# Patient Record
Sex: MALE | Race: WHITE | NOT HISPANIC OR LATINO | Employment: UNEMPLOYED | URBAN - METROPOLITAN AREA
[De-identification: names, ages, dates, MRNs, and addresses within clinical notes are randomized per-mention and may not be internally consistent; named-entity substitution may affect disease eponyms.]

---

## 2022-06-17 ENCOUNTER — HOSPITAL ENCOUNTER (INPATIENT)
Facility: HOSPITAL | Age: 57
LOS: 6 days | Discharge: LEFT BEFORE DISCHARGE | DRG: 301 | End: 2022-06-23
Attending: EMERGENCY MEDICINE | Admitting: EMERGENCY MEDICINE
Payer: MEDICAID

## 2022-06-17 DIAGNOSIS — I96 DRY GANGRENE: ICD-10-CM

## 2022-06-17 DIAGNOSIS — M79.672 LEFT FOOT PAIN: ICD-10-CM

## 2022-06-17 DIAGNOSIS — R07.9 CHEST PAIN: ICD-10-CM

## 2022-06-17 PROCEDURE — 12000002 HC ACUTE/MED SURGE SEMI-PRIVATE ROOM

## 2022-06-17 PROCEDURE — 99285 EMERGENCY DEPT VISIT HI MDM: CPT | Mod: 25

## 2022-06-18 PROBLEM — I16.0 HYPERTENSIVE URGENCY: Status: ACTIVE | Noted: 2022-06-18

## 2022-06-18 PROBLEM — I96 DRY GANGRENE: Status: ACTIVE | Noted: 2022-06-18

## 2022-06-18 PROBLEM — I73.9 PAD (PERIPHERAL ARTERY DISEASE): Status: ACTIVE | Noted: 2022-06-18

## 2022-06-18 LAB
ALBUMIN SERPL BCP-MCNC: 3.9 G/DL (ref 3.5–5.2)
ALP SERPL-CCNC: 102 U/L (ref 55–135)
ALT SERPL W/O P-5'-P-CCNC: 21 U/L (ref 10–44)
ANION GAP SERPL CALC-SCNC: 11 MMOL/L (ref 8–16)
AST SERPL-CCNC: 27 U/L (ref 10–40)
BASOPHILS # BLD AUTO: 0.07 K/UL (ref 0–0.2)
BASOPHILS NFR BLD: 0.7 % (ref 0–1.9)
BILIRUB SERPL-MCNC: 0.5 MG/DL (ref 0.1–1)
BILIRUB UR QL STRIP: NEGATIVE
BUN SERPL-MCNC: 20 MG/DL (ref 6–20)
CALCIUM SERPL-MCNC: 9.6 MG/DL (ref 8.7–10.5)
CHLORIDE SERPL-SCNC: 100 MMOL/L (ref 95–110)
CLARITY UR: CLEAR
CO2 SERPL-SCNC: 24 MMOL/L (ref 23–29)
COLOR UR: YELLOW
CREAT SERPL-MCNC: 0.9 MG/DL (ref 0.5–1.4)
CRP SERPL-MCNC: 11.5 MG/L (ref 0–8.2)
CTP QC/QA: YES
DIFFERENTIAL METHOD: ABNORMAL
EOSINOPHIL # BLD AUTO: 0.4 K/UL (ref 0–0.5)
EOSINOPHIL NFR BLD: 4 % (ref 0–8)
ERYTHROCYTE [DISTWIDTH] IN BLOOD BY AUTOMATED COUNT: 12.1 % (ref 11.5–14.5)
ERYTHROCYTE [SEDIMENTATION RATE] IN BLOOD BY WESTERGREN METHOD: 9 MM/HR (ref 0–10)
EST. GFR  (AFRICAN AMERICAN): >60 ML/MIN/1.73 M^2
EST. GFR  (NON AFRICAN AMERICAN): >60 ML/MIN/1.73 M^2
GLUCOSE SERPL-MCNC: 132 MG/DL (ref 70–110)
GLUCOSE UR QL STRIP: ABNORMAL
HCT VFR BLD AUTO: 46.2 % (ref 40–54)
HGB BLD-MCNC: 16.4 G/DL (ref 14–18)
HGB UR QL STRIP: NEGATIVE
IMM GRANULOCYTES # BLD AUTO: 0.02 K/UL (ref 0–0.04)
IMM GRANULOCYTES NFR BLD AUTO: 0.2 % (ref 0–0.5)
KETONES UR QL STRIP: NEGATIVE
LEUKOCYTE ESTERASE UR QL STRIP: NEGATIVE
LYMPHOCYTES # BLD AUTO: 2.8 K/UL (ref 1–4.8)
LYMPHOCYTES NFR BLD: 27 % (ref 18–48)
MCH RBC QN AUTO: 31.7 PG (ref 27–31)
MCHC RBC AUTO-ENTMCNC: 35.5 G/DL (ref 32–36)
MCV RBC AUTO: 89 FL (ref 82–98)
MONOCYTES # BLD AUTO: 1 K/UL (ref 0.3–1)
MONOCYTES NFR BLD: 9.4 % (ref 4–15)
NEUTROPHILS # BLD AUTO: 6.1 K/UL (ref 1.8–7.7)
NEUTROPHILS NFR BLD: 58.7 % (ref 38–73)
NITRITE UR QL STRIP: NEGATIVE
NRBC BLD-RTO: 0 /100 WBC
PH UR STRIP: 6 [PH] (ref 5–8)
PLATELET # BLD AUTO: 262 K/UL (ref 150–450)
PMV BLD AUTO: 9.8 FL (ref 9.2–12.9)
POTASSIUM SERPL-SCNC: 3.5 MMOL/L (ref 3.5–5.1)
PROT SERPL-MCNC: 8 G/DL (ref 6–8.4)
PROT UR QL STRIP: ABNORMAL
RBC # BLD AUTO: 5.17 M/UL (ref 4.6–6.2)
SARS-COV-2 RDRP RESP QL NAA+PROBE: NEGATIVE
SODIUM SERPL-SCNC: 135 MMOL/L (ref 136–145)
SP GR UR STRIP: 1.02 (ref 1–1.03)
URN SPEC COLLECT METH UR: ABNORMAL
UROBILINOGEN UR STRIP-ACNC: NEGATIVE EU/DL
WBC # BLD AUTO: 10.36 K/UL (ref 3.9–12.7)

## 2022-06-18 PROCEDURE — 99223 1ST HOSP IP/OBS HIGH 75: CPT | Mod: ,,, | Performed by: PODIATRIST

## 2022-06-18 PROCEDURE — 25000003 PHARM REV CODE 250: Performed by: EMERGENCY MEDICINE

## 2022-06-18 PROCEDURE — 85025 COMPLETE CBC W/AUTO DIFF WBC: CPT | Performed by: PHYSICIAN ASSISTANT

## 2022-06-18 PROCEDURE — 86140 C-REACTIVE PROTEIN: CPT | Performed by: PHYSICIAN ASSISTANT

## 2022-06-18 PROCEDURE — U0002 COVID-19 LAB TEST NON-CDC: HCPCS | Performed by: EMERGENCY MEDICINE

## 2022-06-18 PROCEDURE — 85652 RBC SED RATE AUTOMATED: CPT | Performed by: PHYSICIAN ASSISTANT

## 2022-06-18 PROCEDURE — 21400001 HC TELEMETRY ROOM

## 2022-06-18 PROCEDURE — 63600175 PHARM REV CODE 636 W HCPCS: Performed by: EMERGENCY MEDICINE

## 2022-06-18 PROCEDURE — 99223 PR INITIAL HOSPITAL CARE,LEVL III: ICD-10-PCS | Mod: ,,, | Performed by: PODIATRIST

## 2022-06-18 PROCEDURE — 80053 COMPREHEN METABOLIC PANEL: CPT | Performed by: PHYSICIAN ASSISTANT

## 2022-06-18 PROCEDURE — 81003 URINALYSIS AUTO W/O SCOPE: CPT | Performed by: PHYSICIAN ASSISTANT

## 2022-06-18 RX ORDER — ACETAMINOPHEN 325 MG/1
650 TABLET ORAL EVERY 4 HOURS PRN
Status: DISCONTINUED | OUTPATIENT
Start: 2022-06-18 | End: 2022-06-23 | Stop reason: HOSPADM

## 2022-06-18 RX ORDER — IBUPROFEN 200 MG
16 TABLET ORAL
Status: DISCONTINUED | OUTPATIENT
Start: 2022-06-18 | End: 2022-06-23 | Stop reason: HOSPADM

## 2022-06-18 RX ORDER — SODIUM CHLORIDE 0.9 % (FLUSH) 0.9 %
10 SYRINGE (ML) INJECTION EVERY 12 HOURS PRN
Status: DISCONTINUED | OUTPATIENT
Start: 2022-06-18 | End: 2022-06-23 | Stop reason: HOSPADM

## 2022-06-18 RX ORDER — METOPROLOL TARTRATE 50 MG/1
50 TABLET ORAL 2 TIMES DAILY
Status: DISCONTINUED | OUTPATIENT
Start: 2022-06-18 | End: 2022-06-18

## 2022-06-18 RX ORDER — LABETALOL HYDROCHLORIDE 5 MG/ML
10 INJECTION, SOLUTION INTRAVENOUS
Status: COMPLETED | OUTPATIENT
Start: 2022-06-18 | End: 2022-06-18

## 2022-06-18 RX ORDER — GLUCAGON 1 MG
1 KIT INJECTION
Status: DISCONTINUED | OUTPATIENT
Start: 2022-06-18 | End: 2022-06-23 | Stop reason: HOSPADM

## 2022-06-18 RX ORDER — ASPIRIN 325 MG
325 TABLET ORAL DAILY
Status: DISCONTINUED | OUTPATIENT
Start: 2022-06-18 | End: 2022-06-23 | Stop reason: HOSPADM

## 2022-06-18 RX ORDER — MORPHINE SULFATE 4 MG/ML
4 INJECTION, SOLUTION INTRAMUSCULAR; INTRAVENOUS EVERY 4 HOURS PRN
Status: DISCONTINUED | OUTPATIENT
Start: 2022-06-18 | End: 2022-06-23 | Stop reason: HOSPADM

## 2022-06-18 RX ORDER — ONDANSETRON 2 MG/ML
4 INJECTION INTRAMUSCULAR; INTRAVENOUS EVERY 8 HOURS PRN
Status: DISCONTINUED | OUTPATIENT
Start: 2022-06-18 | End: 2022-06-23 | Stop reason: HOSPADM

## 2022-06-18 RX ORDER — ATORVASTATIN CALCIUM 40 MG/1
80 TABLET, FILM COATED ORAL DAILY
Status: DISCONTINUED | OUTPATIENT
Start: 2022-06-18 | End: 2022-06-23 | Stop reason: HOSPADM

## 2022-06-18 RX ORDER — HYDRALAZINE HYDROCHLORIDE 20 MG/ML
10 INJECTION INTRAMUSCULAR; INTRAVENOUS EVERY 6 HOURS PRN
Status: DISCONTINUED | OUTPATIENT
Start: 2022-06-18 | End: 2022-06-19

## 2022-06-18 RX ORDER — IBUPROFEN 200 MG
24 TABLET ORAL
Status: DISCONTINUED | OUTPATIENT
Start: 2022-06-18 | End: 2022-06-23 | Stop reason: HOSPADM

## 2022-06-18 RX ORDER — ENOXAPARIN SODIUM 100 MG/ML
1 INJECTION SUBCUTANEOUS ONCE
Status: COMPLETED | OUTPATIENT
Start: 2022-06-18 | End: 2022-06-18

## 2022-06-18 RX ORDER — HYDROCODONE BITARTRATE AND ACETAMINOPHEN 5; 325 MG/1; MG/1
1 TABLET ORAL EVERY 6 HOURS PRN
Status: DISCONTINUED | OUTPATIENT
Start: 2022-06-18 | End: 2022-06-23 | Stop reason: HOSPADM

## 2022-06-18 RX ORDER — METOPROLOL TARTRATE 25 MG/1
25 TABLET, FILM COATED ORAL 2 TIMES DAILY
Status: DISCONTINUED | OUTPATIENT
Start: 2022-06-18 | End: 2022-06-23 | Stop reason: HOSPADM

## 2022-06-18 RX ORDER — GABAPENTIN 100 MG/1
100 CAPSULE ORAL 3 TIMES DAILY
Status: DISCONTINUED | OUTPATIENT
Start: 2022-06-18 | End: 2022-06-23 | Stop reason: HOSPADM

## 2022-06-18 RX ORDER — NALOXONE HCL 0.4 MG/ML
0.02 VIAL (ML) INJECTION
Status: DISCONTINUED | OUTPATIENT
Start: 2022-06-18 | End: 2022-06-23 | Stop reason: HOSPADM

## 2022-06-18 RX ORDER — ENOXAPARIN SODIUM 100 MG/ML
80 INJECTION SUBCUTANEOUS
Status: DISCONTINUED | OUTPATIENT
Start: 2022-06-18 | End: 2022-06-23 | Stop reason: HOSPADM

## 2022-06-18 RX ORDER — TALC
6 POWDER (GRAM) TOPICAL NIGHTLY PRN
Status: DISCONTINUED | OUTPATIENT
Start: 2022-06-18 | End: 2022-06-23 | Stop reason: HOSPADM

## 2022-06-18 RX ADMIN — GABAPENTIN 100 MG: 100 CAPSULE ORAL at 08:06

## 2022-06-18 RX ADMIN — METOPROLOL TARTRATE 25 MG: 25 TABLET, FILM COATED ORAL at 09:06

## 2022-06-18 RX ADMIN — METOPROLOL TARTRATE 25 MG: 25 TABLET, FILM COATED ORAL at 08:06

## 2022-06-18 RX ADMIN — GABAPENTIN 100 MG: 100 CAPSULE ORAL at 09:06

## 2022-06-18 RX ADMIN — ATORVASTATIN CALCIUM 80 MG: 40 TABLET, FILM COATED ORAL at 09:06

## 2022-06-18 RX ADMIN — MELATONIN TAB 3 MG 6 MG: 3 TAB at 08:06

## 2022-06-18 RX ADMIN — LABETALOL HYDROCHLORIDE 10 MG: 5 INJECTION, SOLUTION INTRAVENOUS at 03:06

## 2022-06-18 RX ADMIN — ASPIRIN 325 MG ORAL TABLET 325 MG: 325 PILL ORAL at 09:06

## 2022-06-18 RX ADMIN — GABAPENTIN 100 MG: 100 CAPSULE ORAL at 03:06

## 2022-06-18 RX ADMIN — ENOXAPARIN SODIUM 80 MG: 100 INJECTION SUBCUTANEOUS at 03:06

## 2022-06-18 RX ADMIN — ENOXAPARIN SODIUM 80 MG: 80 INJECTION SUBCUTANEOUS at 03:06

## 2022-06-18 NOTE — H&P
[facility]  [facility]  History & Physical    Patient Name: Yo Pink  MRN: [id number]  Patient Class: IP- Inpatient  Admission Date: 6/17/2022  Attending Physician: Juan Alberto Alas MD  Primary Care Provider: No primary care provider on file.         Patient information was obtained from patient, caregiver / friend, past medical records and ER records.     Subjective:     Principal Problem:Dry gangrene    Chief Complaint:     Chief Complaint   Patient presents with    Wound Check     Pt here with reports of wound to middle toe on left foot. Pt stated he was seen yesterday and needed to be admitted but had to leave AMA to take care of things at home.       HPI: 57 y.o. male PMHx CVA, ?PAD, tobacco abuse presents with toe pain greater on the right than the left x 1 week. He reports that the area is discolored and started after he stubbed his toe 2 weeks ago. Patient states the ulcer is worsening in color and increasing in size. He was seen yesterday for the same but left prior to final assessment to feed his cats.  Patient has not seen a doctor for this problem before. Symptoms associated with claudication.    ED course: Labs unremarkable. Foot xray shows No radiographic evidence of acute displaced fracture or aggressive cortical destruction    Incidentally, patient quit smoking 3 weeks ago. Case discussed with vascular surgery. Welcome podiatry input.  If patient needs to be transferred for further work, he is amenable.    Admitted to Hospital Medicine for further evaluation    History reviewed. No pertinent past medical history.  History reviewed. No pertinent surgical history.  Social History     Tobacco Use    Smoking status: Former Smoker    Smokeless tobacco: Never Used   Substance Use Topics    Alcohol use: Never    Drug use: Not Currently     History reviewed. No pertinent family history.  Review of patient's allergies indicates:  No Known Allergies    MEDICATION (includes  "but may not be exclusive to:)  Tumeric     Review of Systems as per HPI  ROS  Constitutional: Negative for activity change, appetite change, fatigue, diaphoresis, chills and fever.   Respiratory: Negative for apnea, choking, chest tightness and wheezing.    Gastrointestinal: Negative for abdominal distention, constipation, diarrhea and nausea.   Genitourinary: Negative for dysuria, flank pain, frequency and hematuria.   Skin: Negative for pallor. Positive color change,  rash and wound.   Neurological: Negative for light-headedness, numbness, dizziness and headaches.   Psychiatric/Behavioral: Negative for agitation, behavioral problems, confusion, decreased concentration and dysphoric mood.     All other systems reviewed and are negative.    Physical Exam     ED Triage Vitals [06/17/22 2206]   Enc Vitals Group      BP (!) 188/117      Pulse 87      Resp 18      Temp 97.6 °F (36.4 °C)      Temp src Oral      SpO2 97 %      Weight 178 lb      Height 5' 10"      Head Circumference       Peak Flow       Pain Score       Pain Loc       Pain Edu?       Excl. in GC?      Vitals:    06/17/22 2206 06/18/22 0307 06/18/22 0332   BP: (!) 188/117 (!) (P) 175/114 (!) 159/97   BP Location: Right arm  Right arm   Patient Position: Sitting  Lying   Pulse: 87     Resp: 18     Temp: 97.6 °F (36.4 °C)     TempSrc: Oral     SpO2: 97%     Weight: 80.7 kg (178 lb)     Height: 5' 10" (1.778 m)           Vital signs and nursing assessment noted: elevated bp    GEN:   NAD, A & Ox3, atraumatic, well appearing, nontoxic appearing  HEENT:  PERRLA, EOMI, moist membranes, nl conjunctiva, no scleral icterus, no nystagmus, no nodes/nodules, soft, supple, FROM, no trachial deviation, nexus negative  CV:   RRR no m/r/g, 1+ DP pulses, <3sec cap refill, no obvious JVD  RESP:  CTA B, no w/r/r, equal and bilateral chest rise, no respiratory distress  ABD:   soft, Nontender, Nondistended, +BS, no guarding/rebound  :   Deferred  BACK:  FROM, no midline " tenderness, no paraspinal tenderness  EXT:   FROM, MANNING x 4, no swelling, no edema, no calf tenderness, no bony tenderness, no warmth or redness, no crepitus, no obvious deformity  Physical Exam  Cardiovascular:      Pulses:           Dorsalis pedis pulses are 1+ on the right side and 1+ on the left side.   Musculoskeletal:      Right foot: Normal range of motion.      Left foot: Normal range of motion.   Feet:      Right foot:      Skin integrity: Ulcer, blister, skin breakdown, dry skin and fissure present.      Toenail Condition: Right toenails are abnormally thick.      Left foot:      Toenail Condition: Left toenails are abnormally thick.   Skin:     General: Skin is cool.      Capillary Refill: Capillary refill takes 2 to 3 seconds.      Coloration: Skin is mottled. Skin is not ashen, cyanotic, jaundiced, pale or sallow.      Findings: Wound present. No acne, burn, laceration, lesion or petechiae.     see media 6/16/22    LYMPH:  no gross adenopathy  NEURO:  GCS 15, CN II-XII grossly intact, no obvious motor/sensory deficit, no tremor, nl coordination  PSYCH:   no SI/HI, no anxiety, nl mood/affect, nl judgement/thought process         Tests   Significant Labs and/or Imaging: I have reviewed all pertinent results/findings within the past 24 hours.  Labs Reviewed   CBC W/ AUTO DIFFERENTIAL - Abnormal; Notable for the following components:       Result Value    MCH 31.7 (*)     All other components within normal limits   COMPREHENSIVE METABOLIC PANEL - Abnormal; Notable for the following components:    Sodium 135 (*)     Glucose 132 (*)     All other components within normal limits   URINALYSIS, REFLEX TO URINE CULTURE - Abnormal; Notable for the following components:    Protein, UA Trace (*)     Glucose, UA Trace (*)     All other components within normal limits    Narrative:     Specimen Source->Urine   C-REACTIVE PROTEIN - Abnormal; Notable for the following components:    CRP 11.5 (*)     All other components  within normal limits   SEDIMENTATION RATE   SARS-COV-2 RDRP GENE     X-Ray Foot Complete Left   Final Result      No radiographic evidence of acute displaced fracture or aggressive cortical destruction.  Further evaluation and follow-up as warranted.         Electronically signed by: Giovanny Dela Cruz MD   Date:    06/18/2022   Time:    00:22      US Lower Extrem Arteries Bilat with CAREY (xpd)    (Results Pending)     No results found for this or any previous visit.      Assessment/Plan:   * Dry gangrene  Consult podiatry.  Consult vascular to manage PAD      PAD (peripheral artery disease)  Cardiac monitoring, neuro checks, neurology consulted    Lovenox  Mechanical thrombectomy is NOT indicated for patient  Antithrombotic therapy with aspirin initiated within 48 hours of stroke onset  Lipid lowering with high intensity statin therapy. Check fasting lipid panel   Blood pressure reduction.  Behavioral and lifestyle changes including smoking cessation, exercise, weight reduction for patients with obesity, and a Mediterranean style diet      Hypertensive urgency  Patient provided a quiet room in which to rest. This may produce a fall in blood pressure =20/10 mmHg in approximately one-third of adults. If this is not effective, antihypertensive drugs may be given.    Goal is to lower the blood pressure to <160/<100 mmHg or to a level that is no more than 25 to 30 percent lower than the baseline blood pressure.  Thus, the short-term blood pressure target may need to be above 160/100 mmHg in patients who present with very high pressures, because cerebral or myocardial ischemia or infarction, or acute kidney injury, can be induced by rapid and aggressive antihypertensive therapy if the blood pressure falls below the range at which tissue perfusion can be maintained by autoregulation.  In the long-term as an outpatient, the blood pressure can then be reduced further.       '    VTE Risk Mitigation (From admission, onward)          Ordered     enoxaparin injection 80 mg  Every 12 hours (non-standard times)         06/18/22 0433     IP VTE LOW RISK PATIENT  Once         06/18/22 0433     Place sequential compression device  Until discontinued         06/18/22 0433                   Shalonda Larsen MD  Department of Hospital Medicine   [facility] - Emergency Dept

## 2022-06-18 NOTE — CARE UPDATE
Reviewed H and P by my colleague and agree with A and P. Patient presenting with R foot gangrene. Podiatry consulted. Will follow recs      English

## 2022-06-18 NOTE — ED TRIAGE NOTES
Pt reports wearing compression socks five days ago and reports having psoriasis/ dry skin and scratching L toe with metal spoon and awakened to discoloration to the third digit. Pt reports putting alcohol and neosporin on the site.

## 2022-06-18 NOTE — ED PROVIDER NOTES
Encounter Date: 6/17/2022    SCRIBE #1 NOTE: I, Keeley Osborne, am scribing for, and in the presence of,  Jim Hall MD. I have scribed the following portions of the note - Other sections scribed: HPI, ROS.       History     Chief Complaint   Patient presents with    Wound Check     Pt here with reports of wound to middle toe on left foot. Pt stated he was seen yesterday and needed to be admitted but had to leave AMA to take care of things at home.     57 year old male, with no pertinent medical history, presents to the ED to evaluate the wound to his third toe on his left foot that appeared 5 days ago. Patient states the ulcer is worsening in color and increasing in size. He came to the ED last night and was informed about having to stay the night, take antibiotics, and talk to podiatry. He left AMA last night to feed his cats. Patient returned today and reports that his legs feel tired whenever he walks and they turn blue bilaterally. He states he can still feel his toes. Patient denies diabetes. Patient denies fever, chills, or other associated symptoms.    The history is provided by the patient. No  was used.     Review of patient's allergies indicates:  No Known Allergies  History reviewed. No pertinent past medical history.  History reviewed. No pertinent surgical history.  History reviewed. No pertinent family history.  Social History     Tobacco Use    Smoking status: Former Smoker    Smokeless tobacco: Never Used   Substance Use Topics    Alcohol use: Never    Drug use: Not Currently     Review of Systems   Constitutional: Negative.    HENT: Negative.    Eyes: Negative.    Respiratory: Negative.    Cardiovascular: Negative.    Gastrointestinal: Negative.    Genitourinary: Negative.    Musculoskeletal: Negative.    Skin: Positive for color change (legs turn blue when walking, worsening wound color) and wound (third toe on left foot).   Neurological: Positive for weakness (legs  feel tired when walking).       Physical Exam     Initial Vitals [06/17/22 2206]   BP Pulse Resp Temp SpO2   (!) 188/117 87 18 97.6 °F (36.4 °C) 97 %      MAP       --         Physical Exam    Nursing note and vitals reviewed.  Constitutional: He appears well-developed and well-nourished. He is not diaphoretic. No distress.   HENT:   Head: Normocephalic and atraumatic.   Eyes: Pupils are equal, round, and reactive to light. Right eye exhibits no discharge. Left eye exhibits no discharge.   Neck: No tracheal deviation present.   Normal range of motion.  Cardiovascular: Normal rate and regular rhythm.   Pulmonary/Chest: No stridor. No respiratory distress.   Musculoskeletal:         General: Tenderness (Noted tenderness and discoloration to his left 3rd toe with significant discoloration of adjacent toes, 1+ DP pulses noted in the foot as compared to his right foot 2+ DP bounding pulses there.  Ulceration did on the plantar surface at the base of the 3rd ) present. No edema.      Cervical back: Normal range of motion.     Neurological: He is alert and oriented to person, place, and time.   Skin: Skin is warm and dry.         ED Course   Procedures  Labs Reviewed   CBC W/ AUTO DIFFERENTIAL - Abnormal; Notable for the following components:       Result Value    MCH 31.7 (*)     All other components within normal limits   COMPREHENSIVE METABOLIC PANEL - Abnormal; Notable for the following components:    Sodium 135 (*)     Glucose 132 (*)     All other components within normal limits   URINALYSIS, REFLEX TO URINE CULTURE - Abnormal; Notable for the following components:    Protein, UA Trace (*)     Glucose, UA Trace (*)     All other components within normal limits    Narrative:     Specimen Source->Urine   C-REACTIVE PROTEIN - Abnormal; Notable for the following components:    CRP 11.5 (*)     All other components within normal limits   SEDIMENTATION RATE   SARS-COV-2 RDRP GENE          Imaging Results          US Lower  Extremity Arteries Bilateral (Final result)  Result time 06/18/22 06:15:11   Procedure changed from US Lower Extrem Arteries Bilat with CAREY (xpd)     Final result by Kenton Thrasher MD (06/18/22 06:15:11)                 Impression:      No focal hemodynamically significant stenosis.    Multifocal abnormal waveform morphology of interrogated bilateral lower extremity arteries, which may relate to proximal stenosis and/or peripheral arterial disease.  Additional details of peak systolic velocities and waveform morphologies, as provided in the body of report.      Electronically signed by: Kenton Thrasher  Date:    06/18/2022  Time:    06:15             Narrative:    EXAMINATION:  US LOWER EXTREMITY ARTERIES BILATERAL    CLINICAL HISTORY:  Left foot pain;    TECHNIQUE:  Bilateral spectral, color and grayscale images of the large arteries of both lower extremities were performed.    COMPARISON:  None    FINDINGS:  Peak systolic velocities were obtained as follows (in cm/sec):    Right common femoral:  79.2, triphasic waveform    Right deep femoral:  100.9, triphasic waveform    Right superficial femoral proximal: 73.5, biphasic waveform    Right superficial femoral mid: 65.8, biphasic waveform    Right superficial femoral distal: 35.8, biphasic waveform    Right proximal popliteal:  28.2, biphasic waveform    Right distal popliteal:  25.3, biphasic waveform    Right anterior tibial:  26.2, biphasic waveform    Right peroneal:  29.5, biphasic waveform    Right posterior tibial:  66.8, biphasic waveform    Right dorsalis pedis: 19 biphasic waveform    Left common femoral:  66.3, triphasic waveform    Left deep femoral:  64.5, triphasic waveform    Left superficial femoral proximal: 47.9, triphasic waveform    Left superficial femoral mid: 29, monophasic waveform    Left superficial femoral distal: 21, monophasic waveform    Left proximal popliteal: 10.8, monophasic waveform    Left distal popliteal: 18, biphasic  waveform    Left anterior tibial:  7.9, low resistance waveform    Left peroneal:   17.8, low resistance waveform    Left posterior tibial: 10, monophasic waveform    Left dorsalis pedis: 6.1, monophasic waveform                               X-Ray Foot Complete Left (Final result)  Result time 06/18/22 00:22:40    Final result by Giovanny Dela Cruz MD (06/18/22 00:22:40)                 Impression:      No radiographic evidence of acute displaced fracture or aggressive cortical destruction.  Further evaluation and follow-up as warranted.      Electronically signed by: Giovanny Dela Cruz MD  Date:    06/18/2022  Time:    00:22             Narrative:    EXAMINATION:  XR FOOT COMPLETE 3 VIEW LEFT    CLINICAL HISTORY:  .  Pain in left foot    TECHNIQUE:  AP, lateral and oblique views of the left foot were performed.    COMPARISON:  Left foot radiograph series 06/16/2022    FINDINGS:  There is no radiographic evidence of acute displaced fracture.  Alignment appears within normal limits without evidence of dislocation.  The Lisfranc interval does not appear abnormally widened.  No aggressive cortical destruction or periosteal reaction appreciated.  Previously demonstrated punctate metallic density associated with the 4th digit PIP joint is not well appreciated on today's examination.                                 Medications   sodium chloride 0.9% flush 10 mL (has no administration in time range)   naloxone 0.4 mg/mL injection 0.02 mg (has no administration in time range)   glucose chewable tablet 16 g (has no administration in time range)   glucose chewable tablet 24 g (has no administration in time range)   glucagon (human recombinant) injection 1 mg (has no administration in time range)   sodium chloride 0.9% flush 10 mL (has no administration in time range)   dextrose 10% bolus 125 mL (has no administration in time range)   dextrose 10% bolus 250 mL (has no administration in time range)   enoxaparin injection 80 mg  (has no administration in time range)   ondansetron injection 4 mg (has no administration in time range)   morphine injection 4 mg (has no administration in time range)   HYDROcodone-acetaminophen 5-325 mg per tablet 1 tablet (has no administration in time range)   acetaminophen tablet 650 mg (has no administration in time range)   melatonin tablet 6 mg (has no administration in time range)   gabapentin capsule 100 mg (100 mg Oral Given 6/18/22 0915)   aspirin tablet 325 mg (325 mg Oral Given 6/18/22 0915)   atorvastatin tablet 80 mg (80 mg Oral Given 6/18/22 0915)   hydrALAZINE injection 10 mg (has no administration in time range)   metoprolol tartrate (LOPRESSOR) tablet 25 mg (25 mg Oral Given 6/18/22 0915)   labetaloL injection 10 mg (10 mg Intravenous Given 6/18/22 0327)   enoxaparin injection 80 mg (80 mg Subcutaneous Given 6/18/22 0346)     Medical Decision Making:   History:   Old Medical Records: I decided to obtain old medical records.  Clinical Tests:   Lab Tests: Ordered and Reviewed  Radiological Study: Ordered and Reviewed    MDM:    57-year-old male with past medical history as noted above presenting with ulceration of his left 3rd plantar portion of his toe.  Some discoloration of adjacent toes as well, poor pulses in his left foot.  Do not suspect acute complete occlusion however this appears to be peripheral arterial disease given his associated symptoms of claudication as well.  Arterial ultrasounds ordered and pending.  Patient will need podiatry and vascular surgery evaluation.  Will continue neuro checks, Lovenox x1 given, admitted in stable and improving condition.  Discussed diagnosis and further treatment with patient and family at bedside. All questions answered, patient transferred to floor improved and stable.          Scribe Attestation:   Scribe #1: I performed the above scribed service and the documentation accurately describes the services I performed. I attest to the accuracy of the  note.                 Clinical Impression:   Final diagnoses:  [M79.672] Left foot pain  [I96] Dry gangrene  [R07.9] Chest pain          ED Disposition Condition    Admit           I, Jim Hall M.D., personally performed the services described in this documentation. All medical record entries made by the scribe were at my direction and in my presence. I have reviewed the chart and agree that the record reflects my personal performance and is accurate and complete.     Jim Hall MD  06/18/22 [id number]

## 2022-06-18 NOTE — ASSESSMENT & PLAN NOTE
Cardiac monitoring, neuro checks, neurology consulted    Lovenox  Mechanical thrombectomy is NOT indicated for patient  Antithrombotic therapy with aspirin initiated within 48 hours of stroke onset  Lipid lowering with high intensity statin therapy. Check fasting lipid panel   Blood pressure reduction after the acute phase of ischemic stroke has passed  Behavioral and lifestyle changes including smoking cessation, exercise, weight reduction for patients with obesity, and a Mediterranean style diet

## 2022-06-18 NOTE — ASSESSMENT & PLAN NOTE
Patient provided a quiet room in which to rest. This may produce a fall in blood pressure =20/10 mmHg in approximately one-third of adults. If this is not effective, antihypertensive drugs may be given.    Goal is to lower the blood pressure to <160/<100 mmHg or to a level that is no more than 25 to 30 percent lower than the baseline blood pressure.  Thus, the short-term blood pressure target may need to be above 160/100 mmHg in patients who present with very high pressures, because cerebral or myocardial ischemia or infarction, or acute kidney injury, can be induced by rapid and aggressive antihypertensive therapy if the blood pressure falls below the range at which tissue perfusion can be maintained by autoregulation.  In the long-term as an outpatient, the blood pressure can then be reduced further.       '

## 2022-06-18 NOTE — CONSULTS
[place] - Telemetry  Podiatry  Consult Note    Patient Name: Yo Pink  MRN: [id number]  Admission Date: 6/17/2022  Hospital Length of Stay: 0 days  Attending Physician: Olvin Mortensen MD  Primary Care Provider: No primary care provider on file.     Consults  Subjective:     History of Present Illness: 58 y/o male PMH tobacco abuse admitted for left third toe ulceration. Patient reports wearing compression socks to sleep in several days ago. He then subsequently noticed his toes turning purple. Reports then soaking his feet in epsom salt.       Scheduled Meds:   aspirin  325 mg Oral Daily    atorvastatin  80 mg Oral Daily    enoxparin  80 mg Subcutaneous Q12H    gabapentin  100 mg Oral TID    metoprolol tartrate  25 mg Oral BID     Continuous Infusions:  PRN Meds:acetaminophen, dextrose 10%, dextrose 10%, glucagon (human recombinant), glucose, glucose, hydrALAZINE, HYDROcodone-acetaminophen, melatonin, morphine, naloxone, ondansetron, sodium chloride 0.9%, sodium chloride 0.9%    Review of patient's allergies indicates:  No Known Allergies     History reviewed. No pertinent past medical history.  History reviewed. No pertinent surgical history.    Family History    None       Tobacco Use    Smoking status: Former Smoker    Smokeless tobacco: Never Used   Substance and Sexual Activity    Alcohol use: Never    Drug use: Not Currently    Sexual activity: Not on file     Review of Systems   Constitutional: Positive for activity change.   Respiratory: Negative for shortness of breath.    Cardiovascular: Negative for chest pain and leg swelling.   Gastrointestinal: Negative for nausea and vomiting.   Musculoskeletal: Positive for arthralgias.   Neurological: Positive for numbness. Negative for weakness.     Objective:     Vital Signs (Most Recent):  Temp: 98.5 °F (36.9 °C) (06/18/22 1608)  Pulse: 63 (06/18/22 1608)  Resp: 18 (06/18/22 1608)  BP: (!) 157/86 (06/18/22 1608)  SpO2: 98 % (06/18/22 1608)  Vital Signs (24h Range):  Temp:  [97.6 °F (36.4 °C)-98.6 °F (37 °C)] 98.5 °F (36.9 °C)  Pulse:  [63-87] 63  Resp:  [18-20] 18  SpO2:  [96 %-98 %] 98 %  BP: (149-188)/() 157/86     Weight: 80.7 kg (178 lb)  Body mass index is 25.54 kg/m².    Foot Exam    General  Orientation: alert and oriented to person, place, and time       Right Foot/Ankle     Neurovascular  Dorsalis pedis: 1+  Posterior tibial: 1+      Left Foot/Ankle      Inspection and Palpation  Skin Exam: ulcer;     Neurovascular  Dorsalis pedis: 1+  Posterior tibial: 1+          6/18/22:  Left third toe dry stable gangrene           Right foot toes 1-5 ischemic appearance.     Right foot         Laboratory:  CBC:   Recent Labs   Lab 06/18/22  0103   WBC 10.36   RBC 5.17   HGB 16.4   HCT 46.2      MCV 89   MCH 31.7*   MCHC 35.5     CMP:   Recent Labs   Lab 06/18/22  0103   *   CALCIUM 9.6   ALBUMIN 3.9   PROT 8.0   *   K 3.5   CO2 24      BUN 20   CREATININE 0.9   ALKPHOS 102   ALT 21   AST 27   BILITOT 0.5       Diagnostic Results:  Xray:   X-Ray Foot Complete Left  Order: [id number]   Status: Final result     Visible to patient: No (inaccessible in Patient Portal)     Next appt: None     Dx: Left foot pain     0 Result Notes    Details    Reading Physician Reading Date Result Priority   Giovanny Dela Cruz MD  [phone] 6/18/2022 STAT     Narrative & Impression  EXAMINATION:  XR FOOT COMPLETE 3 VIEW LEFT     CLINICAL HISTORY:  .  Pain in left foot     TECHNIQUE:  AP, lateral and oblique views of the left foot were performed.     COMPARISON:  Left foot radiograph series 06/16/2022     FINDINGS:  There is no radiographic evidence of acute displaced fracture.  Alignment appears within normal limits without evidence of dislocation.  The Lisfranc interval does not appear abnormally widened.  No aggressive cortical destruction or periosteal reaction appreciated.  Previously demonstrated punctate metallic density  associated with the 4th digit PIP joint is not well appreciated on today's examination.     Impression:     No radiographic evidence of acute displaced fracture or aggressive cortical destruction.  Further evaluation and follow-up as warranted.        Electronically signed by: Giovanny Dela Cruz MD  Date:                                            06/18/2022  Time:                                           00:22             Exam Ended: 06/18/22 00:14 Last Resulted: 06/18/22 00:22        Order Details      View Encounter      Lab and Collection Details      Routing      Result History             Result Care Coordination        Patient Communication     Add Comments   Not seen Back to Top                   X-Ray Foot Complete Left: Patient Communication     Add Comments   Not seen       External Result Report    External Result Report       Narrative & Impression    EXAMINATION:  XR FOOT COMPLETE 3 VIEW LEFT     CLINICAL HISTORY:  .  Pain in left foot     TECHNIQUE:  AP, lateral and oblique views of the left foot were performed.     COMPARISON:  Left foot radiograph series 06/16/2022     FINDINGS:  There is no radiographic evidence of acute displaced fracture.  Alignment appears within normal limits without evidence of dislocation.  The Lisfranc interval does not appear abnormally widened.  No aggressive cortical destruction or periosteal reaction appreciated.  Previously demonstrated punctate metallic density associated with the 4th digit PIP joint is not well appreciated on today's examination.     Impression:     No radiographic evidence of acute displaced fracture or aggressive cortical destruction.  Further evaluation and follow-up as warranted.             [facility]in[facility] Lower Extremity Arteries Bilateral  Order: [id number]   Status: Final result     Visible to patient: No (inaccessible in Patient Portal)     Next appt: None     0 Result Notes    Details    Reading Physician Reading Date Result Priority   Kenton KUHN  MD Price  [phone] 6/18/2022 STAT     Narrative & Impression  EXAMINATION:  US LOWER EXTREMITY ARTERIES BILATERAL     CLINICAL HISTORY:  Left foot pain;     TECHNIQUE:  Bilateral spectral, color and grayscale images of the large arteries of both lower extremities were performed.     COMPARISON:  None     FINDINGS:  Peak systolic velocities were obtained as follows (in cm/sec):     Right common femoral:  79.2, triphasic waveform     Right deep femoral:  100.9, triphasic waveform     Right superficial femoral proximal: 73.5, biphasic waveform     Right superficial femoral mid: 65.8, biphasic waveform     Right superficial femoral distal: 35.8, biphasic waveform     Right proximal popliteal:  28.2, biphasic waveform     Right distal popliteal:  25.3, biphasic waveform     Right anterior tibial:  26.2, biphasic waveform     Right peroneal:  29.5, biphasic waveform     Right posterior tibial:  66.8, biphasic waveform     Right dorsalis pedis: 19 biphasic waveform     Left common femoral:  66.3, triphasic waveform     Left deep femoral:  64.5, triphasic waveform     Left superficial femoral proximal: 47.9, triphasic waveform     Left superficial femoral mid: 29, monophasic waveform     Left superficial femoral distal: 21, monophasic waveform     Left proximal popliteal: 10.8, monophasic waveform     Left distal popliteal: 18, biphasic waveform     Left anterior tibial:  7.9, low resistance waveform     Left peroneal:   17.8, low resistance waveform     Left posterior tibial: 10, monophasic waveform     Left dorsalis pedis: 6.1, monophasic waveform     Impression:     No focal hemodynamically significant stenosis.     Multifocal abnormal waveform morphology of interrogated bilateral lower extremity arteries, which may relate to proximal stenosis and/or peripheral arterial disease.  Additional details of peak systolic velocities and waveform morphologies, as provided in the body of  report.        Electronically signed by: Kenton Thrasher  Date:                                            06/18/2022  Time:                                                l Findings:    Assessment/Plan:     Active Diagnoses:    Diagnosis Date Noted POA    PRINCIPAL PROBLEM:  Dry gangrene [I96] 06/18/2022 Yes    Hypertensive urgency [I16.0] 06/18/2022 Yes    PAD (peripheral artery disease) [I73.9] 06/18/2022 Yes      Problems Resolved During this Admission:       Left third toe dry stable gangrene. -will follow demarcation   Right foot toes 1-5 ischemic changes  Vascular surgery consulted.   Nursing orders placed for dressing changes.   Podiatry will follow.     Thank you for your consult. I will follow-up with patient. Please contact us if you have any additional questions.    Mely Alonzo DPM  Podiatry  [facility] - [place] - Telemetry

## 2022-06-18 NOTE — ED NOTES
Pt lower digits purple and mottled in color. Sensation intact to bilateral lower extremities. Pt reports having PVD.

## 2022-06-18 NOTE — FIRST PROVIDER EVALUATION
"Medical screening exam completed.  I have conducted a focused provider triage encounter, findings are as follows:    Brief history of present illness:  57 y.o. male presents to the ED for wound to the left middle toe.    Vitals:    06/17/22 2206   BP: (!) 188/117   BP Location: Right arm   Patient Position: Sitting   Pulse: 87   Resp: 18   Temp: 97.6 °F (36.4 °C)   TempSrc: Oral   SpO2: 97%   Weight: 80.7 kg (178 lb)   Height: 5' 10" (1.778 m)       Pertinent physical exam:  Patient is nontoxic appearing and in no acute distress.      Brief workup plan:  Labs ordered    Preliminary workup initiated; this workup will be continued and followed by the physician or advanced practice provider that is assigned to the patient when roomed.  "

## 2022-06-19 PROCEDURE — 21400001 HC TELEMETRY ROOM

## 2022-06-19 PROCEDURE — 25000003 PHARM REV CODE 250: Performed by: EMERGENCY MEDICINE

## 2022-06-19 PROCEDURE — 63600175 PHARM REV CODE 636 W HCPCS: Performed by: EMERGENCY MEDICINE

## 2022-06-19 PROCEDURE — 63600175 PHARM REV CODE 636 W HCPCS: Performed by: INTERNAL MEDICINE

## 2022-06-19 RX ORDER — HYDRALAZINE HYDROCHLORIDE 20 MG/ML
10 INJECTION INTRAMUSCULAR; INTRAVENOUS EVERY 6 HOURS PRN
Status: DISCONTINUED | OUTPATIENT
Start: 2022-06-19 | End: 2022-06-23 | Stop reason: HOSPADM

## 2022-06-19 RX ORDER — HYDRALAZINE HYDROCHLORIDE 20 MG/ML
10 INJECTION INTRAMUSCULAR; INTRAVENOUS EVERY 6 HOURS PRN
Status: DISCONTINUED | OUTPATIENT
Start: 2022-06-19 | End: 2022-06-19

## 2022-06-19 RX ADMIN — ENOXAPARIN SODIUM 80 MG: 80 INJECTION SUBCUTANEOUS at 04:06

## 2022-06-19 RX ADMIN — GABAPENTIN 100 MG: 100 CAPSULE ORAL at 08:06

## 2022-06-19 RX ADMIN — METOPROLOL TARTRATE 25 MG: 25 TABLET, FILM COATED ORAL at 08:06

## 2022-06-19 RX ADMIN — ASPIRIN 325 MG ORAL TABLET 325 MG: 325 PILL ORAL at 08:06

## 2022-06-19 RX ADMIN — MELATONIN TAB 3 MG 6 MG: 3 TAB at 08:06

## 2022-06-19 RX ADMIN — HYDRALAZINE HYDROCHLORIDE 10 MG: 20 INJECTION INTRAMUSCULAR; INTRAVENOUS at 05:06

## 2022-06-19 RX ADMIN — ENOXAPARIN SODIUM 80 MG: 80 INJECTION SUBCUTANEOUS at 05:06

## 2022-06-19 RX ADMIN — GABAPENTIN 100 MG: 100 CAPSULE ORAL at 02:06

## 2022-06-19 RX ADMIN — HYDROCODONE BITARTRATE AND ACETAMINOPHEN 1 TABLET: 5; 325 TABLET ORAL at 09:06

## 2022-06-19 RX ADMIN — ATORVASTATIN CALCIUM 80 MG: 40 TABLET, FILM COATED ORAL at 08:06

## 2022-06-19 NOTE — SUBJECTIVE & OBJECTIVE
Interval History: No new issues       Review of Systems   Constitutional:  Negative for activity change and appetite change.   HENT:  Negative for congestion and dental problem.    Eyes:  Negative for discharge and itching.   Respiratory:  Negative for apnea and chest tightness.    Cardiovascular:  Negative for chest pain.   Gastrointestinal:  Negative for abdominal distention.   Endocrine: Negative for cold intolerance.   Genitourinary:  Negative for difficulty urinating.   Musculoskeletal:  Negative for arthralgias.   Allergic/Immunologic: Negative for environmental allergies.   Neurological:  Negative for dizziness.   Psychiatric/Behavioral:  Negative for agitation and behavioral problems.    Objective:     Vital Signs (Most Recent):  Temp: 98.4 °F (36.9 °C) (06/19/22 0703)  Pulse: 70 (06/19/22 0703)  Resp: 20 (06/19/22 0703)  BP: (!) 164/97 (06/19/22 0703)  SpO2: 95 % (06/19/22 0703)   Vital Signs (24h Range):  Temp:  [97.6 °F (36.4 °C)-98.9 °F (37.2 °C)] 98.4 °F (36.9 °C)  Pulse:  [63-71] 70  Resp:  [18-20] 20  SpO2:  [95 %-98 %] 95 %  BP: (149-180)/(74-97) 164/97     Weight: 80.7 kg (178 lb)  Body mass index is 25.54 kg/m².  No intake or output data in the 24 hours ending 06/19/22 0941   Physical Exam  Vitals and nursing note reviewed.   Constitutional:       General: He is not in acute distress.     Appearance: Normal appearance. He is normal weight. He is not ill-appearing, toxic-appearing or diaphoretic.   HENT:      Head: Normocephalic and atraumatic.   Eyes:      Conjunctiva/sclera: Conjunctivae normal.   Cardiovascular:      Rate and Rhythm: Normal rate and regular rhythm.   Pulmonary:      Effort: Pulmonary effort is normal. No respiratory distress.   Skin:     General: Skin is warm and dry.   Neurological:      Mental Status: He is alert and oriented to person, place, and time.   Psychiatric:         Mood and Affect: Mood normal.         Behavior: Behavior normal.         Thought Content: Thought  content normal.       Significant Labs: All pertinent labs within the past 24 hours have been reviewed.  BMP:   Recent Labs   Lab 06/18/22  0103   *   *   K 3.5      CO2 24   BUN 20   CREATININE 0.9   CALCIUM 9.6     CBC:   Recent Labs   Lab 06/18/22 0103   WBC 10.36   HGB 16.4   HCT 46.2          Significant Imaging:

## 2022-06-19 NOTE — PROGRESS NOTES
[facility] Medicine  Progress Note    Patient Name: Yo Pink  MRN: [id number]  Patient Class: IP- Inpatient   Admission Date: 6/17/2022  Length of Stay: 1 days  Attending Physician: Olvin Mortensen MD  Primary Care Provider: No primary care provider on file.        Subjective:     Principal Problem:Dry gangrene        HPI:  No notes on file    Overview/Hospital Course:  Patient admitted with gangrene of L #3 toe. Vascular and podiatry were consulted       Interval History: No new issues       Review of Systems   Constitutional:  Negative for activity change and appetite change.   HENT:  Negative for congestion and dental problem.    Eyes:  Negative for discharge and itching.   Respiratory:  Negative for apnea and chest tightness.    Cardiovascular:  Negative for chest pain.   Gastrointestinal:  Negative for abdominal distention.   Endocrine: Negative for cold intolerance.   Genitourinary:  Negative for difficulty urinating.   Musculoskeletal:  Negative for arthralgias.   Allergic/Immunologic: Negative for environmental allergies.   Neurological:  Negative for dizziness.   Psychiatric/Behavioral:  Negative for agitation and behavioral problems.    Objective:     Vital Signs (Most Recent):  Temp: 98.4 °F (36.9 °C) (06/19/22 0703)  Pulse: 70 (06/19/22 0703)  Resp: 20 (06/19/22 0703)  BP: (!) 164/97 (06/19/22 0703)  SpO2: 95 % (06/19/22 0703)   Vital Signs (24h Range):  Temp:  [97.6 °F (36.4 °C)-98.9 °F (37.2 °C)] 98.4 °F (36.9 °C)  Pulse:  [63-71] 70  Resp:  [18-20] 20  SpO2:  [95 %-98 %] 95 %  BP: (149-180)/(74-97) 164/97     Weight: 80.7 kg (178 lb)  Body mass index is 25.54 kg/m².  No intake or output data in the 24 hours ending 06/19/22 0941   Physical Exam  Vitals and nursing note reviewed.   Constitutional:       General: He is not in acute distress.     Appearance: Normal appearance. He is normal weight. He is not ill-appearing, toxic-appearing or diaphoretic.   HENT:      Head:  Normocephalic and atraumatic.   Eyes:      Conjunctiva/sclera: Conjunctivae normal.   Cardiovascular:      Rate and Rhythm: Normal rate and regular rhythm.   Pulmonary:      Effort: Pulmonary effort is normal. No respiratory distress.   Skin:     General: Skin is warm and dry.   Neurological:      Mental Status: He is alert and oriented to person, place, and time.   Psychiatric:         Mood and Affect: Mood normal.         Behavior: Behavior normal.         Thought Content: Thought content normal.       Significant Labs: All pertinent labs within the past 24 hours have been reviewed.  BMP:   Recent Labs   Lab 06/18/22  0103   *   *   K 3.5      CO2 24   BUN 20   CREATININE 0.9   CALCIUM 9.6     CBC:   Recent Labs   Lab 06/18/22 0103   WBC 10.36   HGB 16.4   HCT 46.2          Significant Imaging:       Assessment/Plan:      * Dry gangrene  Consult podiatry.  Consult vascular to manage PAD  Continue Abx for now.      PAD (peripheral artery disease)  Cardiac monitoring, neuro checks, neurology consulted    Lovenox  Mechanical thrombectomy is NOT indicated for patient  Antithrombotic therapy with aspirin initiated within 48 hours of stroke onset  Lipid lowering with high intensity statin therapy. Check fasting lipid panel   Blood pressure reduction after the acute phase of ischemic stroke has passed  Behavioral and lifestyle changes including smoking cessation, exercise, weight reduction for patients with obesity, and a Mediterranean style diet      Hypertensive urgency  Patient provided a quiet room in which to rest. This may produce a fall in blood pressure =20/10 mmHg in approximately one-third of adults. If this is not effective, antihypertensive drugs may be given.    Goal is to lower the blood pressure to <160/<100 mmHg or to a level that is no more than 25 to 30 percent lower than the baseline blood pressure.  Thus, the short-term blood pressure target may need to be above 160/100 mmHg  in patients who present with very high pressures, because cerebral or myocardial ischemia or infarction, or acute kidney injury, can be induced by rapid and aggressive antihypertensive therapy if the blood pressure falls below the range at which tissue perfusion can be maintained by autoregulation.  In the long-term as an outpatient, the blood pressure can then be reduced further.     Resolved     '        VTE Risk Mitigation (From admission, onward)         Ordered     enoxaparin injection 80 mg  Every 12 hours (non-standard times)         06/18/22 0433     IP VTE LOW RISK PATIENT  Once         06/18/22 0433     Place sequential compression device  Until discontinued         06/18/22 0433                Discharge Planning   RAPHAEL:      Code Status: Full Code   Is the patient medically ready for discharge?:     Reason for patient still in hospital (select all that apply): Patient unstable                     Olvin Girard MD  Department of Hospital Medicine   [facility]

## 2022-06-19 NOTE — NURSING
PER handoff received from MAMIE Swartz     Pt resting in bed quietly. NAD noted. No c/o pain.  Fall and safety precautions maintained. Bed alarm activated and audible.. Bed locked in lowest position, with side rails up x2. Call bell and personal items within reach

## 2022-06-19 NOTE — ASSESSMENT & PLAN NOTE
Patient provided a quiet room in which to rest. This may produce a fall in blood pressure =20/10 mmHg in approximately one-third of adults. If this is not effective, antihypertensive drugs may be given.    Goal is to lower the blood pressure to <160/<100 mmHg or to a level that is no more than 25 to 30 percent lower than the baseline blood pressure.  Thus, the short-term blood pressure target may need to be above 160/100 mmHg in patients who present with very high pressures, because cerebral or myocardial ischemia or infarction, or acute kidney injury, can be induced by rapid and aggressive antihypertensive therapy if the blood pressure falls below the range at which tissue perfusion can be maintained by autoregulation.  In the long-term as an outpatient, the blood pressure can then be reduced further.     Resolved     '

## 2022-06-19 NOTE — CONSULTS
Thank you for your consult to [facility]. We have reviewed the patient chart. This patient does meet criteria for [facility] service at this time. Will assume care on 06/20/22 at 6AM     Cassie Mancuso MD  [facility]

## 2022-06-19 NOTE — PLAN OF CARE
[facility] - [place] - Telemetry  Initial Discharge Assessment       Primary Care Provider: [facility]    Admission Diagnosis: Left foot pain [M79.672]  Dry gangrene [I96]  Chest pain [R07.9]    Admission Date: 6/17/2022  Expected Discharge Date:     Discharge Barriers Identified: None    Payor: MEDICAID / Plan: [employer] [facility] ([employer]) / Product Type: Managed Medicaid /     Extended Emergency Contact Information  Primary Emergency Contact: Ana Bangura  Work Phone: [phone]  Relation: Friend  Secondary Emergency Contact: KHLOE VINSON  Mobile Phone: [phone]  Relation: Friend   needed? No    Discharge Plan A: Home  Discharge Plan B:  (tbd)      [id number] - [place] - [address] AT [facility] [address]  [address]  [place] [address]  Phone: [phone] Fax: [phone]      Initial Assessment (most recent)     Adult Discharge Assessment - 06/19/22 1519        Discharge Assessment    Assessment Type Discharge Planning Assessment     Confirmed/corrected address, phone number and insurance Yes     Confirmed Demographics Correct on Facesheet     Source of Information patient     Communicated RAPHAEL with patient/caregiver Date not available/Unable to determine     Reason For Admission Dry gangrene     Lives With alone     Do you have help at home or someone to help you manage your care at home? Yes     Who are your caregiver(s) and their phone number(s)? Khloe Vinson (friend) [phone]     Prior to hospitilization cognitive status: Alert/Oriented     Current cognitive status: Alert/Oriented     Walking or Climbing Stairs Difficulty none     Dressing/Bathing Difficulty none     Equipment Currently Used at Home none     Readmission within 30 days? No     Do you currently have service(s) that help you manage your care at home? No     Do you take prescription medications? Yes     Do you have prescription coverage? Yes     Coverage FRANC      Do you have any problems affording any of your prescribed medications? No     Who is going to help you get home at discharge? frined and neighbors     How do you get to doctors appointments? car, drives self     Discharge Plan A Home     Discharge Plan B --   tbd    DME Needed Upon Discharge  --   tbd    Discharge Plan discussed with: Patient     Discharge Barriers Identified None                 from home alone; independent in care;     Denies needs at this time

## 2022-06-19 NOTE — HOSPITAL COURSE
Patient admitted with gangrene of L #3 toe. Vascular and podiatry were consulted     Patient signed out AMA

## 2022-06-20 LAB
ANION GAP SERPL CALC-SCNC: 10 MMOL/L (ref 8–16)
BUN SERPL-MCNC: 15 MG/DL (ref 6–20)
CALCIUM SERPL-MCNC: 9.1 MG/DL (ref 8.7–10.5)
CHLORIDE SERPL-SCNC: 100 MMOL/L (ref 95–110)
CO2 SERPL-SCNC: 25 MMOL/L (ref 23–29)
CREAT SERPL-MCNC: 0.8 MG/DL (ref 0.5–1.4)
EST. GFR  (AFRICAN AMERICAN): >60 ML/MIN/1.73 M^2
EST. GFR  (NON AFRICAN AMERICAN): >60 ML/MIN/1.73 M^2
GLUCOSE SERPL-MCNC: 108 MG/DL (ref 70–110)
POTASSIUM SERPL-SCNC: 3.4 MMOL/L (ref 3.5–5.1)
SODIUM SERPL-SCNC: 135 MMOL/L (ref 136–145)

## 2022-06-20 PROCEDURE — 63600175 PHARM REV CODE 636 W HCPCS: Performed by: INTERNAL MEDICINE

## 2022-06-20 PROCEDURE — 25000003 PHARM REV CODE 250: Performed by: STUDENT IN AN ORGANIZED HEALTH CARE EDUCATION/TRAINING PROGRAM

## 2022-06-20 PROCEDURE — 99223 1ST HOSP IP/OBS HIGH 75: CPT | Mod: ,,, | Performed by: SURGERY

## 2022-06-20 PROCEDURE — 99223 1ST HOSP IP/OBS HIGH 75: CPT | Mod: ,,, | Performed by: INTERNAL MEDICINE

## 2022-06-20 PROCEDURE — 63600175 PHARM REV CODE 636 W HCPCS: Performed by: EMERGENCY MEDICINE

## 2022-06-20 PROCEDURE — 36415 COLL VENOUS BLD VENIPUNCTURE: CPT | Performed by: INTERNAL MEDICINE

## 2022-06-20 PROCEDURE — 99223 PR INITIAL HOSPITAL CARE,LEVL III: ICD-10-PCS | Mod: ,,, | Performed by: SURGERY

## 2022-06-20 PROCEDURE — 80048 BASIC METABOLIC PNL TOTAL CA: CPT | Performed by: INTERNAL MEDICINE

## 2022-06-20 PROCEDURE — 25000003 PHARM REV CODE 250: Performed by: SURGERY

## 2022-06-20 PROCEDURE — 25000003 PHARM REV CODE 250: Performed by: EMERGENCY MEDICINE

## 2022-06-20 PROCEDURE — 21400001 HC TELEMETRY ROOM

## 2022-06-20 PROCEDURE — 99223 PR INITIAL HOSPITAL CARE,LEVL III: ICD-10-PCS | Mod: ,,, | Performed by: INTERNAL MEDICINE

## 2022-06-20 RX ORDER — CLOPIDOGREL BISULFATE 75 MG/1
75 TABLET ORAL DAILY
Status: DISCONTINUED | OUTPATIENT
Start: 2022-06-20 | End: 2022-06-23 | Stop reason: HOSPADM

## 2022-06-20 RX ORDER — LISINOPRIL 5 MG/1
10 TABLET ORAL DAILY
Status: DISCONTINUED | OUTPATIENT
Start: 2022-06-20 | End: 2022-06-23 | Stop reason: HOSPADM

## 2022-06-20 RX ADMIN — ASPIRIN 325 MG ORAL TABLET 325 MG: 325 PILL ORAL at 08:06

## 2022-06-20 RX ADMIN — ATORVASTATIN CALCIUM 80 MG: 40 TABLET, FILM COATED ORAL at 08:06

## 2022-06-20 RX ADMIN — MELATONIN TAB 3 MG 6 MG: 3 TAB at 09:06

## 2022-06-20 RX ADMIN — GABAPENTIN 100 MG: 100 CAPSULE ORAL at 08:06

## 2022-06-20 RX ADMIN — METOPROLOL TARTRATE 25 MG: 25 TABLET, FILM COATED ORAL at 09:06

## 2022-06-20 RX ADMIN — CLOPIDOGREL 75 MG: 75 TABLET, FILM COATED ORAL at 11:06

## 2022-06-20 RX ADMIN — HYDRALAZINE HYDROCHLORIDE 10 MG: 20 INJECTION INTRAMUSCULAR; INTRAVENOUS at 02:06

## 2022-06-20 RX ADMIN — LISINOPRIL 10 MG: 5 TABLET ORAL at 02:06

## 2022-06-20 RX ADMIN — METOPROLOL TARTRATE 25 MG: 25 TABLET, FILM COATED ORAL at 08:06

## 2022-06-20 RX ADMIN — ENOXAPARIN SODIUM 80 MG: 80 INJECTION SUBCUTANEOUS at 04:06

## 2022-06-20 RX ADMIN — GABAPENTIN 100 MG: 100 CAPSULE ORAL at 09:06

## 2022-06-20 RX ADMIN — GABAPENTIN 100 MG: 100 CAPSULE ORAL at 02:06

## 2022-06-20 NOTE — HPI
"57M with h/o CVA, ?PAD, tobacco abuse (quit about 10 days ago) admitted 6/`7 with R toe pain x 1 week. Reports toe started to have some bluish/black discoloration that has progressed since he stubbed it about 2 weeks ago. Also noticed some blue-sebastián coloring to right toes. Says he bumps his toes all the time. Has been soaking in epsom salt. Denies f/c. Denies recent abx use.     Was seen by podiatry. Concerned for L third dry gangrene and ischemic changes to R toes. Recommended vascular consult.       [place]-Multifocal abnormal waveform morphology of interrogated bilateral lower extremity arteries, which may relate to proximal stenosis and/or peripheral arterial disease.     No systemic signs of infection    Antibiotics have been held    ID consulted for "gangrene of toe, podiatry/vascular consulted  "

## 2022-06-20 NOTE — CONSULTS
[facility]  Vascular Surgery  Consult Note    Inpatient consult to Vascular Surgery  Consult performed by: Mehul Rich MD  Consult ordered by: Shalonda Larsen MD        Subjective:     Chief Complaint/Reason for Admission: L toe wound    History of Present Illness:             Mehul Rich MD [facility]                         06/20/2022    HPI:  Yo Pink is a 57 y.o. male with   Patient Active Problem List   Diagnosis    Dry gangrene    Hypertensive urgency    PAD (peripheral artery disease)    being managed by PCP and specialists who is here today for evaluation of BLE discoloration and L toe wound.  Patient states location is L toe occurring for weeks.  Associated signs and symptoms include discoloration.  Quality is dull and severity is 8/10.  Symptoms began weeks ago.  Alleviating factors include wound care.  Worsening factors include pressure.    no MI  no Stroke  Tobacco use: recently quit    History reviewed. No pertinent past medical history.  History reviewed. No pertinent surgical history.  History reviewed. No pertinent family history.  Social History     Socioeconomic History    Marital status: Single   Tobacco Use    Smoking status: Former Smoker    Smokeless tobacco: Never Used   Substance and Sexual Activity    Alcohol use: Never    Drug use: Not Currently       Current Facility-Administered Medications:     acetaminophen tablet 650 mg, 650 mg, Oral, Q4H PRN, Shalonda Larsen MD    aspirin tablet 325 mg, 325 mg, Oral, Daily, Shalonda Larsen MD, 325 mg at 06/20/22 0849    atorvastatin tablet 80 mg, 80 mg, Oral, Daily, Shalonda Larsen MD, 80 mg at 06/20/22 0849    clopidogreL tablet 75 mg, 75 mg, Oral, Daily, Mehul Rich MD, 75 mg at 06/20/22 1102    dextrose 10% bolus 125 mL, 12.5 g, Intravenous, PRN, Shalonda Larsen MD    dextrose 10% bolus 250 mL, 25 g, Intravenous, PRN, Shalonda  MD Chava    enoxaparin injection 80 mg, 80 mg, Subcutaneous, Q12H, Shalonda Larsen MD, 80 mg at 06/20/22 1610    gabapentin capsule 100 mg, 100 mg, Oral, TID, Shalonda Larsen MD, 100 mg at 06/20/22 1428    glucagon (human recombinant) injection 1 mg, 1 mg, Intramuscular, PRN, Shalonda Larsen MD    glucose chewable tablet 16 g, 16 g, Oral, PRN, Shalonda Larsen MD    glucose chewable tablet 24 g, 24 g, Oral, PRN, Shalonda Larsen MD    hydrALAZINE injection 10 mg, 10 mg, Intravenous, Q6H PRN, Olvin Mortensen MD, 10 mg at 06/20/22 1428    HYDROcodone-acetaminophen 5-325 mg per tablet 1 tablet, 1 tablet, Oral, Q6H PRN, Shalonda Larsen MD, 1 tablet at 06/19/22 2153    lisinopriL tablet 10 mg, 10 mg, Oral, Daily, Cassie Mancuso MD, 10 mg at 06/20/22 1428    melatonin tablet 6 mg, 6 mg, Oral, Nightly PRN, Shalonda Larsen MD, 6 mg at 06/19/22 2038    metoprolol tartrate (LOPRESSOR) tablet 25 mg, 25 mg, Oral, BID, Shalonda Larsen MD, 25 mg at 06/20/22 0849    morphine injection 4 mg, 4 mg, Intravenous, Q4H PRN, Shalonda Larsen MD    naloxone 0.4 mg/mL injection 0.02 mg, 0.02 mg, Intravenous, PRN, Shalonda Larsen MD    ondansetron injection 4 mg, 4 mg, Intravenous, Q8H PRN, Shalonda Larsen MD    sodium chloride 0.9% flush 10 mL, 10 mL, Intravenous, Q12H PRN, Shalonda Larsen MD    sodium chloride 0.9% flush 10 mL, 10 mL, Intravenous, Q12H PRN, Shalonda Larsen MD        No medications prior to admission.       Review of patient's allergies indicates:  No Known Allergies    History reviewed. No pertinent past medical history.  History reviewed. No pertinent surgical history.  Family History    None       Tobacco Use    Smoking status: Former Smoker    Smokeless tobacco: Never Used   Substance and Sexual Activity    Alcohol use: Never    Drug use: Not Currently    Sexual activity: Not on file     Review of Systems   Constitutional:  Negative for  chills.   HENT:  Negative for congestion.    Eyes:  Negative for visual disturbance.   Respiratory:  Negative for shortness of breath.    Cardiovascular:  Negative for chest pain.   Gastrointestinal:  Negative for abdominal distention.   Endocrine: Negative for cold intolerance.   Genitourinary:  Negative for flank pain.   Musculoskeletal:  Negative for back pain.   Skin:  Positive for color change and wound. Negative for pallor and rash.   Allergic/Immunologic: Negative for immunocompromised state.   Neurological:  Negative for dizziness.   Hematological:  Does not bruise/bleed easily.   Psychiatric/Behavioral:  Negative for agitation.    Objective:     Vital Signs (Most Recent):  Temp: 97.7 °F (36.5 °C) (06/20/22 1505)  Pulse: 83 (06/20/22 1505)  Resp: 18 (06/20/22 1505)  BP: (!) 172/104 (reported to RN) (06/20/22 1505)  SpO2: 95 % (06/20/22 1505)   Vital Signs (24h Range):  Temp:  [97.7 °F (36.5 °C)-98.9 °F (37.2 °C)] 97.7 °F (36.5 °C)  Pulse:  [69-83] 83  Resp:  [16-20] 18  SpO2:  [94 %-97 %] 95 %  BP: (163-189)/() 172/104     Weight: 80.7 kg (178 lb)  Body mass index is 25.54 kg/m².    Physical Exam  Vitals reviewed.   Constitutional:       General: He is not in acute distress.     Appearance: He is well-developed. He is not diaphoretic.   HENT:      Head: Normocephalic and atraumatic.   Eyes:      Conjunctiva/sclera: Conjunctivae normal.   Cardiovascular:      Rate and Rhythm: Normal rate.      Pulses:           Femoral pulses are 2+ on the right side and 2+ on the left side.       Dorsalis pedis pulses are detected w/ Doppler on the right side and detected w/ Doppler on the left side.        Posterior tibial pulses are detected w/ Doppler on the right side and detected w/ Doppler on the left side.   Pulmonary:      Effort: Pulmonary effort is normal.   Abdominal:      General: There is no distension.      Palpations: Abdomen is soft. There is no mass.      Tenderness: There is no abdominal tenderness.  There is no guarding or rebound.      Hernia: No hernia is present.   Musculoskeletal:         General: No deformity. Normal range of motion.      Cervical back: Neck supple.   Feet:      Left foot:      Skin integrity: Ulcer and skin breakdown present. No erythema.   Skin:     Findings: No rash.   Neurological:      Mental Status: He is alert and oriented to person, place, and time.       Significant Labs:  All pertinent labs from the last 24 hours have been reviewed.    Significant Diagnostics:  I have reviewed all pertinent imaging results/findings within the past 24 hours.    Assessment/Plan:     * Dry gangrene  -rec urgent LLE angiogram, possible intervention as soon as possible.    -ASA, Plavix  -cont smoking cessation  -wound care, offloading and glycemic control          Thank you for your consult. I will follow-up with patient. Please contact us if you have any additional questions.    Mehul Rich MD  Vascular Surgery  [facility] - Telemetry

## 2022-06-20 NOTE — ASSESSMENT & PLAN NOTE
"57M with h/o CVA, ?PAD, tobacco abuse admitted 6/`7 with R toe pain and b/l toe discoloration. No fever or systemic signs of infection. Was seen by podiatry. Concerned for L third dry gangrene and ischemic changes to R toes. Recommended vascular consul, pending. [place]-Multifocal abnormal waveform morphology of interrogated bilateral lower extremity arteries, which may relate to proximal stenosis and/or peripheral arterial disease. Antibiotics have been held. ID consulted for "gangrene of toe, podiatry/vascular consulted    Agree pt likely has dry gangrene. Antibiotics unlikely to be beneficial    Recommendations:   - agree with holding antibiotics. low threshold for starting if any increase in drainage or systemic signs of infection  - needs adequate perfusion to heal wounds  - if amputation done, please send prox margin for path/culture and let infectious disease know  - counseled on need for tobacco cessation    "

## 2022-06-20 NOTE — SUBJECTIVE & OBJECTIVE
History reviewed. No pertinent past medical history.    History reviewed. No pertinent surgical history.  Social History     Socioeconomic History    Marital status: Single   Tobacco Use    Smoking status: Former Smoker    Smokeless tobacco: Never Used   Substance and Sexual Activity    Alcohol use: Never    Drug use: Not Currently     The patient has a family history of  Noncontributory    No current facility-administered medications on file prior to encounter.     No current outpatient medications on file prior to encounter.         Review of Systems   Constitutional:  Negative for activity change and appetite change.   HENT:  Negative for congestion and dental problem.    Eyes:  Negative for discharge and itching.   Respiratory:  Negative for apnea and chest tightness.    Cardiovascular:  Negative for chest pain.   Gastrointestinal:  Negative for abdominal distention.   Endocrine: Negative for cold intolerance.   Genitourinary:  Negative for difficulty urinating.   Musculoskeletal:  Negative for arthralgias.   Allergic/Immunologic: Negative for environmental allergies.   Neurological:  Negative for dizziness.   Psychiatric/Behavioral:  Negative for agitation and behavioral problems.    Objective:     Vital Signs (Most Recent):  Temp: 98.5 °F (36.9 °C) (06/20/22 1217)  Pulse: 72 (06/20/22 1219)  Resp: 16 (06/20/22 1219)  BP: (!) 182/109 (reported to RN) (06/20/22 1219)  SpO2: 97 % (06/20/22 1219)   Vital Signs (24h Range):  Temp:  [97.7 °F (36.5 °C)-98.9 °F (37.2 °C)] 98.5 °F (36.9 °C)  Pulse:  [69-83] 72  Resp:  [16-20] 16  SpO2:  [94 %-97 %] 97 %  BP: (163-189)/() 182/109     Weight: 80.7 kg (178 lb)  Body mass index is 25.54 kg/m².    Intake/Output Summary (Last 24 hours) at 6/20/2022 1415  Last data filed at 6/20/2022 0910  Gross per 24 hour   Intake 240 ml   Output --   Net 240 ml        Physical Exam  Vitals and nursing note reviewed.   Constitutional:       General: He is not in acute distress.      Appearance: Normal appearance. He is normal weight. He is not ill-appearing, toxic-appearing or diaphoretic.   HENT:      Head: Normocephalic and atraumatic.   Eyes:      Conjunctiva/sclera: Conjunctivae normal.   Cardiovascular:      Rate and Rhythm: Normal rate and regular rhythm.   Pulmonary:      Effort: Pulmonary effort is normal. No respiratory distress.      Breath sounds: Normal breath sounds.   Abdominal:      General: There is no distension.      Palpations: Abdomen is soft.   Skin:     General: Skin is warm and dry.      Comments: Ischemic L toe. No drainage. Blue-sebastián coloration to right toes. Palpable pulses in b/l feet   Neurological:      Mental Status: He is alert and oriented to person, place, and time.   Psychiatric:         Mood and Affect: Mood normal.         Behavior: Behavior normal.         Thought Content: Thought content normal.                     Significant Labs: All pertinent labs within the past 24 hours have been reviewed.  BMP:   Recent Labs   Lab 06/20/22  0347      *   K 3.4*      CO2 25   BUN 15   CREATININE 0.8   CALCIUM 9.1       CBC:   No results for input(s): WBC, HGB, HCT, PLT in the last 48 hours.      Significant Imaging:

## 2022-06-20 NOTE — SUBJECTIVE & OBJECTIVE
No medications prior to admission.       Review of patient's allergies indicates:  No Known Allergies    History reviewed. No pertinent past medical history.  History reviewed. No pertinent surgical history.  Family History    None       Tobacco Use    Smoking status: Former Smoker    Smokeless tobacco: Never Used   Substance and Sexual Activity    Alcohol use: Never    Drug use: Not Currently    Sexual activity: Not on file     Review of Systems   Constitutional:  Negative for chills.   HENT:  Negative for congestion.    Eyes:  Negative for visual disturbance.   Respiratory:  Negative for shortness of breath.    Cardiovascular:  Negative for chest pain.   Gastrointestinal:  Negative for abdominal distention.   Endocrine: Negative for cold intolerance.   Genitourinary:  Negative for flank pain.   Musculoskeletal:  Negative for back pain.   Skin:  Positive for color change and wound. Negative for pallor and rash.   Allergic/Immunologic: Negative for immunocompromised state.   Neurological:  Negative for dizziness.   Hematological:  Does not bruise/bleed easily.   Psychiatric/Behavioral:  Negative for agitation.    Objective:     Vital Signs (Most Recent):  Temp: 97.7 °F (36.5 °C) (06/20/22 1505)  Pulse: 83 (06/20/22 1505)  Resp: 18 (06/20/22 1505)  BP: (!) 172/104 (reported to RN) (06/20/22 1505)  SpO2: 95 % (06/20/22 1505)   Vital Signs (24h Range):  Temp:  [97.7 °F (36.5 °C)-98.9 °F (37.2 °C)] 97.7 °F (36.5 °C)  Pulse:  [69-83] 83  Resp:  [16-20] 18  SpO2:  [94 %-97 %] 95 %  BP: (163-189)/() 172/104     Weight: 80.7 kg (178 lb)  Body mass index is 25.54 kg/m².    Physical Exam  Vitals reviewed.   Constitutional:       General: He is not in acute distress.     Appearance: He is well-developed. He is not diaphoretic.   HENT:      Head: Normocephalic and atraumatic.   Eyes:      Conjunctiva/sclera: Conjunctivae normal.   Cardiovascular:      Rate and Rhythm: Normal rate.      Pulses:           Femoral pulses are  2+ on the right side and 2+ on the left side.       Dorsalis pedis pulses are detected w/ Doppler on the right side and detected w/ Doppler on the left side.        Posterior tibial pulses are detected w/ Doppler on the right side and detected w/ Doppler on the left side.   Pulmonary:      Effort: Pulmonary effort is normal.   Abdominal:      General: There is no distension.      Palpations: Abdomen is soft. There is no mass.      Tenderness: There is no abdominal tenderness. There is no guarding or rebound.      Hernia: No hernia is present.   Musculoskeletal:         General: No deformity. Normal range of motion.      Cervical back: Neck supple.   Feet:      Left foot:      Skin integrity: Ulcer and skin breakdown present. No erythema.   Skin:     Findings: No rash.   Neurological:      Mental Status: He is alert and oriented to person, place, and time.       Significant Labs:  All pertinent labs from the last 24 hours have been reviewed.    Significant Diagnostics:  I have reviewed all pertinent imaging results/findings within the past 24 hours.

## 2022-06-20 NOTE — NURSING
PER handoff given to MAMIE Sweeney     Pt resting in bed quietly. NAD noted. Fall and safety precautions maintained. Bed alarm activated and audible.. Bed locked in lowest position, with side rails up x2. Call bell and personal items within reach

## 2022-06-20 NOTE — ASSESSMENT & PLAN NOTE
-rec urgent LLE angiogram, possible intervention as soon as possible.    -ASA, Plavix  -cont smoking cessation  -wound care, offloading and glycemic control

## 2022-06-20 NOTE — CONSULTS
"[facility]  Infectious Disease  Consult Note    Patient Name: Yo Pink  MRN: [id number]  Admission Date: 6/17/2022  Hospital Length of Stay: 2 days  Attending Physician: Cassie Mancuso MD  Primary Care Provider: [facility]     Isolation Status: No active isolations    Patient information was obtained from patient and ER records.      Inpatient consult to Infectious Diseases  Consult performed by: Ranjana Dumont MD  Consult ordered by: Cassie Mancuso MD        Assessment/Plan:     * Dry gangrene  57M with h/o CVA, ?PAD, tobacco abuse admitted 6/`7 with R toe pain and b/l toe discoloration. No fever or systemic signs of infection. Was seen by podiatry. Concerned for L third dry gangrene and ischemic changes to R toes. Recommended vascular consul, pending. [place]-Multifocal abnormal waveform morphology of interrogated bilateral lower extremity arteries, which may relate to proximal stenosis and/or peripheral arterial disease. Antibiotics have been held. ID consulted for "gangrene of toe, podiatry/vascular consulted    Agree pt likely has dry gangrene. Antibiotics unlikely to be beneficial    Recommendations:   - agree with holding antibiotics. low threshold for starting if any increase in drainage or systemic signs of infection  - needs adequate perfusion to heal wounds  - if amputation done, please send prox margin for path/culture and let infectious disease know  - counseled on need for tobacco cessation          Thank you for your consult. I will sign off. Please contact us if you have any additional questions.    Ranjana Dumont MD  Infectious Disease  [facility] [place] [facility] [place]    Subjective:     Principal Problem: Dry gangrene    HPI: 57M with h/o CVA, ?PAD, tobacco abuse (quit about 10 days ago) admitted 6/`7 with R toe pain x 1 week. Reports toe started to have some bluish/black discoloration that has progressed since he stubbed it about 2 weeks ago. Also noticed some blue-sebastián " "coloring to right toes. Says he bumps his toes all the time. Has been soaking in epsom salt. Denies f/c. Denies recent abx use.     Was seen by podiatry. Concerned for L third dry gangrene and ischemic changes to R toes. Recommended vascular consult.       [place]-Multifocal abnormal waveform morphology of interrogated bilateral lower extremity arteries, which may relate to proximal stenosis and/or peripheral arterial disease.     No systemic signs of infection    Antibiotics have been held    ID consulted for "gangrene of toe, podiatry/vascular consulted      History reviewed. No pertinent past medical history.    History reviewed. No pertinent surgical history.  Social History     Socioeconomic History    Marital status: Single   Tobacco Use    Smoking status: Former Smoker    Smokeless tobacco: Never Used   Substance and Sexual Activity    Alcohol use: Never    Drug use: Not Currently     The patient has a family history of  Noncontributory    No current facility-administered medications on file prior to encounter.     No current outpatient medications on file prior to encounter.         Review of Systems   Constitutional:  Negative for activity change and appetite change.   HENT:  Negative for congestion and dental problem.    Eyes:  Negative for discharge and itching.   Respiratory:  Negative for apnea and chest tightness.    Cardiovascular:  Negative for chest pain.   Gastrointestinal:  Negative for abdominal distention.   Endocrine: Negative for cold intolerance.   Genitourinary:  Negative for difficulty urinating.   Musculoskeletal:  Negative for arthralgias.   Allergic/Immunologic: Negative for environmental allergies.   Neurological:  Negative for dizziness.   Psychiatric/Behavioral:  Negative for agitation and behavioral problems.    Objective:     Vital Signs (Most Recent):  Temp: 98.5 °F (36.9 °C) (06/20/22 1217)  Pulse: 72 (06/20/22 1219)  Resp: 16 (06/20/22 1219)  BP: (!) 182/109 (reported to RN) " (06/20/22 1219)  SpO2: 97 % (06/20/22 1219)   Vital Signs (24h Range):  Temp:  [97.7 °F (36.5 °C)-98.9 °F (37.2 °C)] 98.5 °F (36.9 °C)  Pulse:  [69-83] 72  Resp:  [16-20] 16  SpO2:  [94 %-97 %] 97 %  BP: (163-189)/() 182/109     Weight: 80.7 kg (178 lb)  Body mass index is 25.54 kg/m².    Intake/Output Summary (Last 24 hours) at 6/20/2022 1415  Last data filed at 6/20/2022 0910  Gross per 24 hour   Intake 240 ml   Output --   Net 240 ml        Physical Exam  Vitals and nursing note reviewed.   Constitutional:       General: He is not in acute distress.     Appearance: Normal appearance. He is normal weight. He is not ill-appearing, toxic-appearing or diaphoretic.   HENT:      Head: Normocephalic and atraumatic.   Eyes:      Conjunctiva/sclera: Conjunctivae normal.   Cardiovascular:      Rate and Rhythm: Normal rate and regular rhythm.   Pulmonary:      Effort: Pulmonary effort is normal. No respiratory distress.      Breath sounds: Normal breath sounds.   Abdominal:      General: There is no distension.      Palpations: Abdomen is soft.   Skin:     General: Skin is warm and dry.      Comments: Ischemic L toe. No drainage. Blue-sebastián coloration to right toes. Palpable pulses in b/l feet   Neurological:      Mental Status: He is alert and oriented to person, place, and time.   Psychiatric:         Mood and Affect: Mood normal.         Behavior: Behavior normal.         Thought Content: Thought content normal.                     Significant Labs: All pertinent labs within the past 24 hours have been reviewed.  BMP:   Recent Labs   Lab 06/20/22  0347      *   K 3.4*      CO2 25   BUN 15   CREATININE 0.8   CALCIUM 9.1       CBC:   No results for input(s): WBC, HGB, HCT, PLT in the last 48 hours.      Significant Imaging:

## 2022-06-20 NOTE — HPI
Mehul Rich MD [facility]                         06/20/2022    HPI:  Yo Pink is a 57 y.o. male with   Patient Active Problem List   Diagnosis    Dry gangrene    Hypertensive urgency    PAD (peripheral artery disease)    being managed by PCP and specialists who is here today for evaluation of BLE discoloration and L toe wound.  Patient states location is L toe occurring for weeks.  Associated signs and symptoms include discoloration.  Quality is dull and severity is 8/10.  Symptoms began weeks ago.  Alleviating factors include wound care.  Worsening factors include pressure.    no MI  no Stroke  Tobacco use: recently quit    History reviewed. No pertinent past medical history.  History reviewed. No pertinent surgical history.  History reviewed. No pertinent family history.  Social History     Socioeconomic History    Marital status: Single   Tobacco Use    Smoking status: Former Smoker    Smokeless tobacco: Never Used   Substance and Sexual Activity    Alcohol use: Never    Drug use: Not Currently       Current Facility-Administered Medications:     acetaminophen tablet 650 mg, 650 mg, Oral, Q4H PRN, Shalonda Larsen MD    aspirin tablet 325 mg, 325 mg, Oral, Daily, Shalonda Larsen MD, 325 mg at 06/20/22 0849    atorvastatin tablet 80 mg, 80 mg, Oral, Daily, Shalonda Larsen MD, 80 mg at 06/20/22 0849    clopidogreL tablet 75 mg, 75 mg, Oral, Daily, Mehul Rich MD, 75 mg at 06/20/22 1102    dextrose 10% bolus 125 mL, 12.5 g, Intravenous, PRN, Shalonda Larsen MD    dextrose 10% bolus 250 mL, 25 g, Intravenous, PRN, Shalonda Larsen MD    enoxaparin injection 80 mg, 80 mg, Subcutaneous, Q12H, Shalonda Larsen MD, 80 mg at 06/20/22 1610    gabapentin capsule 100 mg, 100 mg, Oral, TID, Shalonda Larsen MD, 100 mg at 06/20/22 1428    glucagon (human recombinant) injection 1 mg, 1 mg, Intramuscular, PRN, Shalonda Larsen,  MD    glucose chewable tablet 16 g, 16 g, Oral, PRN, Shalonda Larsen MD    glucose chewable tablet 24 g, 24 g, Oral, PRN, Shalonda Larsen MD    hydrALAZINE injection 10 mg, 10 mg, Intravenous, Q6H PRN, Olvin Mortensen MD, 10 mg at 06/20/22 1428    HYDROcodone-acetaminophen 5-325 mg per tablet 1 tablet, 1 tablet, Oral, Q6H PRN, Shalonda Larsen MD, 1 tablet at 06/19/22 2153    lisinopriL tablet 10 mg, 10 mg, Oral, Daily, Cassie Manucso MD, 10 mg at 06/20/22 1428    melatonin tablet 6 mg, 6 mg, Oral, Nightly PRN, Shalonda Larsen MD, 6 mg at 06/19/22 2038    metoprolol tartrate (LOPRESSOR) tablet 25 mg, 25 mg, Oral, BID, Shalonda Larsen MD, 25 mg at 06/20/22 0849    morphine injection 4 mg, 4 mg, Intravenous, Q4H PRN, Shalonda Larsen MD    naloxone 0.4 mg/mL injection 0.02 mg, 0.02 mg, Intravenous, PRN, Shalonda Larsen MD    ondansetron injection 4 mg, 4 mg, Intravenous, Q8H PRN, Shalonda Larsen MD    sodium chloride 0.9% flush 10 mL, 10 mL, Intravenous, Q12H PRN, Shalonda Larsen MD    sodium chloride 0.9% flush 10 mL, 10 mL, Intravenous, Q12H PRN, Shalonda Larsen MD

## 2022-06-21 LAB
LEFT ABI: 0.73
LEFT ARM BP: 169 MMHG
LEFT DORSALIS PEDIS: 124 MMHG
LEFT POSTERIOR TIBIAL: 111 MMHG
LEFT TBI: 0.53
LEFT TOE PRESSURE: 91 MMHG
RIGHT ABI: 1.2
RIGHT ARM BP: 171 MMHG
RIGHT DORSALIS PEDIS: 195 MMHG
RIGHT POSTERIOR TIBIAL: 206 MMHG
RIGHT TBI: 0.7
RIGHT TOE PRESSURE: 119 MMHG

## 2022-06-21 PROCEDURE — 99233 SBSQ HOSP IP/OBS HIGH 50: CPT | Mod: ,,, | Performed by: SURGERY

## 2022-06-21 PROCEDURE — 25000003 PHARM REV CODE 250: Performed by: STUDENT IN AN ORGANIZED HEALTH CARE EDUCATION/TRAINING PROGRAM

## 2022-06-21 PROCEDURE — 25000003 PHARM REV CODE 250: Performed by: EMERGENCY MEDICINE

## 2022-06-21 PROCEDURE — 99233 PR SUBSEQUENT HOSPITAL CARE,LEVL III: ICD-10-PCS | Mod: ,,, | Performed by: SURGERY

## 2022-06-21 PROCEDURE — 63600175 PHARM REV CODE 636 W HCPCS: Performed by: EMERGENCY MEDICINE

## 2022-06-21 PROCEDURE — 99232 PR SUBSEQUENT HOSPITAL CARE,LEVL II: ICD-10-PCS | Mod: ,,, | Performed by: PODIATRIST

## 2022-06-21 PROCEDURE — 99232 SBSQ HOSP IP/OBS MODERATE 35: CPT | Mod: ,,, | Performed by: PODIATRIST

## 2022-06-21 PROCEDURE — 25000003 PHARM REV CODE 250: Performed by: SURGERY

## 2022-06-21 PROCEDURE — 21400001 HC TELEMETRY ROOM

## 2022-06-21 RX ADMIN — ATORVASTATIN CALCIUM 80 MG: 40 TABLET, FILM COATED ORAL at 09:06

## 2022-06-21 RX ADMIN — HYDROCODONE BITARTRATE AND ACETAMINOPHEN 1 TABLET: 5; 325 TABLET ORAL at 01:06

## 2022-06-21 RX ADMIN — ENOXAPARIN SODIUM 80 MG: 80 INJECTION SUBCUTANEOUS at 03:06

## 2022-06-21 RX ADMIN — CLOPIDOGREL 75 MG: 75 TABLET, FILM COATED ORAL at 09:06

## 2022-06-21 RX ADMIN — ASPIRIN 325 MG ORAL TABLET 325 MG: 325 PILL ORAL at 09:06

## 2022-06-21 RX ADMIN — GABAPENTIN 100 MG: 100 CAPSULE ORAL at 03:06

## 2022-06-21 RX ADMIN — ENOXAPARIN SODIUM 80 MG: 80 INJECTION SUBCUTANEOUS at 05:06

## 2022-06-21 RX ADMIN — LISINOPRIL 10 MG: 5 TABLET ORAL at 09:06

## 2022-06-21 RX ADMIN — GABAPENTIN 100 MG: 100 CAPSULE ORAL at 09:06

## 2022-06-21 RX ADMIN — GABAPENTIN 100 MG: 100 CAPSULE ORAL at 08:06

## 2022-06-21 NOTE — SUBJECTIVE & OBJECTIVE
Interval History: ID and vascular surgery consulted, vascular recommending revascularization of LLE      Review of Systems   Constitutional:  Negative for activity change and appetite change.   HENT:  Negative for congestion and dental problem.    Eyes:  Negative for discharge and itching.   Respiratory:  Negative for apnea and chest tightness.    Cardiovascular:  Negative for chest pain.   Gastrointestinal:  Negative for abdominal distention.   Endocrine: Negative for cold intolerance.   Genitourinary:  Negative for difficulty urinating.   Musculoskeletal:  Negative for arthralgias.   Allergic/Immunologic: Negative for environmental allergies.   Neurological:  Negative for dizziness.   Psychiatric/Behavioral:  Negative for agitation and behavioral problems.    Objective:     Vital Signs (Most Recent):  Temp: 97.9 °F (36.6 °C) (06/20/22 2115)  Pulse: 80 (06/20/22 2115)  Resp: 18 (06/20/22 2115)  BP: (!) 141/74 (06/20/22 2115)  SpO2: 95 % (06/20/22 2115)   Vital Signs (24h Range):  Temp:  [97.7 °F (36.5 °C)-98.9 °F (37.2 °C)] 97.9 °F (36.6 °C)  Pulse:  [72-83] 80  Resp:  [16-20] 18  SpO2:  [94 %-97 %] 95 %  BP: (141-189)/() 141/74     Weight: 80.7 kg (178 lb)  Body mass index is 25.54 kg/m².    Intake/Output Summary (Last 24 hours) at 6/20/2022 2131  Last data filed at 6/20/2022 1730  Gross per 24 hour   Intake 480 ml   Output --   Net 480 ml        Physical Exam  Vitals and nursing note reviewed.   Constitutional:       General: He is not in acute distress.     Appearance: Normal appearance. He is normal weight. He is not ill-appearing, toxic-appearing or diaphoretic.   HENT:      Head: Normocephalic and atraumatic.   Eyes:      General: No scleral icterus.     Conjunctiva/sclera: Conjunctivae normal.   Cardiovascular:      Rate and Rhythm: Normal rate and regular rhythm.   Pulmonary:      Effort: Pulmonary effort is normal. No respiratory distress.   Skin:     Comments: See pictures of gangrene of left toe    Neurological:      General: No focal deficit present.      Mental Status: He is alert and oriented to person, place, and time.   Psychiatric:         Mood and Affect: Mood normal.         Behavior: Behavior normal.         Thought Content: Thought content normal.       Significant Labs: All pertinent labs within the past 24 hours have been reviewed.  BMP:   Recent Labs   Lab 06/20/22  0347      *   K 3.4*      CO2 25   BUN 15   CREATININE 0.8   CALCIUM 9.1       CBC:   No results for input(s): WBC, HGB, HCT, PLT in the last 48 hours.      Significant Imaging:

## 2022-06-21 NOTE — NURSING
Bedside report given to day nurse. Pt has no sign of distress. Safety precautions are maintained. Chart check completed.

## 2022-06-21 NOTE — PLAN OF CARE
Problem: Adult Inpatient Plan of Care  Goal: Plan of Care Review  Outcome: Ongoing, Progressing  Flowsheets (Taken 6/21/2022 0457)  Plan of Care Reviewed With: patient  Goal: Patient-Specific Goal (Individualized)  Outcome: Ongoing, Progressing  Goal: Absence of Hospital-Acquired Illness or Injury  Outcome: Ongoing, Progressing  Intervention: Identify and Manage Fall Risk  Flowsheets (Taken 6/21/2022 0457)  Safety Promotion/Fall Prevention:   assistive device/personal item within reach   side rails raised x 2  Intervention: Prevent Skin Injury  Flowsheets (Taken 6/21/2022 0457)  Body Position: position changed independently  Skin Protection: adhesive use limited  Intervention: Prevent and Manage VTE (Venous Thromboembolism) Risk  Flowsheets (Taken 6/21/2022 0457)  Activity Management: Ambulated to bathroom - L4  VTE Prevention/Management:   bleeding precations maintained   bleeding risk assessed  Intervention: Prevent Infection  Flowsheets (Taken 6/21/2022 0457)  Infection Prevention: single patient room provided  Goal: Optimal Comfort and Wellbeing  Outcome: Ongoing, Progressing  Intervention: Provide Person-Centered Care  Flowsheets (Taken 6/21/2022 0457)  Trust Relationship/Rapport:   care explained   choices provided   emotional support provided   empathic listening provided  Goal: Readiness for Transition of Care  Outcome: Ongoing, Progressing

## 2022-06-21 NOTE — PROGRESS NOTES
[facility]  [facility]  Progress Note    Patient Name: Yo Pink  MRN: [id number]  Admission Date: 6/17/2022  Primary Care Provider: [facility]    Subjective:     Interval History: No new problems today    Post-Op Info:  * No surgery found *         No medications prior to admission.       Review of patient's allergies indicates:  No Known Allergies    History reviewed. No pertinent past medical history.  History reviewed. No pertinent surgical history.  Family History    None       Tobacco Use    Smoking status: Former Smoker    Smokeless tobacco: Never Used   Substance and Sexual Activity    Alcohol use: Never    Drug use: Not Currently    Sexual activity: Not on file     Review of Systems   Constitutional:  Negative for chills.   HENT:  Negative for congestion.    Eyes:  Negative for visual disturbance.   Respiratory:  Negative for shortness of breath.    Cardiovascular:  Negative for chest pain.   Gastrointestinal:  Negative for abdominal distention.   Endocrine: Negative for cold intolerance.   Genitourinary:  Negative for flank pain.   Musculoskeletal:  Negative for back pain.   Skin:  Positive for color change and wound. Negative for pallor and rash.   Allergic/Immunologic: Negative for immunocompromised state.   Neurological:  Negative for dizziness.   Hematological:  Does not bruise/bleed easily.   Psychiatric/Behavioral:  Negative for agitation.    Objective:     Vital Signs (Most Recent):  Temp: 97.6 °F (36.4 °C) (06/21/22 1121)  Pulse: 79 (06/21/22 1121)  Resp: 17 (06/21/22 1121)  BP: 129/81 (06/21/22 1121)  SpO2: 99 % (06/21/22 1121)   Vital Signs (24h Range):  Temp:  [97.6 °F (36.4 °C)-97.9 °F (36.6 °C)] 97.6 °F (36.4 °C)  Pulse:  [59-83] 79  Resp:  [17-18] 17  SpO2:  [94 %-99 %] 99 %  BP: (107-172)/() 129/81     Weight: 80.7 kg (178 lb)  Body mass index is 25.54 kg/m².    Physical Exam  Vitals reviewed.   Constitutional:       General: He is not in acute  distress.     Appearance: He is well-developed. He is not diaphoretic.   HENT:      Head: Normocephalic and atraumatic.   Eyes:      Conjunctiva/sclera: Conjunctivae normal.   Cardiovascular:      Rate and Rhythm: Normal rate.      Pulses:           Femoral pulses are 2+ on the right side and 2+ on the left side.       Dorsalis pedis pulses are detected w/ Doppler on the right side and detected w/ Doppler on the left side.        Posterior tibial pulses are detected w/ Doppler on the right side and detected w/ Doppler on the left side.   Pulmonary:      Effort: Pulmonary effort is normal.   Abdominal:      General: There is no distension.      Palpations: Abdomen is soft. There is no mass.      Tenderness: There is no abdominal tenderness. There is no guarding or rebound.      Hernia: No hernia is present.   Musculoskeletal:         General: No deformity. Normal range of motion.      Cervical back: Neck supple.   Feet:      Left foot:      Skin integrity: Ulcer and skin breakdown present. No erythema.   Skin:     Findings: No rash.   Neurological:      Mental Status: He is alert and oriented to person, place, and time.       Significant Labs:  All pertinent labs from the last 24 hours have been reviewed.    Significant Diagnostics:  I have reviewed all pertinent imaging results/findings within the past 24 hours.    Assessment/Plan:     * Dry gangrene  -rec urgent LLE angiogram, possible intervention as soon as possible, pending availability.    -ASA, Plavix  -cont smoking cessation  -wound care, offloading and glycemic control          Mehul Rich MD  Vascular Surgery  [facility] - Telemetry

## 2022-06-21 NOTE — SUBJECTIVE & OBJECTIVE
No medications prior to admission.       Review of patient's allergies indicates:  No Known Allergies    History reviewed. No pertinent past medical history.  History reviewed. No pertinent surgical history.  Family History    None       Tobacco Use    Smoking status: Former Smoker    Smokeless tobacco: Never Used   Substance and Sexual Activity    Alcohol use: Never    Drug use: Not Currently    Sexual activity: Not on file     Review of Systems   Constitutional:  Negative for chills.   HENT:  Negative for congestion.    Eyes:  Negative for visual disturbance.   Respiratory:  Negative for shortness of breath.    Cardiovascular:  Negative for chest pain.   Gastrointestinal:  Negative for abdominal distention.   Endocrine: Negative for cold intolerance.   Genitourinary:  Negative for flank pain.   Musculoskeletal:  Negative for back pain.   Skin:  Positive for color change and wound. Negative for pallor and rash.   Allergic/Immunologic: Negative for immunocompromised state.   Neurological:  Negative for dizziness.   Hematological:  Does not bruise/bleed easily.   Psychiatric/Behavioral:  Negative for agitation.    Objective:     Vital Signs (Most Recent):  Temp: 97.6 °F (36.4 °C) (06/21/22 1121)  Pulse: 79 (06/21/22 1121)  Resp: 17 (06/21/22 1121)  BP: 129/81 (06/21/22 1121)  SpO2: 99 % (06/21/22 1121)   Vital Signs (24h Range):  Temp:  [97.6 °F (36.4 °C)-97.9 °F (36.6 °C)] 97.6 °F (36.4 °C)  Pulse:  [59-83] 79  Resp:  [17-18] 17  SpO2:  [94 %-99 %] 99 %  BP: (107-172)/() 129/81     Weight: 80.7 kg (178 lb)  Body mass index is 25.54 kg/m².    Physical Exam  Vitals reviewed.   Constitutional:       General: He is not in acute distress.     Appearance: He is well-developed. He is not diaphoretic.   HENT:      Head: Normocephalic and atraumatic.   Eyes:      Conjunctiva/sclera: Conjunctivae normal.   Cardiovascular:      Rate and Rhythm: Normal rate.      Pulses:           Femoral pulses are 2+ on the right  side and 2+ on the left side.       Dorsalis pedis pulses are detected w/ Doppler on the right side and detected w/ Doppler on the left side.        Posterior tibial pulses are detected w/ Doppler on the right side and detected w/ Doppler on the left side.   Pulmonary:      Effort: Pulmonary effort is normal.   Abdominal:      General: There is no distension.      Palpations: Abdomen is soft. There is no mass.      Tenderness: There is no abdominal tenderness. There is no guarding or rebound.      Hernia: No hernia is present.   Musculoskeletal:         General: No deformity. Normal range of motion.      Cervical back: Neck supple.   Feet:      Left foot:      Skin integrity: Ulcer and skin breakdown present. No erythema.   Skin:     Findings: No rash.   Neurological:      Mental Status: He is alert and oriented to person, place, and time.       Significant Labs:  All pertinent labs from the last 24 hours have been reviewed.    Significant Diagnostics:  I have reviewed all pertinent imaging results/findings within the past 24 hours.

## 2022-06-21 NOTE — PLAN OF CARE
SW spoke with patient about discharge planning. Patient stated that he lives alone but has friends that will be able to help him when he discharges home. Patient stated that he is not sure what they plan to do about his foot. SW explained that they are waiting on recommendations from vascular and podiatry.        06/21/22 1042   Discharge Reassessment   Assessment Type Discharge Planning Reassessment   Did the patient's condition or plan change since previous assessment? Yes   Discharge Plan discussed with: Patient   Communicated RAPHAEL with patient/caregiver Date not available/Unable to determine   Discharge Plan A Home   Discharge Plan B Home   DME Needed Upon Discharge    (TBD)   Discharge Barriers Identified None   Why the patient remains in the hospital Requires continued medical care   Post-Acute Status   Post-Acute Authorization Other   Coverage [facility]   Other Status No Post-Acute Service Needs   Discharge Delays None known at this time

## 2022-06-21 NOTE — NURSING
Report received from night nurse MAMIE Avila. Visualized patient and assessed patient's overall condition and appearance. No acute distress noted. Will continue to monitor

## 2022-06-21 NOTE — ASSESSMENT & PLAN NOTE
Patient provided a quiet room in which to rest. This may produce a fall in blood pressure =20/10 mmHg in approximately one-third of adults. If this is not effective, antihypertensive drugs may be given.    Goal is to lower the blood pressure to <160/<100 mmHg or to a level that is no more than 25 to 30 percent lower than the baseline blood pressure.  Thus, the short-term blood pressure target may need to be above 160/100 mmHg in patients who present with very high pressures, because cerebral or myocardial ischemia or infarction, or acute kidney injury, can be induced by rapid and aggressive antihypertensive therapy if the blood pressure falls below the range at which tissue perfusion can be maintained by autoregulation.  In the long-term as an outpatient, the blood pressure can then be reduced further.     Started on lisinopril    '

## 2022-06-21 NOTE — ASSESSMENT & PLAN NOTE
Consult podiatry.  Consult vascular to manage PAD - ID and vascular surgery consulted, vascular recommending revascularization of LLE

## 2022-06-21 NOTE — PROGRESS NOTES
[facility] - Telemetry  Podiatry  Consult Note    Patient Name: Yo Pink  MRN: [id number]  Admission Date: 6/17/2022  Hospital Length of Stay: 3 days  Attending Physician: Cassie Mancuso MD  Primary Care Provider: [facility]  Jeff     Consults  Subjective:     History of Present Illness: 58 y/o male PMH tobacco abuse admitted for left third toe ulceration. Patient reports wearing compression socks to sleep in several days ago. He then subsequently noticed his toes turning purple. Reports then soaking his feet in epsom salt.     6/21/22: Patient seen bedside. Pending angio per vascular.   Scheduled Meds:   aspirin  325 mg Oral Daily    atorvastatin  80 mg Oral Daily    clopidogreL  75 mg Oral Daily    enoxparin  80 mg Subcutaneous Q12H    gabapentin  100 mg Oral TID    lisinopriL  10 mg Oral Daily    metoprolol tartrate  25 mg Oral BID     Continuous Infusions:  PRN Meds:acetaminophen, dextrose 10%, dextrose 10%, glucagon (human recombinant), glucose, glucose, hydrALAZINE, HYDROcodone-acetaminophen, melatonin, morphine, naloxone, ondansetron, sodium chloride 0.9%, sodium chloride 0.9%    Review of patient's allergies indicates:  No Known Allergies     History reviewed. No pertinent past medical history.  History reviewed. No pertinent surgical history.    Family History    None       Tobacco Use    Smoking status: Former Smoker    Smokeless tobacco: Never Used   Substance and Sexual Activity    Alcohol use: Never    Drug use: Not Currently    Sexual activity: Not on file     Review of Systems   Constitutional: Positive for activity change.   Respiratory: Negative for shortness of breath.    Cardiovascular: Negative for chest pain and leg swelling.   Gastrointestinal: Negative for nausea and vomiting.   Musculoskeletal: Positive for arthralgias.   Neurological: Positive for numbness. Negative for weakness.     Objective:     Vital Signs (Most Recent):  Temp: 97.6 °F (36.4 °C) (06/21/22  1121)  Pulse: 79 (06/21/22 1121)  Resp: 17 (06/21/22 1121)  BP: 129/81 (06/21/22 1121)  SpO2: 99 % (06/21/22 1121) Vital Signs (24h Range):  Temp:  [97.6 °F (36.4 °C)-97.9 °F (36.6 °C)] 97.6 °F (36.4 °C)  Pulse:  [59-83] 79  Resp:  [17-18] 17  SpO2:  [94 %-99 %] 99 %  BP: (107-172)/() 129/81     Weight: 80.7 kg (178 lb)  Body mass index is 25.54 kg/m².    Foot Exam    General  Orientation: alert and oriented to person, place, and time       Right Foot/Ankle     Neurovascular  Dorsalis pedis: 1+  Posterior tibial: 1+      Left Foot/Ankle      Inspection and Palpation  Skin Exam: ulcer;     Neurovascular  Dorsalis pedis: 1+  Posterior tibial: 1+          6/21/22:  Dry stable gangrene left third toe.       Right foot             6/18/22:  Left third toe dry stable gangrene           Right foot toes 1-5 ischemic appearance.     Right foot         Laboratory:  CBC:   Recent Labs   Lab 06/18/22  0103   WBC 10.36   RBC 5.17   HGB 16.4   HCT 46.2      MCV 89   MCH 31.7*   MCHC 35.5     CMP:   Recent Labs   Lab 06/18/22  0103 06/20/22  0347   * 108   CALCIUM 9.6 9.1   ALBUMIN 3.9  --    PROT 8.0  --    * 135*   K 3.5 3.4*   CO2 24 25    100   BUN 20 15   CREATININE 0.9 0.8   ALKPHOS 102  --    ALT 21  --    AST 27  --    BILITOT 0.5  --        Diagnostic Results:  Xray:   X-Ray Foot Complete Left  Order: [id number]   Status: Final result     Visible to patient: No (inaccessible in Patient Portal)     Next appt: None     Dx: Left foot pain     0 Result Notes    Details    Reading Physician Reading Date Result Priority   Giovanny Dela Cruz MD  [phone] 6/18/2022 STAT     Narrative & Impression  EXAMINATION:  XR FOOT COMPLETE 3 VIEW LEFT     CLINICAL HISTORY:  .  Pain in left foot     TECHNIQUE:  AP, lateral and oblique views of the left foot were performed.     COMPARISON:  Left foot radiograph series 06/16/2022     FINDINGS:  There is no radiographic evidence of acute displaced  fracture.  Alignment appears within normal limits without evidence of dislocation.  The Lisfranc interval does not appear abnormally widened.  No aggressive cortical destruction or periosteal reaction appreciated.  Previously demonstrated punctate metallic density associated with the 4th digit PIP joint is not well appreciated on today's examination.     Impression:     No radiographic evidence of acute displaced fracture or aggressive cortical destruction.  Further evaluation and follow-up as warranted.        Electronically signed by: Giovanny Dela Cruz MD  Date:                                            06/18/2022  Time:                                           00:22             Exam Ended: 06/18/22 00:14 Last Resulted: 06/18/22 00:22        Order Details      View Encounter      Lab and Collection Details      Routing      Result History             Result Care Coordination        Patient Communication     Add Comments   Not seen Back to Top                   X-Ray Foot Complete Left: Patient Communication     Add Comments   Not seen       External Result Report    External Result Report       Narrative & Impression    EXAMINATION:  XR FOOT COMPLETE 3 VIEW LEFT     CLINICAL HISTORY:  .  Pain in left foot     TECHNIQUE:  AP, lateral and oblique views of the left foot were performed.     COMPARISON:  Left foot radiograph series 06/16/2022     FINDINGS:  There is no radiographic evidence of acute displaced fracture.  Alignment appears within normal limits without evidence of dislocation.  The Lisfranc interval does not appear abnormally widened.  No aggressive cortical destruction or periosteal reaction appreciated.  Previously demonstrated punctate metallic density associated with the 4th digit PIP joint is not well appreciated on today's examination.     Impression:     No radiographic evidence of acute displaced fracture or aggressive cortical destruction.  Further evaluation and follow-up as warranted.              [facility] Lower Extremity Arteries Bilateral  Order: [id number]   Status: Final result     Visible to patient: No (inaccessible in Patient Portal)     Next appt: None     0 Result Notes    Details    Reading Physician Reading Date Result Priority   Kenton Thrasher MD  [phone] 6/18/2022 STAT     Narrative & Impression  EXAMINATION:  US LOWER EXTREMITY ARTERIES BILATERAL     CLINICAL HISTORY:  Left foot pain;     TECHNIQUE:  Bilateral spectral, color and grayscale images of the large arteries of both lower extremities were performed.     COMPARISON:  None     FINDINGS:  Peak systolic velocities were obtained as follows (in cm/sec):     Right common femoral:  79.2, triphasic waveform     Right deep femoral:  100.9, triphasic waveform     Right superficial femoral proximal: 73.5, biphasic waveform     Right superficial femoral mid: 65.8, biphasic waveform     Right superficial femoral distal: 35.8, biphasic waveform     Right proximal popliteal:  28.2, biphasic waveform     Right distal popliteal:  25.3, biphasic waveform     Right anterior tibial:  26.2, biphasic waveform     Right peroneal:  29.5, biphasic waveform     Right posterior tibial:  66.8, biphasic waveform     Right dorsalis pedis: 19 biphasic waveform     Left common femoral:  66.3, triphasic waveform     Left deep femoral:  64.5, triphasic waveform     Left superficial femoral proximal: 47.9, triphasic waveform     Left superficial femoral mid: 29, monophasic waveform     Left superficial femoral distal: 21, monophasic waveform     Left proximal popliteal: 10.8, monophasic waveform     Left distal popliteal: 18, biphasic waveform     Left anterior tibial:  7.9, low resistance waveform     Left peroneal:   17.8, low resistance waveform     Left posterior tibial: 10, monophasic waveform     Left dorsalis pedis: 6.1, monophasic waveform     Impression:     No focal hemodynamically significant stenosis.     Multifocal abnormal  waveform morphology of interrogated bilateral lower extremity arteries, which may relate to proximal stenosis and/or peripheral arterial disease.  Additional details of peak systolic velocities and waveform morphologies, as provided in the body of report.        Electronically signed by: Kenton Thrasher  Date:                                            06/18/2022  Time:                                                l Findings:    Assessment/Plan:     Active Diagnoses:    Diagnosis Date Noted POA    PRINCIPAL PROBLEM:  Dry gangrene [I96] 06/18/2022 Yes    Hypertensive urgency [I16.0] 06/18/2022 Yes    PAD (peripheral artery disease) [I73.9] 06/18/2022 Yes      Problems Resolved During this Admission:       Left third toe dry stable gangrene. -will follow demarcation   Right foot toes 1-5 ischemic changes  Vascular surgery consulted- pending angio   Nursing orders placed for dressing changes.   Podiatry will follow.     Thank you for your consult. I will follow-up with patient. Please contact us if you have any additional questions.    Mely Alonzo DPM  Podiatry  [facility] - Telemetry

## 2022-06-21 NOTE — ASSESSMENT & PLAN NOTE
-rec urgent LLE angiogram, possible intervention as soon as possible, pending availability.    -ASA, Plavix  -cont smoking cessation  -wound care, offloading and glycemic control

## 2022-06-21 NOTE — PROGRESS NOTES
[facility] Medicine  Telemedicine Progress Note    Patient Name: Yo Pink  MRN: [id number]  Patient Class: IP- Inpatient   Admission Date: 6/17/2022  Length of Stay: 2 days  Attending Physician: Cassie Mancuso MD  Primary Care Provider: [facility]          Subjective:     Principal Problem:Dry gangrene        HPI:  No notes on file    Overview/Hospital Course:  Patient admitted with gangrene of L #3 toe. Vascular and podiatry were consulted       Interval History: ID and vascular surgery consulted, vascular recommending revascularization of LLE      Review of Systems   Constitutional:  Negative for activity change and appetite change.   HENT:  Negative for congestion and dental problem.    Eyes:  Negative for discharge and itching.   Respiratory:  Negative for apnea and chest tightness.    Cardiovascular:  Negative for chest pain.   Gastrointestinal:  Negative for abdominal distention.   Endocrine: Negative for cold intolerance.   Genitourinary:  Negative for difficulty urinating.   Musculoskeletal:  Negative for arthralgias.   Allergic/Immunologic: Negative for environmental allergies.   Neurological:  Negative for dizziness.   Psychiatric/Behavioral:  Negative for agitation and behavioral problems.    Objective:     Vital Signs (Most Recent):  Temp: 97.9 °F (36.6 °C) (06/20/22 2115)  Pulse: 80 (06/20/22 2115)  Resp: 18 (06/20/22 2115)  BP: (!) 141/74 (06/20/22 2115)  SpO2: 95 % (06/20/22 2115)   Vital Signs (24h Range):  Temp:  [97.7 °F (36.5 °C)-98.9 °F (37.2 °C)] 97.9 °F (36.6 °C)  Pulse:  [72-83] 80  Resp:  [16-20] 18  SpO2:  [94 %-97 %] 95 %  BP: (141-189)/() 141/74     Weight: 80.7 kg (178 lb)  Body mass index is 25.54 kg/m².    Intake/Output Summary (Last 24 hours) at 6/20/2022 2131  Last data filed at 6/20/2022 1730  Gross per 24 hour   Intake 480 ml   Output --   Net 480 ml        Physical Exam  Vitals and nursing note reviewed.   Constitutional:        General: He is not in acute distress.     Appearance: Normal appearance. He is normal weight. He is not ill-appearing, toxic-appearing or diaphoretic.   HENT:      Head: Normocephalic and atraumatic.   Eyes:      General: No scleral icterus.     Conjunctiva/sclera: Conjunctivae normal.   Cardiovascular:      Rate and Rhythm: Normal rate and regular rhythm.   Pulmonary:      Effort: Pulmonary effort is normal. No respiratory distress.   Skin:     Comments: See pictures of gangrene of left toe   Neurological:      General: No focal deficit present.      Mental Status: He is alert and oriented to person, place, and time.   Psychiatric:         Mood and Affect: Mood normal.         Behavior: Behavior normal.         Thought Content: Thought content normal.       Significant Labs: All pertinent labs within the past 24 hours have been reviewed.  BMP:   Recent Labs   Lab 06/20/22  0347      *   K 3.4*      CO2 25   BUN 15   CREATININE 0.8   CALCIUM 9.1       CBC:   No results for input(s): WBC, HGB, HCT, PLT in the last 48 hours.      Significant Imaging:       Assessment/Plan:      * Dry gangrene  Consult podiatry.  Consult vascular to manage PAD - ID and vascular surgery consulted, vascular recommending revascularization of LLE    PAD (peripheral artery disease)  Cardiac monitoring, neuro checks, neurology consulted    Lovenox  Mechanical thrombectomy is NOT indicated for patient  Antithrombotic therapy with aspirin initiated within 48 hours of stroke onset  Lipid lowering with high intensity statin therapy. Check fasting lipid panel   Blood pressure reduction after the acute phase of ischemic stroke has passed  Behavioral and lifestyle changes including smoking cessation, exercise, weight reduction for patients with obesity, and a Mediterranean style diet      Hypertensive urgency  Patient provided a quiet room in which to rest. This may produce a fall in blood pressure =20/10 mmHg in approximately  one-third of adults. If this is not effective, antihypertensive drugs may be given.    Goal is to lower the blood pressure to <160/<100 mmHg or to a level that is no more than 25 to 30 percent lower than the baseline blood pressure.  Thus, the short-term blood pressure target may need to be above 160/100 mmHg in patients who present with very high pressures, because cerebral or myocardial ischemia or infarction, or acute kidney injury, can be induced by rapid and aggressive antihypertensive therapy if the blood pressure falls below the range at which tissue perfusion can be maintained by autoregulation.  In the long-term as an outpatient, the blood pressure can then be reduced further.     Started on lisinopril    '        VTE Risk Mitigation (From admission, onward)         Ordered     enoxaparin injection 80 mg  Every 12 hours (non-standard times)         06/18/22 0433     IP VTE LOW RISK PATIENT  Once         06/18/22 0433     Place sequential compression device  Until discontinued         06/18/22 0433                      I have assessed these finding virtually using telemed platform and with assistance of bedside nurse                 The attending portion of this evaluation, treatment, and documentation was performed per Cassie Mancuso MD via Telemedicine AudioVisual using the secure [email or web address] software platform with 2 way audio/video. The provider was located off-site and the patient is located in the hospital. The aforementioned video software was utilized to document the relevant history and physical exam    Cassie Mancuso MD  Department of Hospital Medicine   [facility]

## 2022-06-22 PROCEDURE — 99233 SBSQ HOSP IP/OBS HIGH 50: CPT | Mod: ,,, | Performed by: SURGERY

## 2022-06-22 PROCEDURE — 99233 PR SUBSEQUENT HOSPITAL CARE,LEVL III: ICD-10-PCS | Mod: ,,, | Performed by: SURGERY

## 2022-06-22 PROCEDURE — 25000003 PHARM REV CODE 250: Performed by: SURGERY

## 2022-06-22 PROCEDURE — 25000003 PHARM REV CODE 250: Performed by: STUDENT IN AN ORGANIZED HEALTH CARE EDUCATION/TRAINING PROGRAM

## 2022-06-22 PROCEDURE — 25000003 PHARM REV CODE 250: Performed by: EMERGENCY MEDICINE

## 2022-06-22 PROCEDURE — 21400001 HC TELEMETRY ROOM

## 2022-06-22 RX ADMIN — GABAPENTIN 100 MG: 100 CAPSULE ORAL at 09:06

## 2022-06-22 RX ADMIN — HYDROCODONE BITARTRATE AND ACETAMINOPHEN 1 TABLET: 5; 325 TABLET ORAL at 12:06

## 2022-06-22 RX ADMIN — ATORVASTATIN CALCIUM 80 MG: 40 TABLET, FILM COATED ORAL at 09:06

## 2022-06-22 RX ADMIN — METOPROLOL TARTRATE 25 MG: 25 TABLET, FILM COATED ORAL at 09:06

## 2022-06-22 RX ADMIN — CLOPIDOGREL 75 MG: 75 TABLET, FILM COATED ORAL at 09:06

## 2022-06-22 RX ADMIN — GABAPENTIN 100 MG: 100 CAPSULE ORAL at 03:06

## 2022-06-22 RX ADMIN — ASPIRIN 325 MG ORAL TABLET 325 MG: 325 PILL ORAL at 09:06

## 2022-06-22 RX ADMIN — GABAPENTIN 100 MG: 100 CAPSULE ORAL at 08:06

## 2022-06-22 RX ADMIN — METOPROLOL TARTRATE 25 MG: 25 TABLET, FILM COATED ORAL at 08:06

## 2022-06-22 RX ADMIN — LISINOPRIL 10 MG: 5 TABLET ORAL at 09:06

## 2022-06-22 NOTE — PLAN OF CARE
Problem: Adult Inpatient Plan of Care  Goal: Plan of Care Review  Outcome: Ongoing, Progressing  Goal: Patient-Specific Goal (Individualized)  Outcome: Ongoing, Progressing  Goal: Absence of Hospital-Acquired Illness or Injury  Outcome: Ongoing, Progressing  Intervention: Identify and Manage Fall Risk  Flowsheets (Taken 6/22/2022 0545)  Safety Promotion/Fall Prevention:   assistive device/personal item within reach   side rails raised x 2  Intervention: Prevent Skin Injury  Flowsheets (Taken 6/22/2022 0545)  Body Position: supine  Skin Protection: adhesive use limited  Intervention: Prevent and Manage VTE (Venous Thromboembolism) Risk  Flowsheets (Taken 6/22/2022 0545)  Activity Management: Ambulated in room - L4  VTE Prevention/Management:   bleeding precations maintained   bleeding risk assessed  Intervention: Prevent Infection  Flowsheets (Taken 6/22/2022 0545)  Infection Prevention: single patient room provided  Goal: Optimal Comfort and Wellbeing  Outcome: Ongoing, Progressing  Intervention: Monitor Pain and Promote Comfort  Flowsheets (Taken 6/22/2022 0545)  Pain Management Interventions:   relaxation techniques promoted   quiet environment facilitated  Intervention: Provide Person-Centered Care  Flowsheets (Taken 6/22/2022 0545)  Trust Relationship/Rapport:   care explained   choices provided   emotional support provided   empathic listening provided  Goal: Readiness for Transition of Care  Outcome: Ongoing, Progressing

## 2022-06-22 NOTE — SUBJECTIVE & OBJECTIVE
Interval History: ID and vascular surgery consulted, vascular recommending revascularization of LLE, unable to perform today. Will keep NPO at midnight as a precaution for tomorrow.       Review of Systems   Constitutional:  Negative for activity change and appetite change.   HENT:  Negative for congestion and dental problem.    Eyes:  Negative for discharge and itching.   Respiratory:  Negative for apnea and chest tightness.    Cardiovascular:  Negative for chest pain.   Gastrointestinal:  Negative for abdominal distention.   Endocrine: Negative for cold intolerance.   Genitourinary:  Negative for difficulty urinating.   Musculoskeletal:  Negative for arthralgias.   Skin:  Positive for wound.   Allergic/Immunologic: Negative for environmental allergies.   Neurological:  Negative for dizziness.   Psychiatric/Behavioral:  Negative for agitation and behavioral problems.    Objective:     Vital Signs (Most Recent):  Temp: 97.8 °F (36.6 °C) (06/21/22 1941)  Pulse: 81 (06/21/22 1941)  Resp: 18 (06/21/22 1941)  BP: 111/70 (06/21/22 1941)  SpO2: 98 % (06/21/22 1941)   Vital Signs (24h Range):  Temp:  [97.6 °F (36.4 °C)-98.7 °F (37.1 °C)] 97.8 °F (36.6 °C)  Pulse:  [59-81] 81  Resp:  [17-18] 18  SpO2:  [94 %-99 %] 98 %  BP: (107-141)/(63-81) 111/70     Weight: 80.7 kg (178 lb)  Body mass index is 25.54 kg/m².    Intake/Output Summary (Last 24 hours) at 6/21/2022 2001  Last data filed at 6/21/2022 1750  Gross per 24 hour   Intake 240 ml   Output 500 ml   Net -260 ml        Physical Exam  Vitals and nursing note reviewed.   Constitutional:       General: He is not in acute distress.     Appearance: Normal appearance. He is normal weight. He is not ill-appearing, toxic-appearing or diaphoretic.   HENT:      Head: Normocephalic and atraumatic.   Eyes:      General: No scleral icterus.     Conjunctiva/sclera: Conjunctivae normal.   Cardiovascular:      Rate and Rhythm: Normal rate and regular rhythm.   Pulmonary:      Effort:  Pulmonary effort is normal. No respiratory distress.   Skin:     Comments: See pictures of gangrene of left toe   Neurological:      General: No focal deficit present.      Mental Status: He is alert and oriented to person, place, and time.   Psychiatric:         Mood and Affect: Mood normal.         Behavior: Behavior normal.         Thought Content: Thought content normal.       Significant Labs: All pertinent labs within the past 24 hours have been reviewed.  BMP:   Recent Labs   Lab 06/20/22  0347      *   K 3.4*      CO2 25   BUN 15   CREATININE 0.8   CALCIUM 9.1       CBC:   No results for input(s): WBC, HGB, HCT, PLT in the last 48 hours.      Significant Imaging:

## 2022-06-22 NOTE — ASSESSMENT & PLAN NOTE
Consult podiatry.  Consult vascular to manage PAD - ID and vascular surgery consulted, vascular recommending revascularization of LLE however difficulty fitting patient into schedule, NPO at midnight again tonight

## 2022-06-22 NOTE — NURSING
Bedside Report given to night nurse ROBIN Avila.  Walking rounds completed. Visualized and assessed patient NAD noted. Safety precautions maintained and call light within reach.     Chart check completed.

## 2022-06-22 NOTE — ASSESSMENT & PLAN NOTE
-rec urgent LLE angiogram, possible intervention as soon as possible, scheduling contacted, pending availability.    -ASA, Plavix  -cont smoking cessation  -wound care, offloading and glycemic control

## 2022-06-22 NOTE — PROGRESS NOTES
[facility]  Vascular Surgery  Progress Note    Patient Name: Yo Pink  MRN: [id number]  Admission Date: 6/17/2022  Primary Care Provider: [facility]    Subjective:     Interval History: foot and toe discoloration occurs intermittently    Post-Op Info:  * No surgery found *         No medications prior to admission.       Review of patient's allergies indicates:  No Known Allergies    History reviewed. No pertinent past medical history.  History reviewed. No pertinent surgical history.  Family History    None       Tobacco Use    Smoking status: Former Smoker    Smokeless tobacco: Never Used   Substance and Sexual Activity    Alcohol use: Never    Drug use: Not Currently    Sexual activity: Not on file     Review of Systems   Constitutional:  Negative for chills.   HENT:  Negative for congestion.    Eyes:  Negative for visual disturbance.   Respiratory:  Negative for shortness of breath.    Cardiovascular:  Negative for chest pain.   Gastrointestinal:  Negative for abdominal distention.   Endocrine: Negative for cold intolerance.   Genitourinary:  Negative for flank pain.   Musculoskeletal:  Negative for back pain.   Skin:  Positive for color change and wound. Negative for pallor and rash.   Allergic/Immunologic: Negative for immunocompromised state.   Neurological:  Negative for dizziness.   Hematological:  Does not bruise/bleed easily.   Psychiatric/Behavioral:  Negative for agitation.    Objective:     Vital Signs (Most Recent):  Temp: 98.4 °F (36.9 °C) (06/22/22 1419)  Pulse: 72 (06/22/22 1419)  Resp: 18 (06/22/22 1419)  BP: (!) 131/90 (06/22/22 1419)  SpO2: 97 % (06/22/22 1419)   Vital Signs (24h Range):  Temp:  [97.6 °F (36.4 °C)-98.4 °F (36.9 °C)] 98.4 °F (36.9 °C)  Pulse:  [64-81] 72  Resp:  [18] 18  SpO2:  [96 %-100 %] 97 %  BP: (111-154)/(70-92) 131/90     Weight: 80.7 kg (178 lb)  Body mass index is 25.54 kg/m².    Physical Exam  Vitals reviewed.   Constitutional:        General: He is not in acute distress.     Appearance: He is well-developed. He is not diaphoretic.   HENT:      Head: Normocephalic and atraumatic.   Eyes:      Conjunctiva/sclera: Conjunctivae normal.   Cardiovascular:      Rate and Rhythm: Normal rate.      Pulses:           Femoral pulses are 2+ on the right side and 2+ on the left side.       Dorsalis pedis pulses are detected w/ Doppler on the right side and detected w/ Doppler on the left side.        Posterior tibial pulses are detected w/ Doppler on the right side and detected w/ Doppler on the left side.   Pulmonary:      Effort: Pulmonary effort is normal.   Abdominal:      General: There is no distension.      Palpations: Abdomen is soft. There is no mass.      Tenderness: There is no abdominal tenderness. There is no guarding or rebound.      Hernia: No hernia is present.   Musculoskeletal:         General: No deformity. Normal range of motion.      Cervical back: Neck supple.   Feet:      Left foot:      Skin integrity: Ulcer and skin breakdown present. No erythema.   Skin:     Findings: No rash.   Neurological:      Mental Status: He is alert and oriented to person, place, and time.       Significant Labs:  All pertinent labs from the last 24 hours have been reviewed.    Significant Diagnostics:  I have reviewed all pertinent imaging results/findings within the past 24 hours.    Assessment/Plan:     * Dry gangrene  -rec urgent LLE angiogram, possible intervention as soon as possible, scheduling contacted, pending availability.    -ASA, Plavix  -cont smoking cessation  -wound care, offloading and glycemic control          Mehul Rich MD  Vascular Surgery  [facility] [place] - Telemetry

## 2022-06-22 NOTE — ASSESSMENT & PLAN NOTE
Cardiac monitoring, vascular consulted    Lovenox  Mechanical thrombectomy is NOT indicated for patient  Antithrombotic therapy with aspirin initiated within 48 hours of stroke onset  Lipid lowering with high intensity statin therapy. Check fasting lipid panel   Blood pressure reduction after the acute phase of ischemic stroke has passed  Behavioral and lifestyle changes including smoking cessation, exercise, weight reduction for patients with obesity, and a Mediterranean style diet

## 2022-06-22 NOTE — PROGRESS NOTES
[facility] Medicine  Telemedicine Progress Note    Patient Name: Yo Pink  MRN: [id number]  Patient Class: IP- Inpatient   Admission Date: 6/17/2022  Length of Stay: 4 days  Attending Physician: Cassie Mancuso MD  Primary Care Provider: [facility]          Subjective:     Principal Problem:Dry gangrene        HPI:  No notes on file    Overview/Hospital Course:  Patient admitted with gangrene of L #3 toe. Vascular and podiatry were consulted       Interval History: ID and vascular surgery consulted, vascular recommending revascularization of LLE, unable to perform again today due to scheduling. Will keep NPO at midnight again as a precaution for tomorrow.       Review of Systems   Constitutional:  Negative for activity change and appetite change.   HENT:  Negative for congestion and dental problem.    Eyes:  Negative for discharge and itching.   Respiratory:  Negative for apnea and chest tightness.    Cardiovascular:  Negative for chest pain.   Gastrointestinal:  Negative for abdominal distention.   Endocrine: Negative for cold intolerance.   Genitourinary:  Negative for difficulty urinating.   Musculoskeletal:  Negative for arthralgias.   Skin:  Positive for wound.   Allergic/Immunologic: Negative for environmental allergies.   Neurological:  Negative for dizziness.   Psychiatric/Behavioral:  Negative for agitation and behavioral problems.    Objective:     Vital Signs (Most Recent):  Temp: 98.4 °F (36.9 °C) (06/22/22 1419)  Pulse: 72 (06/22/22 1419)  Resp: 18 (06/22/22 1419)  BP: (!) 131/90 (06/22/22 1419)  SpO2: 97 % (06/22/22 1419)   Vital Signs (24h Range):  Temp:  [97.6 °F (36.4 °C)-98.4 °F (36.9 °C)] 98.4 °F (36.9 °C)  Pulse:  [64-81] 72  Resp:  [18] 18  SpO2:  [96 %-100 %] 97 %  BP: (111-154)/(70-92) 131/90     Weight: 80.7 kg (178 lb)  Body mass index is 25.54 kg/m².  No intake or output data in the 24 hours ending 06/22/22 1848     Physical Exam  Vitals and  nursing note reviewed.   Constitutional:       General: He is not in acute distress.     Appearance: Normal appearance. He is normal weight. He is not ill-appearing, toxic-appearing or diaphoretic.   HENT:      Head: Normocephalic and atraumatic.   Eyes:      General: No scleral icterus.     Conjunctiva/sclera: Conjunctivae normal.   Cardiovascular:      Rate and Rhythm: Normal rate and regular rhythm.   Pulmonary:      Effort: Pulmonary effort is normal. No respiratory distress.   Skin:     Comments: See pictures of gangrene of left toe   Neurological:      General: No focal deficit present.      Mental Status: He is alert and oriented to person, place, and time.   Psychiatric:         Mood and Affect: Mood normal.         Behavior: Behavior normal.         Thought Content: Thought content normal.       Significant Labs: All pertinent labs within the past 24 hours have been reviewed.  BMP:   No results for input(s): GLU, NA, K, CL, CO2, BUN, CREATININE, CALCIUM, MG in the last 48 hours.    CBC:   No results for input(s): WBC, HGB, HCT, PLT in the last 48 hours.      Significant Imaging:       Assessment/Plan:      * Dry gangrene  Consult podiatry.  Consult vascular to manage PAD - ID and vascular surgery consulted, vascular recommending revascularization of LLE however difficulty fitting patient into schedule, NPO at midnight again tonight     PAD (peripheral artery disease)  Cardiac monitoring, vascular consulted    Lovenox  Mechanical thrombectomy is NOT indicated for patient  Antithrombotic therapy with aspirin initiated within 48 hours of stroke onset  Lipid lowering with high intensity statin therapy. Check fasting lipid panel   Blood pressure reduction after the acute phase of ischemic stroke has passed  Behavioral and lifestyle changes including smoking cessation, exercise, weight reduction for patients with obesity, and a Mediterranean style diet      Hypertensive urgency  Patient provided a quiet room in  which to rest. This may produce a fall in blood pressure =20/10 mmHg in approximately one-third of adults. If this is not effective, antihypertensive drugs may be given.    Goal is to lower the blood pressure to <160/<100 mmHg or to a level that is no more than 25 to 30 percent lower than the baseline blood pressure.  Thus, the short-term blood pressure target may need to be above 160/100 mmHg in patients who present with very high pressures, because cerebral or myocardial ischemia or infarction, or acute kidney injury, can be induced by rapid and aggressive antihypertensive therapy if the blood pressure falls below the range at which tissue perfusion can be maintained by autoregulation.  In the long-term as an outpatient, the blood pressure can then be reduced further.     Started on lisinopril    '        VTE Risk Mitigation (From admission, onward)         Ordered     enoxaparin injection 80 mg  Every 12 hours (non-standard times)         06/18/22 0433     IP VTE LOW RISK PATIENT  Once         06/18/22 0433     Place sequential compression device  Until discontinued         06/18/22 0433                      I have assessed these finding virtually using telemed platform and with assistance of bedside nurse                 The attending portion of this evaluation, treatment, and documentation was performed per Cassie Mancuso MD via Telemedicine AudioVisual using the secure [employer] software platform with 2 way audio/video. The provider was located off-site and the patient is located in the hospital. The aforementioned video software was utilized to document the relevant history and physical exam    Cassie Mancuso MD  Department of Hospital Medicine   [facility] - Telemetry

## 2022-06-22 NOTE — NURSING
Report received from night nurse ROBIN Avila. Visualized patient and assessed patient's overall condition and appearance. No acute distress noted. Will continue to monitor

## 2022-06-22 NOTE — SUBJECTIVE & OBJECTIVE
No medications prior to admission.       Review of patient's allergies indicates:  No Known Allergies    History reviewed. No pertinent past medical history.  History reviewed. No pertinent surgical history.  Family History    None       Tobacco Use    Smoking status: Former Smoker    Smokeless tobacco: Never Used   Substance and Sexual Activity    Alcohol use: Never    Drug use: Not Currently    Sexual activity: Not on file     Review of Systems   Constitutional:  Negative for chills.   HENT:  Negative for congestion.    Eyes:  Negative for visual disturbance.   Respiratory:  Negative for shortness of breath.    Cardiovascular:  Negative for chest pain.   Gastrointestinal:  Negative for abdominal distention.   Endocrine: Negative for cold intolerance.   Genitourinary:  Negative for flank pain.   Musculoskeletal:  Negative for back pain.   Skin:  Positive for color change and wound. Negative for pallor and rash.   Allergic/Immunologic: Negative for immunocompromised state.   Neurological:  Negative for dizziness.   Hematological:  Does not bruise/bleed easily.   Psychiatric/Behavioral:  Negative for agitation.    Objective:     Vital Signs (Most Recent):  Temp: 98.4 °F (36.9 °C) (06/22/22 1419)  Pulse: 72 (06/22/22 1419)  Resp: 18 (06/22/22 1419)  BP: (!) 131/90 (06/22/22 1419)  SpO2: 97 % (06/22/22 1419)   Vital Signs (24h Range):  Temp:  [97.6 °F (36.4 °C)-98.4 °F (36.9 °C)] 98.4 °F (36.9 °C)  Pulse:  [64-81] 72  Resp:  [18] 18  SpO2:  [96 %-100 %] 97 %  BP: (111-154)/(70-92) 131/90     Weight: 80.7 kg (178 lb)  Body mass index is 25.54 kg/m².    Physical Exam  Vitals reviewed.   Constitutional:       General: He is not in acute distress.     Appearance: He is well-developed. He is not diaphoretic.   HENT:      Head: Normocephalic and atraumatic.   Eyes:      Conjunctiva/sclera: Conjunctivae normal.   Cardiovascular:      Rate and Rhythm: Normal rate.      Pulses:           Femoral pulses are 2+ on the right  side and 2+ on the left side.       Dorsalis pedis pulses are detected w/ Doppler on the right side and detected w/ Doppler on the left side.        Posterior tibial pulses are detected w/ Doppler on the right side and detected w/ Doppler on the left side.   Pulmonary:      Effort: Pulmonary effort is normal.   Abdominal:      General: There is no distension.      Palpations: Abdomen is soft. There is no mass.      Tenderness: There is no abdominal tenderness. There is no guarding or rebound.      Hernia: No hernia is present.   Musculoskeletal:         General: No deformity. Normal range of motion.      Cervical back: Neck supple.   Feet:      Left foot:      Skin integrity: Ulcer and skin breakdown present. No erythema.   Skin:     Findings: No rash.   Neurological:      Mental Status: He is alert and oriented to person, place, and time.       Significant Labs:  All pertinent labs from the last 24 hours have been reviewed.    Significant Diagnostics:  I have reviewed all pertinent imaging results/findings within the past 24 hours.

## 2022-06-22 NOTE — SUBJECTIVE & OBJECTIVE
Interval History: ID and vascular surgery consulted, vascular recommending revascularization of LLE, unable to perform again today due to scheduling. Will keep NPO at midnight again as a precaution for tomorrow.       Review of Systems   Constitutional:  Negative for activity change and appetite change.   HENT:  Negative for congestion and dental problem.    Eyes:  Negative for discharge and itching.   Respiratory:  Negative for apnea and chest tightness.    Cardiovascular:  Negative for chest pain.   Gastrointestinal:  Negative for abdominal distention.   Endocrine: Negative for cold intolerance.   Genitourinary:  Negative for difficulty urinating.   Musculoskeletal:  Negative for arthralgias.   Skin:  Positive for wound.   Allergic/Immunologic: Negative for environmental allergies.   Neurological:  Negative for dizziness.   Psychiatric/Behavioral:  Negative for agitation and behavioral problems.    Objective:     Vital Signs (Most Recent):  Temp: 98.4 °F (36.9 °C) (06/22/22 1419)  Pulse: 72 (06/22/22 1419)  Resp: 18 (06/22/22 1419)  BP: (!) 131/90 (06/22/22 1419)  SpO2: 97 % (06/22/22 1419)   Vital Signs (24h Range):  Temp:  [97.6 °F (36.4 °C)-98.4 °F (36.9 °C)] 98.4 °F (36.9 °C)  Pulse:  [64-81] 72  Resp:  [18] 18  SpO2:  [96 %-100 %] 97 %  BP: (111-154)/(70-92) 131/90     Weight: 80.7 kg (178 lb)  Body mass index is 25.54 kg/m².  No intake or output data in the 24 hours ending 06/22/22 1848     Physical Exam  Vitals and nursing note reviewed.   Constitutional:       General: He is not in acute distress.     Appearance: Normal appearance. He is normal weight. He is not ill-appearing, toxic-appearing or diaphoretic.   HENT:      Head: Normocephalic and atraumatic.   Eyes:      General: No scleral icterus.     Conjunctiva/sclera: Conjunctivae normal.   Cardiovascular:      Rate and Rhythm: Normal rate and regular rhythm.   Pulmonary:      Effort: Pulmonary effort is normal. No respiratory distress.   Skin:      Comments: See pictures of gangrene of left toe   Neurological:      General: No focal deficit present.      Mental Status: He is alert and oriented to person, place, and time.   Psychiatric:         Mood and Affect: Mood normal.         Behavior: Behavior normal.         Thought Content: Thought content normal.       Significant Labs: All pertinent labs within the past 24 hours have been reviewed.  BMP:   No results for input(s): GLU, NA, K, CL, CO2, BUN, CREATININE, CALCIUM, MG in the last 48 hours.    CBC:   No results for input(s): WBC, HGB, HCT, PLT in the last 48 hours.      Significant Imaging:

## 2022-06-22 NOTE — PROGRESS NOTES
[facility] Medicine  Telemedicine Progress Note    Patient Name: Yo Pink  MRN: [id number]  Patient Class: IP- Inpatient   Admission Date: 6/17/2022  Length of Stay: 3 days  Attending Physician: Cassie Mancuso MD  Primary Care Provider: [facility]          Subjective:     Principal Problem:Dry gangrene        HPI:  No notes on file    Overview/Hospital Course:  Patient admitted with gangrene of L #3 toe. Vascular and podiatry were consulted       Interval History: ID and vascular surgery consulted, vascular recommending revascularization of LLE, unable to perform today. Will keep NPO at midnight as a precaution for tomorrow.       Review of Systems   Constitutional:  Negative for activity change and appetite change.   HENT:  Negative for congestion and dental problem.    Eyes:  Negative for discharge and itching.   Respiratory:  Negative for apnea and chest tightness.    Cardiovascular:  Negative for chest pain.   Gastrointestinal:  Negative for abdominal distention.   Endocrine: Negative for cold intolerance.   Genitourinary:  Negative for difficulty urinating.   Musculoskeletal:  Negative for arthralgias.   Skin:  Positive for wound.   Allergic/Immunologic: Negative for environmental allergies.   Neurological:  Negative for dizziness.   Psychiatric/Behavioral:  Negative for agitation and behavioral problems.    Objective:     Vital Signs (Most Recent):  Temp: 97.8 °F (36.6 °C) (06/21/22 1941)  Pulse: 81 (06/21/22 1941)  Resp: 18 (06/21/22 1941)  BP: 111/70 (06/21/22 1941)  SpO2: 98 % (06/21/22 1941)   Vital Signs (24h Range):  Temp:  [97.6 °F (36.4 °C)-98.7 °F (37.1 °C)] 97.8 °F (36.6 °C)  Pulse:  [59-81] 81  Resp:  [17-18] 18  SpO2:  [94 %-99 %] 98 %  BP: (107-141)/(63-81) 111/70     Weight: 80.7 kg (178 lb)  Body mass index is 25.54 kg/m².    Intake/Output Summary (Last 24 hours) at 6/21/2022 2001  Last data filed at 6/21/2022 1750  Gross per 24 hour   Intake  240 ml   Output 500 ml   Net -260 ml        Physical Exam  Vitals and nursing note reviewed.   Constitutional:       General: He is not in acute distress.     Appearance: Normal appearance. He is normal weight. He is not ill-appearing, toxic-appearing or diaphoretic.   HENT:      Head: Normocephalic and atraumatic.   Eyes:      General: No scleral icterus.     Conjunctiva/sclera: Conjunctivae normal.   Cardiovascular:      Rate and Rhythm: Normal rate and regular rhythm.   Pulmonary:      Effort: Pulmonary effort is normal. No respiratory distress.   Skin:     Comments: See pictures of gangrene of left toe   Neurological:      General: No focal deficit present.      Mental Status: He is alert and oriented to person, place, and time.   Psychiatric:         Mood and Affect: Mood normal.         Behavior: Behavior normal.         Thought Content: Thought content normal.       Significant Labs: All pertinent labs within the past 24 hours have been reviewed.  BMP:   Recent Labs   Lab 06/20/22  0347      *   K 3.4*      CO2 25   BUN 15   CREATININE 0.8   CALCIUM 9.1       CBC:   No results for input(s): WBC, HGB, HCT, PLT in the last 48 hours.      Significant Imaging:       Assessment/Plan:      * Dry gangrene  Consult podiatry.  Consult vascular to manage PAD - ID and vascular surgery consulted, vascular recommending revascularization of LLE    PAD (peripheral artery disease)  Cardiac monitoring, vascular consulted    Lovenox  Mechanical thrombectomy is NOT indicated for patient  Antithrombotic therapy with aspirin initiated within 48 hours of stroke onset  Lipid lowering with high intensity statin therapy. Check fasting lipid panel   Blood pressure reduction after the acute phase of ischemic stroke has passed  Behavioral and lifestyle changes including smoking cessation, exercise, weight reduction for patients with obesity, and a Mediterranean style diet      Hypertensive urgency  Patient provided a  quiet room in which to rest. This may produce a fall in blood pressure =20/10 mmHg in approximately one-third of adults. If this is not effective, antihypertensive drugs may be given.    Goal is to lower the blood pressure to <160/<100 mmHg or to a level that is no more than 25 to 30 percent lower than the baseline blood pressure.  Thus, the short-term blood pressure target may need to be above 160/100 mmHg in patients who present with very high pressures, because cerebral or myocardial ischemia or infarction, or acute kidney injury, can be induced by rapid and aggressive antihypertensive therapy if the blood pressure falls below the range at which tissue perfusion can be maintained by autoregulation.  In the long-term as an outpatient, the blood pressure can then be reduced further.     Started on lisinopril    '        VTE Risk Mitigation (From admission, onward)         Ordered     enoxaparin injection 80 mg  Every 12 hours (non-standard times)         06/18/22 0433     IP VTE LOW RISK PATIENT  Once         06/18/22 0433     Place sequential compression device  Until discontinued         06/18/22 0433                      I have assessed these finding virtually using telemed platform and with assistance of bedside nurse                 The attending portion of this evaluation, treatment, and documentation was performed per Cassie Mancuso MD via Telemedicine AudioVisual using the secure seedtag software platform with 2 way audio/video. The provider was located off-site and the patient is located in the hospital. The aforementioned video software was utilized to document the relevant history and physical exam    Cassie Mancuso MD  Department of Hospital Medicine   [facility] [place] [facility]

## 2022-06-22 NOTE — H&P (VIEW-ONLY)
[facility]  [facility]  [facility] Note    Patient Name: Yo Pink  MRN: [id number]  Admission Date: 6/17/2022  Primary [facility] Provider: [facility]    Subjective:     Interval History: foot and toe discoloration occurs intermittently    Post-Op Info:  * No surgery found *         No medications prior to admission.       Review of patient's allergies indicates:  No Known Allergies    History reviewed. No pertinent past medical history.  History reviewed. No pertinent surgical history.  Family History    None       Tobacco Use    Smoking status: Former Smoker    Smokeless tobacco: Never Used   Substance and Sexual Activity    Alcohol use: Never    Drug use: Not Currently    Sexual activity: Not on file     Review of Systems   Constitutional:  Negative for chills.   HENT:  Negative for congestion.    Eyes:  Negative for visual disturbance.   Respiratory:  Negative for shortness of breath.    Cardiovascular:  Negative for chest pain.   Gastrointestinal:  Negative for abdominal distention.   Endocrine: Negative for cold intolerance.   Genitourinary:  Negative for flank pain.   Musculoskeletal:  Negative for back pain.   Skin:  Positive for color change and wound. Negative for pallor and rash.   Allergic/Immunologic: Negative for immunocompromised state.   Neurological:  Negative for dizziness.   Hematological:  Does not bruise/bleed easily.   Psychiatric/Behavioral:  Negative for agitation.    Objective:     Vital Signs (Most Recent):  Temp: 98.4 °F (36.9 °C) (06/22/22 1419)  Pulse: 72 (06/22/22 1419)  Resp: 18 (06/22/22 1419)  BP: (!) 131/90 (06/22/22 1419)  SpO2: 97 % (06/22/22 1419)   Vital Signs (24h Range):  Temp:  [97.6 °F (36.4 °C)-98.4 °F (36.9 °C)] 98.4 °F (36.9 °C)  Pulse:  [64-81] 72  Resp:  [18] 18  SpO2:  [96 %-100 %] 97 %  BP: (111-154)/(70-92) 131/90     Weight: 80.7 kg (178 lb)  Body mass index is 25.54 kg/m².    Physical Exam  Vitals reviewed.   Constitutional:        General: He is not in acute distress.     Appearance: He is well-developed. He is not diaphoretic.   HENT:      Head: Normocephalic and atraumatic.   Eyes:      Conjunctiva/sclera: Conjunctivae normal.   Cardiovascular:      Rate and Rhythm: Normal rate.      Pulses:           Femoral pulses are 2+ on the right side and 2+ on the left side.       Dorsalis pedis pulses are detected w/ Doppler on the right side and detected w/ Doppler on the left side.        Posterior tibial pulses are detected w/ Doppler on the right side and detected w/ Doppler on the left side.   Pulmonary:      Effort: Pulmonary effort is normal.   Abdominal:      General: There is no distension.      Palpations: Abdomen is soft. There is no mass.      Tenderness: There is no abdominal tenderness. There is no guarding or rebound.      Hernia: No hernia is present.   Musculoskeletal:         General: No deformity. Normal range of motion.      Cervical back: Neck supple.   Feet:      Left foot:      Skin integrity: Ulcer and skin breakdown present. No erythema.   Skin:     Findings: No rash.   Neurological:      Mental Status: He is alert and oriented to person, place, and time.       Significant Labs:  All pertinent labs from the last 24 hours have been reviewed.    Significant Diagnostics:  I have reviewed all pertinent imaging results/findings within the past 24 hours.    Assessment/Plan:     * Dry gangrene  -rec urgent LLE angiogram, possible intervention as soon as possible, scheduling contacted, pending availability.    -ASA, Plavix  -cont smoking cessation  -wound care, offloading and glycemic control          Mehul Rich MD  Vascular Surgery  [facility] - Telemetry

## 2022-06-22 NOTE — NURSING
Bedside Report given to night nurse MAMIE Bates. Walking rounds completed. Visualized and assessed patient NAD noted. Safety precautions maintained and call light within reach.     Chart check completed.

## 2022-06-23 VITALS
HEART RATE: 64 BPM | TEMPERATURE: 98 F | HEIGHT: 70 IN | OXYGEN SATURATION: 97 % | SYSTOLIC BLOOD PRESSURE: 149 MMHG | DIASTOLIC BLOOD PRESSURE: 76 MMHG | RESPIRATION RATE: 20 BRPM | BODY MASS INDEX: 25.48 KG/M2 | WEIGHT: 178 LBS

## 2022-06-23 PROCEDURE — 25000003 PHARM REV CODE 250: Performed by: STUDENT IN AN ORGANIZED HEALTH CARE EDUCATION/TRAINING PROGRAM

## 2022-06-23 PROCEDURE — 25000003 PHARM REV CODE 250: Performed by: EMERGENCY MEDICINE

## 2022-06-23 PROCEDURE — 25000003 PHARM REV CODE 250: Performed by: SURGERY

## 2022-06-23 RX ADMIN — LISINOPRIL 10 MG: 5 TABLET ORAL at 09:06

## 2022-06-23 RX ADMIN — ASPIRIN 325 MG ORAL TABLET 325 MG: 325 PILL ORAL at 09:06

## 2022-06-23 RX ADMIN — HYDROCODONE BITARTRATE AND ACETAMINOPHEN 1 TABLET: 5; 325 TABLET ORAL at 12:06

## 2022-06-23 RX ADMIN — CLOPIDOGREL 75 MG: 75 TABLET, FILM COATED ORAL at 09:06

## 2022-06-23 RX ADMIN — METOPROLOL TARTRATE 25 MG: 25 TABLET, FILM COATED ORAL at 09:06

## 2022-06-23 RX ADMIN — GABAPENTIN 100 MG: 100 CAPSULE ORAL at 09:06

## 2022-06-23 RX ADMIN — ATORVASTATIN CALCIUM 80 MG: 40 TABLET, FILM COATED ORAL at 09:06

## 2022-06-23 NOTE — PLAN OF CARE
Pt signed out AMA. Pt ambulatory, no acute distress noted.. IV removed, pt to be picked up by friend. AMA form in chart.

## 2022-06-23 NOTE — ASSESSMENT & PLAN NOTE
vascular consulted  Lovenox, ASA, and Plavix   Mechanical thrombectomy is NOT indicated for patient  Antithrombotic therapy with aspirin initiated within 48 hours of stroke onset  Lipid lowering with high intensity statin therapy. Check fasting lipid panel   Blood pressure reduction after the acute phase of ischemic stroke has passed  Behavioral and lifestyle changes including smoking cessation, exercise, weight reduction for patients with obesity, and a Mediterranean style diet

## 2022-06-23 NOTE — SUBJECTIVE & OBJECTIVE
Interval History: ID and vascular surgery consulted, vascular recommending revascularization of LLE, unable to perform. [place] [facility] [place] [facility] [place]. Patient would like to sign out AMA. Long discussion with patient about risks of signing out. He continues to want to sign out.        Review of Systems   Constitutional:  Negative for activity change and appetite change.   HENT:  Negative for congestion and dental problem.    Eyes:  Negative for discharge and itching.   Respiratory:  Negative for apnea and chest tightness.    Cardiovascular:  Negative for chest pain.   Gastrointestinal:  Negative for abdominal distention.   Endocrine: Negative for cold intolerance.   Genitourinary:  Negative for difficulty urinating.   Musculoskeletal:  Negative for arthralgias.   Skin:  Positive for wound.   Allergic/Immunologic: Negative for environmental allergies.   Neurological:  Negative for dizziness.   Psychiatric/Behavioral:  Negative for agitation and behavioral problems.    Objective:     Vital Signs (Most Recent):  Temp: 97.9 °F (36.6 °C) (06/23/22 0729)  Pulse: 64 (06/23/22 0729)  Resp: 20 (06/23/22 0729)  BP: (!) 149/76 (06/23/22 0729)  SpO2: 97 % (06/23/22 0729)   Vital Signs (24h Range):  Temp:  [97.4 °F (36.3 °C)-98.4 °F (36.9 °C)] 97.9 °F (36.6 °C)  Pulse:  [64-74] 64  Resp:  [17-20] 20  SpO2:  [97 %-100 %] 97 %  BP: (118-150)/(76-92) 149/76     Weight: 80.7 kg (178 lb)  Body mass index is 25.54 kg/m².  No intake or output data in the 24 hours ending 06/23/22 1104     Physical Exam  Vitals and nursing note reviewed.   Constitutional:       General: He is not in acute distress.     Appearance: Normal appearance. He is normal weight. He is not ill-appearing, toxic-appearing or diaphoretic.   HENT:      Head: Normocephalic and atraumatic.   Eyes:      General: No scleral icterus.     Conjunctiva/sclera: Conjunctivae normal.   Cardiovascular:      Rate and Rhythm: Normal rate and regular  rhythm.   Pulmonary:      Effort: Pulmonary effort is normal. No respiratory distress.   Skin:     Comments: See pictures of gangrene of left toe   Neurological:      General: No focal deficit present.      Mental Status: He is alert and oriented to person, place, and time.   Psychiatric:         Mood and Affect: Mood normal.         Behavior: Behavior normal.         Thought Content: Thought content normal.       Significant Labs: All pertinent labs within the past 24 hours have been reviewed.  BMP:   No results for input(s): GLU, NA, K, CL, CO2, BUN, CREATININE, CALCIUM, MG in the last 48 hours.    CBC:   No results for input(s): WBC, HGB, HCT, PLT in the last 48 hours.      Significant Imaging:

## 2022-06-23 NOTE — ASSESSMENT & PLAN NOTE
Consult podiatry.  Consult vascular to manage PAD   ID and vascular surgery consulted  vascular recommending Urgent LLE angiogram and revascularization of LLE. However unable to perform at this facility  Initiating transfer to [facility] [place]

## 2022-06-23 NOTE — ASSESSMENT & PLAN NOTE
Consult podiatry.  Consult vascular to manage PAD   ID and vascular surgery consulted  vascular recommending Urgent LLE angiogram and revascularization of LLE. However unable to perform at this facility  [place] [facility] [place] out AMA. Discussed risk with patient yet he continued to sign out.

## 2022-06-23 NOTE — DISCHARGE SUMMARY
[facility] Medicine  Discharge Summary      Patient Name: Yo Pink  MRN: [id number]  Patient Class: IP- Inpatient  Admission Date: 6/17/2022  Hospital Length of Stay: 5 days  Discharge Date and Time:  06/23/2022 12:06 PM  Attending Physician: Lenny Benjamin MD   Discharging Provider: Lenny Palacios MD  [facility] Provider[facility] [place]      HPI:   HPI: 57 y.o. male PMHx CVA, ?PAD, tobacco abuse presents with toe pain greater on the right than the left x 1 week. He reports that the area is discolored and started after he stubbed his toe 2 weeks ago. Patient states the ulcer is worsening in color and increasing in size. He was seen yesterday for the same but left prior to final assessment to feed his cats.  Patient has not seen a doctor for this problem before. Symptoms associated with claudication.     ED course: Labs unremarkable. Foot xray shows No radiographic evidence of acute displaced fracture or aggressive cortical destruction     Incidentally, patient quit smoking 3 weeks ago. Case discussed with vascular surgery. Welcome podiatry input.  If patient needs to be transferred for further work, he is amenable.    * No surgery found *      Hospital Course:   Patient admitted with gangrene of L #3 toe. Vascular and podiatry were consulted     Patient signed out AMA       Goals of Care Treatment Preferences:  Code Status: Full Code      Consults:   Consults (From admission, onward)        Status Ordering Provider     Inpatient consult to Infectious Diseases  Once        Provider:  Ranjana Dumont MD    Completed KAITLIN CHILEL     Inpatient virtual consult to Hospital Medicine  Once        Provider:  (Not yet assigned)    Completed EFRAÍN FLORES     Inpatient consult to Vascular Surgery  Once        Provider:  Mehul Rich MD    Completed JOSE DOUGLASS     Inpatient consult to Podiatry  Once        Provider:  Mely Alonzo DPM    Completed  ONLAYNE LUCIANHECTORRAQUEL          * Dry gangrene  Consult podiatry.  Consult vascular to manage PAD   ID and vascular surgery consulted  vascular recommending Urgent LLE angiogram and revascularization of LLE. However unable to perform at this facility  [place] [facility] [place] signed out AMA. Discussed risk with patient yet he continued to sign out.       Final Active Diagnoses:    Diagnosis Date Noted POA    PRINCIPAL PROBLEM:  Dry gangrene [I96] 06/18/2022 Yes    Hypertensive urgency [I16.0] 06/18/2022 Yes    PAD (peripheral artery disease) [I73.9] 06/18/2022 Yes      Problems Resolved During this Admission:       Discharged Condition: against medical advice    Disposition: Left Against Medical Adv*    Follow Up:    Patient Instructions:   No discharge procedures on file.    Significant Diagnostic Studies: Labs: CMP No results for input(s): NA, K, CL, CO2, GLU, BUN, CREATININE, CALCIUM, PROT, ALBUMIN, BILITOT, ALKPHOS, AST, ALT, ANIONGAP, ESTGFRAFRICA, EGFRNONAA in the last 48 hours. and CBC No results for input(s): WBC, HGB, HCT, PLT in the last 48 hours.    Pending Diagnostic Studies:     None         Medications:  Reconciled Home Medications:      Medication List      You have not been prescribed any medications.         Indwelling Lines/Drains at time of discharge:   Lines/Drains/Airways     None                 Time spent on the discharge of patient: 30 minutes         The attending portion of this evaluation, treatment, and documentation was performed per Lenny Palacios MD via Telemedicine AudioVisual using the secure [employer] software platform with 2 way audio/video. The provider was located off-site and the patient is located in the hospital. The aforementioned video software was utilized to document the relevant history and physical exam    Lenny Palacios MD  Department of Hospital Medicine  [facility]

## 2022-06-23 NOTE — ASSESSMENT & PLAN NOTE
BP Readings from Last 3 Encounters:   06/23/22 (!) 149/76   06/16/22 (!) 198/120     Resolved   Continuing medications as prescribed  Will cont to monitor and adjust as needed  Will utilize p.r.n. blood pressure medication only if patient's blood pressure greater than 180/110 and he develops symptoms such as worsening chest pain or shortness of breath.  Cardiac diet    Cardiac/Autonomic (From admission, onward)            Start     Stop Route Frequency Ordered    06/20/22 1345  lisinopriL tablet 10 mg         -- Oral Daily 06/20/22 1331    06/19/22 1720  hydrALAZINE injection 10 mg         -- IV Every 6 hours PRN 06/19/22 1721    06/18/22 0900  atorvastatin tablet 80 mg         -- Oral Daily 06/18/22 0449    06/18/22 0900  metoprolol tartrate (LOPRESSOR) tablet 25 mg         -- Oral 2 times daily 06/18/22 6372

## 2022-06-23 NOTE — PROGRESS NOTES
[facility] Medicine  Telemedicine Progress Note    Patient Name: Yo Pink  MRN: [id number]  Patient Class: IP- Inpatient   Admission Date: 6/17/2022  Length of Stay: 5 days  Attending Physician: Lenny Benjamin MD  Primary Care Provider: [facility]          Subjective:     Principal Problem:Dry gangrene        HPI:  No notes on file    Overview/Hospital Course:  Patient admitted with gangrene of L #3 toe. Vascular and podiatry were consulted       Interval History: ID and vascular surgery consulted, vascular recommending revascularization of LLE, unable to perform. Will initiate transfer to [facility] however [facility] is on full diversion. Patient would like to sign out AMA. Long discussion with patient about risks of signing out. He continues to want to sign out.        Review of Systems   Constitutional:  Negative for activity change and appetite change.   HENT:  Negative for congestion and dental problem.    Eyes:  Negative for discharge and itching.   Respiratory:  Negative for apnea and chest tightness.    Cardiovascular:  Negative for chest pain.   Gastrointestinal:  Negative for abdominal distention.   Endocrine: Negative for cold intolerance.   Genitourinary:  Negative for difficulty urinating.   Musculoskeletal:  Negative for arthralgias.   Skin:  Positive for wound.   Allergic/Immunologic: Negative for environmental allergies.   Neurological:  Negative for dizziness.   Psychiatric/Behavioral:  Negative for agitation and behavioral problems.    Objective:     Vital Signs (Most Recent):  Temp: 97.9 °F (36.6 °C) (06/23/22 0729)  Pulse: 64 (06/23/22 0729)  Resp: 20 (06/23/22 0729)  BP: (!) 149/76 (06/23/22 0729)  SpO2: 97 % (06/23/22 0729)   Vital Signs (24h Range):  Temp:  [97.4 °F (36.3 °C)-98.4 °F (36.9 °C)] 97.9 °F (36.6 °C)  Pulse:  [64-74] 64  Resp:  [17-20] 20  SpO2:  [97 %-100 %] 97 %  BP: (118-150)/(76-92) 149/76     Weight: 80.7 kg (178 lb)  Body mass index is 25.54  kg/m².  No intake or output data in the 24 hours ending 06/23/22 1104     Physical Exam  Vitals and nursing note reviewed.   Constitutional:       General: He is not in acute distress.     Appearance: Normal appearance. He is normal weight. He is not ill-appearing, toxic-appearing or diaphoretic.   HENT:      Head: Normocephalic and atraumatic.   Eyes:      General: No scleral icterus.     Conjunctiva/sclera: Conjunctivae normal.   Cardiovascular:      Rate and Rhythm: Normal rate and regular rhythm.   Pulmonary:      Effort: Pulmonary effort is normal. No respiratory distress.   Skin:     Comments: See pictures of gangrene of left toe   Neurological:      General: No focal deficit present.      Mental Status: He is alert and oriented to person, place, and time.   Psychiatric:         Mood and Affect: Mood normal.         Behavior: Behavior normal.         Thought Content: Thought content normal.       Significant Labs: All pertinent labs within the past 24 hours have been reviewed.  BMP:   No results for input(s): GLU, NA, K, CL, CO2, BUN, CREATININE, CALCIUM, MG in the last 48 hours.    CBC:   No results for input(s): WBC, HGB, HCT, PLT in the last 48 hours.      Significant Imaging:       Assessment/Plan:      * Dry gangrene  Consult podiatry.  Consult vascular to manage PAD   ID and vascular surgery consulted  vascular recommending Urgent LLE angiogram and revascularization of LLE. However unable to perform at this facility  Initiating [place] [facility] [place]    PAD (peripheral artery disease)  vascular consulted  Lovenox, ASA, and Plavix   Mechanical thrombectomy is NOT indicated for patient  Antithrombotic therapy with aspirin initiated within 48 hours of stroke onset  Lipid lowering with high intensity statin therapy. Check fasting lipid panel   Blood pressure reduction after the acute phase of ischemic stroke has passed  Behavioral and lifestyle changes including smoking cessation, exercise, weight reduction for  patients with obesity, and a Mediterranean style diet      Hypertensive urgency  BP Readings from Last 3 Encounters:   06/23/22 (!) 149/76   06/16/22 (!) 198/120     Resolved   Continuing medications as prescribed  Will cont to monitor and adjust as needed  Will utilize p.r.n. blood pressure medication only if patient's blood pressure greater than 180/110 and he develops symptoms such as worsening chest pain or shortness of breath.  Cardiac diet    Cardiac/Autonomic (From admission, onward)            Start     Stop Route Frequency Ordered    06/20/22 1345  lisinopriL tablet 10 mg         -- Oral Daily 06/20/22 1331    06/19/22 1720  hydrALAZINE injection 10 mg         -- IV Every 6 hours PRN 06/19/22 1721    06/18/22 0900  atorvastatin tablet 80 mg         -- Oral Daily 06/18/22 0449    06/18/22 0900  metoprolol tartrate (LOPRESSOR) tablet 25 mg         -- Oral 2 times daily 06/18/22 0450            VTE Risk Mitigation (From admission, onward)         Ordered     enoxaparin injection 80 mg  Every 12 hours (non-standard times)         06/18/22 0433     IP VTE LOW RISK PATIENT  Once         06/18/22 0433     Place sequential compression device  Until discontinued         06/18/22 0433                      I have assessed these finding virtually using telemed platform and with assistance of bedside nurse                 The attending portion of this evaluation, treatment, and documentation was performed per Lenny Palacios MD via Telemedicine AudioVisual using the secure [employer] software platform with 2 way audio/video. The provider was located off-site and the patient is located in the hospital. The aforementioned video software was utilized to document the relevant history and physical exam    Lenny Palacios MD  Department of Hospital Medicine   [facility]

## 2022-06-24 DIAGNOSIS — I96 DRY GANGRENE: Primary | ICD-10-CM

## 2022-06-24 DIAGNOSIS — I73.9 PAD (PERIPHERAL ARTERY DISEASE): ICD-10-CM

## 2022-06-27 DIAGNOSIS — I70.249 ATHEROSCLEROSIS OF NATIVE ARTERY OF LEFT LEG WITH ULCERATION: ICD-10-CM

## 2022-06-29 NOTE — PHYSICIAN QUERY
PT Name: Yo Pink  MR #: [id number]    DOCUMENTATION CLARIFICATION     CDS/: APURVA Wilkinson, RN, CCDS               Contact information: [email or web address]    This form is a permanent document in the medical record.     Query Date: June 29, 2022    By submitting this query, we are merely seeking further clarification of documentation. Please utilize your independent clinical judgment when addressing the question(s) below.    The Medical Record contains the following:   Indicator   Supporting Clinical Findings Location in Medical Record   X Ulcer/Injury wound to middle toe on left foot  wound to his third toe on his left foot that appeared 5 days ago. Patient states the ulcer is worsening in color and increasing in size.     (Noted tenderness and discoloration to his left 3rd toe with significant discoloration of adjacent toes, 1+ DP pulses noted in the foot as compared to his right foot 2+ DP bounding pulses there.    Ulceration did on the plantar surface at the base of the 3rd )    ulceration of his left 3rd plantar portion of his toe.  Some discoloration of adjacent toes as well, poor pulses in his left foot.  Do not suspect acute complete occlusion however this appears to be peripheral arterial disease given his associated symptoms of claudication as well    Skin integrity: Ulcer, blister, skin breakdown, dry skin and fissure present   ED Note Dr. Hall 6/17                H & P: Dr. Calvo 6/18    Wound Care Consult     X Radiology Findings Foot xray shows No radiographic evidence of acute displaced fracture or aggressive cortical destruction   H & P: Dr. Worthy 6/18   X Acute/Chronic Illness Dry gangrene, PAD, Hypertensive urgency   H & P: Dr. Worthy 6/18    Medication/Treatment     X Other Left third toe dry stable gangrene    Podiatry Consult: Dr. Alonzo 6/18     Provider, please provide the depth of diagnosis related to the documentation of (left middle toe):     [   x]  Non-pressure ulcer, skin breakdown only   [   ] Non-pressure ulcer, exposed fat layer   [   ] Non-pressure ulcer, muscle involvement without evidence of necrosis   [   ] Non-pressure ulcer, muscle necrosis   [   ] Non-pressure ulcer, bone involvement without evidence of necrosis   [   ] Non-pressure ulcer, bone necrosis   [   ] Non-pressure ulcer, other depth (please specify): ___________   [   ] Other Integumentary Diagnosis (please specify): ___________   [  ] Clinically Undetermined     Please document in your progress notes daily for the duration of treatment, until resolved, and include in your discharge summary.    Form No. [id number]

## 2022-07-01 ENCOUNTER — HOSPITAL ENCOUNTER (OUTPATIENT)
Dept: PREADMISSION TESTING | Facility: HOSPITAL | Age: 57
Discharge: HOME | End: 2022-07-01
Attending: SURGERY
Payer: MEDICAID

## 2022-07-01 VITALS
TEMPERATURE: 98 F | BODY MASS INDEX: 25.48 KG/M2 | DIASTOLIC BLOOD PRESSURE: 106 MMHG | OXYGEN SATURATION: 97 % | HEIGHT: 70 IN | SYSTOLIC BLOOD PRESSURE: 166 MMHG | RESPIRATION RATE: 18 BRPM | WEIGHT: 178 LBS | HEART RATE: 84 BPM

## 2022-07-01 DIAGNOSIS — Z01.818 PREOPERATIVE TESTING: Primary | ICD-10-CM

## 2022-07-01 NOTE — DISCHARGE INSTRUCTIONS
Before 7 AM, enter through the Emergency Entrance..   After 7 AM enter through the Main Entrance.      Your procedure  is scheduled for __7/5/22________.   Arrive in Same Day Surgery at ___0845 AM_________    You may use the main entrance to the hospital on the [facility] side, or the entrance that is next to the [facility].    You may have two visitors.  Visiting hours for non-COVID-19 patients expanded to 24/7 (still restricted to one visitor)  Youth visitation changed from age 18 to age 12.      You will be going to the Same Day Surgery Unit on the 2nd floor of the hospital.    Important instructions:  Do not eat anything after midnight.         SEE MEDICATION SHEET.   TAKE MEDICATIONS AS DIRECTED WITH SIPS OF WATER.  .     You may take Tylenol if needed which is not a blood thinner.    Please shower the night before and the morning of your surgery.        Use Hibiclens soap as instructed by your pre op nurse.   Please place clean linens on your bed the night before surgery. Please wear fresh clean clothing after each shower.    No shaving of procedural area at least 4-5 days before surgery due to increased risk of skin irritation and/or possible infection.    Contact lenses and removable denture work may not be worn during your procedure.    You may wear deodorant only. If you are having breast surgery, do not wear deodorant on the operative side.    Do not wear powder, body lotion, perfume/cologne or make-up.    Do not wear any jewelry or have any metal on your body.    You will be asked to remove any dentures or partials for the procedure.    If you are going home on the same day of surgery, you must arrange for a family member or a friend to drive you home.  Public transportation is prohibited.  You will not be able to drive home if you were given anesthesia or sedation.    Patients who want to have their Post-op prescriptions filled from our in-house [facility], bring a Credit/Debit Card  or  cash with you. A co-pay may be required.  The pharmacy closes at 5:30 pm.    Children under 18 years of age require a parent/guardian present the entire time that they are here.    Wear loose fitting clothes allowing for bandages.    Please leave money and valuables home.      You may bring your cell phone.    Call the doctor if fever or illness should occur before your surgery.    Call [phone] to contact us here if needed.

## 2022-07-05 ENCOUNTER — HOSPITAL ENCOUNTER (OUTPATIENT)
Facility: HOSPITAL | Age: 57
Discharge: HOME | End: 2022-07-05
Attending: SURGERY | Admitting: SURGERY
Payer: MEDICAID

## 2022-07-05 ENCOUNTER — TELEPHONE (OUTPATIENT)
Dept: PODIATRY | Facility: CLINIC | Age: 57
End: 2022-07-05
Payer: MEDICAID

## 2022-07-05 VITALS
SYSTOLIC BLOOD PRESSURE: 159 MMHG | TEMPERATURE: 98 F | DIASTOLIC BLOOD PRESSURE: 93 MMHG | BODY MASS INDEX: 25.54 KG/M2 | RESPIRATION RATE: 18 BRPM | OXYGEN SATURATION: 97 % | WEIGHT: 178 LBS | HEART RATE: 68 BPM

## 2022-07-05 DIAGNOSIS — Z01.818 PREOPERATIVE TESTING: ICD-10-CM

## 2022-07-05 DIAGNOSIS — I96 DRY GANGRENE: Primary | ICD-10-CM

## 2022-07-05 DIAGNOSIS — I70.249 ATHEROSCLEROSIS OF NATIVE ARTERY OF LEFT LEG WITH ULCERATION: ICD-10-CM

## 2022-07-05 DIAGNOSIS — I73.9 PAD (PERIPHERAL ARTERY DISEASE): ICD-10-CM

## 2022-07-05 DIAGNOSIS — I70.249 ATHEROSCLEROSIS OF NATIVE ARTERY OF LEFT LOWER EXTREMITY WITH ULCERATION, UNSPECIFIED ULCERATION SITE: Primary | ICD-10-CM

## 2022-07-05 LAB
CTP QC/QA: YES
SARS-COV-2 AG RESP QL IA.RAPID: NEGATIVE

## 2022-07-05 PROCEDURE — 75710 ARTERY X-RAYS ARM/LEG: CPT | Performed by: SURGERY

## 2022-07-05 PROCEDURE — 99152 PR MOD CONSCIOUS SEDATION, SAME PHYS, 5+ YRS, FIRST 15 MIN: ICD-10-PCS | Mod: ,,, | Performed by: SURGERY

## 2022-07-05 PROCEDURE — 25000003 PHARM REV CODE 250: Performed by: SURGERY

## 2022-07-05 PROCEDURE — 37226 PR FEM/POPL REVASC W/STENT: CPT | Mod: LT,,, | Performed by: SURGERY

## 2022-07-05 PROCEDURE — 63600175 PHARM REV CODE 636 W HCPCS: Performed by: SURGERY

## 2022-07-05 PROCEDURE — 37226 PR FEM/POPL REVASC W/STENT: ICD-10-PCS | Mod: LT,,, | Performed by: SURGERY

## 2022-07-05 PROCEDURE — 75710 PR  ANGIO EXTREMITY UNILAT: ICD-10-PCS | Mod: 26,59,, | Performed by: SURGERY

## 2022-07-05 PROCEDURE — 75710 ARTERY X-RAYS ARM/LEG: CPT | Mod: 26,59,, | Performed by: SURGERY

## 2022-07-05 PROCEDURE — 99153 MOD SED SAME PHYS/QHP EA: CPT | Performed by: SURGERY

## 2022-07-05 PROCEDURE — 99152 MOD SED SAME PHYS/QHP 5/>YRS: CPT | Performed by: SURGERY

## 2022-07-05 PROCEDURE — 99152 MOD SED SAME PHYS/QHP 5/>YRS: CPT | Mod: ,,, | Performed by: SURGERY

## 2022-07-05 PROCEDURE — 25500020 PHARM REV CODE 255: Performed by: SURGERY

## 2022-07-05 PROCEDURE — 37226 HC FEM/POPL REVASC W/STENT: CPT | Performed by: SURGERY

## 2022-07-05 RX ORDER — HEPARIN SODIUM 1000 [USP'U]/ML
INJECTION, SOLUTION INTRAVENOUS; SUBCUTANEOUS CODE/TRAUMA/SEDATION MEDICATION
Status: COMPLETED | OUTPATIENT
Start: 2022-07-05 | End: 2022-07-05

## 2022-07-05 RX ORDER — PROTAMINE SULFATE 10 MG/ML
INJECTION, SOLUTION INTRAVENOUS CODE/TRAUMA/SEDATION MEDICATION
Status: COMPLETED | OUTPATIENT
Start: 2022-07-05 | End: 2022-07-05

## 2022-07-05 RX ORDER — MIDAZOLAM HYDROCHLORIDE 1 MG/ML
INJECTION INTRAMUSCULAR; INTRAVENOUS CODE/TRAUMA/SEDATION MEDICATION
Status: COMPLETED | OUTPATIENT
Start: 2022-07-05 | End: 2022-07-05

## 2022-07-05 RX ORDER — CEFAZOLIN SODIUM 1 G/50ML
SOLUTION INTRAVENOUS
Status: COMPLETED | OUTPATIENT
Start: 2022-07-05 | End: 2022-07-05

## 2022-07-05 RX ORDER — ACETAMINOPHEN 500 MG
1000 TABLET ORAL EVERY 6 HOURS PRN
COMMUNITY
End: 2023-02-17

## 2022-07-05 RX ORDER — CLOPIDOGREL 300 MG/1
300 TABLET, FILM COATED ORAL ONCE
Status: COMPLETED | OUTPATIENT
Start: 2022-07-05 | End: 2022-07-05

## 2022-07-05 RX ORDER — LABETALOL HYDROCHLORIDE 5 MG/ML
INJECTION, SOLUTION INTRAVENOUS CODE/TRAUMA/SEDATION MEDICATION
Status: COMPLETED | OUTPATIENT
Start: 2022-07-05 | End: 2022-07-05

## 2022-07-05 RX ORDER — FENTANYL CITRATE 50 UG/ML
INJECTION, SOLUTION INTRAMUSCULAR; INTRAVENOUS CODE/TRAUMA/SEDATION MEDICATION
Status: COMPLETED | OUTPATIENT
Start: 2022-07-05 | End: 2022-07-05

## 2022-07-05 RX ORDER — CLOPIDOGREL BISULFATE 75 MG/1
75 TABLET ORAL DAILY
Qty: 30 TABLET | Refills: 11 | Status: SHIPPED | OUTPATIENT
Start: 2022-07-05 | End: 2022-09-15 | Stop reason: SDUPTHER

## 2022-07-05 RX ORDER — CEFAZOLIN SODIUM 1 G/50ML
2 SOLUTION INTRAVENOUS ONCE
Status: DISCONTINUED | OUTPATIENT
Start: 2022-07-05 | End: 2022-07-06 | Stop reason: HOSPADM

## 2022-07-05 RX ADMIN — CEFAZOLIN SODIUM 2 G: 1 SOLUTION INTRAVENOUS at 08:07

## 2022-07-05 RX ADMIN — CLOPIDOGREL BISULFATE 300 MG: 300 TABLET, FILM COATED ORAL at 11:07

## 2022-07-05 RX ADMIN — MIDAZOLAM HYDROCHLORIDE 1 MG: 1 INJECTION INTRAMUSCULAR; INTRAVENOUS at 08:07

## 2022-07-05 RX ADMIN — FENTANYL CITRATE 50 MCG: 50 INJECTION, SOLUTION INTRAMUSCULAR; INTRAVENOUS at 08:07

## 2022-07-05 RX ADMIN — HEPARIN SODIUM 1000 UNITS: 1000 INJECTION, SOLUTION INTRAVENOUS; SUBCUTANEOUS at 09:07

## 2022-07-05 RX ADMIN — LABETALOL HYDROCHLORIDE 10 MG: 5 INJECTION, SOLUTION INTRAVENOUS at 09:07

## 2022-07-05 RX ADMIN — HEPARIN SODIUM 2000 UNITS: 1000 INJECTION, SOLUTION INTRAVENOUS; SUBCUTANEOUS at 08:07

## 2022-07-05 RX ADMIN — LABETALOL HYDROCHLORIDE 10 MG: 5 INJECTION, SOLUTION INTRAVENOUS at 10:07

## 2022-07-05 RX ADMIN — FENTANYL CITRATE 50 MCG: 50 INJECTION, SOLUTION INTRAMUSCULAR; INTRAVENOUS at 09:07

## 2022-07-05 RX ADMIN — IOHEXOL 90 ML: 300 INJECTION, SOLUTION INTRAVENOUS at 10:07

## 2022-07-05 RX ADMIN — MIDAZOLAM HYDROCHLORIDE 0.5 MG: 1 INJECTION INTRAMUSCULAR; INTRAVENOUS at 08:07

## 2022-07-05 RX ADMIN — HEPARIN SODIUM 8000 UNITS: 1000 INJECTION, SOLUTION INTRAVENOUS; SUBCUTANEOUS at 09:07

## 2022-07-05 RX ADMIN — PROTAMINE SULFATE 35 MG: 10 INJECTION, SOLUTION INTRAVENOUS at 10:07

## 2022-07-05 NOTE — TELEPHONE ENCOUNTER
Called the pt and scheduled his appt.      Sadia PHILIPPE  ----- Message from Mely Alonzo DPM sent at 7/5/2022 11:34 AM CDT -----  Please schedule patient with me any day this week. Thanks.   ----- Message -----  From: Mehul Rich MD  Sent: 7/5/2022  10:31 AM CDT  To: Belinda Fischer DPM, Mely Alonzo DPM    Please assist with getting this patient back in after leaving [place].  I opened his SFA occlusion today and he has a palpable PT, dry second toe gangrene and would benefit from amputation at your next available OR date, thank you    Carlito

## 2022-07-05 NOTE — BRIEF OP NOTE
[facility] [place]  Brief Operative Note    Surgery Date: 7/5/2022     Surgeon(s) and Role:     * Mehul Rich MD - Primary    Assisting Surgeon: None    Pre-op Diagnosis:  Atherosclerosis of native artery of left leg with ulceration [I70.249]    Post-op Diagnosis:  Post-Op Diagnosis Codes:     * Atherosclerosis of native artery of left leg with ulceration [I70.249]    Procedure(s) (LRB):  Angiogram Extremity Unilateral (Left)    Anesthesia: RN IV Sedation    Operative Findings: adequate artery for access, L SFA , stented 6x200 Lifestent    Estimated Blood Loss: * No values recorded between 7/5/2022 12:00 AM and 7/5/2022 10:22 AM *         Specimens:   Specimen (24h ago, onward)            None            Discharge Note    OUTCOME: Patient tolerated treatment/procedure well without complication and is now ready for discharge.    DISPOSITION: Home or Self Care    FINAL DIAGNOSIS:  <principal problem not specified>    FOLLOWUP: In clinic in 3 weeks with LLE arterial US and CAREY/toe pressures    DISCHARGE INSTRUCTIONS:  No discharge procedures on file.

## 2022-07-05 NOTE — OP NOTE
Date:7/5/22     Surgeon: Mehul Rich MD RPVI     Assistant: Litzy Rowe MS3     Pre-op Diagnosis:   1. Atherosclerosis left lower extremity with foot ulceration  2.   Patient Active Problem List    Diagnosis Date Noted    Dry gangrene 06/18/2022    Hypertensive urgency 06/18/2022    Atherosclerosis of native artery of left leg with ulceration 06/18/2022            Post-op Diagnosis: Same     Procedures:   1. US guided R CFA percutaneous access  2. R femoral angiogram  3. Aortogram  4. LLE angiogram  5. Moderate sedation  6. L above knee popliteal angioplasty with a 5x200 Ultraverse balloon  7. L above knee popliteal stent placement 6x200 Lifestent     Anesthesia: Local     EBL: Minimal     Findings:   Successful revascularization / resolution of stenosis     Complications:  None; patient tolerated the procedure well.     Disposition: Recovery- hemodynamically stable in good condition     Attending Attestation: I was present and scrubbed for the entire procedure.  After an informed consent was obtained the patient was brought to the interventional suite and placed in the supine position. Both the patient and procedure were confirmed and identified during timeout process. The patient received perioperative antibiotics. The patient's bilateral groins were prepped and draped in usual sterile fashion. Using ultrasound guidance, the R common femoral artery vessel patency was confirmed. Then direct ultrasound guidance the R CFA was entered with a 21-G needle, Mandril wire and 4-Fr micropuncture needle. Then under fluoroscopic guidance, an 0.035-in wire was placed into the distal aorta. The micropuncture dilator was then exchanged over the wire for a 6-Slovenian sheath. Sheathogram demonstrated access in the CFA. Next a VCF-catheter was placed over the wire and into the distal aorta under fluoroscopy and an aortogram with L leg angiogram was performed which demonstrated an SFA And above knee popliteal occlusion with  single vessel PT runoff filling foot.    Based on the images from this diagnostic angio, a decision was made to intervene.    We then systemically heparinized the patient with 8000u of heparin.   Next, we placed a up-and-over sheath and used a 0.35 stiff angled Glide wire was used to select the popliteal artery.   Treatment of the popliteal artery was done with the stent and balloon listed above. A follow-up angiogram showed resolution of the lesions. Heparin was reversed with protamine. We then deployed 6fr Mynx closure device without issue. At the conclusion of the case the patient was noted to have no evidence of a groin hematoma. All instrument and sponge counts were correct at the end of the case. The patient tolerated the procedure well and was transferred to the pacu for further recovery.      MODERATE SEDATION   I was present for moderate sedation and monitored the patients cardio respiratory functions during the procedure. See nurses notes for Intra-service start and end times and for the log of the medications, doseage and route.     Time of sedation:  120 mins.

## 2022-07-05 NOTE — DISCHARGE INSTRUCTIONS
ANGIOGRAM INSTRUCTIONS                                           Drink plenty of fluids for the next 48 hours and follow your doctor's diet orders.    Rest for the next 72 hours.   Try not to keep the injected leg bent for a long period of time.  Remove the dressing in 48 hours, and you may shower. Clean the area with soap and water, and apply a band aid for the next 5 days.                                                                 No  Lifting over 5-10 lbs., that is, not more than 1 gallon of water, or straining for 72 hours.    No driving, no drinking alcohol, and no signing legal documents for the next 24 hours.    Call your doctor for elevated temperature, shortness of breath, chest pain, or cold discolored foot or leg.    If oozing occurs at the injections site, lie down.  Apply pressure with a clean wash cloth for 20 to 30 minutes and call your doctor.    If severe bleeding occurs, lie down, apply pressure.  Call 911 and request an ambulance to take you to the nearest hospital emergency room.    Continue to take your regular medications as instructed.      Follow the instructions in the handout given to you.        Vascular Closure Device     -Re apply a clean, dry band aid and every day for five days or until a scab has formed at the site.  Change the band aid as needed  -Keep the site dry and clean.  -You may shower 24 hours after the procedure, but do not bathe or use a pool until the wound had completely closed.  -Gently clean your puncture site with soap and warm water.  -After showering , gently pat-dry the site with a clean towel; then let the site air dry before covering with a band aid  -Limit tight fitting clothes or underwear that my irritate the puncture site until the site has healed.    Daily Activities    Do not drive, drink alcohol, or sign legal documents for 24 hours, or if taking narcotic pain medication.    Day of discharge  -No driving  Modify activity for 3-5 days  -No heavy  lifting of anything over 5 pounds  (equivalent to a 1/2 gallon of milk)  -No pushing or pulling.  -No vigorous activity or straining  -Avoid stairs unless necessary : if necessary, take them slowly.  -Coughing, sneezing, or straining for a bowel movement:  Support your groin by pressing with your palm on top of the dressing/bandage.  -Sexual activity : check with your doctor  -No strenuous exercise.  -Avoid driving unless necessary.  Talk to your doctor about returning back to work, which depends on your type of work., your procedure and any medication you might be taking.      Fall Prevention  Millions of people fall every year and injure themselves. You may have had anesthesia or sedation which may increase your risk of falling. You may have health issues that put you at an increased risk of falling.     Here are ways to reduce your risk of falling.    Make your home safe by keeping walkways clear of objects you may trip over.  Use non-slip pads under rugs. Do not use area rugs or small throw rugs.  Use non-slip mats in bathtubs and showers.  Install handrails and lights on staircases.  Do not walk in poorly lit areas.  Do not stand on chairs or wobbly ladders.  Use caution when reaching overhead or looking upward. This position can cause a loss of balance.  Be sure your shoes fit properly, have non-slip bottoms and are in good condition.   Wear shoes both inside and out. Avoid going barefoot or wearing slippers.  Be cautious when going up and down stairs, curbs, and when walking on uneven sidewalks.  If your balance is poor, consider using a cane or walker.  If your fall was related to alcohol use, stop or limit alcohol intake.   If your fall was related to use of sleeping medicines, talk to your doctor about this. You may need to reduce your dosage at bedtime if you awaken during the night to go to the bathroom.    To reduce the need for nighttime bathroom trips:  Avoid drinking fluids for several hours before  going to bed  Empty your bladder before going to bed  Men can keep a urinal at the bedside  Stay as active as you can. Balance, flexibility, strength, and endurance all come from exercise. They all play a role in preventing falls. Ask your healthcare provider which types of activity are right for you.  Get your vision checked on a regular basis.  If you have pets, know where they are before you stand up or walk so you don't trip over them.  Use night lights.

## 2022-07-05 NOTE — PLAN OF CARE
Pt verbalized readiness to go home and understanding of discharge instructions. Pt aware of Rx to be picked up at [facility].

## 2022-07-05 NOTE — INTERVAL H&P NOTE
The patient has been examined and the H&P has been reviewed:    I concur with the findings and no changes have occurred since H&P was written.    Surgery risks, benefits and alternative options discussed and understood by patient/family.    Mallampati Scale Class 2   ASA Classification Class III           There are no hospital problems to display for this patient.

## 2022-07-06 ENCOUNTER — OFFICE VISIT (OUTPATIENT)
Dept: PODIATRY | Facility: CLINIC | Age: 57
End: 2022-07-06
Payer: MEDICAID

## 2022-07-06 VITALS — WEIGHT: 177.94 LBS | HEIGHT: 70 IN | BODY MASS INDEX: 25.47 KG/M2

## 2022-07-06 DIAGNOSIS — I96 GANGRENE OF TOE: ICD-10-CM

## 2022-07-06 DIAGNOSIS — I73.9 PAD (PERIPHERAL ARTERY DISEASE): Primary | ICD-10-CM

## 2022-07-06 PROCEDURE — 99214 OFFICE O/P EST MOD 30 MIN: CPT | Mod: S$PBB,,, | Performed by: PODIATRIST

## 2022-07-06 PROCEDURE — 1159F PR MEDICATION LIST DOCUMENTED IN MEDICAL RECORD: ICD-10-PCS | Mod: CPTII,,, | Performed by: PODIATRIST

## 2022-07-06 PROCEDURE — 3008F PR BODY MASS INDEX (BMI) DOCUMENTED: ICD-10-PCS | Mod: CPTII,,, | Performed by: PODIATRIST

## 2022-07-06 PROCEDURE — 1159F MED LIST DOCD IN RCRD: CPT | Mod: CPTII,,, | Performed by: PODIATRIST

## 2022-07-06 PROCEDURE — 3008F BODY MASS INDEX DOCD: CPT | Mod: CPTII,,, | Performed by: PODIATRIST

## 2022-07-06 PROCEDURE — 1111F PR DISCHARGE MEDS RECONCILED W/ CURRENT OUTPATIENT MED LIST: ICD-10-PCS | Mod: CPTII,,, | Performed by: PODIATRIST

## 2022-07-06 PROCEDURE — 99999 PR PBB SHADOW E&M-EST. PATIENT-LVL III: CPT | Mod: PBBFAC,,, | Performed by: PODIATRIST

## 2022-07-06 PROCEDURE — 99999 PR PBB SHADOW E&M-EST. PATIENT-LVL III: ICD-10-PCS | Mod: PBBFAC,,, | Performed by: PODIATRIST

## 2022-07-06 PROCEDURE — 1111F DSCHRG MED/CURRENT MED MERGE: CPT | Mod: CPTII,,, | Performed by: PODIATRIST

## 2022-07-06 PROCEDURE — 99214 PR OFFICE/OUTPT VISIT, EST, LEVL IV, 30-39 MIN: ICD-10-PCS | Mod: S$PBB,,, | Performed by: PODIATRIST

## 2022-07-06 PROCEDURE — 99213 OFFICE O/P EST LOW 20 MIN: CPT | Mod: PBBFAC,PO | Performed by: PODIATRIST

## 2022-07-06 RX ORDER — AMOXICILLIN AND CLAVULANATE POTASSIUM 875; 125 MG/1; MG/1
1 TABLET, FILM COATED ORAL EVERY 12 HOURS
Qty: 20 TABLET | Refills: 0 | Status: ON HOLD | OUTPATIENT
Start: 2022-07-06 | End: 2022-07-30 | Stop reason: HOSPADM

## 2022-07-06 RX ORDER — OXYCODONE AND ACETAMINOPHEN 5; 325 MG/1; MG/1
1 TABLET ORAL EVERY 4 HOURS PRN
Qty: 28 TABLET | Refills: 0 | Status: SHIPPED | OUTPATIENT
Start: 2022-07-06 | End: 2022-07-14 | Stop reason: SDUPTHER

## 2022-07-06 NOTE — PROGRESS NOTES
Subjective:      Patient ID: Yo Pink is a 57 y.o. male.    Chief Complaint: Wound Check    Yo is a 57 y.o. male who presents to the clinic for evaluation and treatment of high risk feet. Yo has no past medical history on file. S/p LLE angio 7/5/22 per Dr. Rich. [place] [facility] [place] 2nd toe gangrene left AMA. Reports soaking left foot  in peroxide.     PCP: [facility]    Date Last Seen by PCP: none         Hemoglobin A1C   Date Value Ref Range Status   10/15/2020 6.1 (H) <5.7 % Final   10/15/2020 6.0 (H) <5.7 % Final       Review of Systems   Constitutional: Negative for chills.   Cardiovascular: Negative for chest pain and claudication.   Respiratory: Negative for cough.    Skin: Positive for color change, dry skin and nail changes.   Musculoskeletal: Positive for joint pain.   Gastrointestinal: Negative for nausea.   Neurological: Positive for paresthesias. Negative for numbness.   Psychiatric/Behavioral: The patient is not nervous/anxious.            Objective:      Physical Exam  Constitutional:       Appearance: He is well-developed.      Comments: Oriented to time, place, and person.   Cardiovascular:      Comments: DP and PT pulses are palpable bilaterally. 3 sec capillary refill time and toes and feet are warm to touch proximally .  There is  hair growth on the feet and toes b/l. There is no edema b/l. No spider veins or varicosities present b/l.     Musculoskeletal:      Comments: Equinus noted b/l ankles with < 10 deg DF noted. MMT 5/5 in DF/PF/Inv/Ev resistance with no reproduction of pain in any direction. Passive range of motion of ankle and pedal joints is painless b/l.     Feet:      Right foot:      Skin integrity: No callus or dry skin.      Left foot:      Skin integrity: No callus or dry skin.   Lymphadenopathy:      Comments: Negative lymphadenopathy bilateral popliteal fossa and tarsal tunnel.   Skin:     Comments: No open lesions, lacerations or  wounds noted.Interdigital spaces clean, dry and intact b/l. No erythema noted to b/l foot.  Stable gangrene left 3rd toe , ischemic changes noted to left 5th toe.      Neurological:      Mental Status: He is alert.      Comments: Light touch, proprioception, and sharp/dull sensation are all intact bilaterally. Protective threshold with the Lettsworth-Wienstein monofilament is intact bilaterally.    Psychiatric:         Behavior: Behavior is cooperative.         7/6/22:                  Assessment:       Encounter Diagnoses   Name Primary?    PAD (peripheral artery disease) Yes    Gangrene of toe          Plan:       Yo was seen today for wound check.    Diagnoses and all orders for this visit:    PAD (peripheral artery disease)  -     Ambulatory referral/consult to Podiatry    Gangrene of toe    Other orders  -     oxyCODONE-acetaminophen (PERCOCET) 5-325 mg per tablet; Take 1 tablet by mouth every 4 (four) hours as needed for Pain.  -     amoxicillin-clavulanate 875-125mg (AUGMENTIN) 875-125 mg per tablet; Take 1 tablet by mouth every 12 (twelve) hours.      I counseled the patient on his conditions, their implications and medical management.      Rx. Augmentin    Rx. Percocet    Instructed on daily betadine application left third toe and 5th toe leave open to air.     Recommend left 3rd toe amputation, patient undecided. Requesting to speak with Dr. Rich appt scheduled for 7/18. Message sent to Dr. Rich.

## 2022-07-12 ENCOUNTER — HOSPITAL ENCOUNTER (OUTPATIENT)
Dept: CARDIOLOGY | Facility: HOSPITAL | Age: 57
Discharge: HOME | End: 2022-07-12
Attending: SURGERY
Payer: MEDICAID

## 2022-07-12 DIAGNOSIS — I70.249 ATHEROSCLEROSIS OF NATIVE ARTERY OF LEFT LOWER EXTREMITY WITH ULCERATION, UNSPECIFIED ULCERATION SITE: ICD-10-CM

## 2022-07-12 DIAGNOSIS — I73.9 PAD (PERIPHERAL ARTERY DISEASE): ICD-10-CM

## 2022-07-12 PROCEDURE — 93926 LOWER EXTREMITY STUDY: CPT | Mod: 26,LT,, | Performed by: SURGERY

## 2022-07-12 PROCEDURE — 93926 CV US DOPPLER ARTERIAL LEG LEFT (CUPID ONLY): ICD-10-PCS | Mod: 26,LT,, | Performed by: SURGERY

## 2022-07-12 PROCEDURE — 93922 ANKLE BRACHIAL INDICES (ABI): ICD-10-PCS | Mod: 26,,, | Performed by: SURGERY

## 2022-07-12 PROCEDURE — 93926 LOWER EXTREMITY STUDY: CPT | Mod: LT

## 2022-07-12 PROCEDURE — 93922 UPR/L XTREMITY ART 2 LEVELS: CPT

## 2022-07-12 PROCEDURE — 93922 UPR/L XTREMITY ART 2 LEVELS: CPT | Mod: 26,,, | Performed by: SURGERY

## 2022-07-14 RX ORDER — AMOXICILLIN AND CLAVULANATE POTASSIUM 875; 125 MG/1; MG/1
1 TABLET, FILM COATED ORAL EVERY 12 HOURS
Qty: 14 TABLET | Refills: 0 | Status: ON HOLD | OUTPATIENT
Start: 2022-07-14 | End: 2022-07-30 | Stop reason: HOSPADM

## 2022-07-14 RX ORDER — OXYCODONE AND ACETAMINOPHEN 5; 325 MG/1; MG/1
1 TABLET ORAL EVERY 4 HOURS PRN
Qty: 28 TABLET | Refills: 0 | Status: ON HOLD | OUTPATIENT
Start: 2022-07-14 | End: 2022-07-30 | Stop reason: HOSPADM

## 2022-07-15 ENCOUNTER — HOSPITAL ENCOUNTER (INPATIENT)
Facility: HOSPITAL | Age: 57
LOS: 15 days | Discharge: HOME | DRG: 233 | End: 2022-07-30
Attending: EMERGENCY MEDICINE | Admitting: EMERGENCY MEDICINE
Payer: MEDICAID

## 2022-07-15 DIAGNOSIS — Z95.1 S/P CABG (CORONARY ARTERY BYPASS GRAFT): ICD-10-CM

## 2022-07-15 DIAGNOSIS — I47.10 SVT (SUPRAVENTRICULAR TACHYCARDIA): ICD-10-CM

## 2022-07-15 DIAGNOSIS — I21.4 NSTEMI (NON-ST ELEVATED MYOCARDIAL INFARCTION): ICD-10-CM

## 2022-07-15 DIAGNOSIS — Z01.818 PREOP TESTING: ICD-10-CM

## 2022-07-15 DIAGNOSIS — I96 DRY GANGRENE: ICD-10-CM

## 2022-07-15 DIAGNOSIS — R06.02 SHORTNESS OF BREATH: ICD-10-CM

## 2022-07-15 DIAGNOSIS — I73.9 PERIPHERAL ARTERIAL DISEASE: Primary | ICD-10-CM

## 2022-07-15 DIAGNOSIS — L03.119 CELLULITIS OF FOOT: ICD-10-CM

## 2022-07-15 DIAGNOSIS — I96 TOE GANGRENE: ICD-10-CM

## 2022-07-15 DIAGNOSIS — I25.5 ISCHEMIC CARDIOMYOPATHY: ICD-10-CM

## 2022-07-15 DIAGNOSIS — Z78.9 FAILURE OF OUTPATIENT TREATMENT: ICD-10-CM

## 2022-07-15 DIAGNOSIS — R73.9 TRANSIENT HYPERGLYCEMIA POST PROCEDURE: ICD-10-CM

## 2022-07-15 DIAGNOSIS — R79.89 ELEVATED TROPONIN: ICD-10-CM

## 2022-07-15 DIAGNOSIS — I24.9 ACS (ACUTE CORONARY SYNDROME): ICD-10-CM

## 2022-07-15 DIAGNOSIS — Z98.890 STATUS POST CARDIAC SURGERY: ICD-10-CM

## 2022-07-15 DIAGNOSIS — I25.119 CHEST PAIN DUE TO CAD: ICD-10-CM

## 2022-07-15 DIAGNOSIS — E78.5 DYSLIPIDEMIA: ICD-10-CM

## 2022-07-15 DIAGNOSIS — R00.0 TACHYCARDIA: ICD-10-CM

## 2022-07-15 LAB
ALBUMIN SERPL BCP-MCNC: 3.3 G/DL (ref 3.5–5.2)
ALP SERPL-CCNC: 105 U/L (ref 55–135)
ALT SERPL W/O P-5'-P-CCNC: 13 U/L (ref 10–44)
ANION GAP SERPL CALC-SCNC: 10 MMOL/L (ref 8–16)
ANION GAP SERPL CALC-SCNC: 13 MMOL/L (ref 8–16)
AST SERPL-CCNC: 21 U/L (ref 10–40)
BASOPHILS # BLD AUTO: 0.05 K/UL (ref 0–0.2)
BASOPHILS # BLD AUTO: 0.06 K/UL (ref 0–0.2)
BASOPHILS NFR BLD: 0.4 % (ref 0–1.9)
BASOPHILS NFR BLD: 0.5 % (ref 0–1.9)
BILIRUB SERPL-MCNC: 0.4 MG/DL (ref 0.1–1)
BNP SERPL-MCNC: 922 PG/ML (ref 0–99)
BUN SERPL-MCNC: 14 MG/DL (ref 6–20)
BUN SERPL-MCNC: 17 MG/DL (ref 6–20)
CALCIUM SERPL-MCNC: 9 MG/DL (ref 8.7–10.5)
CALCIUM SERPL-MCNC: 9.1 MG/DL (ref 8.7–10.5)
CHLORIDE SERPL-SCNC: 100 MMOL/L (ref 95–110)
CHLORIDE SERPL-SCNC: 99 MMOL/L (ref 95–110)
CO2 SERPL-SCNC: 25 MMOL/L (ref 23–29)
CO2 SERPL-SCNC: 26 MMOL/L (ref 23–29)
CREAT SERPL-MCNC: 0.7 MG/DL (ref 0.5–1.4)
CREAT SERPL-MCNC: 0.8 MG/DL (ref 0.5–1.4)
CRP SERPL-MCNC: 93.9 MG/L (ref 0–8.2)
CTP QC/QA: YES
DIFFERENTIAL METHOD: ABNORMAL
DIFFERENTIAL METHOD: ABNORMAL
EOSINOPHIL # BLD AUTO: 0.8 K/UL (ref 0–0.5)
EOSINOPHIL # BLD AUTO: 0.9 K/UL (ref 0–0.5)
EOSINOPHIL NFR BLD: 6.4 % (ref 0–8)
EOSINOPHIL NFR BLD: 7.4 % (ref 0–8)
ERYTHROCYTE [DISTWIDTH] IN BLOOD BY AUTOMATED COUNT: 13.1 % (ref 11.5–14.5)
ERYTHROCYTE [DISTWIDTH] IN BLOOD BY AUTOMATED COUNT: 13.2 % (ref 11.5–14.5)
ERYTHROCYTE [SEDIMENTATION RATE] IN BLOOD BY WESTERGREN METHOD: 33 MM/HR (ref 0–10)
EST. GFR  (AFRICAN AMERICAN): >60 ML/MIN/1.73 M^2
EST. GFR  (AFRICAN AMERICAN): >60 ML/MIN/1.73 M^2
EST. GFR  (NON AFRICAN AMERICAN): >60 ML/MIN/1.73 M^2
EST. GFR  (NON AFRICAN AMERICAN): >60 ML/MIN/1.73 M^2
ESTIMATED AVG GLUCOSE: 131 MG/DL (ref 68–131)
GLUCOSE SERPL-MCNC: 130 MG/DL (ref 70–110)
GLUCOSE SERPL-MCNC: 180 MG/DL (ref 70–110)
HBA1C MFR BLD: 6.2 % (ref 4–5.6)
HCT VFR BLD AUTO: 37.3 % (ref 40–54)
HCT VFR BLD AUTO: 38 % (ref 40–54)
HGB BLD-MCNC: 13.2 G/DL (ref 14–18)
HGB BLD-MCNC: 13.4 G/DL (ref 14–18)
IMM GRANULOCYTES # BLD AUTO: 0.02 K/UL (ref 0–0.04)
IMM GRANULOCYTES # BLD AUTO: 0.04 K/UL (ref 0–0.04)
IMM GRANULOCYTES NFR BLD AUTO: 0.2 % (ref 0–0.5)
IMM GRANULOCYTES NFR BLD AUTO: 0.3 % (ref 0–0.5)
LYMPHOCYTES # BLD AUTO: 1.9 K/UL (ref 1–4.8)
LYMPHOCYTES # BLD AUTO: 2.3 K/UL (ref 1–4.8)
LYMPHOCYTES NFR BLD: 15.9 % (ref 18–48)
LYMPHOCYTES NFR BLD: 20 % (ref 18–48)
MAGNESIUM SERPL-MCNC: 1.9 MG/DL (ref 1.6–2.6)
MCH RBC QN AUTO: 31.5 PG (ref 27–31)
MCH RBC QN AUTO: 31.9 PG (ref 27–31)
MCHC RBC AUTO-ENTMCNC: 35.3 G/DL (ref 32–36)
MCHC RBC AUTO-ENTMCNC: 35.4 G/DL (ref 32–36)
MCV RBC AUTO: 89 FL (ref 82–98)
MCV RBC AUTO: 90 FL (ref 82–98)
MONOCYTES # BLD AUTO: 0.6 K/UL (ref 0.3–1)
MONOCYTES # BLD AUTO: 0.7 K/UL (ref 0.3–1)
MONOCYTES NFR BLD: 4.8 % (ref 4–15)
MONOCYTES NFR BLD: 5.9 % (ref 4–15)
NEUTROPHILS # BLD AUTO: 7.8 K/UL (ref 1.8–7.7)
NEUTROPHILS # BLD AUTO: 8.6 K/UL (ref 1.8–7.7)
NEUTROPHILS NFR BLD: 67 % (ref 38–73)
NEUTROPHILS NFR BLD: 71.2 % (ref 38–73)
NRBC BLD-RTO: 0 /100 WBC
NRBC BLD-RTO: 0 /100 WBC
PLATELET # BLD AUTO: 268 K/UL (ref 150–450)
PLATELET # BLD AUTO: 302 K/UL (ref 150–450)
PMV BLD AUTO: 9.6 FL (ref 9.2–12.9)
PMV BLD AUTO: 9.9 FL (ref 9.2–12.9)
POCT GLUCOSE: 117 MG/DL (ref 70–110)
POCT GLUCOSE: 121 MG/DL (ref 70–110)
POCT GLUCOSE: 129 MG/DL (ref 70–110)
POCT GLUCOSE: 134 MG/DL (ref 70–110)
POTASSIUM SERPL-SCNC: 3.1 MMOL/L (ref 3.5–5.1)
POTASSIUM SERPL-SCNC: 3.6 MMOL/L (ref 3.5–5.1)
PROT SERPL-MCNC: 7.2 G/DL (ref 6–8.4)
RBC # BLD AUTO: 4.14 M/UL (ref 4.6–6.2)
RBC # BLD AUTO: 4.26 M/UL (ref 4.6–6.2)
SARS-COV-2 RDRP RESP QL NAA+PROBE: NEGATIVE
SODIUM SERPL-SCNC: 136 MMOL/L (ref 136–145)
SODIUM SERPL-SCNC: 137 MMOL/L (ref 136–145)
WBC # BLD AUTO: 11.66 K/UL (ref 3.9–12.7)
WBC # BLD AUTO: 12.11 K/UL (ref 3.9–12.7)

## 2022-07-15 PROCEDURE — 25000003 PHARM REV CODE 250: Performed by: EMERGENCY MEDICINE

## 2022-07-15 PROCEDURE — 85025 COMPLETE CBC W/AUTO DIFF WBC: CPT | Performed by: PHYSICIAN ASSISTANT

## 2022-07-15 PROCEDURE — 25000003 PHARM REV CODE 250: Performed by: STUDENT IN AN ORGANIZED HEALTH CARE EDUCATION/TRAINING PROGRAM

## 2022-07-15 PROCEDURE — 85652 RBC SED RATE AUTOMATED: CPT | Performed by: PHYSICIAN ASSISTANT

## 2022-07-15 PROCEDURE — 63600175 PHARM REV CODE 636 W HCPCS: Performed by: EMERGENCY MEDICINE

## 2022-07-15 PROCEDURE — 99285 EMERGENCY DEPT VISIT HI MDM: CPT | Mod: 25

## 2022-07-15 PROCEDURE — 25000003 PHARM REV CODE 250: Performed by: PHYSICIAN ASSISTANT

## 2022-07-15 PROCEDURE — 83036 HEMOGLOBIN GLYCOSYLATED A1C: CPT | Performed by: EMERGENCY MEDICINE

## 2022-07-15 PROCEDURE — 99223 1ST HOSP IP/OBS HIGH 75: CPT | Mod: ,,, | Performed by: PODIATRIST

## 2022-07-15 PROCEDURE — 63600175 PHARM REV CODE 636 W HCPCS: Performed by: PHYSICIAN ASSISTANT

## 2022-07-15 PROCEDURE — 86140 C-REACTIVE PROTEIN: CPT | Performed by: PHYSICIAN ASSISTANT

## 2022-07-15 PROCEDURE — 36415 COLL VENOUS BLD VENIPUNCTURE: CPT | Performed by: EMERGENCY MEDICINE

## 2022-07-15 PROCEDURE — G0378 HOSPITAL OBSERVATION PER HR: HCPCS

## 2022-07-15 PROCEDURE — 85025 COMPLETE CBC W/AUTO DIFF WBC: CPT | Mod: 91 | Performed by: EMERGENCY MEDICINE

## 2022-07-15 PROCEDURE — 83880 ASSAY OF NATRIURETIC PEPTIDE: CPT | Performed by: PHYSICIAN ASSISTANT

## 2022-07-15 PROCEDURE — 80048 BASIC METABOLIC PNL TOTAL CA: CPT | Mod: XB | Performed by: EMERGENCY MEDICINE

## 2022-07-15 PROCEDURE — 96374 THER/PROPH/DIAG INJ IV PUSH: CPT

## 2022-07-15 PROCEDURE — 80053 COMPREHEN METABOLIC PANEL: CPT | Performed by: PHYSICIAN ASSISTANT

## 2022-07-15 PROCEDURE — 36415 COLL VENOUS BLD VENIPUNCTURE: CPT | Performed by: PHYSICIAN ASSISTANT

## 2022-07-15 PROCEDURE — 83735 ASSAY OF MAGNESIUM: CPT | Performed by: PHYSICIAN ASSISTANT

## 2022-07-15 PROCEDURE — 99223 PR INITIAL HOSPITAL CARE,LEVL III: ICD-10-PCS | Mod: ,,, | Performed by: PODIATRIST

## 2022-07-15 PROCEDURE — 96375 TX/PRO/DX INJ NEW DRUG ADDON: CPT

## 2022-07-15 PROCEDURE — 25000003 PHARM REV CODE 250: Performed by: INTERNAL MEDICINE

## 2022-07-15 PROCEDURE — 11000001 HC ACUTE MED/SURG PRIVATE ROOM

## 2022-07-15 PROCEDURE — U0002 COVID-19 LAB TEST NON-CDC: HCPCS | Performed by: PHYSICIAN ASSISTANT

## 2022-07-15 RX ORDER — ONDANSETRON 2 MG/ML
4 INJECTION INTRAMUSCULAR; INTRAVENOUS
Status: COMPLETED | OUTPATIENT
Start: 2022-07-15 | End: 2022-07-15

## 2022-07-15 RX ORDER — MORPHINE SULFATE 4 MG/ML
4 INJECTION, SOLUTION INTRAMUSCULAR; INTRAVENOUS EVERY 4 HOURS PRN
Status: DISCONTINUED | OUTPATIENT
Start: 2022-07-15 | End: 2022-07-15

## 2022-07-15 RX ORDER — TALC
6 POWDER (GRAM) TOPICAL NIGHTLY PRN
Status: DISCONTINUED | OUTPATIENT
Start: 2022-07-15 | End: 2022-07-30 | Stop reason: HOSPADM

## 2022-07-15 RX ORDER — ATORVASTATIN CALCIUM 40 MG/1
40 TABLET, FILM COATED ORAL DAILY
Status: DISCONTINUED | OUTPATIENT
Start: 2022-07-16 | End: 2022-07-17

## 2022-07-15 RX ORDER — GLUCAGON 1 MG
1 KIT INJECTION
Status: DISCONTINUED | OUTPATIENT
Start: 2022-07-15 | End: 2022-07-26

## 2022-07-15 RX ORDER — ASPIRIN 325 MG
325 TABLET ORAL DAILY
Status: DISCONTINUED | OUTPATIENT
Start: 2022-07-16 | End: 2022-07-15

## 2022-07-15 RX ORDER — NALOXONE HCL 0.4 MG/ML
0.02 VIAL (ML) INJECTION
Status: DISCONTINUED | OUTPATIENT
Start: 2022-07-15 | End: 2022-07-30 | Stop reason: HOSPADM

## 2022-07-15 RX ORDER — HYDRALAZINE HYDROCHLORIDE 20 MG/ML
10 INJECTION INTRAMUSCULAR; INTRAVENOUS
Status: COMPLETED | OUTPATIENT
Start: 2022-07-15 | End: 2022-07-15

## 2022-07-15 RX ORDER — SODIUM CHLORIDE 0.9 % (FLUSH) 0.9 %
10 SYRINGE (ML) INJECTION EVERY 12 HOURS PRN
Status: DISCONTINUED | OUTPATIENT
Start: 2022-07-15 | End: 2022-07-30 | Stop reason: HOSPADM

## 2022-07-15 RX ORDER — ONDANSETRON 2 MG/ML
4 INJECTION INTRAMUSCULAR; INTRAVENOUS EVERY 8 HOURS PRN
Status: DISCONTINUED | OUTPATIENT
Start: 2022-07-15 | End: 2022-07-25

## 2022-07-15 RX ORDER — IBUPROFEN 400 MG/1
400 TABLET ORAL EVERY 6 HOURS PRN
Status: DISCONTINUED | OUTPATIENT
Start: 2022-07-15 | End: 2022-07-26

## 2022-07-15 RX ORDER — METOPROLOL TARTRATE 25 MG/1
25 TABLET, FILM COATED ORAL 2 TIMES DAILY
Status: DISCONTINUED | OUTPATIENT
Start: 2022-07-15 | End: 2022-07-18

## 2022-07-15 RX ORDER — IBUPROFEN 200 MG
16 TABLET ORAL
Status: DISCONTINUED | OUTPATIENT
Start: 2022-07-15 | End: 2022-07-27

## 2022-07-15 RX ORDER — HYDROCODONE BITARTRATE AND ACETAMINOPHEN 5; 325 MG/1; MG/1
1 TABLET ORAL EVERY 6 HOURS PRN
Status: DISCONTINUED | OUTPATIENT
Start: 2022-07-15 | End: 2022-07-24

## 2022-07-15 RX ORDER — ASPIRIN 325 MG
325 TABLET ORAL DAILY
Status: DISCONTINUED | OUTPATIENT
Start: 2022-07-15 | End: 2022-07-16

## 2022-07-15 RX ORDER — METOPROLOL TARTRATE 25 MG/1
25 TABLET, FILM COATED ORAL 2 TIMES DAILY
Status: DISCONTINUED | OUTPATIENT
Start: 2022-07-16 | End: 2022-07-15

## 2022-07-15 RX ORDER — IBUPROFEN 200 MG
24 TABLET ORAL
Status: DISCONTINUED | OUTPATIENT
Start: 2022-07-15 | End: 2022-07-27

## 2022-07-15 RX ORDER — AMLODIPINE BESYLATE 5 MG/1
10 TABLET ORAL DAILY
Status: DISCONTINUED | OUTPATIENT
Start: 2022-07-15 | End: 2022-07-18

## 2022-07-15 RX ORDER — POTASSIUM CHLORIDE 20 MEQ/1
40 TABLET, EXTENDED RELEASE ORAL ONCE
Status: COMPLETED | OUTPATIENT
Start: 2022-07-15 | End: 2022-07-15

## 2022-07-15 RX ORDER — MORPHINE SULFATE 4 MG/ML
4 INJECTION, SOLUTION INTRAMUSCULAR; INTRAVENOUS
Status: COMPLETED | OUTPATIENT
Start: 2022-07-15 | End: 2022-07-15

## 2022-07-15 RX ORDER — HYDROCODONE BITARTRATE AND ACETAMINOPHEN 10; 325 MG/1; MG/1
1 TABLET ORAL EVERY 6 HOURS PRN
Status: DISCONTINUED | OUTPATIENT
Start: 2022-07-15 | End: 2022-07-15

## 2022-07-15 RX ORDER — INSULIN ASPART 100 [IU]/ML
0-5 INJECTION, SOLUTION INTRAVENOUS; SUBCUTANEOUS
Status: DISCONTINUED | OUTPATIENT
Start: 2022-07-15 | End: 2022-07-26

## 2022-07-15 RX ORDER — HYDROCODONE BITARTRATE AND ACETAMINOPHEN 10; 325 MG/1; MG/1
1 TABLET ORAL EVERY 4 HOURS PRN
Status: DISCONTINUED | OUTPATIENT
Start: 2022-07-15 | End: 2022-07-24

## 2022-07-15 RX ORDER — ACETAMINOPHEN 325 MG/1
650 TABLET ORAL EVERY 4 HOURS PRN
Status: DISCONTINUED | OUTPATIENT
Start: 2022-07-15 | End: 2022-07-24

## 2022-07-15 RX ORDER — HYDRALAZINE HYDROCHLORIDE 20 MG/ML
10 INJECTION INTRAMUSCULAR; INTRAVENOUS EVERY 6 HOURS PRN
Status: DISCONTINUED | OUTPATIENT
Start: 2022-07-15 | End: 2022-07-30 | Stop reason: HOSPADM

## 2022-07-15 RX ORDER — OXYCODONE HYDROCHLORIDE 5 MG/1
5 TABLET ORAL EVERY 6 HOURS PRN
Status: DISCONTINUED | OUTPATIENT
Start: 2022-07-15 | End: 2022-07-24

## 2022-07-15 RX ADMIN — METOPROLOL TARTRATE 25 MG: 25 TABLET, FILM COATED ORAL at 09:07

## 2022-07-15 RX ADMIN — PIPERACILLIN SODIUM AND TAZOBACTAM SODIUM 4.5 G: 4; .5 INJECTION, POWDER, LYOPHILIZED, FOR SOLUTION INTRAVENOUS at 01:07

## 2022-07-15 RX ADMIN — HYDRALAZINE HYDROCHLORIDE 10 MG: 20 INJECTION INTRAMUSCULAR; INTRAVENOUS at 04:07

## 2022-07-15 RX ADMIN — HYDROCODONE BITARTRATE AND ACETAMINOPHEN 1 TABLET: 10; 325 TABLET ORAL at 09:07

## 2022-07-15 RX ADMIN — HYDROCODONE BITARTRATE AND ACETAMINOPHEN 1 TABLET: 10; 325 TABLET ORAL at 04:07

## 2022-07-15 RX ADMIN — VANCOMYCIN HYDROCHLORIDE 2000 MG: 500 INJECTION, POWDER, LYOPHILIZED, FOR SOLUTION INTRAVENOUS at 04:07

## 2022-07-15 RX ADMIN — MORPHINE SULFATE 4 MG: 4 INJECTION INTRAVENOUS at 04:07

## 2022-07-15 RX ADMIN — VANCOMYCIN HYDROCHLORIDE 1250 MG: 1.25 INJECTION, POWDER, LYOPHILIZED, FOR SOLUTION INTRAVENOUS at 06:07

## 2022-07-15 RX ADMIN — OXYCODONE 5 MG: 5 TABLET ORAL at 08:07

## 2022-07-15 RX ADMIN — AMLODIPINE BESYLATE 10 MG: 5 TABLET ORAL at 09:07

## 2022-07-15 RX ADMIN — OXYCODONE 5 MG: 5 TABLET ORAL at 01:07

## 2022-07-15 RX ADMIN — METOPROLOL TARTRATE 25 MG: 25 TABLET, FILM COATED ORAL at 08:07

## 2022-07-15 RX ADMIN — PIPERACILLIN SODIUM AND TAZOBACTAM SODIUM 4.5 G: 4; .5 INJECTION, POWDER, LYOPHILIZED, FOR SOLUTION INTRAVENOUS at 07:07

## 2022-07-15 RX ADMIN — ASPIRIN 325 MG ORAL TABLET 325 MG: 325 PILL ORAL at 08:07

## 2022-07-15 RX ADMIN — POTASSIUM CHLORIDE 40 MEQ: 1500 TABLET, EXTENDED RELEASE ORAL at 09:07

## 2022-07-15 RX ADMIN — ONDANSETRON 4 MG: 2 INJECTION INTRAMUSCULAR; INTRAVENOUS at 04:07

## 2022-07-15 NOTE — ASSESSMENT & PLAN NOTE
With dry gangrene.  Trial of PO antibiotics unsuccessful.  Will start empiric antibiotics. Consider MRI.  Vascular and podiatry consulted for source control

## 2022-07-15 NOTE — H&P
"[facility] Emergency Dept  [facility]  History & Physical    Patient Name: Yo Pink  MRN: [id number]  Patient Class: OP- Observation  Admission Date: 7/15/2022  Attending Physician: Kavita Ernst MD  Primary Care Provider: [facility]         Patient information was obtained from patient, past medical records and ER records.     Subjective:     Principal Problem:Cellulitis of foot    Chief Complaint:     Chief Complaint   Patient presents with    Toe Pain     Pt presents to ED c/o left leg, 3rd toe pain.  Pt reports tingling and swelling to left foot. Dx with gangrene to the toe.  Denies cp, sob, numbness or any other symptoms.  Pt reports taking Plavix.  Pain 6/10. Hx of PAD and HTN.  BP in triage 176/100. Note dryness and blackness to the toe.  Pt reports the doctor at the [facility] took him to come to the ED to be admitted.         HPI: 57 y.o. male PMHx PAD s.p vascular stent LLE presents with left foot pain x 2-3 days. Symptoms have been progressively getting worse. Patient states he was placed on antibiotics for dry gangrene and concern of infection.  He believes that his toe looks worse and he completed his antibiotics yesterday.  He was on call with virtual PCP who after assessment of toe told him to come to ED for further evaluation.  There is no report of fever, chills, CP, numbness and tingling.  He does state he stopped smoking a few weeks ago and he sometimes has to "clear his lungs"    ED course: Pain better after morphine. Found to be afebrile. WBC nl. Elevated ESR (33) and CRP (93.9). Bilateral foot xray shows Soft tissue abnormality of the L 3rd digit. Arterial US lower extremity  shows Interval placement of left lower extremity arterial vascular stent, the vascular stent appears patent.  The dorsalis pedis artery bilaterally demonstrates evidence for reversal of flow.  No evidence for occlusion.    Admitted to Hospital Medicine for further evaluation    Past " Medical History:   Diagnosis Date    PAD (peripheral artery disease)      Past Surgical History:   Procedure Laterality Date    ANGIOGRAPHY OF LOWER EXTREMITY Left 2022    Procedure: Angiogram Extremity Unilateral;  Surgeon: Mehul Rich MD;  Location: [facility];  Service: Vascular;  Laterality: Left;  RN PREOP ON 22. BINAX ON 22---NEED CONSENT     Social History     Tobacco Use    Smoking status: Former Smoker     Quit date: 2022     Years since quittin.1    Smokeless tobacco: Never Used   Substance Use Topics    Alcohol use: Never    Drug use: Not Currently     History reviewed. No pertinent family history.  Review of patient's allergies indicates:  No Known Allergies    Current Outpatient Medications:     acetaminophen (TYLENOL) 500 MG tablet, Take 1,000 mg by mouth every 6 (six) hours as needed for Pain., Disp: , Rfl:     amoxicillin-clavulanate 875-125mg (AUGMENTIN) 875-125 mg per tablet, Take 1 tablet by mouth every 12 (twelve) hours., Disp: 20 tablet, Rfl: 0    amoxicillin-clavulanate 875-125mg (AUGMENTIN) 875-125 mg per tablet, Take 1 tablet by mouth every 12 (twelve) hours. for 7 days, Disp: 14 tablet, Rfl: 0    clopidogreL (PLAVIX) 75 mg tablet, Take 1 tablet (75 mg total) by mouth once daily., Disp: 30 tablet, Rfl: 11    oxyCODONE-acetaminophen (PERCOCET) 5-325 mg per tablet, Take 1 tablet by mouth every 4 (four) hours as needed for Pain., Disp: 28 tablet, Rfl: 0         Review of Systems as per HPI  Review of Systems   Constitutional: Negative for diaphoresis and malaise/fatigue.   Respiratory: Negative for hemoptysis, sputum production and wheezing.    Cardiovascular: Positive for claudication. Negative for chest pain and palpitations.   Gastrointestinal: Negative for abdominal pain, diarrhea, nausea and vomiting.   Skin: Positive for rash.        + wound and color change   Neurological: Positive for tingling. Negative for tremors, focal weakness, seizures and  "headaches.   All other systems reviewed and are negative.        Physical Exam     ED Triage Vitals [07/15/22 0232]   Enc Vitals Group      BP (!) 176/100      Pulse 89      Resp 18      Temp 98.5 °F (36.9 °C)      Temp src Oral      SpO2 97 %      Weight 176 lb      Height 5' 10"      Head Circumference       Peak Flow       Pain Score       Pain Loc       Pain Edu?       Excl. in GC?      Vitals:    07/15/22 0440 07/15/22 0450 07/15/22 0500 07/15/22 0516   BP: (!) 167/97 (!) 158/100 (!) 170/99 (!) 161/92   BP Location:    Right arm   Patient Position:    Sitting   Pulse: 79 80 81    Resp: 16   16   Temp:       TempSrc:       SpO2: (!) 94% 96% 95% (!) 94%   Weight:       Height:           Vital signs and nursing assessment noted: elevated bp    GEN:   NAD, A & Ox3, atraumatic, well appearing, nontoxic appearing  HEENT:  PERRLA, EOMI, moist membranes, nl conjunctiva, no scleral icterus, no nystagmus, no nodes/nodules, soft, supple, FROM, no trachial deviation, nexus negative  CV:   RRR no m/r/g,     Capillary Refill: Capillary refill takes 2 to 3 seconds.      Dorsalis pedis pulses are 2+ on the right side and 1+ on the left side.   RESP:  CTA B, no w/r/r, equal and bilateral chest rise, no respiratory distress  ABD:   soft, Nontender, Nondistended, +BS, no guarding/rebound  :   Deferred  BACK:  FROM, no midline tenderness, no paraspinal tenderness  EXT:   FROM, MANNING x 4, no edema, no calf tenderness, no bony tenderness, no warmthno crepitus, no obvious deformity    Physical Exam  Musculoskeletal:      Right foot: Normal range of motion.      Left foot: Normal range of motion.        Feet:    Feet:      Right foot:      Skin integrity: Erythema present.      Toenail Condition: Right toenails are abnormally thick.      Left foot:      Skin integrity: Ulcer, skin breakdown, erythema and dry skin present.      Toenail Condition: Left toenails are abnormally thick.   Skin:     General: Skin is warm.      Capillary " Refill: Capillary refill takes 2 to 3 seconds.      Findings: Erythema and wound present.       LYMPH:  no gross adenopathy  NEURO:  GCS 15, CN II-XII grossly intact, no obvious motor/sensory deficit, no tremor, nl coordination  PSYCH:   no SI/HI, no anxiety, nl mood/affect, nl judgement/thought process       Tests   Significant Labs and/or Imaging: I have reviewed all pertinent results/findings within the past 24 hours.  Labs Reviewed   CBC W/ AUTO DIFFERENTIAL - Abnormal; Notable for the following components:       Result Value    RBC 4.14 (*)     Hemoglobin 13.2 (*)     Hematocrit 37.3 (*)     MCH 31.9 (*)     Gran # (ANC) 8.6 (*)     Eos # 0.8 (*)     Lymph % 15.9 (*)     All other components within normal limits   COMPREHENSIVE METABOLIC PANEL - Abnormal; Notable for the following components:    Glucose 180 (*)     Albumin 3.3 (*)     All other components within normal limits   SEDIMENTATION RATE - Abnormal; Notable for the following components:    Sed Rate 33 (*)     All other components within normal limits   C-REACTIVE PROTEIN - Abnormal; Notable for the following components:    CRP 93.9 (*)     All other components within normal limits   SARS-COV-2 RDRP GENE - Normal   MAGNESIUM   BASIC METABOLIC PANEL   CBC W/ AUTO DIFFERENTIAL   POCT GLUCOSE MONITORING CONTINUOUS     US Lower Extremity Arteries Bilateral   Final Result      Interval placement of left lower extremity arterial vascular stent, the vascular stent appears patent.      The dorsalis pedis artery bilaterally demonstrates evidence for reversal of flow.      Otherwise the arterial vascular structures demonstrate evidence for arterial atherosclerotic disease, without evidence for occlusion.         Electronically signed by: Humble Sterling   Date:    07/15/2022   Time:    04:58      X-Ray Foot Complete Right   Final Result      There is no radiographic evidence for acute process, close clinical and historical correlation is needed to determine need  for additional follow-up         Electronically signed by: Humble Sterling   Date:    07/15/2022   Time:    04:00      X-Ray Foot Complete Left   Final Result      Soft tissue abnormality of the 3rd digit for which clinical correlation is needed.      The osseous structures appear intact.         Electronically signed by: Humble Sterling   Date:    07/15/2022   Time:    03:57        No results found for this or any previous visit.      Assessment/Plan:     * Cellulitis of foot  With dry gangrene.  Trial of PO antibiotics unsuccessful.  Will start empiric antibiotics. Consider MRI.  Vascular and podiatry consulted for source control      Atherosclerosis of native artery of left leg with ulceration  s/p LLE angio with stent placement  Consider RLE angio this admission since early signs of possible ulceration  Mechanical thrombectomy is NOT indicated for patient  Hold Lovenox until evaluated by Podiatry and Vascular surgery  Will start ASA, Statin, Blood pressure reduction.  Behavioral and lifestyle changes including smoking cessation, exercise, nutrition assessment     Dry gangrene  Consult podiatry.  Consult vascular to manage PAD    Hypertensive urgency  Will start antihypertensives            VTE Risk Mitigation (From admission, onward)         Ordered     IP VTE HIGH RISK PATIENT  Once         07/15/22 0438     Place sequential compression device  Until discontinued         07/15/22 0438     Reason for No Pharmacological VTE Prophylaxis  Once        Question:  Reasons:  Answer:  Physician Provided (leave comment)  Comment:  procedure in am    07/15/22 0438              As clarification, on 7/15/2022, patient should be admitted for hospital observation services under my care.     Shalonda Larsen MD  Department of Hospital Medicine   [facility] - Emergency Dept

## 2022-07-15 NOTE — Clinical Note
The catheter was repositioned into the distal   left main. An angiography was performed of the left coronary arteries.

## 2022-07-15 NOTE — Clinical Note
35 ml of contrast were injected throughout the case. 25 mL of contrast was the total wasted during the case. 60 mL was the total amount used during the case.

## 2022-07-15 NOTE — PROGRESS NOTES
Pharmacokinetic Initial Assessment: IV Vancomycin    Assessment/Plan:    Initiate intravenous vancomycin with loading dose of 2000 mg once followed by a maintenance dose of vancomycin 1250mg IV every 12 hours  Desired empiric serum trough concentration is 10 to 20 mcg/mL  Draw vancomycin trough level 60 min prior to fourth dose on 7/16/22 at approximately 16:00  Pharmacy will continue to follow and monitor vancomycin.      Please contact pharmacy at extension [phone] with any questions regarding this assessment.     Thank you for the consult,   Sharron Coreas       Patient brief summary:  Yo Pink is a 57 y.o. male initiated on antimicrobial therapy with IV Vancomycin for treatment of suspected skin & soft tissue infection    Drug Allergies:   Review of patient's allergies indicates:  No Known Allergies    Actual Body Weight:   77.5 kg    Renal Function:   Estimated Creatinine Clearance: 103.9 mL/min (based on SCr of 0.8 mg/dL).,     Dialysis Method (if applicable):  N/A    CBC (last 72 hours):  Recent Labs   Lab Result Units 07/15/22  0325   WBC K/uL 12.11   Hemoglobin g/dL 13.2*   Hematocrit % 37.3*   Platelets K/uL 268   Gran % % 71.2   Lymph % % 15.9*   Mono % % 5.9   Eosinophil % % 6.4   Basophil % % 0.4   Differential Method  Automated       Metabolic Panel (last 72 hours):  Recent Labs   Lab Result Units 07/15/22  0325   Sodium mmol/L 137   Potassium mmol/L 3.6   Chloride mmol/L 99   CO2 mmol/L 25   Glucose mg/dL 180*   BUN mg/dL 17   Creatinine mg/dL 0.8   Albumin g/dL 3.3*   Total Bilirubin mg/dL 0.4   Alkaline Phosphatase U/L 105   AST U/L 21   ALT U/L 13   Magnesium mg/dL 1.9       Drug levels (last 3 results):  No results for input(s): VANCOMYCINRA, VANCORANDOM, VANCOMYCINPE, VANCOPEAK, VANCOMYCINTR, VANCOTROUGH in the last 72 hours.    Microbiologic Results:  Microbiology Results (last 7 days)       ** No results found for the last 168 hours. **

## 2022-07-15 NOTE — CONSULTS
[facility]  Podiatry  Consult Note    Patient Name: Yo Pink  MRN: [id number]  Admission Date: 7/15/2022  Hospital Length of Stay: 0 days  Attending Physician: Catalina Valenzuela DO  Primary Care Provider: [facility]     Inpatient consult to Podiatry  Consult performed by: Mely Alonzo DPM  Consult ordered by: Shalonda Larsen MD        Subjective:     History of Present Illness: 56 y/o male PMH PAD h/o LLE angio. Admitted for left third toe gangrene. Reports seeing PCP yesterday who recommended patient report to hospital for evaluation. Reports continued pain left foot. Currently on PO Augmentin without issue. Seen by myself on 7/5/22 at which time patient undecided as to whether to proceed with recommended left third toe amputation.   Scheduled Meds:   amLODIPine  10 mg Oral Daily    [START ON 7/16/2022] aspirin  325 mg Oral Daily    [START ON 7/16/2022] atorvastatin  40 mg Oral Daily    metoprolol tartrate  25 mg Oral BID    piperacillin-tazobactam (ZOSYN) IVPB  4.5 g Intravenous Q8H    vancomycin (VANCOCIN) IVPB  1,250 mg Intravenous Q12H     Continuous Infusions:  PRN Meds:acetaminophen, dextrose 10%, dextrose 10%, glucagon (human recombinant), glucose, glucose, hydrALAZINE, HYDROcodone-acetaminophen, HYDROcodone-acetaminophen, ibuprofen, insulin aspart U-100, melatonin, naloxone, ondansetron, sodium chloride 0.9%, Pharmacy to dose Vancomycin consult **AND** vancomycin - pharmacy to dose    Review of patient's allergies indicates:  No Known Allergies     Past Medical History:   Diagnosis Date    PAD (peripheral artery disease)      Past Surgical History:   Procedure Laterality Date    ANGIOGRAPHY OF LOWER EXTREMITY Left 7/5/2022    Procedure: Angiogram Extremity Unilateral;  Surgeon: Mehul Rich MD;  Location: [facility];  Service: Vascular;  Laterality: Left;  RN PREOP ON 7/1/22. BINAX ON 7/5/22---NEED CONSENT       Family History    None       Tobacco Use     Smoking status: Former Smoker     Quit date: 2022     Years since quittin.1    Smokeless tobacco: Never Used   Substance and Sexual Activity    Alcohol use: Never    Drug use: Not Currently    Sexual activity: Not on file     Review of Systems   Constitutional: Positive for activity change.   Respiratory: Negative for shortness of breath.    Cardiovascular: Negative for chest pain and leg swelling.   Gastrointestinal: Negative for nausea and vomiting.   Musculoskeletal: Positive for arthralgias.   Neurological: Positive for numbness. Negative for weakness.     Objective:     Vital Signs (Most Recent):  Temp: 97.4 °F (36.3 °C) (07/15/22 0800)  Pulse: 88 (07/15/22 0800)  Resp: 20 (07/15/22 0911)  BP: (!) 168/95 (07/15/22 08)  SpO2: 98 % (07/15/22 08) Vital Signs (24h Range):  Temp:  [97.4 °F (36.3 °C)-98.5 °F (36.9 °C)] 97.4 °F (36.3 °C)  Pulse:  [76-89] 88  Resp:  [16-20] 20  SpO2:  [91 %-98 %] 98 %  BP: (158-184)/() 168/95     Weight: 77.5 kg (170 lb 13.7 oz)  Body mass index is 24.52 kg/m².    Foot Exam    General  Orientation: alert and oriented to person, place, and time       Right Foot/Ankle     Neurovascular  Dorsalis pedis: absent  Posterior tibial: absent  Saphenous nerve sensation: diminished  Tibial nerve sensation: diminished  Superficial peroneal nerve sensation: diminished  Deep peroneal nerve sensation: diminished  Sural nerve sensation: diminished      Left Foot/Ankle      Neurovascular  Dorsalis pedis: absent  Posterior tibial: absent  Saphenous nerve sensation: diminished  Tibial nerve sensation: diminished  Superficial peroneal nerve sensation: diminished  Deep peroneal nerve sensation: diminished  Sural nerve sensation: diminished          7/15/22:  Stable gangrene left third toe             Dusky appearance toes 1-5 right foot.               Laboratory:  CBC:   Recent Labs   Lab 07/15/22  0548   WBC 11.66   RBC 4.26*   HGB 13.4*   HCT 38.0*      MCV 89   MCH 31.5*    MCHC 35.3     CMP:   Recent Labs   Lab 07/15/22  0325 07/15/22  0548   * 130*   CALCIUM 9.1 9.0   ALBUMIN 3.3*  --    PROT 7.2  --     136   K 3.6 3.1*   CO2 25 26   CL 99 100   BUN 17 14   CREATININE 0.8 0.7   ALKPHOS 105  --    ALT 13  --    AST 21  --    BILITOT 0.4  --        Diagnostic Results:  Xray: X-Ray Foot Complete Left  Order: [id number]   Status: Final result     Visible to patient: Yes (not seen)     Next appt: 07/18/2022 at 09:15 AM in Vascular Surgery (Mehul Rich MD)     Dx: Peripheral arterial disease     0 Result Notes    Details    Reading Physician Reading Date Result Priority   Humble Sterling MD  [phone] 7/15/2022 STAT     Narrative & Impression  EXAMINATION:  XR FOOT COMPLETE 3 VIEW LEFT     CLINICAL HISTORY:  .  Peripheral vascular disease, unspecified     TECHNIQUE:  AP, lateral and oblique views of the left foot were performed.     COMPARISON:  Radiograph left foot June 18, 2022     FINDINGS:  On the oblique view there is mild irregular appearance of the soft tissues of the 3rd digit, correlation for soft tissue injury or infection is needed.  The osseous structures appear intact, there is no radiographic evidence for acute fracture or dislocation, there is no radiographic evidence for osseous destructive process.     Impression:     Soft tissue abnormality of the 3rd digit for which clinical correlation is needed.     The osseous structures appear intact.              X-Ray Foot Complete Right    Status: Final result         MyChart Results Release    WIDIP Status: Active  Results Release           PACS Images for ViTAL [place] Viewer     Show images for X-Ray Foot Complete Right                  X-Ray Foot Complete Right  Order: [id number]   Status: Final result     Visible to patient: Yes (not seen)     Next appt: 07/18/2022 at 09:15 AM in Vascular Surgery (Mehul Rich MD)     Dx: Peripheral arterial disease     0 Result  Notes    Details    Reading Physician Reading Date Result Priority   Humble Sterling MD  [phone] 7/15/2022 STAT     Narrative & Impression  EXAMINATION:  XR FOOT COMPLETE 3 VIEW RIGHT     CLINICAL HISTORY:  . Peripheral vascular disease, unspecified     TECHNIQUE:  AP, lateral, and oblique views of the right foot were performed.     COMPARISON:  None     FINDINGS:  The visualized osseous structures appear intact, there is no radiographic evidence for osseous destructive process, acute fracture or dislocation.     Impression:     There is no radiographic evidence for acute process, close clinical and historical correlation is needed to determine need for additional follow-up        Electronically signed by: Humble Sterling  Date:                                            07/15/2022  Time:                                           04:00             Exam Ended: 07/15/22 03:25 Last Resulted: 07/15/22 04:00        Order Details      View Encounter      Lab and Collection Details      Routing      Result History             Result Care Coordination        Patient Communication     Add Comments   Add Notifications  Back to Top                   X-Ray Foot Complete Right: Patient Communication     Add Comments   Add Notifications        External Result Report    External Result Report       Narrative & Impression    EXAMINATION:  XR FOOT COMPLETE 3 VIEW RIGHT     CLINICAL HISTORY:  . Peripheral vascular disease, unspecified     TECHNIQUE:  AP, lateral, and oblique views of the right foot were performed.     COMPARISON:  None     FINDINGS:  The visualized osseous structures appear intact, there is no radiographic evidence for osseous destructive process, acute fracture or dislocation.     Impression:     There is no radiographic evidence for acute process, close clinical and historical correlation is needed to determine need for additional follow-up        Electronically signed by: Humble Sterling  Date:                                             07/15/2022  Time:                                           04:00      Encounter    View Encounter                   Signed by    Signed Time Phone Pager   Humble Sterling MD 7/15/2022 04:00 [phone]            Exam Details    Performed Procedure Technologist Supporting Staff Performing Physician   X-Ray Foot Complete Right Librado Pitt, RT           Appointment Date/Status Modality Department    7/15/2022     Arrived WB PORTXR1 WB XRAY           Begin Exam End Exam  End Exam Questionnaires   7/15/2022  3:20 AM 7/15/2022  3:25 AM  IMAGING END ALL              Reason for Exam  Priority: STAT  Dx: Peripheral arterial disease [I73.9 (ICD-10-CM)]         Order Report     Order Details        Clinical Findings:  Stable gangrene left third toe.     Assessment/Plan:     Active Diagnoses:    Diagnosis Date Noted POA    PRINCIPAL PROBLEM:  Cellulitis of foot [L03.119] 07/15/2022 Yes    Dry gangrene [I96] 06/18/2022 Yes    Hypertensive urgency [I16.0] 06/18/2022 Yes      Problems Resolved During this Admission:     Recommend left third toe amputation patient now in agreement. Attempted to schedule case for today however unable due to OR availability. Will discuss with Dr. Rich and plan for case early next week with Dr. Rich.     Nursing orders placed for daily betadine application     Thank you for your consult. I will follow-up with patient. Please contact us if you have any additional questions.    Mely Alonzo DPM  Podiatry  [facility] [place] - Med Surg

## 2022-07-15 NOTE — Clinical Note
The DP pulses were detected with doppler bilaterally. The PT pulses were detected with doppler bilaterally. The right radial pulse was +2.

## 2022-07-15 NOTE — ED PROVIDER NOTES
Encounter Date: 7/15/2022       History     Chief Complaint   Patient presents with    Toe Pain     Pt presents to ED c/o left leg, 3rd toe pain.  Pt reports tingling and swelling to left foot. Dx with gangrene to the toe.  Denies cp, sob, numbness or any other symptoms.  Pt reports taking Plavix.  Pain /10. Hx of PAD and HTN.  BP in triage 176/100. Note dryness and blackness to the toe.  Pt reports the doctor at the [facility] took him to come to the ED to be admitted.       58yo M smoker     Recent LLE AK pop angio, LLE AK pop stent on  with . states upcoming RLE angio on Monday. Currently seeing  for L 3rd toe dry gangrene, advised amputation. Currently on Augmentin. Pt states discoloration, eschar has spread. Worsening claudication-type pain to L foot. Admits to edema to L foot with prolonged standing. Hx bilateral lower extremity claudication symptoms. He states yesterday accidentally stubbed L great toe, now with discoloration, pain, tenderness to toe since that time. Compliant with Plavix and baby ASA. Spoke with new PCP @ [facility] via videochat today, advised to present to ED.     Vascular surgeon:   Podiatrist:     PMH:  HTN  PAD  CVA        Review of patient's allergies indicates:  No Known Allergies  Past Medical History:   Diagnosis Date    PAD (peripheral artery disease)      Past Surgical History:   Procedure Laterality Date    ANGIOGRAPHY OF LOWER EXTREMITY Left 2022    Procedure: Angiogram Extremity Unilateral;  Surgeon: Mehul Rich MD;  Location: [facility] OR;  Service: Vascular;  Laterality: Left;  RN PREOP ON 22. BINAX ON 22---NEED CONSENT     History reviewed. No pertinent family history.  Social History     Tobacco Use    Smoking status: Former Smoker     Quit date: 2022     Years since quittin.1    Smokeless tobacco: Never Used   Substance Use Topics    Alcohol use: Never    Drug use: Not Currently      Review of Systems   Constitutional: Negative for chills and fever.   Musculoskeletal: Positive for arthralgias, gait problem and joint swelling.   Skin: Positive for color change and wound.   Neurological: Negative for numbness.       Physical Exam     Initial Vitals [07/15/22 0232]   BP Pulse Resp Temp SpO2   (!) 176/100 89 18 98.5 °F (36.9 °C) 97 %      MAP       --         Physical Exam    Nursing note and vitals reviewed.  Constitutional: He appears well-developed and well-nourished. He is not diaphoretic. No distress.   HENT:   Head: Normocephalic and atraumatic.   Neck: Neck supple.   Normal range of motion.  Cardiovascular: Intact distal pulses.   1+DP R foot, 1+PT L foot.    Pulmonary/Chest: No respiratory distress.   Musculoskeletal:      Cervical back: Normal range of motion and neck supple.      Comments: R foot: great toe with mottled appearance, exquisite tenderness to tuft, no bony deformity. Moist intertriginous area 4th, 5th toes. 3-4s cap refill all toes R foot.     L foot: dry gangrene 3rd toe, extends over dorsal surface of foot. Moist tissue to plantar aspect proximal toes 3-5. 3-4s cap refill toes.      Neurological: He is alert and oriented to person, place, and time. GCS score is 15. GCS eye subscore is 4. GCS verbal subscore is 5. GCS motor subscore is 6.   Skin: Skin is warm.   Psychiatric: He has a normal mood and affect. Thought content normal.                             ED Course   Procedures  Labs Reviewed   CBC W/ AUTO DIFFERENTIAL - Abnormal; Notable for the following components:       Result Value    RBC 4.14 (*)     Hemoglobin 13.2 (*)     Hematocrit 37.3 (*)     MCH 31.9 (*)     Gran # (ANC) 8.6 (*)     Eos # 0.8 (*)     Lymph % 15.9 (*)     All other components within normal limits   COMPREHENSIVE METABOLIC PANEL - Abnormal; Notable for the following components:    Glucose 180 (*)     Albumin 3.3 (*)     All other components within normal limits   SEDIMENTATION RATE -  Abnormal; Notable for the following components:    Sed Rate 33 (*)     All other components within normal limits   C-REACTIVE PROTEIN - Abnormal; Notable for the following components:    CRP 93.9 (*)     All other components within normal limits   SARS-COV-2 RDRP GENE - Normal   MAGNESIUM   BASIC METABOLIC PANEL   CBC W/ AUTO DIFFERENTIAL   POCT GLUCOSE MONITORING CONTINUOUS          Imaging Results          US Lower Extremity Arteries Bilateral (In process)                X-Ray Foot Complete Right (Final result)  Result time 07/15/22 04:00:28    Final result by Humble Sterling MD (07/15/22 04:00:28)                 Impression:      There is no radiographic evidence for acute process, close clinical and historical correlation is needed to determine need for additional follow-up      Electronically signed by: Humble Sterling  Date:    07/15/2022  Time:    04:00             Narrative:    EXAMINATION:  XR FOOT COMPLETE 3 VIEW RIGHT    CLINICAL HISTORY:  . Peripheral vascular disease, unspecified    TECHNIQUE:  AP, lateral, and oblique views of the right foot were performed.    COMPARISON:  None    FINDINGS:  The visualized osseous structures appear intact, there is no radiographic evidence for osseous destructive process, acute fracture or dislocation.                               X-Ray Foot Complete Left (Final result)  Result time 07/15/22 03:57:34    Final result by Humble Sterling MD (07/15/22 03:57:34)                 Impression:      Soft tissue abnormality of the 3rd digit for which clinical correlation is needed.    The osseous structures appear intact.      Electronically signed by: Humble Sterling  Date:    07/15/2022  Time:    03:57             Narrative:    EXAMINATION:  XR FOOT COMPLETE 3 VIEW LEFT    CLINICAL HISTORY:  .  Peripheral vascular disease, unspecified    TECHNIQUE:  AP, lateral and oblique views of the left foot were performed.    COMPARISON:  Radiograph left foot June 18,  2022    FINDINGS:  On the oblique view there is mild irregular appearance of the soft tissues of the 3rd digit, correlation for soft tissue injury or infection is needed.  The osseous structures appear intact, there is no radiographic evidence for acute fracture or dislocation, there is no radiographic evidence for osseous destructive process.                                 Medications   sodium chloride 0.9% flush 10 mL (has no administration in time range)   naloxone 0.4 mg/mL injection 0.02 mg (has no administration in time range)   glucose chewable tablet 16 g (has no administration in time range)   glucose chewable tablet 24 g (has no administration in time range)   glucagon (human recombinant) injection 1 mg (has no administration in time range)   dextrose 10% bolus 125 mL (has no administration in time range)   dextrose 10% bolus 250 mL (has no administration in time range)   HYDROcodone-acetaminophen 5-325 mg per tablet 1 tablet (has no administration in time range)   acetaminophen tablet 650 mg (has no administration in time range)   morphine injection 4 mg (has no administration in time range)   ondansetron injection 4 mg (has no administration in time range)   insulin aspart U-100 pen 0-5 Units (has no administration in time range)   melatonin tablet 6 mg (has no administration in time range)   ibuprofen tablet 400 mg (has no administration in time range)   vancomycin (VANCOCIN) 1,197 mg in dextrose 5 % IV syringe (Conc: 5 mg/ml) (has no administration in time range)   piperacillin-tazobactam 4.5 g in dextrose 5 % 100 mL IVPB (ready to mix system) (has no administration in time range)   morphine injection 4 mg (4 mg Intravenous Given 7/15/22 0433)   ondansetron injection 4 mg (4 mg Intravenous Given 7/15/22 0432)   hydrALAZINE injection 10 mg (10 mg Intravenous Given 7/15/22 0433)     Medical Decision Making:   Differential Diagnosis:   Dry gangrene, PAD, cellulitis, failed outpatient therapy, in-stent  stenosis  Clinical Tests:   Lab Tests: Ordered  Radiological Study: Ordered  ED Management:  On Augmentin given by . No new gas formation. No fever, chills. Worsening pain, worsening skin changes extending up foot despite abx. Cellulitis vs worsening eschar from microvascular ischemia. Low suspicion for osteo at this time. Will attempt HM obs admit, failed outpatient abx therapy, have vascular and podiatry see pt for further recs.                       Clinical Impression:   Final diagnoses:  [I73.9] Peripheral arterial disease (Primary)  [I96] Toe gangrene  [Z78.9] Failure of outpatient treatment          ED Disposition Condition    Observation               Jatin Mcguire PA-C  07/15/22 0614

## 2022-07-15 NOTE — PLAN OF CARE
07/15/22 1445   Discharge Planning   Assessment Type Discharge Planning Brief Assessment   Resource/Environmental Concerns none   Support Systems Friends/neighbors   Equipment Currently Used at Home none   Current Living Arrangements home/apartment/condo   Patient/Family Anticipates Transition to home   Patient/Family Anticipated Services at Transition none   DME Needed Upon Discharge  none   Discharge Plan A Home  (with instructions to follow up)     [id number] [place] [facility] - [address] AT [facility] [address]  [address]  [place] [address]  Phone: [phone] Fax: [phone]

## 2022-07-15 NOTE — Clinical Note
The catheter was inserted into the aorta.  Please Choose The Type Of Visit (Required): Treatment and Weekly Evaluation Visit: Show Treatment/Evaluation Variables

## 2022-07-15 NOTE — ED TRIAGE NOTES
Pt presents with  Toe pain in left foot, middle digit noted to be eschar, skin breakdown noted between 4th and fifth digit, toes are mildly blue, pt states he was supposed to see a surgeon on Monday, podiatrist recommended pt come to ER,  Pt A& O,  Ambulates with mild assistance. WANDA

## 2022-07-15 NOTE — CARE UPDATE
58 y/o male PMH PAD h/o LLE angiogram on 07/05/2022 admitted on 07/15/2022 to observation for left 3rd toe gangrene that is worsening since angiogram procedure.  Ultrasound lower extremity without DVT.  Shows vascular stent appears patent.  X-ray of bilateral feet with no acute fractures or dislocations.  Soft tissue abnormality of the 3rd digit on the left foot noted.  Vascular podiatry consulted.      Patient would likely knee amputation of the 3rd digit the left foot.  Will continue IV antibiotics at this time.  Awaiting vascular surgery Podiatry recommendations. Blood pressure elevated, will continue pain control and initiate Norvasc 10 mg for hypertension    I reviewed the patient's medications, past medical/surgical history and the H&P note. I agree with the physical exam and assessment and plan.

## 2022-07-15 NOTE — Clinical Note
Subjective:   3/23/2021  7:28 AM  Primary care physician:Perico Jones M.D.  Referring Provider: Remington Nino MD       Chief Complaint:   Chief Complaint   Patient presents with   • New Patient     NP FOREARM MASS REF DR JONES/ALOK     Diagnosis:   1. Mass of forearm, unspecified laterality     2. Pain of upper extremity, unspecified laterality         History of presenting illness:  Mahendra Lewis  is a pleasant 73 y.o. year old male who presented with what appears to be an enlarging mass in the right bicep.  The patient states that he was bit by a black  on his right bicep 4 years ago.  The patient states that he has had no trouble with that and did not get any necrosis but about 6 months ago he started developing an enlarging mass in the right bicep.  The patient states that he has not had much in the way of discomfort but it did increase in size fairly quickly.  Presently is about 4-1/2 cm in diameter and deep and is mobile and very firm.  There is some area of erythema/infiltration on the anterior surface with some enlarged blood vessels.  The patient states he denies any fever or chills, nausea or vomiting.  He has not had any weight loss.  He has no history of melanoma or squamous cell carcinoma.  He has no history of any other malignancy.  His ECOG performance status is 0.  Does not drink excessive alcohol nor does he smoke.  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  The patient was referred in by Dr. Jones his primary care physician.  He has no other lesions on his body.       Past Medical History:   Diagnosis Date   • Cancer (CMS-HCC) (HCC)     squamous cell on skin   • Dental disorder     dental implants   • Edema 7/8/2015   • Other and unspecified hyperlipidemia 7/8/2015   • Overweight (BMI 25.0-29.9) 8/11/2017     Past Surgical History:   Procedure Laterality Date   • SHOULDER DECOMPRESSION ARTHROSCOPIC Left 11/30/2017    Procedure: SHOULDER DECOMPRESSION  The radial pulses were +2 bilaterally.  ARTHROSCOPIC - SUBACROMIAL W/LABRAL DEBRIDEMENT;  Surgeon: West Arora M.D.;  Location: SURGERY [facility];  Service: Orthopedics   • SHOULDER ARTHROSCOPY W/ ROTATOR CUFF REPAIR Left 11/30/2017    Procedure: SHOULDER ARTHROSCOPY W/ ROTATOR CUFF REPAIR;  Surgeon: West Arora M.D.;  Location: SURGERY [facility];  Service: Orthopedics   • TESHA BY LAPAROSCOPY  1/20/2012    Performed by MARAH TORRES at SURGERY [facility]   • HERNIA REPAIR     • OTHER      squamous cell ca removed   • TONSILLECTOMY       Allergies   Allergen Reactions   • Sulfa Drugs      From childhood     Outpatient Encounter Medications as of 3/23/2021   Medication Sig Dispense Refill   • Naproxen Sodium (ALEVE) 220 MG Cap Take  by mouth.       No facility-administered encounter medications on file as of 3/23/2021.     Social History     Socioeconomic History   • Marital status:      Spouse name: Not on file   • Number of children: Not on file   • Years of education: Not on file   • Highest education level: Not on file   Occupational History   • Not on file   Tobacco Use   • Smoking status: Never Smoker   • Smokeless tobacco: Never Used   Substance and Sexual Activity   • Alcohol use: Yes     Comment: Rarely   • Drug use: No   • Sexual activity: Not on file   Other Topics Concern   • Not on file   Social History Narrative   • Not on file     Social Determinants of Health     Financial Resource Strain:    • Difficulty of Paying Living Expenses:    Food Insecurity:    • Worried About Running Out of Food in the Last Year:    • Ran Out of Food in the Last Year:    Transportation Needs:    • Lack of Transportation (Medical):    • Lack of Transportation (Non-Medical):    Physical Activity:    • Days of Exercise per Week:    • Minutes of Exercise per Session:    Stress:    • Feeling of Stress :    Social Connections:    • Frequency of Communication with Friends and Family:    • Frequency of Social  "Gatherings with Friends and Family:    • Attends [employer] Services:    • Active Member of Clubs or Organizations:    • Attends Club or Organization Meetings:    • Marital Status:    Intimate Partner Violence:    • Fear of Current or Ex-Partner:    • Emotionally Abused:    • Physically Abused:    • Sexually Abused:       Social History     Tobacco Use   Smoking Status Never Smoker   Smokeless Tobacco Never Used     Social History     Substance and Sexual Activity   Alcohol Use Yes    Comment: Rarely     Social History     Substance and Sexual Activity   Drug Use No        Family History   Problem Relation Age of Onset   • Heart Attack Father    • Cancer Brother         brain tumor       Review of Systems   All other systems reviewed and are negative.       Objective:   /72 (BP Location: Left arm, Patient Position: Sitting, BP Cuff Size: Adult)   Pulse 72   Temp 36.2 °C (97.1 °F) (Temporal)   Ht 1.727 m (5' 8\")   Wt 82.6 kg (182 lb)   SpO2 95%   BMI 27.67 kg/m²     Physical Exam   Constitutional: He is oriented to person, place, and time. He appears well-developed and well-nourished.   HENT:   Head: Normocephalic and atraumatic.   Eyes: Pupils are equal, round, and reactive to light. Conjunctivae are normal.   Cardiovascular: Normal rate and regular rhythm.   Pulmonary/Chest: Effort normal and breath sounds normal.   Abdominal: Soft. Bowel sounds are normal.   Musculoskeletal:         General: Deformity and edema present. Normal range of motion.      Cervical back: Normal range of motion and neck supple.      Comments: Right bicep mass   Neurological: He is alert and oriented to person, place, and time.   Skin: Skin is warm and dry.   Psychiatric: He has a normal mood and affect. His behavior is normal. Judgment and thought content normal.   Nursing note and vitals reviewed.  Patient was  Labs:  NA    Imaging:  Per my read, NA    Pathology:  NA    Procedures:  NA    Diagnosis:     1. Mass of forearm, " unspecified laterality     2. Pain of upper extremity, unspecified laterality             Medical Decision Making:  Today's Assessment / Status / Plan:     In light of the present findings, unclear whether this is a malignant tumor and could represent deep melanoma versus foreign body.  He states that there was a spider bite 4 years ago but it is unlikely this is the source of this mass.  I recommended resection in a radical fashion including overlying skin.  He has never had a biopsy but there is a significant amount of erythema and induration in a localized portion but no pain.  It is a firm mass.  We discussed the risks and benefits of the procedure including bleeding, infection, the possibility of recurrence if it is a malignancy and the possibility of needing to return for no dissection.  He agreed the above plan and we will schedule him by next week.    I, Dr. Serna have entered, reviewed and confirmed the above diagnoses related to this patient on this date of service, 3/23/2021  7:28 AM.    He agreed and verbalized his agreement and understanding with the current plan. I answered all questions and concerns he has at this time and advised him to call at any time in the interim with questions or concerns in regards to his care.    Thank you for allowing me to participate in his care, I will continue to follow closely.       Please note that this dictation was created using voice recognition software. I have made every reasonable attempt to correct obvious errors, but I expect that there are errors of grammar and possibly content that I did not discover before finalizing the note.     Thank you for this consultation and allowing me to participate in your patient's care. If I can be of further service please contact my office.

## 2022-07-15 NOTE — ASSESSMENT & PLAN NOTE
s/p LLE angio with stent placement  Consider RLE angio this admission since early signs of possible ulceration  Mechanical thrombectomy is NOT indicated for patient  Hold Lovenox until evaluated by Podiatry and Vascular surgery  Will start ASA, Statin, Blood pressure reduction.  Behavioral and lifestyle changes including smoking cessation, exercise, nutrition assessment

## 2022-07-15 NOTE — Clinical Note
The radial band was applied to the right radial artery. 18 cc's of air were inserted into the closure device.

## 2022-07-16 PROBLEM — E78.5 DYSLIPIDEMIA: Status: ACTIVE | Noted: 2022-07-16

## 2022-07-16 PROBLEM — I24.9 ACS (ACUTE CORONARY SYNDROME): Status: ACTIVE | Noted: 2022-07-16

## 2022-07-16 PROBLEM — R79.89 ELEVATED TROPONIN: Status: ACTIVE | Noted: 2022-07-16

## 2022-07-16 PROBLEM — E87.6 HYPOKALEMIA: Status: ACTIVE | Noted: 2022-07-16

## 2022-07-16 PROBLEM — L03.119 CELLULITIS OF FOOT: Status: RESOLVED | Noted: 2022-07-15 | Resolved: 2022-07-16

## 2022-07-16 LAB
ANION GAP SERPL CALC-SCNC: 14 MMOL/L (ref 8–16)
AORTIC ROOT ANNULUS: 3.59 CM
AORTIC VALVE CUSP SEPERATION: 2.4 CM
ASCENDING AORTA: 3.06 CM
AV INDEX (PROSTH): 0.62
AV MEAN GRADIENT: 6 MMHG
AV PEAK GRADIENT: 10 MMHG
AV VALVE AREA: 2.67 CM2
AV VELOCITY RATIO: 0.64
BASOPHILS # BLD AUTO: 0.05 K/UL (ref 0–0.2)
BASOPHILS NFR BLD: 0.4 % (ref 0–1.9)
BSA FOR ECHO PROCEDURE: 1.95 M2
BUN SERPL-MCNC: 10 MG/DL (ref 6–20)
CALCIUM SERPL-MCNC: 9.3 MG/DL (ref 8.7–10.5)
CHLORIDE SERPL-SCNC: 100 MMOL/L (ref 95–110)
CHOLEST SERPL-MCNC: 144 MG/DL (ref 120–199)
CHOLEST/HDLC SERPL: 4.1 {RATIO} (ref 2–5)
CO2 SERPL-SCNC: 24 MMOL/L (ref 23–29)
CREAT SERPL-MCNC: 0.6 MG/DL (ref 0.5–1.4)
CV ECHO LV RWT: 0.5 CM
DIFFERENTIAL METHOD: ABNORMAL
DOP CALC AO PEAK VEL: 1.62 M/S
DOP CALC AO VTI: 25.49 CM
DOP CALC LVOT AREA: 4.3 CM2
DOP CALC LVOT DIAMETER: 2.35 CM
DOP CALC LVOT PEAK VEL: 1.04 M/S
DOP CALC LVOT STROKE VOLUME: 68.15 CM3
DOP CALCLVOT PEAK VEL VTI: 15.72 CM
E WAVE DECELERATION TIME: 165.2 MSEC
E/A RATIO: 1.56
E/E' RATIO: 22.4 M/S
ECHO LV POSTERIOR WALL: 1.26 CM (ref 0.6–1.1)
EJECTION FRACTION: 30 %
EOSINOPHIL # BLD AUTO: 0.2 K/UL (ref 0–0.5)
EOSINOPHIL NFR BLD: 1.3 % (ref 0–8)
ERYTHROCYTE [DISTWIDTH] IN BLOOD BY AUTOMATED COUNT: 13.2 % (ref 11.5–14.5)
EST. GFR  (AFRICAN AMERICAN): >60 ML/MIN/1.73 M^2
EST. GFR  (NON AFRICAN AMERICAN): >60 ML/MIN/1.73 M^2
FRACTIONAL SHORTENING: 22 % (ref 28–44)
GLUCOSE SERPL-MCNC: 133 MG/DL (ref 70–110)
HCT VFR BLD AUTO: 39.1 % (ref 40–54)
HDLC SERPL-MCNC: 35 MG/DL (ref 40–75)
HDLC SERPL: 24.3 % (ref 20–50)
HGB BLD-MCNC: 13.6 G/DL (ref 14–18)
IMM GRANULOCYTES # BLD AUTO: 0.04 K/UL (ref 0–0.04)
IMM GRANULOCYTES NFR BLD AUTO: 0.3 % (ref 0–0.5)
INTERVENTRICULAR SEPTUM: 1.64 CM (ref 0.6–1.1)
IVRT: 99.9 MSEC
LA MAJOR: 6.56 CM
LA MINOR: 6.22 CM
LA WIDTH: 4.94 CM
LDLC SERPL CALC-MCNC: 84.6 MG/DL (ref 63–159)
LEFT ATRIUM SIZE: 4.63 CM
LEFT ATRIUM VOLUME INDEX: 63.7 ML/M2
LEFT ATRIUM VOLUME: 124.14 CM3
LEFT INTERNAL DIMENSION IN SYSTOLE: 3.92 CM (ref 2.1–4)
LEFT VENTRICLE DIASTOLIC VOLUME INDEX: 61.97 ML/M2
LEFT VENTRICLE DIASTOLIC VOLUME: 120.84 ML
LEFT VENTRICLE MASS INDEX: 160 G/M2
LEFT VENTRICLE SYSTOLIC VOLUME INDEX: 34.3 ML/M2
LEFT VENTRICLE SYSTOLIC VOLUME: 66.86 ML
LEFT VENTRICULAR INTERNAL DIMENSION IN DIASTOLE: 5.05 CM (ref 3.5–6)
LEFT VENTRICULAR MASS: 311.5 G
LV LATERAL E/E' RATIO: 18.67 M/S
LV SEPTAL E/E' RATIO: 28 M/S
LYMPHOCYTES # BLD AUTO: 1.6 K/UL (ref 1–4.8)
LYMPHOCYTES NFR BLD: 13.7 % (ref 18–48)
MCH RBC QN AUTO: 31.6 PG (ref 27–31)
MCHC RBC AUTO-ENTMCNC: 34.8 G/DL (ref 32–36)
MCV RBC AUTO: 91 FL (ref 82–98)
MONOCYTES # BLD AUTO: 0.8 K/UL (ref 0.3–1)
MONOCYTES NFR BLD: 7.1 % (ref 4–15)
MV PEAK A VEL: 0.72 M/S
MV PEAK E VEL: 1.12 M/S
MV STENOSIS PRESSURE HALF TIME: 47.91 MS
MV VALVE AREA P 1/2 METHOD: 4.59 CM2
NEUTROPHILS # BLD AUTO: 9 K/UL (ref 1.8–7.7)
NEUTROPHILS NFR BLD: 77.2 % (ref 38–73)
NONHDLC SERPL-MCNC: 109 MG/DL
NRBC BLD-RTO: 0 /100 WBC
PISA TR MAX VEL: 3.42 M/S
PLATELET # BLD AUTO: 321 K/UL (ref 150–450)
PMV BLD AUTO: 9.7 FL (ref 9.2–12.9)
POCT GLUCOSE: 114 MG/DL (ref 70–110)
POCT GLUCOSE: 119 MG/DL (ref 70–110)
POCT GLUCOSE: 137 MG/DL (ref 70–110)
POCT GLUCOSE: 155 MG/DL (ref 70–110)
POCT GLUCOSE: 163 MG/DL (ref 70–110)
POTASSIUM SERPL-SCNC: 3.7 MMOL/L (ref 3.5–5.1)
PULM VEIN S/D RATIO: 0.58
PV PEAK D VEL: 1.08 M/S
PV PEAK S VEL: 0.63 M/S
PV PEAK VELOCITY: 1 CM/S
RA MAJOR: 5.21 CM
RA PRESSURE: 15 MMHG
RA WIDTH: 3.57 CM
RBC # BLD AUTO: 4.31 M/UL (ref 4.6–6.2)
RIGHT VENTRICULAR END-DIASTOLIC DIMENSION: 3.34 CM
RV TISSUE DOPPLER FREE WALL SYSTOLIC VELOCITY 1 (APICAL 4 CHAMBER VIEW): 15.01 CM/S
SINUS: 3.2 CM
SODIUM SERPL-SCNC: 138 MMOL/L (ref 136–145)
STJ: 2.95 CM
TDI LATERAL: 0.06 M/S
TDI SEPTAL: 0.04 M/S
TDI: 0.05 M/S
TR MAX PG: 47 MMHG
TRICUSPID ANNULAR PLANE SYSTOLIC EXCURSION: 1.72 CM
TRIGL SERPL-MCNC: 122 MG/DL (ref 30–150)
TROPONIN I SERPL DL<=0.01 NG/ML-MCNC: 0.07 NG/ML (ref 0–0.03)
TROPONIN I SERPL DL<=0.01 NG/ML-MCNC: 0.11 NG/ML (ref 0–0.03)
TV REST PULMONARY ARTERY PRESSURE: 62 MMHG
WBC # BLD AUTO: 11.65 K/UL (ref 3.9–12.7)

## 2022-07-16 PROCEDURE — 63600175 PHARM REV CODE 636 W HCPCS: Performed by: INTERNAL MEDICINE

## 2022-07-16 PROCEDURE — 99204 PR OFFICE/OUTPT VISIT, NEW, LEVL IV, 45-59 MIN: ICD-10-PCS | Mod: 25,,, | Performed by: INTERNAL MEDICINE

## 2022-07-16 PROCEDURE — 80048 BASIC METABOLIC PNL TOTAL CA: CPT | Performed by: EMERGENCY MEDICINE

## 2022-07-16 PROCEDURE — 25000003 PHARM REV CODE 250: Performed by: INTERNAL MEDICINE

## 2022-07-16 PROCEDURE — 96372 THER/PROPH/DIAG INJ SC/IM: CPT | Performed by: INTERNAL MEDICINE

## 2022-07-16 PROCEDURE — 63600175 PHARM REV CODE 636 W HCPCS: Performed by: STUDENT IN AN ORGANIZED HEALTH CARE EDUCATION/TRAINING PROGRAM

## 2022-07-16 PROCEDURE — 80061 LIPID PANEL: CPT | Performed by: EMERGENCY MEDICINE

## 2022-07-16 PROCEDURE — G0378 HOSPITAL OBSERVATION PER HR: HCPCS

## 2022-07-16 PROCEDURE — 25000003 PHARM REV CODE 250: Performed by: EMERGENCY MEDICINE

## 2022-07-16 PROCEDURE — 11000001 HC ACUTE MED/SURG PRIVATE ROOM

## 2022-07-16 PROCEDURE — 84484 ASSAY OF TROPONIN QUANT: CPT | Performed by: NURSE PRACTITIONER

## 2022-07-16 PROCEDURE — 99204 OFFICE O/P NEW MOD 45 MIN: CPT | Mod: 25,,, | Performed by: INTERNAL MEDICINE

## 2022-07-16 PROCEDURE — 36415 COLL VENOUS BLD VENIPUNCTURE: CPT | Performed by: STUDENT IN AN ORGANIZED HEALTH CARE EDUCATION/TRAINING PROGRAM

## 2022-07-16 PROCEDURE — 85025 COMPLETE CBC W/AUTO DIFF WBC: CPT | Performed by: EMERGENCY MEDICINE

## 2022-07-16 PROCEDURE — 36415 COLL VENOUS BLD VENIPUNCTURE: CPT | Performed by: NURSE PRACTITIONER

## 2022-07-16 PROCEDURE — 84484 ASSAY OF TROPONIN QUANT: CPT | Mod: 91 | Performed by: STUDENT IN AN ORGANIZED HEALTH CARE EDUCATION/TRAINING PROGRAM

## 2022-07-16 PROCEDURE — 25000003 PHARM REV CODE 250: Performed by: STUDENT IN AN ORGANIZED HEALTH CARE EDUCATION/TRAINING PROGRAM

## 2022-07-16 PROCEDURE — 87040 BLOOD CULTURE FOR BACTERIA: CPT | Mod: 59 | Performed by: STUDENT IN AN ORGANIZED HEALTH CARE EDUCATION/TRAINING PROGRAM

## 2022-07-16 PROCEDURE — 25000003 PHARM REV CODE 250: Performed by: PHYSICIAN ASSISTANT

## 2022-07-16 PROCEDURE — 99213 PR OFFICE/OUTPT VISIT, EST, LEVL III, 20-29 MIN: ICD-10-PCS | Mod: ,,, | Performed by: STUDENT IN AN ORGANIZED HEALTH CARE EDUCATION/TRAINING PROGRAM

## 2022-07-16 PROCEDURE — 99213 OFFICE O/P EST LOW 20 MIN: CPT | Mod: ,,, | Performed by: STUDENT IN AN ORGANIZED HEALTH CARE EDUCATION/TRAINING PROGRAM

## 2022-07-16 RX ORDER — ENOXAPARIN SODIUM 100 MG/ML
1 INJECTION SUBCUTANEOUS
Status: DISPENSED | OUTPATIENT
Start: 2022-07-16 | End: 2022-07-17

## 2022-07-16 RX ORDER — NAPROXEN SODIUM 220 MG/1
81 TABLET, FILM COATED ORAL DAILY
Status: DISCONTINUED | OUTPATIENT
Start: 2022-07-16 | End: 2022-07-25

## 2022-07-16 RX ORDER — SODIUM CHLORIDE 9 MG/ML
INJECTION, SOLUTION INTRAVENOUS CONTINUOUS
Status: DISCONTINUED | OUTPATIENT
Start: 2022-07-18 | End: 2022-07-17

## 2022-07-16 RX ORDER — CLOPIDOGREL BISULFATE 75 MG/1
300 TABLET ORAL ONCE
Status: COMPLETED | OUTPATIENT
Start: 2022-07-16 | End: 2022-07-16

## 2022-07-16 RX ORDER — FUROSEMIDE 10 MG/ML
40 INJECTION INTRAMUSCULAR; INTRAVENOUS ONCE
Status: COMPLETED | OUTPATIENT
Start: 2022-07-16 | End: 2022-07-16

## 2022-07-16 RX ORDER — CLOPIDOGREL BISULFATE 75 MG/1
75 TABLET ORAL DAILY
Status: DISCONTINUED | OUTPATIENT
Start: 2022-07-17 | End: 2022-07-24

## 2022-07-16 RX ADMIN — ASPIRIN 325 MG ORAL TABLET 325 MG: 325 PILL ORAL at 09:07

## 2022-07-16 RX ADMIN — AMLODIPINE BESYLATE 10 MG: 5 TABLET ORAL at 09:07

## 2022-07-16 RX ADMIN — METOPROLOL TARTRATE 25 MG: 25 TABLET, FILM COATED ORAL at 09:07

## 2022-07-16 RX ADMIN — ACETAMINOPHEN 650 MG: 325 TABLET ORAL at 09:07

## 2022-07-16 RX ADMIN — FUROSEMIDE 40 MG: 10 INJECTION, SOLUTION INTRAMUSCULAR; INTRAVENOUS at 04:07

## 2022-07-16 RX ADMIN — CLOPIDOGREL 300 MG: 75 TABLET, FILM COATED ORAL at 04:07

## 2022-07-16 RX ADMIN — ATORVASTATIN CALCIUM 40 MG: 40 TABLET, FILM COATED ORAL at 09:07

## 2022-07-16 RX ADMIN — ENOXAPARIN SODIUM 80 MG: 100 INJECTION SUBCUTANEOUS at 04:07

## 2022-07-16 NOTE — SUBJECTIVE & OBJECTIVE
"Past Medical History:   Diagnosis Date    PAD (peripheral artery disease)        Past Surgical History:   Procedure Laterality Date    ANGIOGRAPHY OF LOWER EXTREMITY Left 7/5/2022    Procedure: Angiogram Extremity Unilateral;  Surgeon: Mehul Rich MD;  Location: [facility];  Service: Vascular;  Laterality: Left;  RN PREOP ON 7/1/22. BINAX ON 7/5/22---NEED CONSENT       Review of patient's allergies indicates:  No Known Allergies    Medications:  Medications Prior to Admission   Medication Sig    acetaminophen (TYLENOL) 500 MG tablet Take 1,000 mg by mouth every 6 (six) hours as needed for Pain.    amoxicillin-clavulanate 875-125mg (AUGMENTIN) 875-125 mg per tablet Take 1 tablet by mouth every 12 (twelve) hours.    amoxicillin-clavulanate 875-125mg (AUGMENTIN) 875-125 mg per tablet Take 1 tablet by mouth every 12 (twelve) hours. for 7 days    clopidogreL (PLAVIX) 75 mg tablet Take 1 tablet (75 mg total) by mouth once daily.    oxyCODONE-acetaminophen (PERCOCET) 5-325 mg per tablet Take 1 tablet by mouth every 4 (four) hours as needed for Pain.     Antibiotics (From admission, onward)                Start     Stop Route Frequency Ordered    07/15/22 1700  vancomycin 1.25 g in dextrose 5% 250 mL IVPB (ready to mix)         -- IV Every 12 hours (non-standard times) 07/15/22 0608    07/15/22 0547  vancomycin - pharmacy to dose  (vancomycin with pharmacy to dose consult)        "And" Linked Group Details    -- IV pharmacy to manage frequency 07/15/22 0447    07/15/22 0545  piperacillin-tazobactam 4.5 g in dextrose 5 % 100 mL IVPB (ready to mix system)         -- IV Every 8 hours (non-standard times) 07/15/22 0438          Antifungals (From admission, onward)                None          Antivirals (From admission, onward)      None               There is no immunization history on file for this patient.    Family History    Denies medical conditions that run in his family       Social History "     Socioeconomic History    Marital status: Single   Tobacco Use    Smoking status: Former Smoker     Quit date: 2022     Years since quittin.1    Smokeless tobacco: Never Used   Substance and Sexual Activity    Alcohol use: Never    Drug use: Not Currently     Review of Systems   Constitutional:  Negative for chills and fever.   All other systems reviewed and are negative.  Objective:     Vital Signs (Most Recent):  Temp: 98 °F (36.7 °C) (22)  Pulse: 90 (22)  Resp: 19 (22)  BP: (!) 160/85 (22)  SpO2: 96 % (22)   Vital Signs (24h Range):  Temp:  [97.5 °F (36.4 °C)-98.4 °F (36.9 °C)] 98 °F (36.7 °C)  Pulse:  [75-99] 90  Resp:  [19-20] 19  SpO2:  [92 %-98 %] 96 %  BP: (140-168)/(77-99) 160/85     Weight: 77.5 kg (170 lb 13.7 oz)  Body mass index is 24.52 kg/m².    Estimated Creatinine Clearance: 138.6 mL/min (based on SCr of 0.6 mg/dL).    Physical Exam  Constitutional:       General: He is not in acute distress.     Appearance: He is not ill-appearing or toxic-appearing.   HENT:      Head: Normocephalic and atraumatic.      Right Ear: External ear normal.      Left Ear: External ear normal.      Mouth/Throat:      Mouth: Mucous membranes are moist.      Comments: dentures  Eyes:      General: No scleral icterus.        Right eye: No discharge.         Left eye: No discharge.   Cardiovascular:      Rate and Rhythm: Normal rate and regular rhythm.   Pulmonary:      Effort: Pulmonary effort is normal. No respiratory distress.      Breath sounds: No stridor. No wheezing or rhonchi.   Abdominal:      General: Bowel sounds are normal. There is no distension.      Palpations: Abdomen is soft.      Tenderness: There is no abdominal tenderness. There is no guarding.   Musculoskeletal:      Right lower leg: No edema.      Left lower leg: No edema.   Skin:     General: Skin is warm and dry.      Comments: L third toe - c/w dry gangrene; no drainage, erythema  or swelling  Dusky toes on R/L feet   Neurological:      Mental Status: He is alert and oriented to person, place, and time. Mental status is at baseline.   Psychiatric:         Mood and Affect: Mood normal.         Behavior: Behavior normal.       Significant Labs:   Microbiology Results (last 7 days)       Procedure Component Value Units Date/Time    Blood culture [[id number]] Collected: 07/16/22 0644    Order Status: Sent Specimen: Blood from Peripheral, Left Hand Updated: 07/16/22 0716    Blood culture [[id number]] Collected: 07/16/22 0644    Order Status: Sent Specimen: Blood from Peripheral, Right Hand Updated: 07/16/22 0716            Significant Imaging: I have reviewed all pertinent imaging results/findings within the past 24 hours.

## 2022-07-16 NOTE — HOSPITAL COURSE
56 y/o male PMH PAD h/o LLE angiogram on 07/05/2022 admitted on 07/15/2022 to observation for left 3rd toe gangrene that is worsening since angiogram procedure.  Ultrasound lower extremity without DVT.  Shows vascular stent appears patent.  X-ray of bilateral feet with no acute fractures or dislocations.  Soft tissue abnormality of the 3rd digit on the left foot noted.  Vascular podiatry consulted.       Patient started on IV abx but complained of burning sensation all over with infusion. Overnight on 07/15, rapid response called for SOB and diaphoresis. Stated that it lasted briefly and then resolved.  Initial and subsequent Troponin elevated. Repeat CXR with no acute process. BNP elevated. Echo with EF of 30%, moderately decreased systolic function, grade 2 diastolic dysfunction, pulmonary hypertension, and moderate to severe global hypokinesis with WMA. Lasix IV given once with improvement. Cardiology consulted- Suspect ACS, continue on aspirin,plavix,and statin.  Plan for heart catheterization on 07/18.  ID consulted. IV abx discontinued. Awaiting vascular surgery recommendations. Podiatry plans for left 3rd toe amputation, pending vascular recs and cardiology procedure. Continue pain control and blood pressure control,  Had cardiac events,S/P LHC,for NSTEMI  LM/2V CAD  Severe LV dysfxn  Severe PAD s/p recent L SFA PTAS, needing L 3rd toe amputation +/- RLE angio  R rad vasband for hemostasis     Plan:  Cont med rx.  Cont ASA/Plavix/Statin.  Start entresto/toprol, titrate as BP/HR will allow.  Case d/w Dr. Wynn, will transfer to [facility] for CABG vs MV PCI eval.  Lifevest ordered.  Repeat echo in 3 months (mid Oct 2022) for reassessment of LVEF.   CT chest is done.no acute process.  Pending transfer to [facility] [place] [place].  CC is toe pain.

## 2022-07-16 NOTE — ASSESSMENT & PLAN NOTE
-Overnight on 07/15, patient with SOB and diaphoresis. No CP, but admits later intermittent chest discomfort  -chest x-ray no acute process  -unable to find EKG/15.  Per documentation,EKG reading sinus rhythm with fusion complexes and PACs  -Initial troponin 0.07, repeat trop was 0.114  -Echo pending  -BNP elevated >900  -Cardiology consulted  -Will give IV lasix x 1 dose

## 2022-07-16 NOTE — NURSING
Notified JESSICA Wilburn, OLIVA that patient  had an episode of SOB at 1942, notified VIVIAN Covington who came in the room to see the patient, he ordered BNP, chest xray, IS and I placed oxygen 3liters oxygen saturation 92% ( The BNP is 942.) he  felt better after a few minutes or so.  He just called me to the room, he disconnected the Zosyn from his IV indicating that he feels like the antibiotic is making him SOB. and he does not want it anymore,. He is having another episode of SOB while I am at his bedside.  He started to feel better then became diaphoretic and SOB, oxygen at 3lnc 98%, pulse 91, he is still breathing moderately heavy but he says he is feeling better but not back at his baseline. Received an order for EKG, troponin and notify RR nurse and charge nurse, Isi who are now at the bedside.  Ekg performed, and BS is 163.   Patient states he is feeling better.Continue to monitor.

## 2022-07-16 NOTE — PLAN OF CARE
Problem: Adult Inpatient Plan of Care  Goal: Plan of Care Review  7/15/2022 1935 by Haydee Durant RN  Outcome: Ongoing, Progressing  7/15/2022 1920 by Haydee Durant RN  Outcome: Ongoing, Progressing  Goal: Patient-Specific Goal (Individualized)  7/15/2022 1935 by Haydee Durant RN  Outcome: Ongoing, Progressing  7/15/2022 1920 by Haydee Durant RN  Outcome: Ongoing, Progressing  Goal: Absence of Hospital-Acquired Illness or Injury  7/15/2022 1935 by Haydee Durant RN  Outcome: Ongoing, Progressing  7/15/2022 1920 by Haydee Durant RN  Outcome: Ongoing, Progressing  Goal: Optimal Comfort and Wellbeing  7/15/2022 1935 by Haydee Durant RN  Outcome: Ongoing, Progressing  7/15/2022 1920 by Haydee Durant RN  Outcome: Ongoing, Progressing  Goal: Readiness for Transition of Care  7/15/2022 1935 by Haydee Durant RN  Outcome: Ongoing, Progressing  7/15/2022 1920 by Haydee Durant RN  Outcome: Ongoing, Progressing

## 2022-07-16 NOTE — HPI
56 yo male with PVD s/p LLE angio with stent placement 7/5 and tobacco abuse admitted for L toe pain. ID consulted for abx recs. Pt reported several day history of worsening pain along dry gangrenous toe, denied fevers, chills, redness or swelling. Admission notable for some SOB that pt believed to be due to ?zosyn. Blcx obtained - in process. Pt has been afebrile and hemodynamically stable. Xray of L foot with irregular appearance of soft tissue at 3rd digit. Plan for 3rd toe amputation next week. Per chart review, pt was given augmentin as an outpatient and reported tolerating without issues. Denies IVDU or toxic habits.

## 2022-07-16 NOTE — HPI
"57 y.o. male PMHx PAD s.p vascular stent LLE presents with left foot pain x 2-3 days. Symptoms have been progressively getting worse. Patient states he was placed on antibiotics for dry gangrene and concern of infection.  He believes that his toe looks worse and he completed his antibiotics yesterday.  He was on call with virtual PCP who after assessment of toe told him to come to ED for further evaluation.  There is no report of fever, chills, CP, numbness and tingling.  He does state he stopped smoking a few weeks ago and he sometimes has to "clear his lungs"  ED course: Pain better after morphine. Found to be afebrile. WBC nl. Elevated ESR (33) and CRP (93.9). Bilateral foot xray shows Soft tissue abnormality of the L 3rd digit. Arterial US lower extremity  shows Interval placement of left lower extremity arterial vascular stent, the vascular stent appears patent.  The dorsalis pedis artery bilaterally demonstrates evidence for reversal of flow. No evidence for occlusion.    Cardiology consulted for CP/elev trop.    The patient is a very pleasant 57-year-old man with no prior cardiac history but a significant history of left lower extremity PA D status post vascular intervention with dry gangrene of his left middle toe with plans for amputation.  He apparently supposed to have an angiogram of his right leg early next week.  He presented with progressive left foot pain which apparently is improved after admission.  He was given some antibiotics intravenously yesterday, apparently developed some shortness of breath.  He had recurrent dyspnea thereafter with associated diaphoresis.  He denied any angina.  EKG did not reveal any ischemic changes, however his troponin nelsy from 0.07 up to 0.011.  He is currently symptom free.  He is a former smoker, but tells me he has quit.  Pros and cons of coronary angiography discussed with the patient and he agrees to proceed.  Permit has been signed.  Echocardiogram is " pending.

## 2022-07-16 NOTE — SUBJECTIVE & OBJECTIVE
Past Medical History:   Diagnosis Date    PAD (peripheral artery disease)        Past Surgical History:   Procedure Laterality Date    ANGIOGRAPHY OF LOWER EXTREMITY Left 2022    Procedure: Angiogram Extremity Unilateral;  Surgeon: Mehul Rich MD;  Location: [facility];  Service: Vascular;  Laterality: Left;  RN PREOP ON 22. BINAX ON 22---NEED CONSENT       Review of patient's allergies indicates:  No Known Allergies    No current facility-administered medications on file prior to encounter.     Current Outpatient Medications on File Prior to Encounter   Medication Sig    acetaminophen (TYLENOL) 500 MG tablet Take 1,000 mg by mouth every 6 (six) hours as needed for Pain.    amoxicillin-clavulanate 875-125mg (AUGMENTIN) 875-125 mg per tablet Take 1 tablet by mouth every 12 (twelve) hours.    amoxicillin-clavulanate 875-125mg (AUGMENTIN) 875-125 mg per tablet Take 1 tablet by mouth every 12 (twelve) hours. for 7 days    clopidogreL (PLAVIX) 75 mg tablet Take 1 tablet (75 mg total) by mouth once daily.    oxyCODONE-acetaminophen (PERCOCET) 5-325 mg per tablet Take 1 tablet by mouth every 4 (four) hours as needed for Pain.     Family History    None       Tobacco Use    Smoking status: Former Smoker     Quit date: 2022     Years since quittin.1    Smokeless tobacco: Never Used   Substance and Sexual Activity    Alcohol use: Never    Drug use: Not Currently    Sexual activity: Not on file     Review of Systems   Constitutional: Negative for chills, diaphoresis, fever and malaise/fatigue.   HENT:  Negative for nosebleeds.    Eyes:  Negative for blurred vision and double vision.   Cardiovascular:  Negative for chest pain, claudication, cyanosis, dyspnea on exertion, leg swelling, orthopnea, palpitations, paroxysmal nocturnal dyspnea and syncope.   Respiratory:  Positive for shortness of breath. Negative for cough and wheezing.    Skin:  Negative for dry skin and poor wound healing.    Musculoskeletal:  Negative for back pain, joint swelling and myalgias.   Gastrointestinal:  Negative for abdominal pain, nausea and vomiting.   Genitourinary:  Negative for hematuria.   Neurological:  Negative for dizziness, headaches, numbness, seizures and weakness.   Psychiatric/Behavioral:  Negative for altered mental status and depression.    Objective:     Vital Signs (Most Recent):  Temp: 98.3 °F (36.8 °C) (07/16/22 1133)  Pulse: 80 (07/16/22 1133)  Resp: 19 (07/16/22 1133)  BP: (!) 146/83 (07/16/22 1133)  SpO2: 97 % (07/16/22 1133)   Vital Signs (24h Range):  Temp:  [97.5 °F (36.4 °C)-98.4 °F (36.9 °C)] 98.3 °F (36.8 °C)  Pulse:  [80-99] 80  Resp:  [19-20] 19  SpO2:  [92 %-98 %] 97 %  BP: (146-168)/(77-99) 146/83     Weight: 77.1 kg (170 lb)  Body mass index is 24.39 kg/m².    SpO2: 97 %  O2 Device (Oxygen Therapy): nasal cannula      Intake/Output Summary (Last 24 hours) at 7/16/2022 1149  Last data filed at 7/16/2022 0702  Gross per 24 hour   Intake 360 ml   Output 1450 ml   Net -1090 ml       Lines/Drains/Airways       Peripheral Intravenous Line  Duration                  Peripheral IV - Single Lumen 07/15/22 0325 20 G Left Forearm 1 day                    Physical Exam  Constitutional:       General: He is not in acute distress.     Appearance: He is well-developed. He is not ill-appearing, toxic-appearing or diaphoretic.   HENT:      Head: Normocephalic and atraumatic.   Eyes:      General: No scleral icterus.     Extraocular Movements: Extraocular movements intact.      Conjunctiva/sclera: Conjunctivae normal.      Pupils: Pupils are equal, round, and reactive to light.   Neck:      Thyroid: No thyromegaly.      Vascular: No JVD.      Trachea: No tracheal deviation.   Cardiovascular:      Rate and Rhythm: Normal rate and regular rhythm.      Pulses:           Carotid pulses are 2+ on the right side and 2+ on the left side.       Radial pulses are 2+ on the right side.      Heart sounds: S1 normal  and S2 normal. No murmur heard.    No friction rub. No gallop.   Pulmonary:      Effort: Pulmonary effort is normal. No respiratory distress.      Breath sounds: Normal breath sounds. No stridor. No wheezing, rhonchi or rales.   Chest:      Chest wall: No tenderness.   Abdominal:      General: There is no distension.      Palpations: Abdomen is soft.   Musculoskeletal:         General: No swelling or tenderness. Normal range of motion.      Cervical back: Normal range of motion and neck supple. No rigidity.      Right lower leg: No edema.      Left lower leg: No edema.      Comments: L middle toe dry gangrene   Skin:     General: Skin is warm and dry.      Coloration: Skin is not jaundiced.   Neurological:      General: No focal deficit present.      Mental Status: He is alert and oriented to person, place, and time.      Cranial Nerves: No cranial nerve deficit.   Psychiatric:         Mood and Affect: Mood normal.         Behavior: Behavior normal.       Current Medications:   amLODIPine  10 mg Oral Daily    aspirin  325 mg Oral Daily    atorvastatin  40 mg Oral Daily    furosemide (LASIX) injection  40 mg Intravenous Once    metoprolol tartrate  25 mg Oral BID       acetaminophen, dextrose 10%, dextrose 10%, glucagon (human recombinant), glucose, glucose, hydrALAZINE, HYDROcodone-acetaminophen, HYDROcodone-acetaminophen, ibuprofen, insulin aspart U-100, melatonin, naloxone, ondansetron, oxyCODONE, sodium chloride 0.9%    Laboratory (all labs reviewed):  CBC:  Recent Labs   Lab 06/18/22  0103 07/15/22  0325 07/15/22  0548 07/16/22  0644   WBC 10.36 12.11 11.66 11.65   Hemoglobin 16.4 13.2 L 13.4 L 13.6 L   Hematocrit 46.2 37.3 L 38.0 L 39.1 L   Platelets 262 268 302 321       CHEMISTRIES:  Recent Labs   Lab 06/18/22  0103 06/20/22  0347 07/15/22  0325 07/15/22  0548 07/16/22  0644   Glucose 132 H 108 180 H 130 H 133 H   Sodium 135 L 135 L 137 136 138   Potassium 3.5 3.4 L 3.6 3.1 L 3.7   BUN 20 15 17 14 10    Creatinine 0.9 0.8 0.8 0.7 0.6   eGFR if African American >60 >60 >60 >60 >60   eGFR if non African American >60 >60 >60 >60 >60   Calcium 9.6 9.1 9.1 9.0 9.3   Magnesium  --   --  1.9  --   --        CARDIAC BIOMARKERS:  Recent Labs   Lab 07/16/22  0101 07/16/22  0644   Troponin I 0.073 H 0.114 H       COAGS:        LIPIDS/LFTS:  Recent Labs   Lab 06/18/22  0103 07/15/22  0325   AST 27 21   ALT 21 13       BNP:  Recent Labs   Lab 07/15/22  2102    H       TSH:        Free T4:        Diagnostic Results:  ECG (personally reviewed and interpreted tracing(s)):  7/16/22 0038 (media tab) SR 93, PVCs, ASMI age indet    Chest X-Ray (personally reviewed and interpreted image(s)): 7/16/22 NAD    LE art US 7/15/22  Interval placement of left lower extremity arterial vascular stent, the vascular stent appears patent.  The dorsalis pedis artery bilaterally demonstrates evidence for reversal of flow.  Otherwise the arterial vascular structures demonstrate evidence for arterial atherosclerotic disease, without evidence for occlusion.    Echo: ordered 7/16/22    Cath: planned 7/18/22

## 2022-07-16 NOTE — PLAN OF CARE
Problem: Adult Inpatient Plan of Care  Goal: Plan of Care Review  Outcome: Ongoing, Progressing     Problem: Adult Inpatient Plan of Care  Goal: Patient-Specific Goal (Individualized)  Outcome: Ongoing, Progressing     Problem: Adult Inpatient Plan of Care  Goal: Absence of Hospital-Acquired Illness or Injury  Outcome: Ongoing, Progressing     Problem: Adult Inpatient Plan of Care  Goal: Optimal Comfort and Wellbeing  Outcome: Ongoing, Progressing     Problem: Adult Inpatient Plan of Care  Goal: Readiness for Transition of Care  Outcome: Ongoing, Progressing     Problem: Impaired Wound Healing  Goal: Optimal Wound Healing  Outcome: Ongoing, Progressing

## 2022-07-16 NOTE — PROGRESS NOTES
[facility]  Progress Note    Patient Name: Yo Pink  MRN: [id number]  Patient Class: OP- Observation   Admission Date: 7/15/2022  Length of Stay: 0 days  Attending Physician: Catalina Valenzuela DO  Primary Care Provider: [facility]        Subjective:     Principal Problem:Dry gangrene        HPI:  No notes on file    Overview/Hospital Course:  58 y/o male PMH PAD h/o LLE angiogram on 07/05/2022 admitted on 07/15/2022 to observation for left 3rd toe gangrene that is worsening since angiogram procedure.  Ultrasound lower extremity without DVT.  Shows vascular stent appears patent.  X-ray of bilateral feet with no acute fractures or dislocations.  Soft tissue abnormality of the 3rd digit on the left foot noted.  Vascular podiatry consulted.       Patient started on IV abx but complained of burning sensation all over with infusion. Overnight on 07/15, rapid response called for SOB and diaphoresis. Stated that it lasted briefly and then resolved.  Initial and subsequent Troponin mildly elevated. Repeat CXR with no acute process. BNP elevated. Echo pending. Will consult cards.  ID consulted. IV abx discontinued. Awaiting vascular surgery recommendations. Podiatry plans for left 3rd toe amputation on 07/18, Monday. Continue pain control and blood pressure control      Interval History:  Overnight, rapid response call due to patient being short of breath And diaphoretic.  Chest x-ray with no acute process.  BNP elevated.  Patient stated that the episode lasted briefly and then resolve on its own.  He thinks it is due to the IV antibiotics infusion.  Does not complain of chest pain but does state he has some chest discomfort and some difficulty breathing. Admits orthopena. No LE swelling.     Review of Systems   Constitutional:  Positive for diaphoresis. Negative for chills, fatigue and fever.   HENT:  Negative for congestion and trouble swallowing.    Respiratory:  Positive for  shortness of breath. Negative for cough, chest tightness and wheezing.         + orthopnea   Cardiovascular:  Positive for chest pain (Feels more like chest discomfort). Negative for palpitations and leg swelling.   Gastrointestinal:  Negative for abdominal distention, abdominal pain, blood in stool, diarrhea, nausea and vomiting.   Genitourinary:  Negative for difficulty urinating, dysuria and hematuria.   Musculoskeletal:  Positive for arthralgias and myalgias. Negative for joint swelling.   Skin:  Positive for wound.   Neurological:  Negative for dizziness, weakness and headaches.   Psychiatric/Behavioral:  Negative for confusion, decreased concentration and hallucinations.      Objective:     Vital Signs (Most Recent):  Temp: 98.3 °F (36.8 °C) (07/16/22 1133)  Pulse: 80 (07/16/22 1133)  Resp: 19 (07/16/22 1133)  BP: (!) 146/83 (07/16/22 1133)  SpO2: 97 % (07/16/22 1133)   Vital Signs (24h Range):  Temp:  [97.5 °F (36.4 °C)-98.4 °F (36.9 °C)] 98.3 °F (36.8 °C)  Pulse:  [80-99] 80  Resp:  [19-20] 19  SpO2:  [92 %-98 %] 97 %  BP: (146-168)/(77-99) 146/83     Weight: 77.1 kg (170 lb)  Body mass index is 24.39 kg/m².    Intake/Output Summary (Last 24 hours) at 7/16/2022 1145  Last data filed at 7/16/2022 0702  Gross per 24 hour   Intake 360 ml   Output 1450 ml   Net -1090 ml      Physical Exam  Vitals and nursing note reviewed.   Constitutional:       General: He is not in acute distress.     Appearance: He is not ill-appearing.   HENT:      Head: Normocephalic and atraumatic.      Right Ear: External ear normal.      Left Ear: External ear normal.      Nose: Nose normal.      Mouth/Throat:      Mouth: Mucous membranes are moist.   Eyes:      Extraocular Movements: Extraocular movements intact.      Conjunctiva/sclera: Conjunctivae normal.   Cardiovascular:      Rate and Rhythm: Normal rate and regular rhythm.      Heart sounds: No murmur heard.    No gallop.   Pulmonary:      Effort: Pulmonary effort is normal. No  respiratory distress.      Breath sounds: Normal breath sounds. No wheezing.   Abdominal:      General: There is no distension.      Palpations: Abdomen is soft.      Tenderness: There is no abdominal tenderness. There is no guarding.   Musculoskeletal:         General: Swelling present. No tenderness.      Cervical back: Normal range of motion.      Right lower leg: Edema present.      Left lower leg: Edema present.      Comments: Trace pedal edema bilaterally. Left third toe findings c/w dry gangrene; no drainage, erythema or swelling. Bilateral feet with dusky toes. Feet are cool to touch   Skin:     General: Skin is warm and dry.   Neurological:      General: No focal deficit present.      Mental Status: He is alert and oriented to person, place, and time.      Sensory: Sensory deficit (diminished sensation in bilateral feet) present.   Psychiatric:         Mood and Affect: Mood normal.         Behavior: Behavior normal.         Thought Content: Thought content normal.       Significant Labs: All pertinent labs within the past 24 hours have been reviewed.    Significant Imaging: I have reviewed all pertinent imaging results/findings within the past 24 hours.      Assessment/Plan:      * Dry gangrene  -Left 3rd digit with findings c/w dry gangrene  -US b/l LE:  The vascular stent appears pain in left lower extremity.  Arterial atherosclerotic disease present, without evidence of occlusion.  -XR bilateral feet:  No acute fractures or dislocation.  Left 3rd digit with soft tissue abnormality  -Podiatry and vascular consulted  -Podiatry plan for left 3rd toe amputation on 07/18/2022, pending vascular evaluation   -ID consulted: IV abx discontinued 07/15. Monitor off abx.     Hypertensive urgency  -SBP in the 180s in the ED  -Noted history of HTN, but patient states he has not been on medications  -Started Norvasc 10mg qd on 07/15  -BP improving  -Continue pain control             PAD (peripheral artery  disease)  -s/p LLE angio with stent placement on 07/05 by Dr. Rich   -Consider RLE angio this admission since early signs of possible ulceration  -Mechanical thrombectomy is NOT indicated for patient  -Hold Lovenox until evaluated by Podiatry and Vascular surgery  -Continue ASA, Statin, Blood pressure reduction.  -Vascular consulted     Elevated troponin  -Overnight on 07/15, patient with SOB and diaphoresis. No CP, but admits later intermittent chest discomfort  -chest x-ray no acute process  -unable to find EKG/15.  Per documentation,EKG reading sinus rhythm with fusion complexes and PACs  -Initial troponin 0.07, repeat trop was 0.114  -Echo pending  -BNP elevated >900  -Cardiology consulted  -Will give IV lasix x 1 dose       Hypokalemia  K 3.1 on 07/15  Replace as needed  Resolved         VTE Risk Mitigation (From admission, onward)         Ordered     enoxaparin injection 80 mg  Every 12 hours (non-standard times)         07/16/22 1205     IP VTE HIGH RISK PATIENT  Once         07/15/22 0438     Place sequential compression device  Until discontinued         07/15/22 0438     Reason for No Pharmacological VTE Prophylaxis  Once        Question:  Reasons:  Answer:  Physician Provided (leave comment)  Comment:  procedure in am    07/15/22 0438                Discharge Planning   RAPHAEL:      Code Status: Full Code   Is the patient medically ready for discharge?:     Reason for patient still in hospital (select all that apply): Patient trending condition, Treatment and Consult recommendations  Discharge Plan A: Home (with instructions to follow up)                  Catalina Valenzuela DO  Department of Hospital Medicine   [facility]

## 2022-07-16 NOTE — ASSESSMENT & PLAN NOTE
Mgmt per IM/vasc/podiatry  Apparent plan for L 3rd toe amputation and RLE angio next week.  Will need to hold pending cardiac cath.  Cont ASA, dec dose to 81mg qd  Reload plavix and cont 75mg qd  Agree with statin, check lipids

## 2022-07-16 NOTE — CONSULTS
[facility]  Infectious Disease  Consult Note    Patient Name: Yo Pink  MRN: [id number]  Admission Date: 7/15/2022  Hospital Length of Stay: 0 days  Attending Physician: Catalina Valenzuela DO  Primary Care Provider: [facility]     Isolation Status: No active isolations    Patient information was obtained from patient, past medical records, ER records and primary team.      Inpatient consult to Infectious Diseases  Consult performed by: Estela Mayfield MD  Consult ordered by: Catalina Valenzuela DO        Assessment/Plan:     Dry gangrene  Dry gangrene of L 3rd toe s/p L angio/stent placement on 7/5 - no overlying cellulitis or swelling concerning for ascending infection. Denied fevers/chills prior to admission.  Plan for amputation tentatively next week per patient. Unclear if true reaction to zosyn vs other IV medication - pt reported tolerating augmentin as an out patient.     Recommendations:  -stop abx and monitor off  -continue smoking cessation  -if concerns for infection in OR, consider bone biopsy/cx of clean margin         Thank you for your consult. I will sign off. Please contact us if you have any additional questions. Above d/w primary team. Reconsult with questions or if cultures return positive.     Time: 70 minutes   50% of time spent on face-to-face counseling and coordination of care. Counseling included review of test results, diagnosis, and treatment plan with patient and/or family.        Estela Mayfield MD  Infectious Disease  [facility]    Subjective:     Principal Problem: Cellulitis of foot    HPI: 58 yo male with PVD s/p LLE angio with stent placement 7/5 and tobacco abuse admitted for L toe pain. ID consulted for abx recs. Pt reported several day history of worsening pain along dry gangrenous toe, denied fevers, chills, redness or swelling. Admission notable for some SOB that pt believed to be due to ?zosyn. Blcx obtained - in process. Pt has been  "afebrile and hemodynamically stable. Xray of L foot with irregular appearance of soft tissue at 3rd digit. Plan for 3rd toe amputation next week. Per chart review, pt was given augmentin as an outpatient and reported tolerating without issues. Denies IVDU or toxic habits.           Past Medical History:   Diagnosis Date    PAD (peripheral artery disease)        Past Surgical History:   Procedure Laterality Date    ANGIOGRAPHY OF LOWER EXTREMITY Left 7/5/2022    Procedure: Angiogram Extremity Unilateral;  Surgeon: Mehul Rich MD;  Location: [facility];  Service: Vascular;  Laterality: Left;  RN PREOP ON 7/1/22. BINAX ON 7/5/22---NEED CONSENT       Review of patient's allergies indicates:  No Known Allergies    Medications:  Medications Prior to Admission   Medication Sig    acetaminophen (TYLENOL) 500 MG tablet Take 1,000 mg by mouth every 6 (six) hours as needed for Pain.    amoxicillin-clavulanate 875-125mg (AUGMENTIN) 875-125 mg per tablet Take 1 tablet by mouth every 12 (twelve) hours.    amoxicillin-clavulanate 875-125mg (AUGMENTIN) 875-125 mg per tablet Take 1 tablet by mouth every 12 (twelve) hours. for 7 days    clopidogreL (PLAVIX) 75 mg tablet Take 1 tablet (75 mg total) by mouth once daily.    oxyCODONE-acetaminophen (PERCOCET) 5-325 mg per tablet Take 1 tablet by mouth every 4 (four) hours as needed for Pain.     Antibiotics (From admission, onward)                Start     Stop Route Frequency Ordered    07/15/22 1700  vancomycin 1.25 g in dextrose 5% 250 mL IVPB (ready to mix)         -- IV Every 12 hours (non-standard times) 07/15/22 0608    07/15/22 0547  vancomycin - pharmacy to dose  (vancomycin with pharmacy to dose consult)        "And" Linked Group Details    -- IV pharmacy to manage frequency 07/15/22 0447    07/15/22 0545  piperacillin-tazobactam 4.5 g in dextrose 5 % 100 mL IVPB (ready to mix system)         -- IV Every 8 hours (non-standard times) 07/15/22 0439      "     Antifungals (From admission, onward)                None          Antivirals (From admission, onward)      None               There is no immunization history on file for this patient.    Family History    Denies medical conditions that run in his family       Social History     Socioeconomic History    Marital status: Single   Tobacco Use    Smoking status: Former Smoker     Quit date: 2022     Years since quittin.1    Smokeless tobacco: Never Used   Substance and Sexual Activity    Alcohol use: Never    Drug use: Not Currently     Review of Systems   Constitutional:  Negative for chills and fever.   All other systems reviewed and are negative.  Objective:     Vital Signs (Most Recent):  Temp: 98 °F (36.7 °C) (22)  Pulse: 90 (22)  Resp: 19 (22)  BP: (!) 160/85 (22)  SpO2: 96 % (22)   Vital Signs (24h Range):  Temp:  [97.5 °F (36.4 °C)-98.4 °F (36.9 °C)] 98 °F (36.7 °C)  Pulse:  [75-99] 90  Resp:  [19-20] 19  SpO2:  [92 %-98 %] 96 %  BP: (140-168)/(77-99) 160/85     Weight: 77.5 kg (170 lb 13.7 oz)  Body mass index is 24.52 kg/m².    Estimated Creatinine Clearance: 138.6 mL/min (based on SCr of 0.6 mg/dL).    Physical Exam  Constitutional:       General: He is not in acute distress.     Appearance: He is not ill-appearing or toxic-appearing.   HENT:      Head: Normocephalic and atraumatic.      Right Ear: External ear normal.      Left Ear: External ear normal.      Mouth/Throat:      Mouth: Mucous membranes are moist.      Comments: dentures  Eyes:      General: No scleral icterus.        Right eye: No discharge.         Left eye: No discharge.   Cardiovascular:      Rate and Rhythm: Normal rate and regular rhythm.   Pulmonary:      Effort: Pulmonary effort is normal. No respiratory distress.      Breath sounds: No stridor. No wheezing or rhonchi.   Abdominal:      General: Bowel sounds are normal. There is no distension.      Palpations:  Abdomen is soft.      Tenderness: There is no abdominal tenderness. There is no guarding.   Musculoskeletal:      Right lower leg: No edema.      Left lower leg: No edema.   Skin:     General: Skin is warm and dry.      Comments: L third toe - c/w dry gangrene; no drainage, erythema or swelling  Dusky toes on R/L feet   Neurological:      Mental Status: He is alert and oriented to person, place, and time. Mental status is at baseline.   Psychiatric:         Mood and Affect: Mood normal.         Behavior: Behavior normal.       Significant Labs:   Microbiology Results (last 7 days)       Procedure Component Value Units Date/Time    Blood culture [[id number]] Collected: 07/16/22 0644    Order Status: Sent Specimen: Blood from Peripheral, Left Hand Updated: 07/16/22 0716    Blood culture [[id number]] Collected: 07/16/22 0644    Order Status: Sent Specimen: Blood from Peripheral, Right Hand Updated: 07/16/22 0716            Significant Imaging: I have reviewed all pertinent imaging results/findings within the past 24 hours.

## 2022-07-16 NOTE — NURSING
RAPID RESPONSE NURSE PROACTIVE ROUNDING NOTE       Time of Visit: 005    Admit Date: 7/15/2022  LOS: 0  Code Status: Full Code   Date of Visit: 2022  : 1965  Age: 57 y.o.  Sex: male  Race: White  Bed: W410/W410 A:   MRN: [id number]  Was the patient discharged from an ICU this admission? No   Was the patient discharged from a PACU within last 24 hours? No   Did the patient receive conscious sedation/general anesthesia in last 24 hours? No   Was the patient in the ED within the past 24 hours? Yes   Was the patient on NIPPV within the past 24 hours? No   Attending Physician: Catalina Valenzuela DO  Primary Service: Hospitalist   Time spent at the bedside: < 15 min    SITUATION    Notified by charge RN via phone call  Reason for alert: SOB, diaphoretic    Diagnosis: Cellulitis of foot   has a past medical history of PAD (peripheral artery disease).    Last Vitals:  Temp: 97.8 °F (36.6 °C) (07/15 2303)  Pulse: 91 ( 0006)  Resp: 20 ( 0010)  BP: 167/94 (07/15 2303)  SpO2: 98 % (6)    24 Hour Vitals Range:  Temp:  [97.4 °F (36.3 °C)-98.5 °F (36.9 °C)]   Pulse:  [75-99]   Resp:  [16-20]   BP: (140-184)/()   SpO2:  [91 %-98 %]     Clinical Issues: Respiratory    ASSESSMENT/INTERVENTIONS    Patient tachypneic, diaphoretic and SOB. Lungs sound coarse. He is on 3L NC, sats >95%. EKG reading sinus rhythm with fusion complexes and PACs. No chest pain. In just the time I was in there he looks much better, stating he is feeling much better. This is his second episode this shift, both lasting a short time. He is convinced it is all related to the zosyn he was getting, stating both episode happened while he was getting his antibiotics. He does not want this medication anymore. Troponin ordered. . BNP was 922 earlier in the shift. Information relayed to [facility].    RECOMMENDATIONS  Contact CORDELL about findings. Inquire about diuresing patient and further interventions. Continue to monitor for  additional episodes.    Addendum: Contacted CORDELL about troponin 0.073. Orders recieved to recheck troponin in 6 hours.     Discussed plan of care with charge RN, Lidia Nam RN, [facility]    PROVIDER ESCALATION    Physician escalation: Yes    Orders received and case discussed with CORDELL.    Disposition:Remain in room 410    FOLLOW UP    Call back the Rapid Response NurseIsabel at [phone]   for additional questions or concerns.

## 2022-07-16 NOTE — SUBJECTIVE & OBJECTIVE
Interval History:  Overnight, rapid response call due to patient being short of breath And diaphoretic.  Chest x-ray with no acute process.  BNP elevated.  Patient stated that the episode lasted briefly and then resolve on its own.  He thinks it is due to the IV antibiotics infusion.  Does not complain of chest pain but does state he has some chest discomfort and some difficulty breathing. Admits orthopena. No LE swelling.     Review of Systems   Constitutional:  Positive for diaphoresis. Negative for chills, fatigue and fever.   HENT:  Negative for congestion and trouble swallowing.    Respiratory:  Positive for shortness of breath. Negative for cough, chest tightness and wheezing.         + orthopnea   Cardiovascular:  Positive for chest pain (Feels more like chest discomfort). Negative for palpitations and leg swelling.   Gastrointestinal:  Negative for abdominal distention, abdominal pain, blood in stool, diarrhea, nausea and vomiting.   Genitourinary:  Negative for difficulty urinating, dysuria and hematuria.   Musculoskeletal:  Positive for arthralgias and myalgias. Negative for joint swelling.   Skin:  Positive for wound.   Neurological:  Negative for dizziness, weakness and headaches.   Psychiatric/Behavioral:  Negative for confusion, decreased concentration and hallucinations.      Objective:     Vital Signs (Most Recent):  Temp: 98.3 °F (36.8 °C) (07/16/22 1133)  Pulse: 80 (07/16/22 1133)  Resp: 19 (07/16/22 1133)  BP: (!) 146/83 (07/16/22 1133)  SpO2: 97 % (07/16/22 1133)   Vital Signs (24h Range):  Temp:  [97.5 °F (36.4 °C)-98.4 °F (36.9 °C)] 98.3 °F (36.8 °C)  Pulse:  [80-99] 80  Resp:  [19-20] 19  SpO2:  [92 %-98 %] 97 %  BP: (146-168)/(77-99) 146/83     Weight: 77.1 kg (170 lb)  Body mass index is 24.39 kg/m².    Intake/Output Summary (Last 24 hours) at 7/16/2022 1145  Last data filed at 7/16/2022 0702  Gross per 24 hour   Intake 360 ml   Output 1450 ml   Net -1090 ml      Physical Exam  Vitals and  nursing note reviewed.   Constitutional:       General: He is not in acute distress.     Appearance: He is not ill-appearing.   HENT:      Head: Normocephalic and atraumatic.      Right Ear: External ear normal.      Left Ear: External ear normal.      Nose: Nose normal.      Mouth/Throat:      Mouth: Mucous membranes are moist.   Eyes:      Extraocular Movements: Extraocular movements intact.      Conjunctiva/sclera: Conjunctivae normal.   Cardiovascular:      Rate and Rhythm: Normal rate and regular rhythm.      Heart sounds: No murmur heard.    No gallop.   Pulmonary:      Effort: Pulmonary effort is normal. No respiratory distress.      Breath sounds: Normal breath sounds. No wheezing.   Abdominal:      General: There is no distension.      Palpations: Abdomen is soft.      Tenderness: There is no abdominal tenderness. There is no guarding.   Musculoskeletal:         General: Swelling present. No tenderness.      Cervical back: Normal range of motion.      Right lower leg: Edema present.      Left lower leg: Edema present.      Comments: Trace pedal edema bilaterally. Left third toe findings c/w dry gangrene; no drainage, erythema or swelling. Bilateral feet with dusky toes. Feet are cool to touch   Skin:     General: Skin is warm and dry.   Neurological:      General: No focal deficit present.      Mental Status: He is alert and oriented to person, place, and time.      Sensory: Sensory deficit (diminished sensation in bilateral feet) present.   Psychiatric:         Mood and Affect: Mood normal.         Behavior: Behavior normal.         Thought Content: Thought content normal.       Significant Labs: All pertinent labs within the past 24 hours have been reviewed.    Significant Imaging: I have reviewed all pertinent imaging results/findings within the past 24 hours.

## 2022-07-16 NOTE — CONSULTS
"[facility] Surg  Cardiology  Consult Note    Patient Name: Yo Pink  MRN: [id number]  Admission Date: 7/15/2022  Hospital Length of Stay: 0 days  Code Status: Full Code   Attending Provider: Catalina Valenzuela DO   Consulting Provider: Noah Huffman MD  Primary Care Physician: [facility]  Principal Problem:Dry gangrene    Patient information was obtained from patient and ER records.     Inpatient consult to Cardiology  Consult performed by: Noah Huffman MD  Consult ordered by: Catalina Valenzuela DO  Reason for consult: ACS        Subjective:     Chief Complaint:  LLE pain     HPI:   57 y.o. male PMHx PAD s.p vascular stent LLE presents with left foot pain x 2-3 days. Symptoms have been progressively getting worse. Patient states he was placed on antibiotics for dry gangrene and concern of infection.  He believes that his toe looks worse and he completed his antibiotics yesterday.  He was on call with virtual PCP who after assessment of toe told him to come to ED for further evaluation.  There is no report of fever, chills, CP, numbness and tingling.  He does state he stopped smoking a few weeks ago and he sometimes has to "clear his lungs"  ED course: Pain better after morphine. Found to be afebrile. WBC nl. Elevated ESR (33) and CRP (93.9). Bilateral foot xray shows Soft tissue abnormality of the L 3rd digit. Arterial US lower extremity  shows Interval placement of left lower extremity arterial vascular stent, the vascular stent appears patent.  The dorsalis pedis artery bilaterally demonstrates evidence for reversal of flow. No evidence for occlusion.    Cardiology consulted for CP/elev trop.    The patient is a very pleasant 57-year-old man with no prior cardiac history but a significant history of left lower extremity PA D status post vascular intervention with dry gangrene of his left middle toe with plans for amputation.  He apparently supposed to have an angiogram of his right " leg early next week.  He presented with progressive left foot pain which apparently is improved after admission.  He was given some antibiotics intravenously yesterday, apparently developed some shortness of breath.  He had recurrent dyspnea thereafter with associated diaphoresis.  He denied any angina.  EKG did not reveal any ischemic changes, however his troponin nelsy from 0.07 up to 0.011.  He is currently symptom free.  He is a former smoker, but tells me he has quit.  Pros and cons of coronary angiography discussed with the patient and he agrees to proceed.  Permit has been signed.  Echocardiogram is pending.      Past Medical History:   Diagnosis Date    PAD (peripheral artery disease)        Past Surgical History:   Procedure Laterality Date    ANGIOGRAPHY OF LOWER EXTREMITY Left 2022    Procedure: Angiogram Extremity Unilateral;  Surgeon: Mehul iRch MD;  Location[facility];  Service: Vascular;  Laterality: Left;  RN PREOP ON 22. BINAX ON 22---NEED CONSENT       Review of patient's allergies indicates:  No Known Allergies    No current facility-administered medications on file prior to encounter.     Current Outpatient Medications on File Prior to Encounter   Medication Sig    acetaminophen (TYLENOL) 500 MG tablet Take 1,000 mg by mouth every 6 (six) hours as needed for Pain.    amoxicillin-clavulanate 875-125mg (AUGMENTIN) 875-125 mg per tablet Take 1 tablet by mouth every 12 (twelve) hours.    amoxicillin-clavulanate 875-125mg (AUGMENTIN) 875-125 mg per tablet Take 1 tablet by mouth every 12 (twelve) hours. for 7 days    clopidogreL (PLAVIX) 75 mg tablet Take 1 tablet (75 mg total) by mouth once daily.    oxyCODONE-acetaminophen (PERCOCET) 5-325 mg per tablet Take 1 tablet by mouth every 4 (four) hours as needed for Pain.     Family History    None       Tobacco Use    Smoking status: Former Smoker     Quit date: 2022     Years since quittin.1    Smokeless tobacco:  Never Used   Substance and Sexual Activity    Alcohol use: Never    Drug use: Not Currently    Sexual activity: Not on file     Review of Systems   Constitutional: Negative for chills, diaphoresis, fever and malaise/fatigue.   HENT:  Negative for nosebleeds.    Eyes:  Negative for blurred vision and double vision.   Cardiovascular:  Negative for chest pain, claudication, cyanosis, dyspnea on exertion, leg swelling, orthopnea, palpitations, paroxysmal nocturnal dyspnea and syncope.   Respiratory:  Positive for shortness of breath. Negative for cough and wheezing.    Skin:  Negative for dry skin and poor wound healing.   Musculoskeletal:  Negative for back pain, joint swelling and myalgias.   Gastrointestinal:  Negative for abdominal pain, nausea and vomiting.   Genitourinary:  Negative for hematuria.   Neurological:  Negative for dizziness, headaches, numbness, seizures and weakness.   Psychiatric/Behavioral:  Negative for altered mental status and depression.    Objective:     Vital Signs (Most Recent):  Temp: 98.3 °F (36.8 °C) (07/16/22 1133)  Pulse: 80 (07/16/22 1133)  Resp: 19 (07/16/22 1133)  BP: (!) 146/83 (07/16/22 1133)  SpO2: 97 % (07/16/22 1133)   Vital Signs (24h Range):  Temp:  [97.5 °F (36.4 °C)-98.4 °F (36.9 °C)] 98.3 °F (36.8 °C)  Pulse:  [80-99] 80  Resp:  [19-20] 19  SpO2:  [92 %-98 %] 97 %  BP: (146-168)/(77-99) 146/83     Weight: 77.1 kg (170 lb)  Body mass index is 24.39 kg/m².    SpO2: 97 %  O2 Device (Oxygen Therapy): nasal cannula      Intake/Output Summary (Last 24 hours) at 7/16/2022 1149  Last data filed at 7/16/2022 0702  Gross per 24 hour   Intake 360 ml   Output 1450 ml   Net -1090 ml       Lines/Drains/Airways       Peripheral Intravenous Line  Duration                  Peripheral IV - Single Lumen 07/15/22 0325 20 G Left Forearm 1 day                    Physical Exam  Constitutional:       General: He is not in acute distress.     Appearance: He is well-developed. He is not  ill-appearing, toxic-appearing or diaphoretic.   HENT:      Head: Normocephalic and atraumatic.   Eyes:      General: No scleral icterus.     Extraocular Movements: Extraocular movements intact.      Conjunctiva/sclera: Conjunctivae normal.      Pupils: Pupils are equal, round, and reactive to light.   Neck:      Thyroid: No thyromegaly.      Vascular: No JVD.      Trachea: No tracheal deviation.   Cardiovascular:      Rate and Rhythm: Normal rate and regular rhythm.      Pulses:           Carotid pulses are 2+ on the right side and 2+ on the left side.       Radial pulses are 2+ on the right side.      Heart sounds: S1 normal and S2 normal. No murmur heard.    No friction rub. No gallop.   Pulmonary:      Effort: Pulmonary effort is normal. No respiratory distress.      Breath sounds: Normal breath sounds. No stridor. No wheezing, rhonchi or rales.   Chest:      Chest wall: No tenderness.   Abdominal:      General: There is no distension.      Palpations: Abdomen is soft.   Musculoskeletal:         General: No swelling or tenderness. Normal range of motion.      Cervical back: Normal range of motion and neck supple. No rigidity.      Right lower leg: No edema.      Left lower leg: No edema.      Comments: L middle toe dry gangrene   Skin:     General: Skin is warm and dry.      Coloration: Skin is not jaundiced.   Neurological:      General: No focal deficit present.      Mental Status: He is alert and oriented to person, place, and time.      Cranial Nerves: No cranial nerve deficit.   Psychiatric:         Mood and Affect: Mood normal.         Behavior: Behavior normal.       Current Medications:   amLODIPine  10 mg Oral Daily    aspirin  325 mg Oral Daily    atorvastatin  40 mg Oral Daily    furosemide (LASIX) injection  40 mg Intravenous Once    metoprolol tartrate  25 mg Oral BID       acetaminophen, dextrose 10%, dextrose 10%, glucagon (human recombinant), glucose, glucose, hydrALAZINE,  HYDROcodone-acetaminophen, HYDROcodone-acetaminophen, ibuprofen, insulin aspart U-100, melatonin, naloxone, ondansetron, oxyCODONE, sodium chloride 0.9%    Laboratory (all labs reviewed):  CBC:  Recent Labs   Lab 06/18/22  0103 07/15/22  0325 07/15/22  0548 07/16/22  0644   WBC 10.36 12.11 11.66 11.65   Hemoglobin 16.4 13.2 L 13.4 L 13.6 L   Hematocrit 46.2 37.3 L 38.0 L 39.1 L   Platelets 262 268 302 321       CHEMISTRIES:  Recent Labs   Lab 06/18/22  0103 06/20/22  0347 07/15/22  0325 07/15/22  0548 07/16/22  0644   Glucose 132 H 108 180 H 130 H 133 H   Sodium 135 L 135 L 137 136 138   Potassium 3.5 3.4 L 3.6 3.1 L 3.7   BUN 20 15 17 14 10   Creatinine 0.9 0.8 0.8 0.7 0.6   eGFR if African American >60 >60 >60 >60 >60   eGFR if non African American >60 >60 >60 >60 >60   Calcium 9.6 9.1 9.1 9.0 9.3   Magnesium  --   --  1.9  --   --        CARDIAC BIOMARKERS:  Recent Labs   Lab 07/16/22  0101 07/16/22  0644   Troponin I 0.073 H 0.114 H       COAGS:        LIPIDS/LFTS:  Recent Labs   Lab 06/18/22  0103 07/15/22  0325   AST 27 21   ALT 21 13       BNP:  Recent Labs   Lab 07/15/22  2102    H       TSH:        Free T4:        Diagnostic Results:  ECG (personally reviewed and interpreted tracing(s)):  7/16/22 0038 (media tab) SR 93, PVCs, ASMI age indet    Chest X-Ray (personally reviewed and interpreted image(s)): 7/16/22 NAD    LE art US 7/15/22  Interval placement of left lower extremity arterial vascular stent, the vascular stent appears patent.  The dorsalis pedis artery bilaterally demonstrates evidence for reversal of flow.  Otherwise the arterial vascular structures demonstrate evidence for arterial atherosclerotic disease, without evidence for occlusion.    Echo: ordered 7/16/22    Cath: planned 7/18/22      Assessment and Plan:     * Dry gangrene  Mgmt per IM/vasc/podiatry  Apparent plan for L 3rd toe amputation and RLE angio next week.  Will need to hold pending cardiac cath.  Cont ASA, dec dose to  81mg qd  Reload plavix and cont 75mg qd  Agree with statin, check lipids    ACS (acute coronary syndrome)  Spontaneous SOB/diaphoresis (once with abx admin, once without abx admin).  Trop 0.07->0.11  Check echo  ASA/Plavix as above  Enox x3 doses, hold in am 7/18 for cath  Cath 7/18/22 via R radial    Risks, benefits and alternatives of the catheterization procedure were discussed with the patient which include but are not limited to: bleeding, infection, death, heart attack, arrhythmia, kidney failure, stroke, need for emergency surgery, etc.  Patient understands and and agrees to proceed.  Consent was placed on the chart.          Hypertensive urgency  Cont med rx    PAD (peripheral artery disease)  Mgmt per Dr. Rich (Vasc Surg) and Podiatry    Dyslipidemia  Agree with statin  Check lipids        VTE Risk Mitigation (From admission, onward)         Ordered     enoxaparin injection 80 mg  Every 12 hours (non-standard times)         07/16/22 1205     IP VTE HIGH RISK PATIENT  Once         07/15/22 0438     Place sequential compression device  Until discontinued         07/15/22 0438     Reason for No Pharmacological VTE Prophylaxis  Once        Question:  Reasons:  Answer:  Physician Provided (leave comment)  Comment:  procedure in am    07/15/22 0438                Thank you for your consult. I will follow-up with patient. Please contact us if you have any additional questions.    Noah Huffman MD  Cardiology   [facility] Surg

## 2022-07-16 NOTE — ASSESSMENT & PLAN NOTE
-SBP in the 180s in the ED  -Noted history of HTN, but patient states he has not been on medications  -Started Norvasc 10mg qd on 07/15  -BP improving  -Continue pain control

## 2022-07-16 NOTE — ASSESSMENT & PLAN NOTE
-s/p LLE angio with stent placement on 07/05 by Dr. Rich   -Consider RLE angio this admission since early signs of possible ulceration  -Mechanical thrombectomy is NOT indicated for patient  -Hold Lovenox until evaluated by Podiatry and Vascular surgery  -Continue ASA, Statin, Blood pressure reduction.  -Vascular consulted

## 2022-07-16 NOTE — ASSESSMENT & PLAN NOTE
Spontaneous SOB/diaphoresis (once with abx admin, once without abx admin).  Trop 0.07->0.11  Check echo  ASA/Plavix as above  Enox x3 doses, hold in am 7/18 for cath  Cath 7/18/22 via R radial    Risks, benefits and alternatives of the catheterization procedure were discussed with the patient which include but are not limited to: bleeding, infection, death, heart attack, arrhythmia, kidney failure, stroke, need for emergency surgery, etc.  Patient understands and and agrees to proceed.  Consent was placed on the chart.

## 2022-07-16 NOTE — ASSESSMENT & PLAN NOTE
-Left 3rd digit with findings c/w dry gangrene  -US b/l LE:  The vascular stent appears pain in left lower extremity.  Arterial atherosclerotic disease present, without evidence of occlusion.  -XR bilateral feet:  No acute fractures or dislocation.  Left 3rd digit with soft tissue abnormality  -Podiatry and vascular consulted  -Podiatry plan for left 3rd toe amputation on 07/18/2022, pending vascular evaluation   -ID consulted: IV abx discontinued 07/15. Monitor off abx.

## 2022-07-16 NOTE — ASSESSMENT & PLAN NOTE
Dry gangrene of L 3rd toe s/p L angio/stent placement on 7/5 - no overlying cellulitis or swelling concerning for ascending infection. Denied fevers/chills prior to admission.  Plan for amputation tentatively next week per patient. Unclear if true reaction to zosyn vs other IV medication - pt reported tolerating augmentin as an out patient.     Recommendations:  -stop abx and monitor off  -continue smoking cessation

## 2022-07-16 NOTE — PLAN OF CARE
Problem: Adult Inpatient Plan of Care  Goal: Plan of Care Review  7/15/2022 1946 by Haydee Durant RN  Outcome: Ongoing, Progressing  7/15/2022 1935 by Haydee Durant RN  Outcome: Ongoing, Progressing  7/15/2022 1920 by Haydee Durant RN  Outcome: Ongoing, Progressing  Goal: Patient-Specific Goal (Individualized)  7/15/2022 1946 by Haydee Durant RN  Outcome: Ongoing, Progressing  7/15/2022 1935 by Haydee Durant RN  Outcome: Ongoing, Progressing  7/15/2022 1920 by Haydee Durant RN  Outcome: Ongoing, Progressing  Goal: Absence of Hospital-Acquired Illness or Injury  7/15/2022 1946 by Haydee Durant RN  Outcome: Ongoing, Progressing  7/15/2022 1935 by Haydee Durant RN  Outcome: Ongoing, Progressing  7/15/2022 1920 by Haydee Durant RN  Outcome: Ongoing, Progressing  Goal: Optimal Comfort and Wellbeing  7/15/2022 1946 by Haydee Durant RN  Outcome: Ongoing, Progressing  7/15/2022 1935 by Haydee Durant RN  Outcome: Ongoing, Progressing  7/15/2022 1920 by Haydee Durant RN  Outcome: Ongoing, Progressing  Goal: Readiness for Transition of Care  7/15/2022 1946 by Haydee Durant RN  Outcome: Ongoing, Progressing  7/15/2022 1935 by Haydee Durant RN  Outcome: Ongoing, Progressing  7/15/2022 1920 by Haydee Durant RN  Outcome: Ongoing, Progressing

## 2022-07-17 PROBLEM — I25.5 ISCHEMIC CARDIOMYOPATHY: Status: ACTIVE | Noted: 2022-07-17

## 2022-07-17 LAB
ANION GAP SERPL CALC-SCNC: 12 MMOL/L (ref 8–16)
BASOPHILS # BLD AUTO: 0.05 K/UL (ref 0–0.2)
BASOPHILS NFR BLD: 0.5 % (ref 0–1.9)
BUN SERPL-MCNC: 16 MG/DL (ref 6–20)
CALCIUM SERPL-MCNC: 9 MG/DL (ref 8.7–10.5)
CHLORIDE SERPL-SCNC: 98 MMOL/L (ref 95–110)
CO2 SERPL-SCNC: 24 MMOL/L (ref 23–29)
CREAT SERPL-MCNC: 0.7 MG/DL (ref 0.5–1.4)
DIFFERENTIAL METHOD: ABNORMAL
EOSINOPHIL # BLD AUTO: 0.1 K/UL (ref 0–0.5)
EOSINOPHIL NFR BLD: 1.2 % (ref 0–8)
ERYTHROCYTE [DISTWIDTH] IN BLOOD BY AUTOMATED COUNT: 13 % (ref 11.5–14.5)
EST. GFR  (AFRICAN AMERICAN): >60 ML/MIN/1.73 M^2
EST. GFR  (NON AFRICAN AMERICAN): >60 ML/MIN/1.73 M^2
GLUCOSE SERPL-MCNC: 127 MG/DL (ref 70–110)
HCT VFR BLD AUTO: 39.4 % (ref 40–54)
HGB BLD-MCNC: 13.2 G/DL (ref 14–18)
IMM GRANULOCYTES # BLD AUTO: 0.03 K/UL (ref 0–0.04)
IMM GRANULOCYTES NFR BLD AUTO: 0.3 % (ref 0–0.5)
LYMPHOCYTES # BLD AUTO: 1.4 K/UL (ref 1–4.8)
LYMPHOCYTES NFR BLD: 13.7 % (ref 18–48)
MCH RBC QN AUTO: 30.4 PG (ref 27–31)
MCHC RBC AUTO-ENTMCNC: 33.5 G/DL (ref 32–36)
MCV RBC AUTO: 91 FL (ref 82–98)
MONOCYTES # BLD AUTO: 0.9 K/UL (ref 0.3–1)
MONOCYTES NFR BLD: 8.8 % (ref 4–15)
NEUTROPHILS # BLD AUTO: 7.7 K/UL (ref 1.8–7.7)
NEUTROPHILS NFR BLD: 75.5 % (ref 38–73)
NRBC BLD-RTO: 0 /100 WBC
PLATELET # BLD AUTO: 296 K/UL (ref 150–450)
PMV BLD AUTO: 9.8 FL (ref 9.2–12.9)
POCT GLUCOSE: 112 MG/DL (ref 70–110)
POCT GLUCOSE: 169 MG/DL (ref 70–110)
POTASSIUM SERPL-SCNC: 3.3 MMOL/L (ref 3.5–5.1)
RBC # BLD AUTO: 4.34 M/UL (ref 4.6–6.2)
SODIUM SERPL-SCNC: 134 MMOL/L (ref 136–145)
WBC # BLD AUTO: 10.19 K/UL (ref 3.9–12.7)

## 2022-07-17 PROCEDURE — 25000003 PHARM REV CODE 250: Performed by: EMERGENCY MEDICINE

## 2022-07-17 PROCEDURE — 96372 THER/PROPH/DIAG INJ SC/IM: CPT | Performed by: INTERNAL MEDICINE

## 2022-07-17 PROCEDURE — 36415 COLL VENOUS BLD VENIPUNCTURE: CPT | Performed by: EMERGENCY MEDICINE

## 2022-07-17 PROCEDURE — 85025 COMPLETE CBC W/AUTO DIFF WBC: CPT | Performed by: EMERGENCY MEDICINE

## 2022-07-17 PROCEDURE — 25000003 PHARM REV CODE 250: Performed by: STUDENT IN AN ORGANIZED HEALTH CARE EDUCATION/TRAINING PROGRAM

## 2022-07-17 PROCEDURE — 99233 SBSQ HOSP IP/OBS HIGH 50: CPT | Mod: ,,, | Performed by: INTERNAL MEDICINE

## 2022-07-17 PROCEDURE — 25000003 PHARM REV CODE 250: Performed by: INTERNAL MEDICINE

## 2022-07-17 PROCEDURE — 11000001 HC ACUTE MED/SURG PRIVATE ROOM

## 2022-07-17 PROCEDURE — 80048 BASIC METABOLIC PNL TOTAL CA: CPT | Performed by: EMERGENCY MEDICINE

## 2022-07-17 PROCEDURE — 63600175 PHARM REV CODE 636 W HCPCS: Performed by: INTERNAL MEDICINE

## 2022-07-17 PROCEDURE — 99233 PR SUBSEQUENT HOSPITAL CARE,LEVL III: ICD-10-PCS | Mod: ,,, | Performed by: INTERNAL MEDICINE

## 2022-07-17 RX ORDER — SODIUM CHLORIDE 9 MG/ML
INJECTION, SOLUTION INTRAVENOUS CONTINUOUS
Status: ACTIVE | OUTPATIENT
Start: 2022-07-18 | End: 2022-07-18

## 2022-07-17 RX ORDER — ATORVASTATIN CALCIUM 20 MG/1
80 TABLET, FILM COATED ORAL NIGHTLY
Status: DISCONTINUED | OUTPATIENT
Start: 2022-07-18 | End: 2022-07-25

## 2022-07-17 RX ORDER — POTASSIUM CHLORIDE 20 MEQ/1
40 TABLET, EXTENDED RELEASE ORAL ONCE
Status: COMPLETED | OUTPATIENT
Start: 2022-07-17 | End: 2022-07-17

## 2022-07-17 RX ADMIN — ATORVASTATIN CALCIUM 40 MG: 40 TABLET, FILM COATED ORAL at 09:07

## 2022-07-17 RX ADMIN — HYDROCODONE BITARTRATE AND ACETAMINOPHEN 1 TABLET: 5; 325 TABLET ORAL at 07:07

## 2022-07-17 RX ADMIN — METOPROLOL TARTRATE 25 MG: 25 TABLET, FILM COATED ORAL at 09:07

## 2022-07-17 RX ADMIN — CLOPIDOGREL BISULFATE 75 MG: 75 TABLET, FILM COATED ORAL at 09:07

## 2022-07-17 RX ADMIN — ASPIRIN 81 MG CHEWABLE TABLET 81 MG: 81 TABLET CHEWABLE at 09:07

## 2022-07-17 RX ADMIN — HYDROCODONE BITARTRATE AND ACETAMINOPHEN 1 TABLET: 5; 325 TABLET ORAL at 09:07

## 2022-07-17 RX ADMIN — METOPROLOL TARTRATE 25 MG: 25 TABLET, FILM COATED ORAL at 08:07

## 2022-07-17 RX ADMIN — SODIUM CHLORIDE: 0.9 INJECTION, SOLUTION INTRAVENOUS at 11:07

## 2022-07-17 RX ADMIN — ENOXAPARIN SODIUM 80 MG: 100 INJECTION SUBCUTANEOUS at 04:07

## 2022-07-17 RX ADMIN — POTASSIUM CHLORIDE 40 MEQ: 1500 TABLET, EXTENDED RELEASE ORAL at 08:07

## 2022-07-17 RX ADMIN — AMLODIPINE BESYLATE 10 MG: 5 TABLET ORAL at 09:07

## 2022-07-17 NOTE — SUBJECTIVE & OBJECTIVE
Interval History:  No acute overnight events.  Patient remained afebrile.  States his shortness breath and orthopnea has improved after given the Lasix yesterday.  Noticed a swelling in his feet has improved as well.  Denies any chest pain or difficulty breathing at this time.    Review of Systems   Constitutional:  Positive for fatigue. Negative for chills, diaphoresis and fever.   HENT:  Negative for congestion and trouble swallowing.    Respiratory:  Positive for shortness of breath (has significantly improved). Negative for cough, chest tightness and wheezing.         + orthopnea   Cardiovascular:  Positive for leg swelling (feet swelling improved). Negative for chest pain and palpitations.   Gastrointestinal:  Negative for abdominal distention, abdominal pain, blood in stool, diarrhea, nausea and vomiting.   Genitourinary:  Negative for difficulty urinating, dysuria and hematuria.   Musculoskeletal:  Positive for arthralgias and myalgias. Negative for joint swelling.   Skin:  Positive for wound.   Neurological:  Negative for dizziness, weakness and headaches.   Psychiatric/Behavioral:  Negative for confusion, decreased concentration and hallucinations.      Objective:     Vital Signs (Most Recent):  Temp: 98.4 °F (36.9 °C) (07/17/22 1110)  Pulse: 75 (07/17/22 1110)  Resp: 20 (07/17/22 1110)  BP: 121/68 (07/17/22 1110)  SpO2: (!) 94 % (07/17/22 1110)   Vital Signs (24h Range):  Temp:  [98 °F (36.7 °C)-99.4 °F (37.4 °C)] 98.4 °F (36.9 °C)  Pulse:  [64-90] 75  Resp:  [16-20] 20  SpO2:  [93 %-97 %] 94 %  BP: (121-152)/(68-83) 121/68     Weight: 77.1 kg (170 lb)  Body mass index is 24.39 kg/m².    Intake/Output Summary (Last 24 hours) at 7/17/2022 1113  Last data filed at 7/17/2022 0413  Gross per 24 hour   Intake 360 ml   Output 1250 ml   Net -890 ml        Physical Exam  Vitals and nursing note reviewed.   Constitutional:       General: He is not in acute distress.     Appearance: He is not ill-appearing.   HENT:       Head: Normocephalic and atraumatic.      Right Ear: External ear normal.      Left Ear: External ear normal.      Nose: Nose normal.      Mouth/Throat:      Mouth: Mucous membranes are moist.   Eyes:      Extraocular Movements: Extraocular movements intact.      Conjunctiva/sclera: Conjunctivae normal.   Cardiovascular:      Rate and Rhythm: Normal rate and regular rhythm.      Heart sounds: No murmur heard.    No gallop.   Pulmonary:      Effort: Pulmonary effort is normal. No respiratory distress.      Breath sounds: Normal breath sounds. No wheezing.   Abdominal:      General: There is no distension.      Palpations: Abdomen is soft.      Tenderness: There is no abdominal tenderness. There is no guarding.   Musculoskeletal:         General: No swelling or tenderness.      Cervical back: Normal range of motion.      Right lower leg: No edema.      Left lower leg: No edema.      Comments: Trace pedal edema resolved bilaterally. Left third toe findings c/w dry gangrene; no drainage, erythema or swelling. Bilateral feet with dusky toes. Feet are cool to touch   Skin:     General: Skin is warm and dry.   Neurological:      General: No focal deficit present.      Mental Status: He is alert and oriented to person, place, and time.      Sensory: Sensory deficit (diminished sensation in bilateral feet) present.   Psychiatric:         Mood and Affect: Mood normal.         Behavior: Behavior normal.         Thought Content: Thought content normal.       Significant Labs: All pertinent labs within the past 24 hours have been reviewed.    Significant Imaging: I have reviewed all pertinent imaging results/findings within the past 24 hours.

## 2022-07-17 NOTE — ASSESSMENT & PLAN NOTE
Spontaneous SOB/diaphoresis (once with abx admin, once without abx admin).  Trop 0.07->0.11  EF 30% on echo  ASA/Plavix/statin as above  Enox x3 doses, hold in am 7/18 for cath  Cath 7/18/22 via R radial  Lifevest ordered  Repeat echo in 3 months (mid Oct 2022) for reassessment of LVEF

## 2022-07-17 NOTE — ASSESSMENT & PLAN NOTE
-Echo with EF of 30%, moderately decreased systolic function, grade 2 diastolic dysfunction, pulmonary hypertension, and moderate to severe global hypokinesis with WMA. ]  -Symptoms of orthopnea pedal edema improved with Lasix IV x 1 o 07/16  -Overall, does not appear overloaded.   -Cardiology consulted: plans for cath on 07/18. LifeVest ordered

## 2022-07-17 NOTE — ASSESSMENT & PLAN NOTE
-s/p LLE angio with stent placement on 07/05 by Dr. Rich   -Consider RLE angio this admission since early signs of possible ulceration  -Mechanical thrombectomy is NOT indicated for patient  -Consulted Podiatry and Vascular surgery  -Continue ASA, Statin, Blood pressure reduction.

## 2022-07-17 NOTE — PROGRESS NOTES
[facility]  Progress Note    Patient Name: Yo Pink  MRN: [id number]  Patient Class: OP- Observation   Admission Date: 7/15/2022  Length of Stay: 0 days  Attending Physician: Catalina Valenzuela DO  Primary Care Provider: [facility]        Subjective:     Principal Problem:Dry gangrene        HPI:  No notes on file    Overview/Hospital Course:  56 y/o male PMH PAD h/o LLE angiogram on 07/05/2022 admitted on 07/15/2022 to observation for left 3rd toe gangrene that is worsening since angiogram procedure.  Ultrasound lower extremity without DVT.  Shows vascular stent appears patent.  X-ray of bilateral feet with no acute fractures or dislocations.  Soft tissue abnormality of the 3rd digit on the left foot noted.  Vascular podiatry consulted.       Patient started on IV abx but complained of burning sensation all over with infusion. Overnight on 07/15, rapid response called for SOB and diaphoresis. Stated that it lasted briefly and then resolved.  Initial and subsequent Troponin elevated. Repeat CXR with no acute process. BNP elevated. Echo with EF of 30%, moderately decreased systolic function, grade 2 diastolic dysfunction, pulmonary hypertension, and moderate to severe global hypokinesis with WMA. Lasix IV given once with improvement. Cardiology consulted- Suspect ACS, continue on aspirin,plavix,and statin.  Plan for heart catheterization on 07/18.  ID consulted. IV abx discontinued. Awaiting vascular surgery recommendations. Podiatry plans for left 3rd toe amputation, pending vascular recs and cardiology procedure. Continue pain control and blood pressure control      Interval History:  No acute overnight events.  Patient remained afebrile.  States his shortness breath and orthopnea has improved after given the Lasix yesterday.  Noticed a swelling in his feet has improved as well.  Denies any chest pain or difficulty breathing at this time.    Review of Systems    Constitutional:  Positive for fatigue. Negative for chills, diaphoresis and fever.   HENT:  Negative for congestion and trouble swallowing.    Respiratory:  Positive for shortness of breath (has significantly improved). Negative for cough, chest tightness and wheezing.         + orthopnea   Cardiovascular:  Positive for leg swelling (feet swelling improved). Negative for chest pain and palpitations.   Gastrointestinal:  Negative for abdominal distention, abdominal pain, blood in stool, diarrhea, nausea and vomiting.   Genitourinary:  Negative for difficulty urinating, dysuria and hematuria.   Musculoskeletal:  Positive for arthralgias and myalgias. Negative for joint swelling.   Skin:  Positive for wound.   Neurological:  Negative for dizziness, weakness and headaches.   Psychiatric/Behavioral:  Negative for confusion, decreased concentration and hallucinations.      Objective:     Vital Signs (Most Recent):  Temp: 98.4 °F (36.9 °C) (07/17/22 1110)  Pulse: 75 (07/17/22 1110)  Resp: 20 (07/17/22 1110)  BP: 121/68 (07/17/22 1110)  SpO2: (!) 94 % (07/17/22 1110)   Vital Signs (24h Range):  Temp:  [98 °F (36.7 °C)-99.4 °F (37.4 °C)] 98.4 °F (36.9 °C)  Pulse:  [64-90] 75  Resp:  [16-20] 20  SpO2:  [93 %-97 %] 94 %  BP: (121-152)/(68-83) 121/68     Weight: 77.1 kg (170 lb)  Body mass index is 24.39 kg/m².    Intake/Output Summary (Last 24 hours) at 7/17/2022 1113  Last data filed at 7/17/2022 0413  Gross per 24 hour   Intake 360 ml   Output 1250 ml   Net -890 ml        Physical Exam  Vitals and nursing note reviewed.   Constitutional:       General: He is not in acute distress.     Appearance: He is not ill-appearing.   HENT:      Head: Normocephalic and atraumatic.      Right Ear: External ear normal.      Left Ear: External ear normal.      Nose: Nose normal.      Mouth/Throat:      Mouth: Mucous membranes are moist.   Eyes:      Extraocular Movements: Extraocular movements intact.      Conjunctiva/sclera:  Conjunctivae normal.   Cardiovascular:      Rate and Rhythm: Normal rate and regular rhythm.      Heart sounds: No murmur heard.    No gallop.   Pulmonary:      Effort: Pulmonary effort is normal. No respiratory distress.      Breath sounds: Normal breath sounds. No wheezing.   Abdominal:      General: There is no distension.      Palpations: Abdomen is soft.      Tenderness: There is no abdominal tenderness. There is no guarding.   Musculoskeletal:         General: No swelling or tenderness.      Cervical back: Normal range of motion.      Right lower leg: No edema.      Left lower leg: No edema.      Comments: Trace pedal edema resolved bilaterally. Left third toe findings c/w dry gangrene; no drainage, erythema or swelling. Bilateral feet with dusky toes. Feet are cool to touch   Skin:     General: Skin is warm and dry.   Neurological:      General: No focal deficit present.      Mental Status: He is alert and oriented to person, place, and time.      Sensory: Sensory deficit (diminished sensation in bilateral feet) present.   Psychiatric:         Mood and Affect: Mood normal.         Behavior: Behavior normal.         Thought Content: Thought content normal.       Significant Labs: All pertinent labs within the past 24 hours have been reviewed.    Significant Imaging: I have reviewed all pertinent imaging results/findings within the past 24 hours.      Assessment/Plan:      * Dry gangrene  -Left 3rd digit with findings c/w dry gangrene  -US b/l LE:  The vascular stent appears pain in left lower extremity.  Arterial atherosclerotic disease present, without evidence of occlusion.  -XR bilateral feet:  No acute fractures or dislocation.  Left 3rd digit with soft tissue abnormality  -Podiatry and vascular consulted  -Podiatry plan for left 3rd toe amputation, pending vascular evaluation and cardiology procedure   -ID consulted: IV abx discontinued 07/15. Monitor off abx.   -Blood cx with NGTD    Hypertensive  urgency  -SBP in the 180s in the ED  -Noted history of HTN, but patient states he has not been on medications  -Started Norvasc 10mg qd on 07/15  -BP improving  -Continue pain control     PAD (peripheral artery disease)  -s/p LLE angio with stent placement on 07/05 by Dr. Rich   -Consider RLE angio this admission since early signs of possible ulceration  -Mechanical thrombectomy is NOT indicated for patient  -Consulted Podiatry and Vascular surgery  -Continue ASA, Statin, Blood pressure reduction.    ACS (acute coronary syndrome)  -Overnight on 07/15, patient with SOB and diaphoresis. No CP, but admits later intermittent chest discomfort  -chest x-ray no acute process  -unable to find EKG from 7/15.  Per documentation, EKG reading sinus rhythm with fusion complexes and PACs  -Initial troponin 0.07, repeat trop was 0.114  -Echo with EF of 30%, moderately decreased systolic function, grade 2 diastolic dysfunction, pulmonary hypertension, and moderate to severe global hypokinesis with WMA.   -BNP elevated >900  -Cardiology consulted: plans for heart cath on 07/18. Continue lovenox, ASA, statin.       Ischemic cardiomyopathy  -Echo with EF of 30%, moderately decreased systolic function, grade 2 diastolic dysfunction, pulmonary hypertension, and moderate to severe global hypokinesis with WMA. ]  -Symptoms of orthopnea pedal edema improved with Lasix IV x 1 o 07/16  -Overall, does not appear overloaded.   -Cardiology consulted: plans for cath on 07/18. LifeVest ordered      Dyslipidemia  Continue statin: atorvastatin 80mg qhs  Lipid panel: Chol 144,Trig 122, HDL 35, LDL 84    Hypokalemia  K 3.1 on 07/15  Replace as needed      VTE Risk Mitigation (From admission, onward)         Ordered     enoxaparin injection 80 mg  Every 12 hours (non-standard times)         07/16/22 1205     IP VTE HIGH RISK PATIENT  Once         07/15/22 0438     Place sequential compression device  Until discontinued         07/15/22 0722      Reason for No Pharmacological VTE Prophylaxis  Once        Question:  Reasons:  Answer:  Physician Provided (leave comment)  Comment:  procedure in am    07/15/22 0438                Discharge Planning   RAPHAEL:      Code Status: Full Code   Is the patient medically ready for discharge?:     Reason for patient still in hospital (select all that apply): Treatment and Consult recommendations  Discharge Plan A: Home (with instructions to follow up)                  Catalina Vlaenzuela DO  Department of Hospital Medicine   [facility]

## 2022-07-17 NOTE — ASSESSMENT & PLAN NOTE
-Overnight on 07/15, patient with SOB and diaphoresis. No CP, but admits later intermittent chest discomfort  -chest x-ray no acute process  -unable to find EKG from 7/15.  Per documentation, EKG reading sinus rhythm with fusion complexes and PACs  -Initial troponin 0.07, repeat trop was 0.114  -Echo with EF of 30%, moderately decreased systolic function, grade 2 diastolic dysfunction, pulmonary hypertension, and moderate to severe global hypokinesis with WMA.   -BNP elevated >900  -Cardiology consulted: plans for heart cath on 07/18. Continue lovenox, ASA, statin.

## 2022-07-17 NOTE — ASSESSMENT & PLAN NOTE
-Left 3rd digit with findings c/w dry gangrene  -US b/l LE:  The vascular stent appears pain in left lower extremity.  Arterial atherosclerotic disease present, without evidence of occlusion.  -XR bilateral feet:  No acute fractures or dislocation.  Left 3rd digit with soft tissue abnormality  -Podiatry and vascular consulted  -Podiatry plan for left 3rd toe amputation, pending vascular evaluation and cardiology procedure   -ID consulted: IV abx discontinued 07/15. Monitor off abx.   -Blood cx with NGTD

## 2022-07-17 NOTE — HOSPITAL COURSE
Interval Hx: no cp/sob.  Resting comfortably.  Awaiting tx to [facility] for CABG eval.    Tele: SR 70s, PVCs (personally reviewed and interpreted)

## 2022-07-17 NOTE — PROGRESS NOTES
[facility]  Cardiology  Progress Note    Patient Name: Yo Pink  MRN: [id number]  Admission Date: 7/15/2022  Hospital Length of Stay: 0 days  Code Status: Full Code   Attending Physician: Catalina Valenzuela DO   Primary Care Physician: [facility]  Expected Discharge Date:   Principal Problem:Dry gangrene    Subjective:       Interval Hx: no cp/sob.  Resting comfortably.    Tele: tele ordered (personally reviewed and interpreted)        Past Medical History:   Diagnosis Date    PAD (peripheral artery disease)        Past Surgical History:   Procedure Laterality Date    ANGIOGRAPHY OF LOWER EXTREMITY Left 2022    Procedure: Angiogram Extremity Unilateral;  Surgeon: Mehul Rich MD;  Location: [facility];  Service: Vascular;  Laterality: Left;  RN PREOP ON 22. BINAX ON 22---NEED CONSENT       Review of patient's allergies indicates:  No Known Allergies    No current facility-administered medications on file prior to encounter.     Current Outpatient Medications on File Prior to Encounter   Medication Sig    acetaminophen (TYLENOL) 500 MG tablet Take 1,000 mg by mouth every 6 (six) hours as needed for Pain.    amoxicillin-clavulanate 875-125mg (AUGMENTIN) 875-125 mg per tablet Take 1 tablet by mouth every 12 (twelve) hours.    amoxicillin-clavulanate 875-125mg (AUGMENTIN) 875-125 mg per tablet Take 1 tablet by mouth every 12 (twelve) hours. for 7 days    clopidogreL (PLAVIX) 75 mg tablet Take 1 tablet (75 mg total) by mouth once daily.    oxyCODONE-acetaminophen (PERCOCET) 5-325 mg per tablet Take 1 tablet by mouth every 4 (four) hours as needed for Pain.     Family History    None       Tobacco Use    Smoking status: Former Smoker     Quit date: 2022     Years since quittin.1    Smokeless tobacco: Never Used   Substance and Sexual Activity    Alcohol use: Never    Drug use: Not Currently    Sexual activity: Not on file     Review of Systems    Gastrointestinal:  Negative for melena.   Genitourinary:  Negative for hematuria.   Objective:     Vital Signs (Most Recent):  Temp: 98.5 °F (36.9 °C) (07/17/22 0804)  Pulse: 82 (07/17/22 0804)  Resp: 16 (07/17/22 0908)  BP: (!) 149/73 (07/17/22 0804)  SpO2: 95 % (07/17/22 0804)   Vital Signs (24h Range):  Temp:  [98 °F (36.7 °C)-99.4 °F (37.4 °C)] 98.5 °F (36.9 °C)  Pulse:  [64-90] 82  Resp:  [16-19] 16  SpO2:  [93 %-97 %] 95 %  BP: (125-152)/(68-83) 149/73     Weight: 77.1 kg (170 lb)  Body mass index is 24.39 kg/m².    SpO2: 95 %  O2 Device (Oxygen Therapy): nasal cannula      Intake/Output Summary (Last 24 hours) at 7/17/2022 1045  Last data filed at 7/17/2022 0413  Gross per 24 hour   Intake 360 ml   Output 1250 ml   Net -890 ml         Lines/Drains/Airways       Peripheral Intravenous Line  Duration                  Peripheral IV - Single Lumen 07/15/22 0325 20 G Left Forearm 2 days                  Exam unchanged vs 7/16/22  Physical Exam  Constitutional:       General: He is not in acute distress.     Appearance: He is well-developed. He is not ill-appearing, toxic-appearing or diaphoretic.   HENT:      Head: Normocephalic and atraumatic.   Eyes:      General: No scleral icterus.     Extraocular Movements: Extraocular movements intact.      Conjunctiva/sclera: Conjunctivae normal.      Pupils: Pupils are equal, round, and reactive to light.   Neck:      Thyroid: No thyromegaly.      Vascular: No JVD.      Trachea: No tracheal deviation.   Cardiovascular:      Rate and Rhythm: Normal rate and regular rhythm.      Pulses:           Carotid pulses are 2+ on the right side and 2+ on the left side.       Radial pulses are 2+ on the right side.      Heart sounds: S1 normal and S2 normal. No murmur heard.    No friction rub. No gallop.   Pulmonary:      Effort: Pulmonary effort is normal. No respiratory distress.      Breath sounds: Normal breath sounds. No stridor. No wheezing, rhonchi or rales.   Chest:       Chest wall: No tenderness.   Abdominal:      General: There is no distension.      Palpations: Abdomen is soft.   Musculoskeletal:         General: No swelling or tenderness. Normal range of motion.      Cervical back: Normal range of motion and neck supple. No rigidity.      Right lower leg: No edema.      Left lower leg: No edema.      Comments: L middle toe dry gangrene   Skin:     General: Skin is warm and dry.      Coloration: Skin is not jaundiced.   Neurological:      General: No focal deficit present.      Mental Status: He is alert and oriented to person, place, and time.      Cranial Nerves: No cranial nerve deficit.   Psychiatric:         Mood and Affect: Mood normal.         Behavior: Behavior normal.       Current Medications:   amLODIPine  10 mg Oral Daily    aspirin  81 mg Oral Daily    atorvastatin  40 mg Oral Daily    clopidogreL  75 mg Oral Daily    enoxaparin  1 mg/kg Subcutaneous Q12H    metoprolol tartrate  25 mg Oral BID      [START ON 7/18/2022] sodium chloride 0.9%       acetaminophen, dextrose 10%, dextrose 10%, glucagon (human recombinant), glucose, glucose, hydrALAZINE, HYDROcodone-acetaminophen, HYDROcodone-acetaminophen, ibuprofen, insulin aspart U-100, melatonin, naloxone, ondansetron, oxyCODONE, sodium chloride 0.9%    Laboratory (all labs reviewed):  CBC:  Recent Labs   Lab 06/18/22  0103 07/15/22  0325 07/15/22  0548 07/16/22  0644 07/17/22  0354   WBC 10.36 12.11 11.66 11.65 10.19   Hemoglobin 16.4 13.2 L 13.4 L 13.6 L 13.2 L   Hematocrit 46.2 37.3 L 38.0 L 39.1 L 39.4 L   Platelets 262 268 302 321 296         CHEMISTRIES:  Recent Labs   Lab 06/20/22  0347 07/15/22  0325 07/15/22  0548 07/16/22  0644 07/17/22  0354   Glucose 108 180 H 130 H 133 H 127 H   Sodium 135 L 137 136 138 134 L   Potassium 3.4 L 3.6 3.1 L 3.7 3.3 L   BUN 15 17 14 10 16   Creatinine 0.8 0.8 0.7 0.6 0.7   eGFR if  >60 >60 >60 >60 >60   eGFR if non African American >60 >60 >60 >60 >60    Calcium 9.1 9.1 9.0 9.3 9.0   Magnesium  --  1.9  --   --   --          CARDIAC BIOMARKERS:  Recent Labs   Lab 07/16/22  0101 07/16/22  0644   Troponin I 0.073 H 0.114 H         COAGS:        LIPIDS/LFTS:  Recent Labs   Lab 06/18/22  0103 07/15/22  0325 07/16/22  0644   Cholesterol  --   --  144   Triglycerides  --   --  122   HDL  --   --  35 L   LDL Cholesterol  --   --  84.6   Non-HDL Cholesterol  --   --  109   AST 27 21  --    ALT 21 13  --          BNP:  Recent Labs   Lab 07/15/22  2102    H         TSH:        Free T4:        Diagnostic Results:  ECG (personally reviewed and interpreted tracing(s)):  7/16/22 0038 (media tab) SR 93, PVCs, ASMI age indet    Chest X-Ray (personally reviewed and interpreted image(s)): 7/16/22 NAD    Cath: planned 7/18/22    Echo: 7/16/22 (images personally reviewed and interpreted)  · The left ventricle is normal in size with moderate concentric hypertrophy and moderately decreased systolic function.  · The estimated ejection fraction is 30%.  · Mod-severe global hypokinesis with LAD territory WMA.  · Grade II left ventricular diastolic dysfunction.  · Normal right ventricular size with normal right ventricular systolic function.  · Mild-to-moderate mitral regurgitation.  · Mild tricuspid regurgitation.  · The estimated PA systolic pressure is 62 mmHg.  · There is pulmonary hypertension.    LE art US 7/15/22  Interval placement of left lower extremity arterial vascular stent, the vascular stent appears patent.  The dorsalis pedis artery bilaterally demonstrates evidence for reversal of flow.  Otherwise the arterial vascular structures demonstrate evidence for arterial atherosclerotic disease, without evidence for occlusion.              Assessment and Plan:     * Dry gangrene  Mgmt per IM/vasc/podiatry  Apparent plan for L 3rd toe amputation and RLE angio next week.  Will need to hold pending cardiac cath.  Cont ASA, dec dose to 81mg qd  Cont plavix 75mg qd  Inc atorva  80mg qhs  Check lipids/LFT 3 months (mid Oct 2022)    Ischemic cardiomyopathy  EF 30%  Not grossly overloaded on exam  LAD WMA on echo    ACS (acute coronary syndrome)  Spontaneous SOB/diaphoresis (once with abx admin, once without abx admin).  Trop 0.07->0.11  EF 30% on echo  ASA/Plavix/statin as above  Enox x3 doses, hold in am 7/18 for cath  Cath 7/18/22 via R radial  Lifevest ordered  Repeat echo in 3 months (mid Oct 2022) for reassessment of LVEF        Hypertensive urgency  Cont med rx    PAD (peripheral artery disease)  Mgmt per Dr. Rich (Vasc Surg) and Podiatry    Dyslipidemia  Inc atorva 80mg qhs  Check lipids/LFT 3 months (mid Oct 2022)        VTE Risk Mitigation (From admission, onward)         Ordered     enoxaparin injection 80 mg  Every 12 hours (non-standard times)         07/16/22 1205     IP VTE HIGH RISK PATIENT  Once         07/15/22 0438     Place sequential compression device  Until discontinued         07/15/22 0438     Reason for No Pharmacological VTE Prophylaxis  Once        Question:  Reasons:  Answer:  Physician Provided (leave comment)  Comment:  procedure in am    07/15/22 0438                Noah Huffman MD  Cardiology  [facility]

## 2022-07-17 NOTE — ASSESSMENT & PLAN NOTE
Mgmt per IM/vasc/podiatry  Apparent plan for L 3rd toe amputation and RLE angio next week.  Will need to hold pending cardiac cath.  Cont ASA, dec dose to 81mg qd  Cont plavix 75mg qd  Inc atorva 80mg qhs  Check lipids/LFT 3 months (mid Oct 2022)

## 2022-07-17 NOTE — SUBJECTIVE & OBJECTIVE
Past Medical History:   Diagnosis Date    PAD (peripheral artery disease)        Past Surgical History:   Procedure Laterality Date    ANGIOGRAPHY OF LOWER EXTREMITY Left 2022    Procedure: Angiogram Extremity Unilateral;  Surgeon: Mehul Rich MD;  Location: [facility];  Service: Vascular;  Laterality: Left;  RN PREOP ON 22. BINAX ON 22---NEED CONSENT       Review of patient's allergies indicates:  No Known Allergies    No current facility-administered medications on file prior to encounter.     Current Outpatient Medications on File Prior to Encounter   Medication Sig    acetaminophen (TYLENOL) 500 MG tablet Take 1,000 mg by mouth every 6 (six) hours as needed for Pain.    amoxicillin-clavulanate 875-125mg (AUGMENTIN) 875-125 mg per tablet Take 1 tablet by mouth every 12 (twelve) hours.    amoxicillin-clavulanate 875-125mg (AUGMENTIN) 875-125 mg per tablet Take 1 tablet by mouth every 12 (twelve) hours. for 7 days    clopidogreL (PLAVIX) 75 mg tablet Take 1 tablet (75 mg total) by mouth once daily.    oxyCODONE-acetaminophen (PERCOCET) 5-325 mg per tablet Take 1 tablet by mouth every 4 (four) hours as needed for Pain.     Family History    None       Tobacco Use    Smoking status: Former Smoker     Quit date: 2022     Years since quittin.1    Smokeless tobacco: Never Used   Substance and Sexual Activity    Alcohol use: Never    Drug use: Not Currently    Sexual activity: Not on file     Review of Systems   Gastrointestinal:  Negative for melena.   Genitourinary:  Negative for hematuria.   Objective:     Vital Signs (Most Recent):  Temp: 98.5 °F (36.9 °C) (22 0804)  Pulse: 82 (22 0804)  Resp: 16 (22 0908)  BP: (!) 149/73 (22 0804)  SpO2: 95 % (22 0804)   Vital Signs (24h Range):  Temp:  [98 °F (36.7 °C)-99.4 °F (37.4 °C)] 98.5 °F (36.9 °C)  Pulse:  [64-90] 82  Resp:  [16-19] 16  SpO2:  [93 %-97 %] 95 %  BP: (125-152)/(68-83) 149/73     Weight: 77.1 kg (170  lb)  Body mass index is 24.39 kg/m².    SpO2: 95 %  O2 Device (Oxygen Therapy): nasal cannula      Intake/Output Summary (Last 24 hours) at 7/17/2022 1045  Last data filed at 7/17/2022 0413  Gross per 24 hour   Intake 360 ml   Output 1250 ml   Net -890 ml         Lines/Drains/Airways       Peripheral Intravenous Line  Duration                  Peripheral IV - Single Lumen 07/15/22 0325 20 G Left Forearm 2 days                  Exam unchanged vs 7/16/22  Physical Exam  Constitutional:       General: He is not in acute distress.     Appearance: He is well-developed. He is not ill-appearing, toxic-appearing or diaphoretic.   HENT:      Head: Normocephalic and atraumatic.   Eyes:      General: No scleral icterus.     Extraocular Movements: Extraocular movements intact.      Conjunctiva/sclera: Conjunctivae normal.      Pupils: Pupils are equal, round, and reactive to light.   Neck:      Thyroid: No thyromegaly.      Vascular: No JVD.      Trachea: No tracheal deviation.   Cardiovascular:      Rate and Rhythm: Normal rate and regular rhythm.      Pulses:           Carotid pulses are 2+ on the right side and 2+ on the left side.       Radial pulses are 2+ on the right side.      Heart sounds: S1 normal and S2 normal. No murmur heard.    No friction rub. No gallop.   Pulmonary:      Effort: Pulmonary effort is normal. No respiratory distress.      Breath sounds: Normal breath sounds. No stridor. No wheezing, rhonchi or rales.   Chest:      Chest wall: No tenderness.   Abdominal:      General: There is no distension.      Palpations: Abdomen is soft.   Musculoskeletal:         General: No swelling or tenderness. Normal range of motion.      Cervical back: Normal range of motion and neck supple. No rigidity.      Right lower leg: No edema.      Left lower leg: No edema.      Comments: L middle toe dry gangrene   Skin:     General: Skin is warm and dry.      Coloration: Skin is not jaundiced.   Neurological:      General: No  focal deficit present.      Mental Status: He is alert and oriented to person, place, and time.      Cranial Nerves: No cranial nerve deficit.   Psychiatric:         Mood and Affect: Mood normal.         Behavior: Behavior normal.       Current Medications:   amLODIPine  10 mg Oral Daily    aspirin  81 mg Oral Daily    atorvastatin  40 mg Oral Daily    clopidogreL  75 mg Oral Daily    enoxaparin  1 mg/kg Subcutaneous Q12H    metoprolol tartrate  25 mg Oral BID      [START ON 7/18/2022] sodium chloride 0.9%       acetaminophen, dextrose 10%, dextrose 10%, glucagon (human recombinant), glucose, glucose, hydrALAZINE, HYDROcodone-acetaminophen, HYDROcodone-acetaminophen, ibuprofen, insulin aspart U-100, melatonin, naloxone, ondansetron, oxyCODONE, sodium chloride 0.9%    Laboratory (all labs reviewed):  CBC:  Recent Labs   Lab 06/18/22  0103 07/15/22  0325 07/15/22  0548 07/16/22  0644 07/17/22  0354   WBC 10.36 12.11 11.66 11.65 10.19   Hemoglobin 16.4 13.2 L 13.4 L 13.6 L 13.2 L   Hematocrit 46.2 37.3 L 38.0 L 39.1 L 39.4 L   Platelets 262 268 302 321 296         CHEMISTRIES:  Recent Labs   Lab 06/20/22  0347 07/15/22  0325 07/15/22  0548 07/16/22  0644 07/17/22  0354   Glucose 108 180 H 130 H 133 H 127 H   Sodium 135 L 137 136 138 134 L   Potassium 3.4 L 3.6 3.1 L 3.7 3.3 L   BUN 15 17 14 10 16   Creatinine 0.8 0.8 0.7 0.6 0.7   eGFR if  >60 >60 >60 >60 >60   eGFR if non African American >60 >60 >60 >60 >60   Calcium 9.1 9.1 9.0 9.3 9.0   Magnesium  --  1.9  --   --   --          CARDIAC BIOMARKERS:  Recent Labs   Lab 07/16/22  0101 07/16/22  0644   Troponin I 0.073 H 0.114 H         COAGS:        LIPIDS/LFTS:  Recent Labs   Lab 06/18/22  0103 07/15/22  0325 07/16/22  0644   Cholesterol  --   --  144   Triglycerides  --   --  122   HDL  --   --  35 L   LDL Cholesterol  --   --  84.6   Non-HDL Cholesterol  --   --  109   AST 27 21  --    ALT 21 13  --          BNP:  Recent Labs   Lab 07/15/22  2100     H         TSH:        Free T4:        Diagnostic Results:  ECG (personally reviewed and interpreted tracing(s)):  7/16/22 0038 (media tab) SR 93, PVCs, ASMI age indet    Chest X-Ray (personally reviewed and interpreted image(s)): 7/16/22 NAD    Cath: planned 7/18/22    Echo: 7/16/22 (images personally reviewed and interpreted)  The left ventricle is normal in size with moderate concentric hypertrophy and moderately decreased systolic function.  The estimated ejection fraction is 30%.  Mod-severe global hypokinesis with LAD territory WMA.  Grade II left ventricular diastolic dysfunction.  Normal right ventricular size with normal right ventricular systolic function.  Mild-to-moderate mitral regurgitation.  Mild tricuspid regurgitation.  The estimated PA systolic pressure is 62 mmHg.  There is pulmonary hypertension.    LE art US 7/15/22  Interval placement of left lower extremity arterial vascular stent, the vascular stent appears patent.  The dorsalis pedis artery bilaterally demonstrates evidence for reversal of flow.  Otherwise the arterial vascular structures demonstrate evidence for arterial atherosclerotic disease, without evidence for occlusion.

## 2022-07-18 LAB
ANION GAP SERPL CALC-SCNC: 10 MMOL/L (ref 8–16)
BUN SERPL-MCNC: 17 MG/DL (ref 6–20)
CALCIUM SERPL-MCNC: 8.7 MG/DL (ref 8.7–10.5)
CHLORIDE SERPL-SCNC: 101 MMOL/L (ref 95–110)
CO2 SERPL-SCNC: 25 MMOL/L (ref 23–29)
CREAT SERPL-MCNC: 0.6 MG/DL (ref 0.5–1.4)
EST. GFR  (AFRICAN AMERICAN): >60 ML/MIN/1.73 M^2
EST. GFR  (NON AFRICAN AMERICAN): >60 ML/MIN/1.73 M^2
GLUCOSE SERPL-MCNC: 126 MG/DL (ref 70–110)
POCT GLUCOSE: 107 MG/DL (ref 70–110)
POCT GLUCOSE: 112 MG/DL (ref 70–110)
POCT GLUCOSE: 113 MG/DL (ref 70–110)
POCT GLUCOSE: 130 MG/DL (ref 70–110)
POTASSIUM SERPL-SCNC: 3.5 MMOL/L (ref 3.5–5.1)
SODIUM SERPL-SCNC: 136 MMOL/L (ref 136–145)

## 2022-07-18 PROCEDURE — 11000001 HC ACUTE MED/SURG PRIVATE ROOM

## 2022-07-18 PROCEDURE — 63600175 PHARM REV CODE 636 W HCPCS: Performed by: INTERNAL MEDICINE

## 2022-07-18 PROCEDURE — 80048 BASIC METABOLIC PNL TOTAL CA: CPT | Performed by: STUDENT IN AN ORGANIZED HEALTH CARE EDUCATION/TRAINING PROGRAM

## 2022-07-18 PROCEDURE — 99152 MOD SED SAME PHYS/QHP 5/>YRS: CPT | Mod: ,,, | Performed by: INTERNAL MEDICINE

## 2022-07-18 PROCEDURE — 99153 MOD SED SAME PHYS/QHP EA: CPT | Performed by: INTERNAL MEDICINE

## 2022-07-18 PROCEDURE — 93571 PR HEART FLOW RESERV MEASURE,INIT VESSL: ICD-10-PCS | Mod: 26,52,LM, | Performed by: INTERNAL MEDICINE

## 2022-07-18 PROCEDURE — 93458 PR CATH PLACE/CORON ANGIO, IMG SUPER/INTERP,W LEFT HEART VENTRICULOGRAPHY: ICD-10-PCS | Mod: 26,,, | Performed by: INTERNAL MEDICINE

## 2022-07-18 PROCEDURE — 25000003 PHARM REV CODE 250: Performed by: INTERNAL MEDICINE

## 2022-07-18 PROCEDURE — 93458 L HRT ARTERY/VENTRICLE ANGIO: CPT | Performed by: INTERNAL MEDICINE

## 2022-07-18 PROCEDURE — C1769 GUIDE WIRE: HCPCS | Performed by: INTERNAL MEDICINE

## 2022-07-18 PROCEDURE — 36415 COLL VENOUS BLD VENIPUNCTURE: CPT | Performed by: STUDENT IN AN ORGANIZED HEALTH CARE EDUCATION/TRAINING PROGRAM

## 2022-07-18 PROCEDURE — C1894 INTRO/SHEATH, NON-LASER: HCPCS | Performed by: INTERNAL MEDICINE

## 2022-07-18 PROCEDURE — 99223 PR INITIAL HOSPITAL CARE,LEVL III: ICD-10-PCS | Mod: ,,, | Performed by: SURGERY

## 2022-07-18 PROCEDURE — 93571 IV DOP VEL&/PRESS C FLO 1ST: CPT | Mod: 74,LM | Performed by: INTERNAL MEDICINE

## 2022-07-18 PROCEDURE — 99152 PR MOD CONSCIOUS SEDATION, SAME PHYS, 5+ YRS, FIRST 15 MIN: ICD-10-PCS | Mod: ,,, | Performed by: INTERNAL MEDICINE

## 2022-07-18 PROCEDURE — 25500020 PHARM REV CODE 255: Performed by: INTERNAL MEDICINE

## 2022-07-18 PROCEDURE — 99223 1ST HOSP IP/OBS HIGH 75: CPT | Mod: ,,, | Performed by: SURGERY

## 2022-07-18 PROCEDURE — 93572 IV DOP VEL&/PRESS C FLO EA: CPT | Mod: 74,LC | Performed by: INTERNAL MEDICINE

## 2022-07-18 PROCEDURE — 25000003 PHARM REV CODE 250: Performed by: STUDENT IN AN ORGANIZED HEALTH CARE EDUCATION/TRAINING PROGRAM

## 2022-07-18 PROCEDURE — C1887 CATHETER, GUIDING: HCPCS | Performed by: INTERNAL MEDICINE

## 2022-07-18 PROCEDURE — 93572 PR HEART FLOW RESERV MEASURE,ADDN VESSL: ICD-10-PCS | Mod: 26,52,LC, | Performed by: INTERNAL MEDICINE

## 2022-07-18 PROCEDURE — 93572 IV DOP VEL&/PRESS C FLO EA: CPT | Mod: 26,52,LC, | Performed by: INTERNAL MEDICINE

## 2022-07-18 PROCEDURE — 27201423 OPTIME MED/SURG SUP & DEVICES STERILE SUPPLY: Performed by: INTERNAL MEDICINE

## 2022-07-18 PROCEDURE — 99152 MOD SED SAME PHYS/QHP 5/>YRS: CPT | Performed by: INTERNAL MEDICINE

## 2022-07-18 PROCEDURE — 93458 L HRT ARTERY/VENTRICLE ANGIO: CPT | Mod: 26,,, | Performed by: INTERNAL MEDICINE

## 2022-07-18 PROCEDURE — 93571 IV DOP VEL&/PRESS C FLO 1ST: CPT | Mod: 26,52,LM, | Performed by: INTERNAL MEDICINE

## 2022-07-18 RX ORDER — VERAPAMIL HYDROCHLORIDE 2.5 MG/ML
INJECTION, SOLUTION INTRAVENOUS
Status: DISCONTINUED | OUTPATIENT
Start: 2022-07-18 | End: 2022-07-18 | Stop reason: HOSPADM

## 2022-07-18 RX ORDER — ACETAMINOPHEN 325 MG/1
650 TABLET ORAL EVERY 4 HOURS PRN
Status: DISCONTINUED | OUTPATIENT
Start: 2022-07-18 | End: 2022-07-24

## 2022-07-18 RX ORDER — LIDOCAINE HYDROCHLORIDE 10 MG/ML
INJECTION, SOLUTION EPIDURAL; INFILTRATION; INTRACAUDAL; PERINEURAL
Status: DISCONTINUED | OUTPATIENT
Start: 2022-07-18 | End: 2022-07-18 | Stop reason: HOSPADM

## 2022-07-18 RX ORDER — ATROPINE SULFATE 0.1 MG/ML
0.5 INJECTION INTRAVENOUS
Status: DISCONTINUED | OUTPATIENT
Start: 2022-07-18 | End: 2022-07-24

## 2022-07-18 RX ORDER — NITROGLYCERIN 0.4 MG/1
0.4 TABLET SUBLINGUAL EVERY 5 MIN PRN
Status: DISCONTINUED | OUTPATIENT
Start: 2022-07-18 | End: 2022-07-24

## 2022-07-18 RX ORDER — METOPROLOL SUCCINATE 25 MG/1
25 TABLET, EXTENDED RELEASE ORAL DAILY
Status: DISCONTINUED | OUTPATIENT
Start: 2022-07-18 | End: 2022-07-25

## 2022-07-18 RX ORDER — ONDANSETRON 4 MG/1
8 TABLET, ORALLY DISINTEGRATING ORAL EVERY 8 HOURS PRN
Status: DISCONTINUED | OUTPATIENT
Start: 2022-07-18 | End: 2022-07-24

## 2022-07-18 RX ORDER — DIPHENHYDRAMINE HCL 25 MG
50 CAPSULE ORAL ONCE
Status: COMPLETED | OUTPATIENT
Start: 2022-07-18 | End: 2022-07-18

## 2022-07-18 RX ORDER — HEPARIN SOD,PORCINE/0.9 % NACL 1000/500ML
INTRAVENOUS SOLUTION INTRAVENOUS
Status: DISCONTINUED | OUTPATIENT
Start: 2022-07-18 | End: 2022-07-18 | Stop reason: HOSPADM

## 2022-07-18 RX ORDER — NITROGLYCERIN 20 MG/100ML
INJECTION INTRAVENOUS
Status: DISCONTINUED | OUTPATIENT
Start: 2022-07-18 | End: 2022-07-18 | Stop reason: HOSPADM

## 2022-07-18 RX ORDER — MIDAZOLAM HYDROCHLORIDE 1 MG/ML
INJECTION, SOLUTION INTRAMUSCULAR; INTRAVENOUS
Status: DISCONTINUED | OUTPATIENT
Start: 2022-07-18 | End: 2022-07-18 | Stop reason: HOSPADM

## 2022-07-18 RX ORDER — HEPARIN SODIUM 1000 [USP'U]/ML
INJECTION, SOLUTION INTRAVENOUS; SUBCUTANEOUS
Status: DISCONTINUED | OUTPATIENT
Start: 2022-07-18 | End: 2022-07-18 | Stop reason: HOSPADM

## 2022-07-18 RX ORDER — MORPHINE SULFATE 4 MG/ML
2 INJECTION, SOLUTION INTRAMUSCULAR; INTRAVENOUS EVERY 10 MIN PRN
Status: DISCONTINUED | OUTPATIENT
Start: 2022-07-18 | End: 2022-07-24

## 2022-07-18 RX ORDER — HYDROCODONE BITARTRATE AND ACETAMINOPHEN 5; 325 MG/1; MG/1
1 TABLET ORAL EVERY 4 HOURS PRN
Status: DISCONTINUED | OUTPATIENT
Start: 2022-07-18 | End: 2022-07-24

## 2022-07-18 RX ORDER — FENTANYL CITRATE 50 UG/ML
INJECTION, SOLUTION INTRAMUSCULAR; INTRAVENOUS
Status: DISCONTINUED | OUTPATIENT
Start: 2022-07-18 | End: 2022-07-18 | Stop reason: HOSPADM

## 2022-07-18 RX ADMIN — DIPHENHYDRAMINE HYDROCHLORIDE 50 MG: 25 CAPSULE ORAL at 07:07

## 2022-07-18 RX ADMIN — OXYCODONE 5 MG: 5 TABLET ORAL at 08:07

## 2022-07-18 RX ADMIN — ASPIRIN 81 MG CHEWABLE TABLET 81 MG: 81 TABLET CHEWABLE at 07:07

## 2022-07-18 RX ADMIN — SACUBITRIL AND VALSARTAN 1 TABLET: 24; 26 TABLET, FILM COATED ORAL at 08:07

## 2022-07-18 RX ADMIN — METOPROLOL TARTRATE 25 MG: 25 TABLET, FILM COATED ORAL at 07:07

## 2022-07-18 RX ADMIN — METOPROLOL SUCCINATE 25 MG: 25 TABLET, EXTENDED RELEASE ORAL at 08:07

## 2022-07-18 RX ADMIN — AMLODIPINE BESYLATE 10 MG: 5 TABLET ORAL at 07:07

## 2022-07-18 RX ADMIN — ATORVASTATIN CALCIUM 80 MG: 40 TABLET, FILM COATED ORAL at 08:07

## 2022-07-18 RX ADMIN — CLOPIDOGREL BISULFATE 75 MG: 75 TABLET, FILM COATED ORAL at 07:07

## 2022-07-18 RX ADMIN — HYDROCODONE BITARTRATE AND ACETAMINOPHEN 1 TABLET: 10; 325 TABLET ORAL at 03:07

## 2022-07-18 RX ADMIN — SODIUM CHLORIDE 800 ML: 0.9 INJECTION, SOLUTION INTRAVENOUS at 11:07

## 2022-07-18 NOTE — NURSING
pt awake, alert, and oriented x4. able to make needs   known. telebox #[id number] in place. bsg monitored. fluids started   at 0000 per providers order. hourly rounds made. no c/o   pain/distress/sob at this time. will continue to monitor.

## 2022-07-18 NOTE — CONSULTS
[facility] Surg  Wound Care    Patient Name:  Yo Pink   MRN:  [id number]  Date: 2022  Diagnosis: Dry gangrene    History:     Past Medical History:   Diagnosis Date    PAD (peripheral artery disease)        Social History     Socioeconomic History    Marital status: Single   Tobacco Use    Smoking status: Former Smoker     Quit date: 2022     Years since quittin.1    Smokeless tobacco: Never Used   Substance and Sexual Activity    Alcohol use: Never    Drug use: Not Currently       Precautions:     Allergies as of 07/15/2022    (No Known Allergies)       [facility] Assessment Details/Treatment   Consulted for gangrene of toe  A 57 year old male admitted 7/15/22 from home with cellulitis of foot; atherosclerosis of native artery of left leg with ulceration; dry gangrene; hypertensive urgency   WBC 10.19 Hgb 13.2 Hct 39.4   7/15 Alb 3.3 Weight 170 lb A1C 6.2  On Isoflex mattress; Osvaldo score 19  Consults to Podiatry and Vascular  7/15 Podiatry placed nursing orders for daily betadine application; recommend left 3rd toe amputation  Discussed with nursing.  Will assist nursing as needed with treatment plan as per Podiatry orders  Reconsult [facility] nurse as needed    2022

## 2022-07-18 NOTE — PROGRESS NOTES
Pt returned from cath lab. R WR TR band in place, site assessed no bleeding noted. . VSS, will continue to monitor.

## 2022-07-18 NOTE — ASSESSMENT & PLAN NOTE
-Left 3rd digit with findings c/w dry gangrene  -US b/l LE:  The vascular stent appears pain in left lower extremity.  Arterial atherosclerotic disease present, without evidence of occlusion.  -XR bilateral feet:  No acute fractures or dislocation.  Left 3rd digit with soft tissue abnormality  -ID consulted: IV abx discontinued 07/15. Monitor off abx.   -Blood cx with NGTD  -Podiatry and vascular consulted  -Podiatry plan for left 3rd toe amputation, pending vascular evaluation and cardiology procedure

## 2022-07-18 NOTE — BRIEF OP NOTE
[facility] - Cath Lab  Brief Operative Note     SUMMARY     Surgery Date: 7/18/2022     Surgeon(s) and Role:     * Noah Huffman MD - Primary    Assisting Surgeon: None    Pre-op Diagnosis:  Shortness of breath [R06.02]  Elevated troponin [R77.8]  ACS (acute coronary syndrome) [I24.9]    Post-op Diagnosis:  Post-Op Diagnosis Codes:     * Shortness of breath [R06.02]     * Elevated troponin [R77.8]     * ACS (acute coronary syndrome) [I24.9]    Procedure(s) (LRB):  Left heart cath (Left)  Instantaneous Wave-Free Ratio (IFR) (N/A)    Anesthesia: RN IV Sedation    Description of the findings of the procedure: uneventful LHC/cor angio via R radial    Findings/Key Components:  LVEDP: 14mmHg  LVEF: 30%  Wall Motion: LAD WMA    Dominance: Right  LM: distal 60%, iFR 0.86  LAD: prox  after S1, distal vessel fills via L-L and R-L george  Ramus: large, MLI  LCx: large, mid 20-30%, no step-up with iFR pullback  RCA: mid , dist vessel fills via L-L and R-L george    Hemostasis:  R Radial band    Impression:  NSTEMI  LM/2V CAD  Severe LV dysfxn  Severe PAD s/p recent L SFA PTAS, needing L 3rd toe amputation +/- RLE angio  R rad vasband for hemostasis    Plan:  Cont med rx.  Cont ASA/Plavix/Statin.  Start entresto/toprol, titrate as BP/HR will allow.  Case d/w Dr. Wynn, will transfer to [facility] for CABG vs MV PCI eval.  Lifevest ordered.  Repeat echo in 3 months (mid Oct 2022) for reassessment of LVEF.    Estimated Blood Loss: <50cc         Specimens: None

## 2022-07-18 NOTE — PLAN OF CARE
Problem: Adult Inpatient Plan of Care  Goal: Plan of Care Review  Outcome: Ongoing, Progressing  Goal: Patient-Specific Goal (Individualized)  Outcome: Ongoing, Progressing  Goal: Absence of Hospital-Acquired Illness or Injury  Outcome: Ongoing, Progressing  Intervention: Prevent Skin Injury  Flowsheets (Taken 7/18/2022 8877)  Body Position: position changed independently     Problem: Impaired Wound Healing  Goal: Optimal Wound Healing  Outcome: Ongoing, Progressing      Opt out

## 2022-07-18 NOTE — PLAN OF CARE
Will perform L 3rd toe amputation as soon as safely able.  Subsequent RLE angiogram for critical limb ischemia.

## 2022-07-18 NOTE — NURSING
Sent secure chat to VIVIAN Irizarry about order clarification. Pt has ns @ 100. Has CHF and getting iv lasix. Instructed to contact VIVIAN Monroe if pt begins to become symptomatic. Currently pt is asymptomatic. will continue to monitor.

## 2022-07-18 NOTE — SUBJECTIVE & OBJECTIVE
Interval History:  No acute overnight events.  Patient remained afebrile.  States his shortness breath and orthopnea has resolved.   Denies any chest pain or difficulty breathing at this time.    Review of Systems   Constitutional:  Positive for fatigue. Negative for chills, diaphoresis and fever.   HENT:  Negative for congestion and trouble swallowing.    Respiratory:  Negative for cough, chest tightness, shortness of breath (has significantly improved) and wheezing.         + orthopnea   Cardiovascular:  Negative for chest pain, palpitations and leg swelling (feet swelling resolved.).   Gastrointestinal:  Negative for abdominal distention, abdominal pain, blood in stool, diarrhea, nausea and vomiting.   Genitourinary:  Negative for difficulty urinating, dysuria and hematuria.   Musculoskeletal:  Positive for arthralgias and myalgias. Negative for joint swelling.   Skin:  Positive for wound.   Neurological:  Negative for dizziness, weakness and headaches.   Psychiatric/Behavioral:  Negative for confusion, decreased concentration and hallucinations.      Objective:     Vital Signs (Most Recent):  Temp: 98 °F (36.7 °C) (07/18/22 1112)  Pulse: 71 (07/18/22 1116)  Resp: 19 (07/18/22 1112)  BP: 137/83 (07/18/22 1116)  SpO2: (!) 93 % (07/18/22 1116)   Vital Signs (24h Range):  Temp:  [97.8 °F (36.6 °C)-98.3 °F (36.8 °C)] 98 °F (36.7 °C)  Pulse:  [71-83] 71  Resp:  [16-20] 19  SpO2:  [93 %-97 %] 93 %  BP: (131-147)/(71-88) 137/83     Weight: 77.1 kg (170 lb)  Body mass index is 24.39 kg/m².    Intake/Output Summary (Last 24 hours) at 7/18/2022 1137  Last data filed at 7/18/2022 0700  Gross per 24 hour   Intake --   Output 1250 ml   Net -1250 ml        Physical Exam  Vitals and nursing note reviewed.   Constitutional:       General: He is not in acute distress.     Appearance: He is not ill-appearing.   HENT:      Head: Normocephalic and atraumatic.      Right Ear: External ear normal.      Left Ear: External ear normal.       Nose: Nose normal.      Mouth/Throat:      Mouth: Mucous membranes are moist.   Eyes:      Extraocular Movements: Extraocular movements intact.      Conjunctiva/sclera: Conjunctivae normal.   Cardiovascular:      Rate and Rhythm: Normal rate and regular rhythm.      Heart sounds: No murmur heard.    No gallop.   Pulmonary:      Effort: Pulmonary effort is normal. No respiratory distress.      Breath sounds: Normal breath sounds. No wheezing.   Abdominal:      General: There is no distension.      Palpations: Abdomen is soft.      Tenderness: There is no abdominal tenderness. There is no guarding.   Musculoskeletal:         General: No swelling or tenderness.      Cervical back: Normal range of motion.      Right lower leg: No edema.      Left lower leg: No edema.      Comments: Trace pedal edema resolved bilaterally. Left third toe findings c/w dry gangrene; no drainage, erythema or swelling. Bilateral feet with dusky toes. Feet are cool to touch   Skin:     General: Skin is warm and dry.   Neurological:      General: No focal deficit present.      Mental Status: He is alert and oriented to person, place, and time.      Sensory: Sensory deficit (diminished sensation in bilateral feet) present.   Psychiatric:         Mood and Affect: Mood normal.         Behavior: Behavior normal.         Thought Content: Thought content normal.       Significant Labs: All pertinent labs within the past 24 hours have been reviewed.    Significant Imaging: I have reviewed all pertinent imaging results/findings within the past 24 hours.

## 2022-07-18 NOTE — PROGRESS NOTES
[facility] Medicine  Progress Note    Patient Name: Yo Pink  MRN: [id number]  Patient Class: IP- Inpatient   Admission Date: 7/15/2022  Length of Stay: 1 days  Attending Physician: Catalina Valenzuela DO  Primary Care Provider: [facility]        Subjective:     Principal Problem:Dry gangrene        HPI:  No notes on file    Overview/Hospital Course:  56 y/o male PMH PAD h/o LLE angiogram on 07/05/2022 admitted on 07/15/2022 to observation for left 3rd toe gangrene that is worsening since angiogram procedure.  Ultrasound lower extremity without DVT.  Shows vascular stent appears patent.  X-ray of bilateral feet with no acute fractures or dislocations.  Soft tissue abnormality of the 3rd digit on the left foot noted.  Vascular podiatry consulted.       Patient started on IV abx but complained of burning sensation all over with infusion. Overnight on 07/15, rapid response called for SOB and diaphoresis. Stated that it lasted briefly and then resolved.  Initial and subsequent Troponin elevated. Repeat CXR with no acute process. BNP elevated. Echo with EF of 30%, moderately decreased systolic function, grade 2 diastolic dysfunction, pulmonary hypertension, and moderate to severe global hypokinesis with WMA. Lasix IV given once with improvement. Cardiology consulted- Suspect ACS, continue on aspirin,plavix,and statin.  Plan for heart catheterization on 07/18.  ID consulted. IV abx discontinued. Awaiting vascular surgery recommendations. Podiatry plans for left 3rd toe amputation, pending vascular recs and cardiology procedure. Continue pain control and blood pressure control      Interval History:  No acute overnight events.  Patient remained afebrile.  States his shortness breath and orthopnea has resolved.   Denies any chest pain or difficulty breathing at this time.    Review of Systems   Constitutional:  Positive for fatigue. Negative for chills, diaphoresis and fever.   HENT:   Negative for congestion and trouble swallowing.    Respiratory:  Negative for cough, chest tightness, shortness of breath (has significantly improved) and wheezing.         + orthopnea   Cardiovascular:  Negative for chest pain, palpitations and leg swelling (feet swelling resolved.).   Gastrointestinal:  Negative for abdominal distention, abdominal pain, blood in stool, diarrhea, nausea and vomiting.   Genitourinary:  Negative for difficulty urinating, dysuria and hematuria.   Musculoskeletal:  Positive for arthralgias and myalgias. Negative for joint swelling.   Skin:  Positive for wound.   Neurological:  Negative for dizziness, weakness and headaches.   Psychiatric/Behavioral:  Negative for confusion, decreased concentration and hallucinations.      Objective:     Vital Signs (Most Recent):  Temp: 98 °F (36.7 °C) (07/18/22 1112)  Pulse: 71 (07/18/22 1116)  Resp: 19 (07/18/22 1112)  BP: 137/83 (07/18/22 1116)  SpO2: (!) 93 % (07/18/22 1116)   Vital Signs (24h Range):  Temp:  [97.8 °F (36.6 °C)-98.3 °F (36.8 °C)] 98 °F (36.7 °C)  Pulse:  [71-83] 71  Resp:  [16-20] 19  SpO2:  [93 %-97 %] 93 %  BP: (131-147)/(71-88) 137/83     Weight: 77.1 kg (170 lb)  Body mass index is 24.39 kg/m².    Intake/Output Summary (Last 24 hours) at 7/18/2022 1137  Last data filed at 7/18/2022 0700  Gross per 24 hour   Intake --   Output 1250 ml   Net -1250 ml        Physical Exam  Vitals and nursing note reviewed.   Constitutional:       General: He is not in acute distress.     Appearance: He is not ill-appearing.   HENT:      Head: Normocephalic and atraumatic.      Right Ear: External ear normal.      Left Ear: External ear normal.      Nose: Nose normal.      Mouth/Throat:      Mouth: Mucous membranes are moist.   Eyes:      Extraocular Movements: Extraocular movements intact.      Conjunctiva/sclera: Conjunctivae normal.   Cardiovascular:      Rate and Rhythm: Normal rate and regular rhythm.      Heart sounds: No murmur heard.    No  gallop.   Pulmonary:      Effort: Pulmonary effort is normal. No respiratory distress.      Breath sounds: Normal breath sounds. No wheezing.   Abdominal:      General: There is no distension.      Palpations: Abdomen is soft.      Tenderness: There is no abdominal tenderness. There is no guarding.   Musculoskeletal:         General: No swelling or tenderness.      Cervical back: Normal range of motion.      Right lower leg: No edema.      Left lower leg: No edema.      Comments: Trace pedal edema resolved bilaterally. Left third toe findings c/w dry gangrene; no drainage, erythema or swelling. Bilateral feet with dusky toes. Feet are cool to touch   Skin:     General: Skin is warm and dry.   Neurological:      General: No focal deficit present.      Mental Status: He is alert and oriented to person, place, and time.      Sensory: Sensory deficit (diminished sensation in bilateral feet) present.   Psychiatric:         Mood and Affect: Mood normal.         Behavior: Behavior normal.         Thought Content: Thought content normal.       Significant Labs: All pertinent labs within the past 24 hours have been reviewed.    Significant Imaging: I have reviewed all pertinent imaging results/findings within the past 24 hours.      Assessment/Plan:      * Dry gangrene  -Left 3rd digit with findings c/w dry gangrene  -US b/l LE:  The vascular stent appears pain in left lower extremity.  Arterial atherosclerotic disease present, without evidence of occlusion.  -XR bilateral feet:  No acute fractures or dislocation.  Left 3rd digit with soft tissue abnormality  -ID consulted: IV abx discontinued 07/15. Monitor off abx.   -Blood cx with NGTD  -Podiatry and vascular consulted  -Podiatry plan for left 3rd toe amputation, pending vascular evaluation and cardiology procedure     Hypertensive urgency  -SBP in the 180s in the ED  -Noted history of HTN, but patient states he has not been on medications  -Started Norvasc 10mg qd on  07/15  -BP improving  -Continue pain control     PAD (peripheral artery disease)  -s/p LLE angio with stent placement on 07/05 by Dr. Rich   -Consider RLE angio this admission since early signs of possible ulceration  -Mechanical thrombectomy is NOT indicated for patient  -Consulted Podiatry and Vascular surgery  -Continue ASA, Statin, Blood pressure reduction.    ACS (acute coronary syndrome)  -Overnight on 07/15, patient with SOB and diaphoresis. No CP, but admits later intermittent chest discomfort  -chest x-ray no acute process  -unable to find EKG from 7/15.  Per documentation, EKG reading sinus rhythm with fusion complexes and PACs  -Initial troponin 0.07, repeat trop was 0.114  -Echo with EF of 30%, moderately decreased systolic function, grade 2 diastolic dysfunction, pulmonary hypertension, and moderate to severe global hypokinesis with WMA.   -BNP elevated >900  -Cardiology consulted: plans for heart cath on 07/18. Continue lovenox, ASA, statin.       Ischemic cardiomyopathy  -Echo with EF of 30%, moderately decreased systolic function, grade 2 diastolic dysfunction, pulmonary hypertension, and moderate to severe global hypokinesis with WMA. ]  -Symptoms of orthopnea pedal edema improved with Lasix IV x 1 o 07/16  -Overall, does not appear overloaded.   -Cardiology consulted: plans for cath on 07/18. LifeVest ordered      Dyslipidemia  Continue statin: atorvastatin 80mg qhs  Lipid panel: Chol 144,Trig 122, HDL 35, LDL 84    Hypokalemia  K 3.1 on 07/15  Replace as needed  Resolved 07/18      VTE Risk Mitigation (From admission, onward)         Ordered     enoxaparin injection 80 mg  Every 12 hours (non-standard times)         07/16/22 1205     IP VTE HIGH RISK PATIENT  Once         07/15/22 0438     Place sequential compression device  Until discontinued         07/15/22 0438     Reason for No Pharmacological VTE Prophylaxis  Once        Question:  Reasons:  Answer:  Physician Provided (leave comment)   Comment:  procedure in am    07/15/22 0438                Discharge Planning   RAPHAEL:      Code Status: Full Code   Is the patient medically ready for discharge?:     Reason for patient still in hospital (select all that apply): Treatment and Consult recommendations  Discharge Plan A: Home (with instructions to follow up)                  Catalina Valenzuela DO  Department of Hospital Medicine   [facility] [place] - Med Surg

## 2022-07-19 PROBLEM — I21.4 NSTEMI (NON-ST ELEVATED MYOCARDIAL INFARCTION): Status: ACTIVE | Noted: 2022-07-19

## 2022-07-19 LAB
ANION GAP SERPL CALC-SCNC: 13 MMOL/L (ref 8–16)
BASOPHILS # BLD AUTO: 0.07 K/UL (ref 0–0.2)
BASOPHILS NFR BLD: 0.8 % (ref 0–1.9)
BUN SERPL-MCNC: 12 MG/DL (ref 6–20)
CALCIUM SERPL-MCNC: 9 MG/DL (ref 8.7–10.5)
CHLORIDE SERPL-SCNC: 103 MMOL/L (ref 95–110)
CO2 SERPL-SCNC: 19 MMOL/L (ref 23–29)
CREAT SERPL-MCNC: 0.6 MG/DL (ref 0.5–1.4)
DIFFERENTIAL METHOD: ABNORMAL
EOSINOPHIL # BLD AUTO: 0.5 K/UL (ref 0–0.5)
EOSINOPHIL NFR BLD: 6 % (ref 0–8)
ERYTHROCYTE [DISTWIDTH] IN BLOOD BY AUTOMATED COUNT: 13.1 % (ref 11.5–14.5)
EST. GFR  (AFRICAN AMERICAN): >60 ML/MIN/1.73 M^2
EST. GFR  (NON AFRICAN AMERICAN): >60 ML/MIN/1.73 M^2
GLUCOSE SERPL-MCNC: 101 MG/DL (ref 70–110)
HCT VFR BLD AUTO: 41.4 % (ref 40–54)
HGB BLD-MCNC: 14.4 G/DL (ref 14–18)
IMM GRANULOCYTES # BLD AUTO: 0.02 K/UL (ref 0–0.04)
IMM GRANULOCYTES NFR BLD AUTO: 0.2 % (ref 0–0.5)
LEFT CBA DIAS: 16 CM/S
LEFT CBA SYS: 63 CM/S
LEFT CCA DIST DIAS: 21 CM/S
LEFT CCA DIST SYS: 74 CM/S
LEFT CCA MID DIAS: 18 CM/S
LEFT CCA MID SYS: 89 CM/S
LEFT CCA PROX DIAS: 17 CM/S
LEFT CCA PROX SYS: 97 CM/S
LEFT ECA DIAS: 14 CM/S
LEFT ECA SYS: 66 CM/S
LEFT ICA DIST DIAS: 18 CM/S
LEFT ICA DIST SYS: 61 CM/S
LEFT ICA MID DIAS: 24 CM/S
LEFT ICA MID SYS: 68 CM/S
LEFT ICA PROX DIAS: 14 CM/S
LEFT ICA PROX SYS: 58 CM/S
LEFT VERTEBRAL DIAS: 21 CM/S
LEFT VERTEBRAL SYS: 57 CM/S
LYMPHOCYTES # BLD AUTO: 1.5 K/UL (ref 1–4.8)
LYMPHOCYTES NFR BLD: 17.5 % (ref 18–48)
MCH RBC QN AUTO: 31.2 PG (ref 27–31)
MCHC RBC AUTO-ENTMCNC: 34.8 G/DL (ref 32–36)
MCV RBC AUTO: 90 FL (ref 82–98)
MONOCYTES # BLD AUTO: 0.8 K/UL (ref 0.3–1)
MONOCYTES NFR BLD: 9.3 % (ref 4–15)
NEUTROPHILS # BLD AUTO: 5.8 K/UL (ref 1.8–7.7)
NEUTROPHILS NFR BLD: 66.2 % (ref 38–73)
NRBC BLD-RTO: 0 /100 WBC
OHS CV CAROTID RIGHT ICA EDV HIGHEST: 35
OHS CV CAROTID ULTRASOUND LEFT ICA/CCA RATIO: 0.92
OHS CV CAROTID ULTRASOUND RIGHT ICA/CCA RATIO: 1.26
OHS CV PV CAROTID LEFT HIGHEST CCA: 97
OHS CV PV CAROTID LEFT HIGHEST ICA: 68
OHS CV PV CAROTID RIGHT HIGHEST CCA: 92
OHS CV PV CAROTID RIGHT HIGHEST ICA: 102
OHS CV US CAROTID LEFT HIGHEST EDV: 24
PLATELET # BLD AUTO: 316 K/UL (ref 150–450)
PMV BLD AUTO: 9.5 FL (ref 9.2–12.9)
POCT GLUCOSE: 107 MG/DL (ref 70–110)
POCT GLUCOSE: 117 MG/DL (ref 70–110)
POCT GLUCOSE: 121 MG/DL (ref 70–110)
POTASSIUM SERPL-SCNC: 3.5 MMOL/L (ref 3.5–5.1)
RBC # BLD AUTO: 4.61 M/UL (ref 4.6–6.2)
RIGHT CBA DIAS: 21 CM/S
RIGHT CBA SYS: 85 CM/S
RIGHT CCA DIST DIAS: 21 CM/S
RIGHT CCA DIST SYS: 81 CM/S
RIGHT CCA MID DIAS: 21 CM/S
RIGHT CCA MID SYS: 92 CM/S
RIGHT CCA PROX DIAS: 17 CM/S
RIGHT CCA PROX SYS: 81 CM/S
RIGHT ECA DIAS: 9 CM/S
RIGHT ECA SYS: 80 CM/S
RIGHT ICA DIST DIAS: 21 CM/S
RIGHT ICA DIST SYS: 56 CM/S
RIGHT ICA MID DIAS: 35 CM/S
RIGHT ICA MID SYS: 102 CM/S
RIGHT ICA PROX DIAS: 16 CM/S
RIGHT ICA PROX SYS: 62 CM/S
RIGHT VERTEBRAL DIAS: 16 CM/S
RIGHT VERTEBRAL SYS: 50 CM/S
SODIUM SERPL-SCNC: 135 MMOL/L (ref 136–145)
WBC # BLD AUTO: 8.7 K/UL (ref 3.9–12.7)

## 2022-07-19 PROCEDURE — 25000003 PHARM REV CODE 250: Performed by: INTERNAL MEDICINE

## 2022-07-19 PROCEDURE — 99233 SBSQ HOSP IP/OBS HIGH 50: CPT | Mod: ,,, | Performed by: SURGERY

## 2022-07-19 PROCEDURE — 80048 BASIC METABOLIC PNL TOTAL CA: CPT | Performed by: INTERNAL MEDICINE

## 2022-07-19 PROCEDURE — 11000001 HC ACUTE MED/SURG PRIVATE ROOM

## 2022-07-19 PROCEDURE — 85025 COMPLETE CBC W/AUTO DIFF WBC: CPT | Performed by: INTERNAL MEDICINE

## 2022-07-19 PROCEDURE — 99233 PR SUBSEQUENT HOSPITAL CARE,LEVL III: ICD-10-PCS | Mod: ,,, | Performed by: SURGERY

## 2022-07-19 PROCEDURE — 99233 SBSQ HOSP IP/OBS HIGH 50: CPT | Mod: ,,, | Performed by: INTERNAL MEDICINE

## 2022-07-19 PROCEDURE — 36415 COLL VENOUS BLD VENIPUNCTURE: CPT | Performed by: INTERNAL MEDICINE

## 2022-07-19 PROCEDURE — 99233 PR SUBSEQUENT HOSPITAL CARE,LEVL III: ICD-10-PCS | Mod: ,,, | Performed by: INTERNAL MEDICINE

## 2022-07-19 RX ADMIN — SACUBITRIL AND VALSARTAN 1 TABLET: 24; 26 TABLET, FILM COATED ORAL at 08:07

## 2022-07-19 RX ADMIN — ASPIRIN 81 MG CHEWABLE TABLET 81 MG: 81 TABLET CHEWABLE at 08:07

## 2022-07-19 RX ADMIN — METOPROLOL SUCCINATE 25 MG: 25 TABLET, EXTENDED RELEASE ORAL at 08:07

## 2022-07-19 RX ADMIN — HYDROCODONE BITARTRATE AND ACETAMINOPHEN 1 TABLET: 5; 325 TABLET ORAL at 07:07

## 2022-07-19 RX ADMIN — HYDROCODONE BITARTRATE AND ACETAMINOPHEN 1 TABLET: 5; 325 TABLET ORAL at 02:07

## 2022-07-19 RX ADMIN — ATORVASTATIN CALCIUM 80 MG: 40 TABLET, FILM COATED ORAL at 08:07

## 2022-07-19 RX ADMIN — OXYCODONE 5 MG: 5 TABLET ORAL at 08:07

## 2022-07-19 RX ADMIN — CLOPIDOGREL BISULFATE 75 MG: 75 TABLET, FILM COATED ORAL at 08:07

## 2022-07-19 NOTE — SUBJECTIVE & OBJECTIVE
Past Medical History:   Diagnosis Date    PAD (peripheral artery disease)        Past Surgical History:   Procedure Laterality Date    ANGIOGRAPHY OF LOWER EXTREMITY Left 2022    Procedure: Angiogram Extremity Unilateral;  Surgeon: Mehul Rich MD;  Location: [facility];  Service: Vascular;  Laterality: Left;  RN PREOP ON 22. BINAX ON 22---NEED CONSENT       Review of patient's allergies indicates:  No Known Allergies    No current facility-administered medications on file prior to encounter.     Current Outpatient Medications on File Prior to Encounter   Medication Sig    acetaminophen (TYLENOL) 500 MG tablet Take 1,000 mg by mouth every 6 (six) hours as needed for Pain.    amoxicillin-clavulanate 875-125mg (AUGMENTIN) 875-125 mg per tablet Take 1 tablet by mouth every 12 (twelve) hours.    amoxicillin-clavulanate 875-125mg (AUGMENTIN) 875-125 mg per tablet Take 1 tablet by mouth every 12 (twelve) hours. for 7 days    clopidogreL (PLAVIX) 75 mg tablet Take 1 tablet (75 mg total) by mouth once daily.    oxyCODONE-acetaminophen (PERCOCET) 5-325 mg per tablet Take 1 tablet by mouth every 4 (four) hours as needed for Pain.     Family History    None       Tobacco Use    Smoking status: Former Smoker     Quit date: 2022     Years since quittin.1    Smokeless tobacco: Never Used   Substance and Sexual Activity    Alcohol use: Never    Drug use: Not Currently    Sexual activity: Not on file     Review of Systems   Gastrointestinal:  Negative for melena.   Genitourinary:  Negative for hematuria.   Objective:     Vital Signs (Most Recent):  Temp: 98.4 °F (36.9 °C) (22 1113)  Pulse: 71 (22 1113)  Resp: 18 (22 1113)  BP: 133/69 (22 1113)  SpO2: 96 % (22 1113)   Vital Signs (24h Range):  Temp:  [97.9 °F (36.6 °C)-98.5 °F (36.9 °C)] 98.4 °F (36.9 °C)  Pulse:  [67-81] 71  Resp:  [17-20] 18  SpO2:  [92 %-97 %] 96 %  BP: (122-162)/(66-95) 133/69     Weight: 77.1 kg (170  lb)  Body mass index is 24.39 kg/m².    SpO2: 96 %  O2 Device (Oxygen Therapy): room air      Intake/Output Summary (Last 24 hours) at 7/19/2022 1150  Last data filed at 7/19/2022 0831  Gross per 24 hour   Intake 1410 ml   Output 3100 ml   Net -1690 ml         Lines/Drains/Airways       Peripheral Intravenous Line  Duration                  Peripheral IV - Single Lumen 07/15/22 0325 20 G Left Forearm 4 days                    Physical Exam  Constitutional:       General: He is not in acute distress.     Appearance: He is well-developed. He is not ill-appearing, toxic-appearing or diaphoretic.   HENT:      Head: Normocephalic and atraumatic.   Eyes:      General: No scleral icterus.     Extraocular Movements: Extraocular movements intact.      Conjunctiva/sclera: Conjunctivae normal.      Pupils: Pupils are equal, round, and reactive to light.   Neck:      Thyroid: No thyromegaly.      Vascular: No JVD.      Trachea: No tracheal deviation.   Cardiovascular:      Rate and Rhythm: Normal rate and regular rhythm.      Pulses:           Carotid pulses are 2+ on the right side and 2+ on the left side.       Radial pulses are 2+ on the right side.      Heart sounds: S1 normal and S2 normal. No murmur heard.    No friction rub. No gallop.      Comments: R rad access site c/d/I.  Pulmonary:      Effort: Pulmonary effort is normal. No respiratory distress.      Breath sounds: Normal breath sounds. No stridor. No wheezing, rhonchi or rales.   Chest:      Chest wall: No tenderness.   Abdominal:      General: There is no distension.      Palpations: Abdomen is soft.   Musculoskeletal:         General: No swelling or tenderness. Normal range of motion.      Cervical back: Normal range of motion and neck supple. No rigidity.      Right lower leg: No edema.      Left lower leg: No edema.      Comments: L middle toe dry gangrene   Skin:     General: Skin is warm and dry.      Coloration: Skin is not jaundiced.   Neurological:       General: No focal deficit present.      Mental Status: He is alert and oriented to person, place, and time.      Cranial Nerves: No cranial nerve deficit.   Psychiatric:         Mood and Affect: Mood normal.         Behavior: Behavior normal.       Current Medications:   aspirin  81 mg Oral Daily    atorvastatin  80 mg Oral QHS    clopidogreL  75 mg Oral Daily    metoprolol succinate  25 mg Oral Daily    sacubitriL-valsartan  1 tablet Oral BID         acetaminophen, acetaminophen, atropine, dextrose 10%, dextrose 10%, glucagon (human recombinant), glucose, glucose, hydrALAZINE, HYDROcodone-acetaminophen, HYDROcodone-acetaminophen, HYDROcodone-acetaminophen, ibuprofen, insulin aspart U-100, melatonin, morphine, naloxone, nitroGLYCERIN, ondansetron, ondansetron, oxyCODONE, sodium chloride 0.9%, sodium chloride 0.9%    Laboratory (all labs reviewed):  CBC:  Recent Labs   Lab 07/15/22  0325 07/15/22  0548 07/16/22  0644 07/17/22  0354 07/19/22  0444   WBC 12.11 11.66 11.65 10.19 8.70   Hemoglobin 13.2 L 13.4 L 13.6 L 13.2 L 14.4   Hematocrit 37.3 L 38.0 L 39.1 L 39.4 L 41.4   Platelets 268 302 321 296 316         CHEMISTRIES:  Recent Labs   Lab 07/15/22  0325 07/15/22  0548 07/16/22  0644 07/17/22  0354 07/18/22  0510 07/19/22  0444   Glucose 180 H 130 H 133 H 127 H 126 H 101   Sodium 137 136 138 134 L 136 135 L   Potassium 3.6 3.1 L 3.7 3.3 L 3.5 3.5   BUN 17 14 10 16 17 12   Creatinine 0.8 0.7 0.6 0.7 0.6 0.6   eGFR if African American >60 >60 >60 >60 >60 >60   eGFR if non African American >60 >60 >60 >60 >60 >60   Calcium 9.1 9.0 9.3 9.0 8.7 9.0   Magnesium 1.9  --   --   --   --   --          CARDIAC BIOMARKERS:  Recent Labs   Lab 07/16/22  0101 07/16/22  0644   Troponin I 0.073 H 0.114 H         COAGS:        LIPIDS/LFTS:  Recent Labs   Lab 06/18/22  0103 07/15/22  0325 07/16/22  0644   Cholesterol  --   --  144   Triglycerides  --   --  122   HDL  --   --  35 L   LDL Cholesterol  --   --  84.6   Non-HDL  Cholesterol  --   --  109   AST 27 21  --    ALT 21 13  --          BNP:  Recent Labs   Lab 07/15/22  2102    H         TSH:        Free T4:        Diagnostic Results:  ECG (personally reviewed and interpreted tracing(s)):  7/16/22 0038 (media tab) SR 93, PVCs, ASMI age indet    Chest X-Ray (personally reviewed and interpreted image(s)): 7/16/22 NAD    Cath: 7/18/22 (images personally reviewed and interpreted)  LVEDP: 14mmHg  LVEF: 30% by echo  Wall Motion: LAD WMA  Dominance: Right  LM: distal 60%, iFR 0.86  LAD: prox  after S1, distal vessel fills via L-L and R-L george  Ramus: large, MLI  LCx: large, mid 20-30%, no step-up with iFR pullback  RCA: mid , dist vessel fills via L-L and R-L george  Hemostasis:  R Radial band  Impression:  NSTEMI  LM/2V CAD  Severe LV dysfxn  Severe PAD s/p recent L SFA PTAS, needing L 3rd toe amputation +/- RLE angio  R rad vasband for hemostasis  Plan:  Cont med rx.  Cont ASA/Plavix/Statin.  Start entresto/toprol, titrate as BP/HR will allow.  Case d/w Dr. Wynn, will transfer to [facility] for CABG vs MV PCI eval.  Lifevest ordered.  Repeat echo in 3 months (mid Oct 2022) for reassessment of LVEF.    Echo: 7/16/22 (images prev personally reviewed and interpreted)  The left ventricle is normal in size with moderate concentric hypertrophy and moderately decreased systolic function.  The estimated ejection fraction is 30%.  Mod-severe global hypokinesis with LAD territory WMA.  Grade II left ventricular diastolic dysfunction.  Normal right ventricular size with normal right ventricular systolic function.  Mild-to-moderate mitral regurgitation.  Mild tricuspid regurgitation.  The estimated PA systolic pressure is 62 mmHg.  There is pulmonary hypertension.    LE art US 7/15/22  Interval placement of left lower extremity arterial vascular stent, the vascular stent appears patent.  The dorsalis pedis artery bilaterally demonstrates evidence for reversal of flow.  Otherwise the  arterial vascular structures demonstrate evidence for arterial atherosclerotic disease, without evidence for occlusion.

## 2022-07-19 NOTE — ASSESSMENT & PLAN NOTE
Spontaneous SOB/diaphoresis (once with abx admin, once without abx admin).  Trop 0.07->0.11  EF 30% on echo    NSTEMI  Cath revealed LM/2V CAD  Severe LV dysfxn  Severe PAD s/p recent L SFA PTAS, needing L 3rd toe amputation +/- RLE angio  R rad vasband for hemostasis  Cont med rx.  Cont ASA/Plavix/Statin.  Start entresto/toprol, titrate as BP/HR will allow.  Case d/w Dr. Wynn, will transfer to [facility] for CABG vs MV PCI eval.  Lifevest ordered.  Preop Carotid US and CT chest ordered (pending)  Repeat echo in 3 months (mid Oct 2022) for reassessment of LVEF.

## 2022-07-19 NOTE — HPI
"57 y.o. male PMHx PAD s.p vascular stent LLE presents with left foot pain x 2-3 days. Symptoms have been progressively getting worse. Patient states he was placed on antibiotics for dry gangrene and concern of infection.  He believes that his toe looks worse and he completed his antibiotics yesterday.  He was on call with virtual PCP who after assessment of toe told him to come to ED for further evaluation.  There is no report of fever, chills, CP, numbness and tingling.  He does state he stopped smoking a few weeks ago and he sometimes has to "clear his lungs"     ED course: Pain better after morphine. Found to be afebrile. WBC nl. Elevated ESR (33) and CRP (93.9). Bilateral foot xray shows Soft tissue abnormality of the L 3rd digit. Arterial US lower extremity  shows Interval placement of left lower extremity arterial vascular stent, the vascular stent appears patent.  The dorsalis pedis artery bilaterally demonstrates evidence for reversal of flow.  No evidence for occlusion.  "

## 2022-07-19 NOTE — PROGRESS NOTES
[facility] Surg  Cardiology  Progress Note    Patient Name: Yo Pink  MRN: [id number]  Admission Date: 7/15/2022  Hospital Length of Stay: 2 days  Code Status: Full Code   Attending Physician: Juan Alberto Alas MD   Primary Care Physician: [facility] [place]  Expected Discharge Date:   Principal Problem:Dry gangrene    Subjective:     Interval Hx: no cp/sob.  Resting comfortably.  No radial access site complications.  Awaiting tx to [facility] for CABG eval.    Tele: SR 70s, PVCs(personally reviewed and interpreted)        Past Medical History:   Diagnosis Date    PAD (peripheral artery disease)        Past Surgical History:   Procedure Laterality Date    ANGIOGRAPHY OF LOWER EXTREMITY Left 2022    Procedure: Angiogram Extremity Unilateral;  Surgeon: Mehul Rich MD;  Location: [facility];  Service: Vascular;  Laterality: Left;  RN PREOP ON 22. BINAX ON 22---NEED CONSENT       Review of patient's allergies indicates:  No Known Allergies    No current facility-administered medications on file prior to encounter.     Current Outpatient Medications on File Prior to Encounter   Medication Sig    acetaminophen (TYLENOL) 500 MG tablet Take 1,000 mg by mouth every 6 (six) hours as needed for Pain.    amoxicillin-clavulanate 875-125mg (AUGMENTIN) 875-125 mg per tablet Take 1 tablet by mouth every 12 (twelve) hours.    amoxicillin-clavulanate 875-125mg (AUGMENTIN) 875-125 mg per tablet Take 1 tablet by mouth every 12 (twelve) hours. for 7 days    clopidogreL (PLAVIX) 75 mg tablet Take 1 tablet (75 mg total) by mouth once daily.    oxyCODONE-acetaminophen (PERCOCET) 5-325 mg per tablet Take 1 tablet by mouth every 4 (four) hours as needed for Pain.     Family History    None       Tobacco Use    Smoking status: Former Smoker     Quit date: 2022     Years since quittin.1    Smokeless tobacco: Never Used   Substance and Sexual Activity    Alcohol use: Never    Drug use: Not  Currently    Sexual activity: Not on file     Review of Systems   Gastrointestinal:  Negative for melena.   Genitourinary:  Negative for hematuria.   Objective:     Vital Signs (Most Recent):  Temp: 98.4 °F (36.9 °C) (07/19/22 1113)  Pulse: 71 (07/19/22 1113)  Resp: 18 (07/19/22 1113)  BP: 133/69 (07/19/22 1113)  SpO2: 96 % (07/19/22 1113)   Vital Signs (24h Range):  Temp:  [97.9 °F (36.6 °C)-98.5 °F (36.9 °C)] 98.4 °F (36.9 °C)  Pulse:  [67-81] 71  Resp:  [17-20] 18  SpO2:  [92 %-97 %] 96 %  BP: (122-162)/(66-95) 133/69     Weight: 77.1 kg (170 lb)  Body mass index is 24.39 kg/m².    SpO2: 96 %  O2 Device (Oxygen Therapy): room air      Intake/Output Summary (Last 24 hours) at 7/19/2022 1150  Last data filed at 7/19/2022 0831  Gross per 24 hour   Intake 1410 ml   Output 3100 ml   Net -1690 ml         Lines/Drains/Airways       Peripheral Intravenous Line  Duration                  Peripheral IV - Single Lumen 07/15/22 0325 20 G Left Forearm 4 days                    Physical Exam  Constitutional:       General: He is not in acute distress.     Appearance: He is well-developed. He is not ill-appearing, toxic-appearing or diaphoretic.   HENT:      Head: Normocephalic and atraumatic.   Eyes:      General: No scleral icterus.     Extraocular Movements: Extraocular movements intact.      Conjunctiva/sclera: Conjunctivae normal.      Pupils: Pupils are equal, round, and reactive to light.   Neck:      Thyroid: No thyromegaly.      Vascular: No JVD.      Trachea: No tracheal deviation.   Cardiovascular:      Rate and Rhythm: Normal rate and regular rhythm.      Pulses:           Carotid pulses are 2+ on the right side and 2+ on the left side.       Radial pulses are 2+ on the right side.      Heart sounds: S1 normal and S2 normal. No murmur heard.    No friction rub. No gallop.      Comments: R rad access site c/d/I.  Pulmonary:      Effort: Pulmonary effort is normal. No respiratory distress.      Breath sounds: Normal  breath sounds. No stridor. No wheezing, rhonchi or rales.   Chest:      Chest wall: No tenderness.   Abdominal:      General: There is no distension.      Palpations: Abdomen is soft.   Musculoskeletal:         General: No swelling or tenderness. Normal range of motion.      Cervical back: Normal range of motion and neck supple. No rigidity.      Right lower leg: No edema.      Left lower leg: No edema.      Comments: L middle toe dry gangrene   Skin:     General: Skin is warm and dry.      Coloration: Skin is not jaundiced.   Neurological:      General: No focal deficit present.      Mental Status: He is alert and oriented to person, place, and time.      Cranial Nerves: No cranial nerve deficit.   Psychiatric:         Mood and Affect: Mood normal.         Behavior: Behavior normal.       Current Medications:   aspirin  81 mg Oral Daily    atorvastatin  80 mg Oral QHS    clopidogreL  75 mg Oral Daily    metoprolol succinate  25 mg Oral Daily    sacubitriL-valsartan  1 tablet Oral BID         acetaminophen, acetaminophen, atropine, dextrose 10%, dextrose 10%, glucagon (human recombinant), glucose, glucose, hydrALAZINE, HYDROcodone-acetaminophen, HYDROcodone-acetaminophen, HYDROcodone-acetaminophen, ibuprofen, insulin aspart U-100, melatonin, morphine, naloxone, nitroGLYCERIN, ondansetron, ondansetron, oxyCODONE, sodium chloride 0.9%, sodium chloride 0.9%    Laboratory (all labs reviewed):  CBC:  Recent Labs   Lab 07/15/22  0325 07/15/22  0548 07/16/22  0644 07/17/22  0354 07/19/22  0444   WBC 12.11 11.66 11.65 10.19 8.70   Hemoglobin 13.2 L 13.4 L 13.6 L 13.2 L 14.4   Hematocrit 37.3 L 38.0 L 39.1 L 39.4 L 41.4   Platelets 268 302 321 296 316         CHEMISTRIES:  Recent Labs   Lab 07/15/22  0325 07/15/22  0548 07/16/22  0644 07/17/22  0354 07/18/22  0510 07/19/22  0444   Glucose 180 H 130 H 133 H 127 H 126 H 101   Sodium 137 136 138 134 L 136 135 L   Potassium 3.6 3.1 L 3.7 3.3 L 3.5 3.5   BUN 17 14 10 16 17  12   Creatinine 0.8 0.7 0.6 0.7 0.6 0.6   eGFR if African American >60 >60 >60 >60 >60 >60   eGFR if non African American >60 >60 >60 >60 >60 >60   Calcium 9.1 9.0 9.3 9.0 8.7 9.0   Magnesium 1.9  --   --   --   --   --          CARDIAC BIOMARKERS:  Recent Labs   Lab 07/16/22  0101 07/16/22  0644   Troponin I 0.073 H 0.114 H         COAGS:        LIPIDS/LFTS:  Recent Labs   Lab 06/18/22  0103 07/15/22  0325 07/16/22  0644   Cholesterol  --   --  144   Triglycerides  --   --  122   HDL  --   --  35 L   LDL Cholesterol  --   --  84.6   Non-HDL Cholesterol  --   --  109   AST 27 21  --    ALT 21 13  --          BNP:  Recent Labs   Lab 07/15/22  2102    H         TSH:        Free T4:        Diagnostic Results:  ECG (personally reviewed and interpreted tracing(s)):  7/16/22 0038 (media tab) SR 93, PVCs, ASMI age indet    Chest X-Ray (personally reviewed and interpreted image(s)): 7/16/22 NAD    Cath: 7/18/22 (images personally reviewed and interpreted)  LVEDP: 14mmHg  LVEF: 30% by echo  Wall Motion: LAD WMA  Dominance: Right  LM: distal 60%, iFR 0.86  LAD: prox  after S1, distal vessel fills via L-L and R-L george  Ramus: large, MLI  LCx: large, mid 20-30%, no step-up with iFR pullback  RCA: mid , dist vessel fills via L-L and R-L george  Hemostasis:  R Radial band  Impression:  NSTEMI  LM/2V CAD  Severe LV dysfxn  Severe PAD s/p recent L SFA PTAS, needing L 3rd toe amputation +/- RLE angio  R rad vasband for hemostasis  Plan:  Cont med rx.  Cont ASA/Plavix/Statin.  Start entresto/toprol, titrate as BP/HR will allow.  Case d/w Dr. Wynn, will transfer to [facility] for CABG vs MV PCI eval.  Lifevest ordered.  Repeat echo in 3 months (mid Oct 2022) for reassessment of LVEF.    Echo: 7/16/22 (images prev personally reviewed and interpreted)  · The left ventricle is normal in size with moderate concentric hypertrophy and moderately decreased systolic function.  · The estimated ejection fraction is 30%.  · Mod-severe  global hypokinesis with LAD territory WMA.  · Grade II left ventricular diastolic dysfunction.  · Normal right ventricular size with normal right ventricular systolic function.  · Mild-to-moderate mitral regurgitation.  · Mild tricuspid regurgitation.  · The estimated PA systolic pressure is 62 mmHg.  · There is pulmonary hypertension.    LE art US 7/15/22  Interval placement of left lower extremity arterial vascular stent, the vascular stent appears patent.  The dorsalis pedis artery bilaterally demonstrates evidence for reversal of flow.  Otherwise the arterial vascular structures demonstrate evidence for arterial atherosclerotic disease, without evidence for occlusion.              Assessment and Plan:     * Dry gangrene  Mgmt per IM/vasc/podiatry  Apparent plan for L 3rd toe amputation and RLE angio next week.  Will need to hold pending cardiac cath.  Cont ASA 81mg qd  Cont plavix 75mg qd  Cont atorva 80mg qhs  Check lipids/LFT 3 months (mid Oct 2022)    Ischemic cardiomyopathy  EF 30%  Not grossly overloaded on exam  LAD WMA on echo    ACS (acute coronary syndrome)  Spontaneous SOB/diaphoresis (once with abx admin, once without abx admin).  Trop 0.07->0.11  EF 30% on echo    NSTEMI  Cath revealed LM/2V CAD  Severe LV dysfxn  Severe PAD s/p recent L SFA PTAS, needing L 3rd toe amputation +/- RLE angio  R rad vasband for hemostasis  Cont med rx.  Cont ASA/Plavix/Statin.  Cont entresto/toprol, titrate as BP/HR will allow.  Case d/w Dr. Wynn, will transfer to [facility] for CABG vs MV PCI eval.  Lifevest ordered.  Preop Carotid US and CT chest ordered (pending)  Repeat echo in 3 months (mid Oct 2022) for reassessment of LVEF.        Hypertensive urgency  Cont med rx    PAD (peripheral artery disease)  Mgmt per Dr. Rich (Vasc Surg) and Podiatry    Dyslipidemia  Cont atorva 80mg qhs  Check lipids/LFT 3 months (mid Oct 2022)    NSTEMI (non-ST elevated myocardial infarction)  As per ACS section        VTE Risk  Mitigation (From admission, onward)         Ordered     enoxaparin injection 80 mg  Every 12 hours (non-standard times)         07/16/22 1205     IP VTE HIGH RISK PATIENT  Once         07/15/22 0438     Place sequential compression device  Until discontinued         07/15/22 0438     Reason for No Pharmacological VTE Prophylaxis  Once        Question:  Reasons:  Answer:  Physician Provided (leave comment)  Comment:  procedure in am    07/15/22 0438                Noah Huffman MD  Cardiology  [facility] S[facility]

## 2022-07-19 NOTE — SUBJECTIVE & OBJECTIVE
Interval History:denies chest pain.  CC is Toe pain.    Review of Systems   Constitutional:  Positive for fatigue. Negative for chills, diaphoresis and fever.   HENT:  Negative for congestion and trouble swallowing.    Respiratory:  Negative for cough, chest tightness, shortness of breath (has significantly improved) and wheezing.         + orthopnea   Cardiovascular:  Negative for chest pain, palpitations and leg swelling (feet swelling resolved.).   Gastrointestinal:  Negative for abdominal distention, abdominal pain, blood in stool, diarrhea, nausea and vomiting.   Genitourinary:  Negative for difficulty urinating, dysuria and hematuria.   Musculoskeletal:  Positive for arthralgias and myalgias. Negative for joint swelling.   Skin:  Positive for wound.   Neurological:  Negative for dizziness, weakness and headaches.   Psychiatric/Behavioral:  Negative for confusion, decreased concentration and hallucinations.      Objective:     Vital Signs (Most Recent):  Temp: 98.4 °F (36.9 °C) (07/19/22 1113)  Pulse: 71 (07/19/22 1113)  Resp: 18 (07/19/22 1113)  BP: 133/69 (07/19/22 1113)  SpO2: 96 % (07/19/22 1113)   Vital Signs (24h Range):  Temp:  [97.9 °F (36.6 °C)-98.5 °F (36.9 °C)] 98.4 °F (36.9 °C)  Pulse:  [67-81] 71  Resp:  [17-20] 18  SpO2:  [92 %-97 %] 96 %  BP: (122-162)/(66-95) 133/69     Weight: 77.1 kg (170 lb)  Body mass index is 24.39 kg/m².    Intake/Output Summary (Last 24 hours) at 7/19/2022 1129  Last data filed at 7/19/2022 0831  Gross per 24 hour   Intake 1410 ml   Output 3100 ml   Net -1690 ml        Physical Exam  Vitals and nursing note reviewed.   Constitutional:       General: He is not in acute distress.     Appearance: He is not ill-appearing.   HENT:      Head: Normocephalic and atraumatic.      Right Ear: External ear normal.      Left Ear: External ear normal.      Nose: Nose normal.      Mouth/Throat:      Mouth: Mucous membranes are moist.   Eyes:      Extraocular Movements: Extraocular  movements intact.      Conjunctiva/sclera: Conjunctivae normal.   Cardiovascular:      Rate and Rhythm: Normal rate and regular rhythm.      Heart sounds: No murmur heard.    No gallop.   Pulmonary:      Effort: Pulmonary effort is normal. No respiratory distress.      Breath sounds: Normal breath sounds. No wheezing.   Abdominal:      General: There is no distension.      Palpations: Abdomen is soft.      Tenderness: There is no abdominal tenderness. There is no guarding.   Musculoskeletal:         General: No swelling or tenderness.      Cervical back: Normal range of motion.      Right lower leg: No edema.      Left lower leg: No edema.      Comments: Trace pedal edema resolved bilaterally. Left third toe findings c/w dry gangrene; no drainage, erythema or swelling. Bilateral feet with dusky toes. Feet are cool to touch   Skin:     General: Skin is warm and dry.   Neurological:      General: No focal deficit present.      Mental Status: He is alert and oriented to person, place, and time.      Sensory: Sensory deficit (diminished sensation in bilateral feet) present.   Psychiatric:         Mood and Affect: Mood normal.         Behavior: Behavior normal.         Thought Content: Thought content normal.       Significant Labs: All pertinent labs within the past 24 hours have been reviewed.    Significant Imaging: I have reviewed all pertinent imaging results/findings within the past 24 hours.

## 2022-07-19 NOTE — ASSESSMENT & PLAN NOTE
Had cardiac events,S/P [facility],for NSTEMI  LM/2V CAD  Severe LV dysfxn  Severe PAD s/p recent L SFA PTAS, needing L 3rd toe amputation +/- RLE angio  R rad vasband for hemostasis     Plan:  Cont med rx.  Cont ASA/Plavix/Statin.  Start entresto/toprol, titrate as BP/HR will allow.  Case d/w Dr. Wynn, will transfer to [facility] for CABG vs MV PCI eval.  Lifevest ordered.  Repeat echo in 3 months (mid Oct 2022) for reassessment of LVEF.  Pending transfer to [facility].

## 2022-07-19 NOTE — PLAN OF CARE
[facility] - Med Surg  Discharge Reassessment  [employer] representative Kianna [phone], says referral was processed and awaiting expected discharge date.  Dr. Villegas says patient to transfer to [facility] [place].    Plan A: Transfer OM  Plan B:  Home with /home health.    Primary Care Provider: [facility]    Expected Discharge Date:     Reassessment (most recent)     Discharge Reassessment - 07/19/22 1125        Discharge Reassessment    Assessment Type Discharge Planning Reassessment     Did the patient's condition or plan change since previous assessment? Yes     Discharge Plan discussed with: Patient     Communicated RAPHAEL with patient/caregiver Date not available/Unable to determine     Discharge Plan A Home     Discharge Plan B Home;Home Health     DME Needed Upon Discharge  other (see comments)   [employer].    Discharge Barriers Identified None     Why the patient remains in the hospital Requires continued medical care        Post-Acute Status    Post-Acute Authorization HME     E Status Set-up Complete/Auth obtained   Amanda khan Donna needs to be informed of when patient is discharging home.  [phone]    Coverage MEDICAID - [facility] ([facility])     Discharge Delays None known at this time

## 2022-07-19 NOTE — ASSESSMENT & PLAN NOTE
-Left 3rd digit with findings c/w dry gangrene  -US b/l LE:  The vascular stent appears pain in left lower extremity.  Arterial atherosclerotic disease present, without evidence of occlusion.  -XR bilateral feet:  No acute fractures or dislocation.  Left 3rd digit with soft tissue abnormality  -ID consulted: IV abx discontinued 07/15. Monitor off abx.   -Blood cx with NGTD  -Podiatry and vascular consulted  -Podiatry plan for left 3rd toe amputation, pending vascular evaluation and cardiology procedure ,delayed duo to cardiac events.

## 2022-07-19 NOTE — ASSESSMENT & PLAN NOTE
Mgmt per IM/vasc/podiatry  Apparent plan for L 3rd toe amputation and RLE angio next week.  Will need to hold pending cardiac cath.  Cont ASA 81mg qd  Cont plavix 75mg qd  Cont atorva 80mg qhs  Check lipids/LFT 3 months (mid Oct 2022)

## 2022-07-19 NOTE — PROGRESS NOTES
"[facility]  Progress Note    Patient Name: oY Pink  MRN: [id number]  Patient Class: IP- Inpatient   Admission Date: 7/15/2022  Length of Stay: 2 days  Attending Physician: Juan Alberto Alas MD  Primary Care Provider: [facility]        Subjective:     Principal Problem:Dry gangrene        HPI:  57 y.o. male PMHx PAD s.p vascular stent LLE presents with left foot pain x 2-3 days. Symptoms have been progressively getting worse. Patient states he was placed on antibiotics for dry gangrene and concern of infection.  He believes that his toe looks worse and he completed his antibiotics yesterday.  He was on call with virtual PCP who after assessment of toe told him to come to ED for further evaluation.  There is no report of fever, chills, CP, numbness and tingling.  He does state he stopped smoking a few weeks ago and he sometimes has to "clear his lungs"     ED course: Pain better after morphine. Found to be afebrile. WBC nl. Elevated ESR (33) and CRP (93.9). Bilateral foot xray shows Soft tissue abnormality of the L 3rd digit. Arterial US lower extremity  shows Interval placement of left lower extremity arterial vascular stent, the vascular stent appears patent.  The dorsalis pedis artery bilaterally demonstrates evidence for reversal of flow.  No evidence for occlusion.      Overview/Hospital Course:  58 y/o male PMH PAD h/o LLE angiogram on 07/05/2022 admitted on 07/15/2022 to observation for left 3rd toe gangrene that is worsening since angiogram procedure.  Ultrasound lower extremity without DVT.  Shows vascular stent appears patent.  X-ray of bilateral feet with no acute fractures or dislocations.  Soft tissue abnormality of the 3rd digit on the left foot noted.  Vascular podiatry consulted.       Patient started on IV abx but complained of burning sensation all over with infusion. Overnight on 07/15, rapid response called for SOB and diaphoresis. Stated that " it lasted briefly and then resolved.  Initial and subsequent Troponin elevated. Repeat CXR with no acute process. BNP elevated. Echo with EF of 30%, moderately decreased systolic function, grade 2 diastolic dysfunction, pulmonary hypertension, and moderate to severe global hypokinesis with WMA. Lasix IV given once with improvement. Cardiology consulted- Suspect ACS, continue on aspirin,plavix,and statin.  Plan for heart catheterization on 07/18.  ID consulted. IV abx discontinued. Awaiting vascular surgery recommendations. Podiatry plans for left 3rd toe amputation, pending vascular recs and cardiology procedure. Continue pain control and blood pressure control,  Had cardiac events,S/P C,for NSTEMI  LM/2V CAD  Severe LV dysfxn  Severe PAD s/p recent L SFA PTAS, needing L 3rd toe amputation +/- RLE angio  R rad vasband for hemostasis     Plan:  Cont med rx.  Cont ASA/Plavix/Statin.  Start entresto/toprol, titrate as BP/HR will allow.  Case d/w Dr. Wynn, will transfer to [facility] for CABG vs MV PCI eval.  Lifevest ordered.  Repeat echo in 3 months (mid Oct 2022) for reassessment of LVEF.  P[place] [facility] [place].      Interval History:denies chest pain.  CC is Toe pain.    Review of Systems   Constitutional:  Positive for fatigue. Negative for chills, diaphoresis and fever.   HENT:  Negative for congestion and trouble swallowing.    Respiratory:  Negative for cough, chest tightness, shortness of breath (has significantly improved) and wheezing.         + orthopnea   Cardiovascular:  Negative for chest pain, palpitations and leg swelling (feet swelling resolved.).   Gastrointestinal:  Negative for abdominal distention, abdominal pain, blood in stool, diarrhea, nausea and vomiting.   Genitourinary:  Negative for difficulty urinating, dysuria and hematuria.   Musculoskeletal:  Positive for arthralgias and myalgias. Negative for joint swelling.   Skin:  Positive for wound.   Neurological:  Negative for dizziness,  weakness and headaches.   Psychiatric/Behavioral:  Negative for confusion, decreased concentration and hallucinations.      Objective:     Vital Signs (Most Recent):  Temp: 98.4 °F (36.9 °C) (07/19/22 1113)  Pulse: 71 (07/19/22 1113)  Resp: 18 (07/19/22 1113)  BP: 133/69 (07/19/22 1113)  SpO2: 96 % (07/19/22 1113)   Vital Signs (24h Range):  Temp:  [97.9 °F (36.6 °C)-98.5 °F (36.9 °C)] 98.4 °F (36.9 °C)  Pulse:  [67-81] 71  Resp:  [17-20] 18  SpO2:  [92 %-97 %] 96 %  BP: (122-162)/(66-95) 133/69     Weight: 77.1 kg (170 lb)  Body mass index is 24.39 kg/m².    Intake/Output Summary (Last 24 hours) at 7/19/2022 1129  Last data filed at 7/19/2022 0831  Gross per 24 hour   Intake 1410 ml   Output 3100 ml   Net -1690 ml        Physical Exam  Vitals and nursing note reviewed.   Constitutional:       General: He is not in acute distress.     Appearance: He is not ill-appearing.   HENT:      Head: Normocephalic and atraumatic.      Right Ear: External ear normal.      Left Ear: External ear normal.      Nose: Nose normal.      Mouth/Throat:      Mouth: Mucous membranes are moist.   Eyes:      Extraocular Movements: Extraocular movements intact.      Conjunctiva/sclera: Conjunctivae normal.   Cardiovascular:      Rate and Rhythm: Normal rate and regular rhythm.      Heart sounds: No murmur heard.    No gallop.   Pulmonary:      Effort: Pulmonary effort is normal. No respiratory distress.      Breath sounds: Normal breath sounds. No wheezing.   Abdominal:      General: There is no distension.      Palpations: Abdomen is soft.      Tenderness: There is no abdominal tenderness. There is no guarding.   Musculoskeletal:         General: No swelling or tenderness.      Cervical back: Normal range of motion.      Right lower leg: No edema.      Left lower leg: No edema.      Comments: Trace pedal edema resolved bilaterally. Left third toe findings c/w dry gangrene; no drainage, erythema or swelling. Bilateral feet with dusky toes.  Feet are cool to touch   Skin:     General: Skin is warm and dry.   Neurological:      General: No focal deficit present.      Mental Status: He is alert and oriented to person, place, and time.      Sensory: Sensory deficit (diminished sensation in bilateral feet) present.   Psychiatric:         Mood and Affect: Mood normal.         Behavior: Behavior normal.         Thought Content: Thought content normal.       Significant Labs: All pertinent labs within the past 24 hours have been reviewed.    Significant Imaging: I have reviewed all pertinent imaging results/findings within the past 24 hours.    CC is Toe pain.  Assessment/Plan:      * Dry gangrene  -Left 3rd digit with findings c/w dry gangrene  -US b/l LE:  The vascular stent appears pain in left lower extremity.  Arterial atherosclerotic disease present, without evidence of occlusion.  -XR bilateral feet:  No acute fractures or dislocation.  Left 3rd digit with soft tissue abnormality  -ID consulted: IV abx discontinued 07/15. Monitor off abx.   -Blood cx with NGTD  -Podiatry and vascular consulted  -Podiatry plan for left 3rd toe amputation, pending vascular evaluation and cardiology procedure ,delayed duo to cardiac events.    NSTEMI (non-ST elevated myocardial infarction)  Had cardiac events,S/P [facility],for NSTEMI  LM/2V CAD  Severe LV dysfxn  Severe PAD s/p recent L SFA PTAS, needing L 3rd toe amputation +/- RLE angio  R rad vasband for hemostasis     Plan:  Cont med rx.  Cont ASA/Plavix/Statin.  Start entresto/toprol, titrate as BP/HR will allow.  Case d/w Dr. Wynn, will transfer to [facility] for CABG vs MV PCI eval.  Lifevest ordered.  Repeat echo in 3 months (mid Oct 2022) for reassessment of LVEF.  [place] [facility]      Ischemic cardiomyopathy  -Echo with EF of 30%, moderately decreased systolic function, grade 2 diastolic dysfunction, pulmonary hypertension, and moderate to severe global hypokinesis with WMA. ]  -Symptoms of orthopnea pedal edema  improved with Lasix IV x 1 o 07/16  -Overall, does not appear overloaded.   -Cardiology consulted: . LifeVest ordered      Dyslipidemia  Continue statin: atorvastatin 80mg qhs  Lipid panel: Chol 144,Trig 122, HDL 35, LDL 84    Hypokalemia  K 3.1 on 07/15  Replace as needed  Resolved 07/18    ACS (acute coronary syndrome)  -Overnight on 07/15, patient with SOB and diaphoresis. No CP, but admits later intermittent chest discomfort  -chest x-ray no acute process  -unable to find EKG from 7/15.  Per documentation, EKG reading sinus rhythm with fusion complexes and PACs  -Initial troponin 0.07, repeat trop was 0.114  -Echo with EF of 30%, moderately decreased systolic function, grade 2 diastolic dysfunction, pulmonary hypertension, and moderate to severe global hypokinesis with WMA.   -BNP elevated >900  -Cardiology consulted: plans for heart cath on 07/18. Continue lovenox, ASA, statin.       PAD (peripheral artery disease)  -s/p LLE angio with stent placement on 07/05 by Dr. Rich   -Consider RLE angio this admission since early signs of possible ulceration  -Mechanical thrombectomy is NOT indicated for patient  -Consulted Podiatry and Vascular surgery  -Continue ASA, Statin, Blood pressure reduction.    Hypertensive urgency  -SBP in the 180s in the ED  -Noted history of HTN, but patient states he has not been on medications  -Started Norvasc 10mg qd on 07/15  -BP improving  -Continue pain control       VTE Risk Mitigation (From admission, onward)         Ordered     enoxaparin injection 80 mg  Every 12 hours (non-standard times)         07/16/22 1205     IP VTE HIGH RISK PATIENT  Once         07/15/22 0438     Place sequential compression device  Until discontinued         07/15/22 0438     Reason for No Pharmacological VTE Prophylaxis  Once        Question:  Reasons:  Answer:  Physician Provided (leave comment)  Comment:  procedure in am    07/15/22 0438                Discharge Planning   RAPHAEL:      Code Status:  Full Code   Is the patient medically ready for discharge?:     Reason for patient still in hospital (select all that apply): Patient trending condition  Discharge Plan A: Home                  Juan Alberto Alas MD  Department of Hospital Medicine   [facility]

## 2022-07-19 NOTE — ASSESSMENT & PLAN NOTE
-Echo with EF of 30%, moderately decreased systolic function, grade 2 diastolic dysfunction, pulmonary hypertension, and moderate to severe global hypokinesis with WMA. ]  -Symptoms of orthopnea pedal edema improved with Lasix IV x 1 o 07/16  -Overall, does not appear overloaded.   -Cardiology consulted: . LifeVest ordered

## 2022-07-19 NOTE — PLAN OF CARE
Addended by: KARLIE CONTRERAS on: 11/18/2020 04:58 PM     Modules accepted: Orders     [facility] - Med Surg  Initial Discharge Assessment  Home alone, independent patient says. [facility]       Primary Care Provider: [facility]    Admission Diagnosis: Toe gangrene [I96]  Peripheral arterial disease [I73.9]  Failure of outpatient treatment [Z78.9]    Admission Date: 7/15/2022  Expected Discharge Date:     Discharge Barriers Identified: None    Payor: MEDICAID / Plan: [employer] [facility] / Product Type: Managed Medicaid /     Extended Emergency Contact Information  Primary Emergency Contact: CHRISTYREJI  Mobile Phone: [phone]  Relation: Roommate   needed? No  Secondary Emergency Contact: Ana Bangura  Work Phone: [phone]  Relation: Friend    Discharge Plan A: Home  Discharge Plan B: Home, Home Health      [id number] - [place], [place] - [address] AT [facility] [address]  [address]  [place] [address]  Phone: [phone] Fax: [phone]      Initial Assessment (most recent)     Adult Discharge Assessment - 07/18/22 1108        Discharge Assessment    Assessment Type Discharge Planning Assessment     Confirmed/corrected address, phone number and insurance Yes     Confirmed Demographics Correct on Facesheet     Source of Information patient     When was your last doctors appointment? 07/07/22     Communicated RAPHAEL with patient/caregiver Date not available/Unable to determine     Reason For Admission Toe gangrene     Lives With alone     Do you expect to return to your current living situation? Yes     Do you have help at home or someone to help you manage your care at home? Yes     Who are your caregiver(s) and their phone number(s)? Ana Bangura [phone]     Prior to hospitilization cognitive status: Alert/Oriented     Current cognitive status: Alert/Oriented     Walking or Climbing Stairs Difficulty none     Dressing/Bathing Difficulty none     Equipment  Currently Used at Home none     Readmission within 30 days? No     Patient currently being followed by outpatient case management? No     Do you currently have service(s) that help you manage your care at home? No     Do you have prescription coverage? Yes     Coverage MEDICAID - [facility] ([facility])     Do you have any problems affording any of your prescribed medications? TBD     Who is going to help you get home at discharge? Ana Bangura [phone]     How do you get to doctors appointments? family or friend will provide     Are you on dialysis? No     Do you take coumadin? No     Discharge Plan A Home     Discharge Plan B Home;Home Health     DME Needed Upon Discharge  other (see comments)   TBD    Discharge Plan discussed with: Patient     Discharge Barriers Identified None

## 2022-07-20 LAB
BACTERIA BLD CULT: NORMAL
BACTERIA BLD CULT: NORMAL
POCT GLUCOSE: 103 MG/DL (ref 70–110)
POCT GLUCOSE: 105 MG/DL (ref 70–110)
POCT GLUCOSE: 112 MG/DL (ref 70–110)
POCT GLUCOSE: 122 MG/DL (ref 70–110)

## 2022-07-20 PROCEDURE — 11000001 HC ACUTE MED/SURG PRIVATE ROOM

## 2022-07-20 PROCEDURE — 25000003 PHARM REV CODE 250: Performed by: INTERNAL MEDICINE

## 2022-07-20 PROCEDURE — 99233 SBSQ HOSP IP/OBS HIGH 50: CPT | Mod: ,,, | Performed by: INTERNAL MEDICINE

## 2022-07-20 PROCEDURE — 99233 PR SUBSEQUENT HOSPITAL CARE,LEVL III: ICD-10-PCS | Mod: ,,, | Performed by: INTERNAL MEDICINE

## 2022-07-20 RX ADMIN — HYDROCODONE BITARTRATE AND ACETAMINOPHEN 1 TABLET: 10; 325 TABLET ORAL at 08:07

## 2022-07-20 RX ADMIN — SACUBITRIL AND VALSARTAN 1 TABLET: 24; 26 TABLET, FILM COATED ORAL at 08:07

## 2022-07-20 RX ADMIN — HYDROCODONE BITARTRATE AND ACETAMINOPHEN 1 TABLET: 10; 325 TABLET ORAL at 02:07

## 2022-07-20 RX ADMIN — ATORVASTATIN CALCIUM 80 MG: 40 TABLET, FILM COATED ORAL at 08:07

## 2022-07-20 RX ADMIN — CLOPIDOGREL BISULFATE 75 MG: 75 TABLET, FILM COATED ORAL at 08:07

## 2022-07-20 RX ADMIN — ASPIRIN 81 MG CHEWABLE TABLET 81 MG: 81 TABLET CHEWABLE at 08:07

## 2022-07-20 RX ADMIN — METOPROLOL SUCCINATE 25 MG: 25 TABLET, EXTENDED RELEASE ORAL at 08:07

## 2022-07-20 RX ADMIN — OXYCODONE 5 MG: 5 TABLET ORAL at 04:07

## 2022-07-20 RX ADMIN — HYDROCODONE BITARTRATE AND ACETAMINOPHEN 1 TABLET: 5; 325 TABLET ORAL at 02:07

## 2022-07-20 NOTE — ASSESSMENT & PLAN NOTE
Spontaneous SOB/diaphoresis (once with abx admin, once without abx admin).  Trop 0.07->0.11  EF 30% on echo  Above consistent with NSTEMI  Cath 7/18/22 revealed LM/2V CAD  Severe LV dysfxn  Severe PAD s/p recent L SFA PTAS, needing L 3rd toe amputation +/- RLE angio  R rad vasband for hemostasis  Cont med rx.  Cont ASA/Plavix/Statin.  Start entresto/toprol, titrate as BP/HR will allow.  Case d/w Dr. Wynn, will transfer to [facility] for CABG vs MV PCI eval.  Lifevest ordered.  Preop Carotid US and CT chest complete  Repeat echo in 3 months (late Oct 2022) for reassessment of LVEF.

## 2022-07-20 NOTE — NURSING
[facility]  Nurses Note -- 4 Eyes      7/20/2022       Skin assessed on: Wednesday       [] No Pressure Injuries Present    []Prevention Measures Documented    [] Yes LDA  for Pressure Injury Previously documented     [x] Yes New Pressure Injury Discovered   [x] LDA for New Pressure Injury Added      Attending RN:  Margarita Diaz RN     Second RN:  KARUNA Delaney

## 2022-07-20 NOTE — PROGRESS NOTES
"[facility]  Progress Note    Patient Name: Yo Pink  MRN: [id number]  Patient Class: IP- Inpatient   Admission Date: 7/15/2022  Length of Stay: 3 days  Attending Physician: Juan Alberto Alas MD  Primary Care Provider: [facility]        Subjective:     Principal Problem:Dry gangrene        HPI:  57 y.o. male PMHx PAD s.p vascular stent LLE presents with left foot pain x 2-3 days. Symptoms have been progressively getting worse. Patient states he was placed on antibiotics for dry gangrene and concern of infection.  He believes that his toe looks worse and he completed his antibiotics yesterday.  He was on call with virtual PCP who after assessment of toe told him to come to ED for further evaluation.  There is no report of fever, chills, CP, numbness and tingling.  He does state he stopped smoking a few weeks ago and he sometimes has to "clear his lungs"     ED course: Pain better after morphine. Found to be afebrile. WBC nl. Elevated ESR (33) and CRP (93.9). Bilateral foot xray shows Soft tissue abnormality of the L 3rd digit. Arterial US lower extremity  shows Interval placement of left lower extremity arterial vascular stent, the vascular stent appears patent.  The dorsalis pedis artery bilaterally demonstrates evidence for reversal of flow.  No evidence for occlusion.      Overview/Hospital Course:  58 y/o male PMH PAD h/o LLE angiogram on 07/05/2022 admitted on 07/15/2022 to observation for left 3rd toe gangrene that is worsening since angiogram procedure.  Ultrasound lower extremity without DVT.  Shows vascular stent appears patent.  X-ray of bilateral feet with no acute fractures or dislocations.  Soft tissue abnormality of the 3rd digit on the left foot noted.  Vascular podiatry consulted.       Patient started on IV abx but complained of burning sensation all over with infusion. Overnight on 07/15, rapid response called for SOB and diaphoresis. Stated that " it lasted briefly and then resolved.  Initial and subsequent Troponin elevated. Repeat CXR with no acute process. BNP elevated. Echo with EF of 30%, moderately decreased systolic function, grade 2 diastolic dysfunction, pulmonary hypertension, and moderate to severe global hypokinesis with WMA. Lasix IV given once with improvement. Cardiology consulted- Suspect ACS, continue on aspirin,plavix,and statin.  Plan for heart catheterization on 07/18.  ID consulted. IV abx discontinued. Awaiting vascular surgery recommendations. Podiatry plans for left 3rd toe amputation, pending vascular recs and cardiology procedure. Continue pain control and blood pressure control,  Had cardiac events,S/P [facility],for NSTEMI  LM/2V CAD  Severe LV dysfxn  Severe PAD s/p recent L SFA PTAS, needing L 3rd toe amputation +/- RLE angio  R rad vasband for hemostasis     Plan:  Cont med rx.  Cont ASA/Plavix/Statin.  Start entresto/toprol, titrate as BP/HR will allow.  Case d/w Dr. Wynn, will transfer to [facility] for CABG vs MV PCI eval.  Lifevest ordered.  Repeat echo in 3 months (mid Oct 2022) for reassessment of LVEF.  [place] [facility]  CC is toe pain.      Interval History:denies chest pain.  CC is Toe pain.    Review of Systems   Constitutional:  Positive for fatigue. Negative for chills, diaphoresis and fever.   HENT:  Negative for congestion and trouble swallowing.    Respiratory:  Negative for cough, chest tightness, shortness of breath (has significantly improved) and wheezing.         + orthopnea   Cardiovascular:  Negative for chest pain, palpitations and leg swelling (feet swelling resolved.).   Gastrointestinal:  Negative for abdominal distention, abdominal pain, blood in stool, diarrhea, nausea and vomiting.   Genitourinary:  Negative for difficulty urinating, dysuria and hematuria.   Musculoskeletal:  Positive for arthralgias and myalgias. Negative for joint swelling.   Skin:  Positive for wound.   Neurological:  Negative for  dizziness, weakness and headaches.   Psychiatric/Behavioral:  Negative for confusion, decreased concentration and hallucinations.      Objective:     Vital Signs (Most Recent):  Temp: 97.9 °F (36.6 °C) (07/20/22 0721)  Pulse: 73 (07/20/22 0721)  Resp: 18 (07/20/22 0808)  BP: 128/84 (07/20/22 0721)  SpO2: (!) 94 % (07/20/22 0721)   Vital Signs (24h Range):  Temp:  [97.6 °F (36.4 °C)-98.4 °F (36.9 °C)] 97.9 °F (36.6 °C)  Pulse:  [68-83] 73  Resp:  [17-18] 18  SpO2:  [94 %-98 %] 94 %  BP: (123-135)/(69-84) 128/84     Weight: 77.1 kg (170 lb)  Body mass index is 24.39 kg/m².    Intake/Output Summary (Last 24 hours) at 7/20/2022 0926  Last data filed at 7/20/2022 0721  Gross per 24 hour   Intake 240 ml   Output 700 ml   Net -460 ml        Physical Exam  Vitals and nursing note reviewed.   Constitutional:       General: He is not in acute distress.     Appearance: He is not ill-appearing.   HENT:      Head: Normocephalic and atraumatic.      Right Ear: External ear normal.      Left Ear: External ear normal.      Nose: Nose normal.      Mouth/Throat:      Mouth: Mucous membranes are moist.   Eyes:      Extraocular Movements: Extraocular movements intact.      Conjunctiva/sclera: Conjunctivae normal.   Cardiovascular:      Rate and Rhythm: Normal rate and regular rhythm.      Heart sounds: No murmur heard.    No gallop.   Pulmonary:      Effort: Pulmonary effort is normal. No respiratory distress.      Breath sounds: Normal breath sounds. No wheezing.   Abdominal:      General: There is no distension.      Palpations: Abdomen is soft.      Tenderness: There is no abdominal tenderness. There is no guarding.   Musculoskeletal:         General: No swelling or tenderness.      Cervical back: Normal range of motion.      Right lower leg: No edema.      Left lower leg: No edema.      Comments: Trace pedal edema resolved bilaterally. Left third toe findings c/w dry gangrene; no drainage, erythema or swelling. Bilateral feet with  dusky toes. Feet are cool to touch   Skin:     General: Skin is warm and dry.   Neurological:      General: No focal deficit present.      Mental Status: He is alert and oriented to person, place, and time.      Sensory: Sensory deficit (diminished sensation in bilateral feet) present.   Psychiatric:         Mood and Affect: Mood normal.         Behavior: Behavior normal.         Thought Content: Thought content normal.       Significant Labs: All pertinent labs within the past 24 hours have been reviewed.    Significant Imaging: I have reviewed all pertinent imaging results/findings within the past 24 hours.    CC is Toe pain.  Assessment/Plan:      * Dry gangrene  -Left 3rd digit with findings c/w dry gangrene  -US b/l LE:  The vascular stent appears pain in left lower extremity.  Arterial atherosclerotic disease present, without evidence of occlusion.  -XR bilateral feet:  No acute fractures or dislocation.  Left 3rd digit with soft tissue abnormality  -ID consulted: IV abx discontinued 07/15. Monitor off abx.   -Blood cx with NGTD  -Podiatry and vascular consulted  -Podiatry plan for left 3rd toe amputation, pending vascular evaluation and cardiology procedure ,delayed duo to cardiac events.    NSTEMI (non-ST elevated myocardial infarction)  Had cardiac events,S/P [facility],for NSTEMI  LM/2V CAD  Severe LV dysfxn  Severe PAD s/p recent L SFA PTAS, needing L 3rd toe amputation +/- RLE angio  R rad vasband for hemostasis     Plan:  Cont med rx.  Cont ASA/Plavix/Statin.  Start entresto/toprol, titrate as BP/HR will allow.  Case d/w Dr. Wynn, will transfer to [facility] for CABG vs MV PCI eval.  Lifevest ordered.  Repeat echo in 3 months (mid Oct 2022) for reassessment of LVEF.  [place] [facility] [place]      Ischemic cardiomyopathy  -Echo with EF of 30%, moderately decreased systolic function, grade 2 diastolic dysfunction, pulmonary hypertension, and moderate to severe global hypokinesis with WMA. ]  -Symptoms of orthopnea  pedal edema improved with Lasix IV x 1 o 07/16  -Overall, does not appear overloaded.   -Cardiology consulted: . LifeVest ordered      Dyslipidemia  Continue statin: atorvastatin 80mg qhs  Lipid panel: Chol 144,Trig 122, HDL 35, LDL 84    Hypokalemia  K 3.1 on 07/15  Replace as needed  Resolved 07/18    ACS (acute coronary syndrome)  -Overnight on 07/15, patient with SOB and diaphoresis. No CP, but admits later intermittent chest discomfort  -chest x-ray no acute process  -unable to find EKG from 7/15.  Per documentation, EKG reading sinus rhythm with fusion complexes and PACs  -Initial troponin 0.07, repeat trop was 0.114  -Echo with EF of 30%, moderately decreased systolic function, grade 2 diastolic dysfunction, pulmonary hypertension, and moderate to severe global hypokinesis with WMA.   -BNP elevated >900  -Cardiology consulted: plans for heart cath on 07/18. Continue lovenox, ASA, statin.       PAD (peripheral artery disease)  -s/p LLE angio with stent placement on 07/05 by Dr. Rich   -Consider RLE angio this admission since early signs of possible ulceration  -Mechanical thrombectomy is NOT indicated for patient  -Consulted Podiatry and Vascular surgery  -Continue ASA, Statin, Blood pressure reduction.    Hypertensive urgency  -SBP in the 180s in the ED  -Noted history of HTN, but patient states he has not been on medications  -Started Norvasc 10mg qd on 07/15  -BP improving  -Continue pain control       VTE Risk Mitigation (From admission, onward)         Ordered     enoxaparin injection 80 mg  Every 12 hours (non-standard times)         07/16/22 1205     IP VTE HIGH RISK PATIENT  Once         07/15/22 0438     Place sequential compression device  Until discontinued         07/15/22 0438     Reason for No Pharmacological VTE Prophylaxis  Once        Question:  Reasons:  Answer:  Physician Provided (leave comment)  Comment:  procedure in am    07/15/22 0438                Discharge Planning   RAPHAEL:       Code Status: Full Code   Is the patient medically ready for discharge?:     Reason for patient still in hospital (select all that apply): Patient trending condition  Discharge Plan A: Home   Discharge Delays: None known at this time              Juan Alberto Alas MD  Department of Hospital Medicine   [facility]

## 2022-07-20 NOTE — ASSESSMENT & PLAN NOTE
Mgmt per IM/vasc/podiatry  Apparent plan for L 3rd toe amputation and RLE angio next week.  Will need to hold pending CABG eval  Cont ASA 81mg qd  Cont plavix 75mg qd  Cont atorva 80mg qhs  Check lipids/LFT 3 months (mid Oct 2022)

## 2022-07-20 NOTE — PROGRESS NOTES
[facility]  Cardiology  Progress Note    Patient Name: Yo Pink  MRN: [id number]  Admission Date: 7/15/2022  Hospital Length of Stay: 3 days  Code Status: Full Code   Attending Physician: Juan Alberto Alas MD   Primary Care Physician: [facility] [place]  Expected Discharge Date:   Principal Problem:Dry gangrene    Subjective:     Interval Hx: no cp/sob.  Resting comfortably.  Awaiting tx to [facility] for CABG eval.  Case d/w Dr. Villegas.    Tele: SR 70s, PVCs, NSVT (personally reviewed and interpreted)        Past Medical History:   Diagnosis Date    PAD (peripheral artery disease)        Past Surgical History:   Procedure Laterality Date    ANGIOGRAPHY OF LOWER EXTREMITY Left 2022    Procedure: Angiogram Extremity Unilateral;  Surgeon: Mehul Rich MD;  Location: [facility];  Service: Vascular;  Laterality: Left;  RN PREOP ON 22. BINAX ON 22---NEED CONSENT       Review of patient's allergies indicates:  No Known Allergies    No current facility-administered medications on file prior to encounter.     Current Outpatient Medications on File Prior to Encounter   Medication Sig    acetaminophen (TYLENOL) 500 MG tablet Take 1,000 mg by mouth every 6 (six) hours as needed for Pain.    amoxicillin-clavulanate 875-125mg (AUGMENTIN) 875-125 mg per tablet Take 1 tablet by mouth every 12 (twelve) hours.    amoxicillin-clavulanate 875-125mg (AUGMENTIN) 875-125 mg per tablet Take 1 tablet by mouth every 12 (twelve) hours. for 7 days    clopidogreL (PLAVIX) 75 mg tablet Take 1 tablet (75 mg total) by mouth once daily.    oxyCODONE-acetaminophen (PERCOCET) 5-325 mg per tablet Take 1 tablet by mouth every 4 (four) hours as needed for Pain.     Family History    None       Tobacco Use    Smoking status: Former Smoker     Quit date: 2022     Years since quittin.1    Smokeless tobacco: Never Used   Substance and Sexual Activity    Alcohol use: Never    Drug use: Not Currently     Sexual activity: Not on file     Review of Systems   Gastrointestinal:  Negative for melena.   Genitourinary:  Negative for hematuria.   Objective:     Vital Signs (Most Recent):  Temp: 97.9 °F (36.6 °C) (07/20/22 0721)  Pulse: 73 (07/20/22 0721)  Resp: 18 (07/20/22 0721)  BP: 128/84 (07/20/22 0721)  SpO2: (!) 94 % (07/20/22 0721)   Vital Signs (24h Range):  Temp:  [97.6 °F (36.4 °C)-98.4 °F (36.9 °C)] 97.9 °F (36.6 °C)  Pulse:  [68-83] 73  Resp:  [17-18] 18  SpO2:  [94 %-98 %] 94 %  BP: (123-135)/(69-84) 128/84     Weight: 77.1 kg (170 lb)  Body mass index is 24.39 kg/m².    SpO2: (!) 94 %  O2 Device (Oxygen Therapy): room air      Intake/Output Summary (Last 24 hours) at 7/20/2022 0743  Last data filed at 7/19/2022 1720  Gross per 24 hour   Intake 480 ml   Output 1300 ml   Net -820 ml         Lines/Drains/Airways       Peripheral Intravenous Line  Duration                  Peripheral IV - Single Lumen 07/15/22 0325 20 G Left Forearm 5 days                  Exam unchanged vs 7/19/22  Physical Exam  Constitutional:       General: He is not in acute distress.     Appearance: He is well-developed. He is not ill-appearing, toxic-appearing or diaphoretic.   HENT:      Head: Normocephalic and atraumatic.   Eyes:      General: No scleral icterus.     Extraocular Movements: Extraocular movements intact.      Conjunctiva/sclera: Conjunctivae normal.      Pupils: Pupils are equal, round, and reactive to light.   Neck:      Thyroid: No thyromegaly.      Vascular: No JVD.      Trachea: No tracheal deviation.   Cardiovascular:      Rate and Rhythm: Normal rate and regular rhythm.      Pulses:           Carotid pulses are 2+ on the right side and 2+ on the left side.     Heart sounds: S1 normal and S2 normal. No murmur heard.    No friction rub. No gallop.      Comments: R rad access site c/d/I.  Pulmonary:      Effort: Pulmonary effort is normal. No respiratory distress.      Breath sounds: Normal breath sounds. No stridor.  No wheezing, rhonchi or rales.   Chest:      Chest wall: No tenderness.   Abdominal:      General: There is no distension.      Palpations: Abdomen is soft.   Musculoskeletal:         General: No swelling or tenderness. Normal range of motion.      Cervical back: Normal range of motion and neck supple. No rigidity.      Right lower leg: No edema.      Left lower leg: No edema.      Comments: L middle toe dry gangrene   Skin:     General: Skin is warm and dry.      Coloration: Skin is not jaundiced.   Neurological:      General: No focal deficit present.      Mental Status: He is alert and oriented to person, place, and time.      Cranial Nerves: No cranial nerve deficit.   Psychiatric:         Mood and Affect: Mood normal.         Behavior: Behavior normal.       Current Medications:   aspirin  81 mg Oral Daily    atorvastatin  80 mg Oral QHS    clopidogreL  75 mg Oral Daily    metoprolol succinate  25 mg Oral Daily    sacubitriL-valsartan  1 tablet Oral BID         acetaminophen, acetaminophen, atropine, dextrose 10%, dextrose 10%, glucagon (human recombinant), glucose, glucose, hydrALAZINE, HYDROcodone-acetaminophen, HYDROcodone-acetaminophen, HYDROcodone-acetaminophen, ibuprofen, insulin aspart U-100, melatonin, morphine, naloxone, nitroGLYCERIN, ondansetron, ondansetron, oxyCODONE, sodium chloride 0.9%, sodium chloride 0.9%    Laboratory (all labs reviewed):  CBC:  Recent Labs   Lab 07/15/22  0325 07/15/22  0548 07/16/22  0644 07/17/22  0354 07/19/22  0444   WBC 12.11 11.66 11.65 10.19 8.70   Hemoglobin 13.2 L 13.4 L 13.6 L 13.2 L 14.4   Hematocrit 37.3 L 38.0 L 39.1 L 39.4 L 41.4   Platelets 268 302 321 296 316         CHEMISTRIES:  Recent Labs   Lab 07/15/22  0325 07/15/22  0548 07/16/22  0644 07/17/22  0354 07/18/22  0510 07/19/22  0444   Glucose 180 H 130 H 133 H 127 H 126 H 101   Sodium 137 136 138 134 L 136 135 L   Potassium 3.6 3.1 L 3.7 3.3 L 3.5 3.5   BUN 17 14 10 16 17 12   Creatinine 0.8 0.7 0.6  0.7 0.6 0.6   eGFR if African American >60 >60 >60 >60 >60 >60   eGFR if non African American >60 >60 >60 >60 >60 >60   Calcium 9.1 9.0 9.3 9.0 8.7 9.0   Magnesium 1.9  --   --   --   --   --          CARDIAC BIOMARKERS:  Recent Labs   Lab 07/16/22  0101 07/16/22  0644   Troponin I 0.073 H 0.114 H         COAGS:        LIPIDS/LFTS:  Recent Labs   Lab 06/18/22  0103 07/15/22  0325 07/16/22  0644   Cholesterol  --   --  144   Triglycerides  --   --  122   HDL  --   --  35 L   LDL Cholesterol  --   --  84.6   Non-HDL Cholesterol  --   --  109   AST 27 21  --    ALT 21 13  --          BNP:  Recent Labs   Lab 07/15/22  2102    H         TSH:        Free T4:        Diagnostic Results:  ECG (personally reviewed and interpreted tracing(s)):  7/16/22 0038 (media tab) SR 93, PVCs, ASMI age indet    Chest X-Ray (personally reviewed and interpreted image(s)): 7/16/22 NAD    Carotid US 7/19/22  · There is 0-19% right Internal Carotid Stenosis.  · There is 0-19% left Internal Carotid Stenosis.    Cath: 7/18/22 (images personally reviewed and interpreted)  LVEDP: 14mmHg  LVEF: 30% by echo  Wall Motion: LAD WMA  Dominance: Right  LM: distal 60%, iFR 0.86  LAD: prox  after S1, distal vessel fills via L-L and R-L george  Ramus: large, MLI  LCx: large, mid 20-30%, no step-up with iFR pullback  RCA: mid , dist vessel fills via L-L and R-L george  Hemostasis:  R Radial band  Impression:  NSTEMI  LM/2V CAD  Severe LV dysfxn  Severe PAD s/p recent L SFA PTAS, needing L 3rd toe amputation +/- RLE angio  R rad vasband for hemostasis  Plan:  Cont med rx.  Cont ASA/Plavix/Statin.  Start entresto/toprol, titrate as BP/HR will allow.  Case d/w Dr. Wynn, will transfer to [facility] for CABG vs MV PCI eval.  Lifevest ordered.  Repeat echo in 3 months (mid Oct 2022) for reassessment of LVEF.    Echo: 7/16/22 (images prev personally reviewed and interpreted)  · The left ventricle is normal in size with moderate concentric hypertrophy and  moderately decreased systolic function.  · The estimated ejection fraction is 30%.  · Mod-severe global hypokinesis with LAD territory WMA.  · Grade II left ventricular diastolic dysfunction.  · Normal right ventricular size with normal right ventricular systolic function.  · Mild-to-moderate mitral regurgitation.  · Mild tricuspid regurgitation.  · The estimated PA systolic pressure is 62 mmHg.  · There is pulmonary hypertension.    LE art US 7/15/22  Interval placement of left lower extremity arterial vascular stent, the vascular stent appears patent.  The dorsalis pedis artery bilaterally demonstrates evidence for reversal of flow.  Otherwise the arterial vascular structures demonstrate evidence for arterial atherosclerotic disease, without evidence for occlusion.              Assessment and Plan:     * Dry gangrene  Mgmt per IM/vasc/podiatry  Apparent plan for L 3rd toe amputation and RLE angio next week.  Will need to hold pending CABG eval  Cont ASA 81mg qd  Cont plavix 75mg qd  Cont atorva 80mg qhs  Check lipids/LFT 3 months (mid Oct 2022)    Ischemic cardiomyopathy  EF 30%  Not grossly overloaded on exam  LAD WMA on echo    ACS (acute coronary syndrome)  Spontaneous SOB/diaphoresis (once with abx admin, once without abx admin).  Trop 0.07->0.11  EF 30% on echo  Above consistent with NSTEMI  Cath 7/18/22 revealed LM/2V CAD  Severe LV dysfxn  Severe PAD s/p recent L SFA PTAS, needing L 3rd toe amputation +/- RLE angio  R rad vasband for hemostasis  Cont med rx.  Cont ASA/Plavix/Statin.  Start entresto/toprol, titrate as BP/HR will allow.  Case d/w Dr. Wynn, will transfer to [facility] for CABG vs MV PCI eval.  Lifevest ordered.  Preop Carotid US and CT chest complete  Repeat echo in 3 months (late Oct 2022) for reassessment of LVEF.        Hypertensive urgency  Cont med rx  BP controlled    PAD (peripheral artery disease)  Mgmt per Dr. Rich (Vasc Surg) and Podiatry    Dyslipidemia  Cont atorva 80mg  qhs  Check lipids/LFT 3 months (mid Oct 2022)    NSTEMI (non-ST elevated myocardial infarction)  As per ACS section        VTE Risk Mitigation (From admission, onward)         Ordered     enoxaparin injection 80 mg  Every 12 hours (non-standard times)         07/16/22 1205     IP VTE HIGH RISK PATIENT  Once         07/15/22 0438     Place sequential compression device  Until discontinued         07/15/22 0438     Reason for No Pharmacological VTE Prophylaxis  Once        Question:  Reasons:  Answer:  Physician Provided (leave comment)  Comment:  procedure in am    07/15/22 0438                Noah Huffman MD  Cardiology  [facility] S[facility]

## 2022-07-20 NOTE — PROVIDER TRANSFER
Outside Transfer Acceptance Note / Regional Referral Center    Referring facility: [facility]  Referring provider: TARSHA SHARIF  Accepting facility: [facility]  Accepting provider: CHRIS UMAÑA  Reason for transfer:  Need Cardiothoracic Surgery  Transfer diagnosis: Ischemic cardiomyopathy  Transfer specialty requested: Cardiothoracic Surgery  Transfer specialty notified: yes  Transfer level: NUMBER 1-5: 2  Bed type requested: telemetry  Isolation status: No active isolations   Admission class or status: IP- Inpatient    Narrative     57-year-old male with history of peripheral artery disease with left lower extremity stenting admitted to [facility] on July 15 with pain in his left foot that had been worsening.  He was admitted with cellulitis of the foot with dry gangrene and hypertensive urgency.  He was treated with IV antibiotics.  Overnight on July 15 he had dyspnea and diaphoresis.  He was found have elevated troponin.  Echocardiogram had EF 30% with grade 2 diastolic dysfunction and pulmonary hypertension.  There was concern for ACS.  He underwent left heart catheterization on July 18 that showed coronary disease and EF 30%.  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  He is not currently requiring antibiotics.  He remains on aspirin, atorvastatin, Plavix, beta-blocker, and sacubitril/valsartan.  Referring team spoke with Cardiothoracic surgery at [facility].  Requesting transfer to Hospital Medicine at [facility] for evaluation of cardiac intervention options and continued treatment of peripheral artery disease/dry gangrene.  Referring provider noted patient has no current chest pain.    July 19:  Sodium 135, potassium 3.5, chloride 103, CO2 19, BUN 12, creatinine 0.6, glucose 101, white blood cells 8.7, hemoglobin 14.4,  hematocrit 41.4, platelets 316  -CT chest had no acute process seen.  Coronary artery calcification.    July 18:  Left heart catheterization noted EF 30% and LM/2V CAD.    July 16:  Blood cultures with no growth at 4 days, troponin 0.073 (repeat 0.114)    July 15:  COVID negative  -bilateral lower extremity Doppler arterial ultrasound noted interval placement of left lower extremity arterial vascular stent that appeared patent.  Dorsalis pedis artery bilaterally demonstrates evidence of reversal flow.  Otherwise arterial vascular structures demonstrate evidence for arterial atherosclerotic disease without evidence of occlusion.    Objective     Vitals: Temp: 97.5 °F (36.4 °C) (07/20/22 1137)  Pulse: 66 (07/20/22 1137)  Resp: 20 (07/20/22 1137)  BP: 116/70 (07/20/22 1137)  SpO2: 97 % (07/20/22 1137)  Recent Labs:   CBC:   Recent Labs   Lab 07/19/22  0444   WBC 8.70   HGB 14.4   HCT 41.4        CMP:   Recent Labs   Lab 07/19/22  0444   *   K 3.5      CO2 19*      BUN 12   CREATININE 0.6   CALCIUM 9.0   ANIONGAP 13   EGFRNONAA >60     Recent imaging: see above   IV access:        Peripheral IV - Single Lumen 07/15/22 0325 20 G Left Forearm (Active)   Site Assessment Clean;Dry;Intact;No redness 07/20/22 0800   Extremity Assessment Distal to IV No abnormal discoloration 07/20/22 0800   Line Status Flushed 07/18/22 2349   Dressing Status Clean;Dry;Intact 07/18/22 2349   Dressing Intervention Integrity maintained 07/18/22 0730   Dressing Change Due 07/19/22 07/20/22 0800   Site Change Due 07/19/22 07/19/22 0800     Allergies: Review of patient's allergies indicates:  No Known Allergies   NPO: No      Anticoagulation:   Anticoagulants     Ordered     Route Frequency Start Stop    07/16/22 1205  enoxaparin         SubQ Every 12 hours (non-standard times) 07/16/22 1315 07/17 1559           Instructions    1. Admit to Hospital Medicine      Upon patient arrival to floor, please send Secure[place] [facility] or call extension [phone] (if no answer, do NOT leave a callback number after the beep, rather please send a SecureChat to [facility]), for Hospital Medicine admit team assignment and for additional admit orders for the patient.  Do not page the attending physician associated with the patient on arrival (this physician may not be on duty at the time of arrival).  Rather, always send a SecureChat to [facility] or call [phone] to reach the triage physician for orders and team assignment.    GUS Astorga MD  Hospital Medicine Staff  Cell: [phone]

## 2022-07-20 NOTE — ASSESSMENT & PLAN NOTE
Had cardiac events,S/P [facility],for NSTEMI  LM/2V CAD  Severe LV dysfxn  Severe PAD s/p recent L SFA PTAS, needing L 3rd toe amputation +/- RLE angio  R rad vasband for hemostasis     Plan:  Cont med rx.  Cont ASA/Plavix/Statin.  Start entresto/toprol, titrate as BP/HR will allow.  Case d/w Dr. Wynn, will transfer to [facility] for CABG vs MV PCI eval.  Lifevest ordered.  Repeat echo in 3 months (mid Oct 2022) for reassessment of LVEF.  Pending transfer to [facility].

## 2022-07-20 NOTE — PLAN OF CARE
Problem: Adult Inpatient Plan of Care  Goal: Plan of Care Review  Outcome: Ongoing, Progressing  Flowsheets (Taken 7/20/2022 0815)  Plan of Care Reviewed With: patient  Goal: Patient-Specific Goal (Individualized)  Outcome: Ongoing, Progressing     Problem: Impaired Wound Healing  Goal: Optimal Wound Healing  Outcome: Ongoing, Progressing  Intervention: Promote Wound Healing  Flowsheets (Taken 7/20/2022 0815)  Oral Nutrition Promotion: physical activity promoted  Sleep/Rest Enhancement: awakenings minimized  Pain Management Interventions:   around-the-clock dosing utilized   awakened for pain meds per patient request   care clustered   quiet environment facilitated     Problem: Adult Inpatient Plan of Care  Goal: Plan of Care Review  Outcome: Ongoing, Progressing  Flowsheets (Taken 7/20/2022 0815)  Plan of Care Reviewed With: patient     Problem: Adult Inpatient Plan of Care  Goal: Plan of Care Review  Outcome: Ongoing, Progressing  Flowsheets (Taken 7/20/2022 0815)  Plan of Care Reviewed With: patient     Problem: Adult Inpatient Plan of Care  Goal: Patient-Specific Goal (Individualized)  Outcome: Ongoing, Progressing     Problem: Adult Inpatient Plan of Care  Goal: Patient-Specific Goal (Individualized)  Outcome: Ongoing, Progressing     Problem: Impaired Wound Healing  Goal: Optimal Wound Healing  Outcome: Ongoing, Progressing  Intervention: Promote Wound Healing  Flowsheets (Taken 7/20/2022 0815)  Oral Nutrition Promotion: physical activity promoted  Sleep/Rest Enhancement: awakenings minimized  Pain Management Interventions:   around-the-clock dosing utilized   awakened for pain meds per patient request   care clustered   quiet environment facilitated     Problem: Impaired Wound Healing  Goal: Optimal Wound Healing  Outcome: Ongoing, Progressing     Problem: Impaired Wound Healing  Goal: Optimal Wound Healing  Intervention: Promote Wound Healing  Flowsheets (Taken 7/20/2022 0815)  Oral Nutrition Promotion:  physical activity promoted  Sleep/Rest Enhancement: awakenings minimized  Pain Management Interventions:   around-the-clock dosing utilized   awakened for pain meds per patient request   care clustered   quiet environment facilitated     Problem: Impaired Wound Healing  Goal: Optimal Wound Healing  Intervention: Promote Wound Healing  Flowsheets (Taken 7/20/2022 0815)  Oral Nutrition Promotion: physical activity promoted  Sleep/Rest Enhancement: awakenings minimized  Pain Management Interventions:   around-the-clock dosing utilized   awakened for pain meds per patient request   care clustered   quiet environment facilitated

## 2022-07-20 NOTE — SUBJECTIVE & OBJECTIVE
Interval History:denies chest pain.  CC is Toe pain.    Review of Systems   Constitutional:  Positive for fatigue. Negative for chills, diaphoresis and fever.   HENT:  Negative for congestion and trouble swallowing.    Respiratory:  Negative for cough, chest tightness, shortness of breath (has significantly improved) and wheezing.         + orthopnea   Cardiovascular:  Negative for chest pain, palpitations and leg swelling (feet swelling resolved.).   Gastrointestinal:  Negative for abdominal distention, abdominal pain, blood in stool, diarrhea, nausea and vomiting.   Genitourinary:  Negative for difficulty urinating, dysuria and hematuria.   Musculoskeletal:  Positive for arthralgias and myalgias. Negative for joint swelling.   Skin:  Positive for wound.   Neurological:  Negative for dizziness, weakness and headaches.   Psychiatric/Behavioral:  Negative for confusion, decreased concentration and hallucinations.      Objective:     Vital Signs (Most Recent):  Temp: 97.9 °F (36.6 °C) (07/20/22 0721)  Pulse: 73 (07/20/22 0721)  Resp: 18 (07/20/22 0808)  BP: 128/84 (07/20/22 0721)  SpO2: (!) 94 % (07/20/22 0721)   Vital Signs (24h Range):  Temp:  [97.6 °F (36.4 °C)-98.4 °F (36.9 °C)] 97.9 °F (36.6 °C)  Pulse:  [68-83] 73  Resp:  [17-18] 18  SpO2:  [94 %-98 %] 94 %  BP: (123-135)/(69-84) 128/84     Weight: 77.1 kg (170 lb)  Body mass index is 24.39 kg/m².    Intake/Output Summary (Last 24 hours) at 7/20/2022 0926  Last data filed at 7/20/2022 0721  Gross per 24 hour   Intake 240 ml   Output 700 ml   Net -460 ml        Physical Exam  Vitals and nursing note reviewed.   Constitutional:       General: He is not in acute distress.     Appearance: He is not ill-appearing.   HENT:      Head: Normocephalic and atraumatic.      Right Ear: External ear normal.      Left Ear: External ear normal.      Nose: Nose normal.      Mouth/Throat:      Mouth: Mucous membranes are moist.   Eyes:      Extraocular Movements: Extraocular  movements intact.      Conjunctiva/sclera: Conjunctivae normal.   Cardiovascular:      Rate and Rhythm: Normal rate and regular rhythm.      Heart sounds: No murmur heard.    No gallop.   Pulmonary:      Effort: Pulmonary effort is normal. No respiratory distress.      Breath sounds: Normal breath sounds. No wheezing.   Abdominal:      General: There is no distension.      Palpations: Abdomen is soft.      Tenderness: There is no abdominal tenderness. There is no guarding.   Musculoskeletal:         General: No swelling or tenderness.      Cervical back: Normal range of motion.      Right lower leg: No edema.      Left lower leg: No edema.      Comments: Trace pedal edema resolved bilaterally. Left third toe findings c/w dry gangrene; no drainage, erythema or swelling. Bilateral feet with dusky toes. Feet are cool to touch   Skin:     General: Skin is warm and dry.   Neurological:      General: No focal deficit present.      Mental Status: He is alert and oriented to person, place, and time.      Sensory: Sensory deficit (diminished sensation in bilateral feet) present.   Psychiatric:         Mood and Affect: Mood normal.         Behavior: Behavior normal.         Thought Content: Thought content normal.       Significant Labs: All pertinent labs within the past 24 hours have been reviewed.    Significant Imaging: I have reviewed all pertinent imaging results/findings within the past 24 hours.

## 2022-07-20 NOTE — SUBJECTIVE & OBJECTIVE
Past Medical History:   Diagnosis Date    PAD (peripheral artery disease)        Past Surgical History:   Procedure Laterality Date    ANGIOGRAPHY OF LOWER EXTREMITY Left 2022    Procedure: Angiogram Extremity Unilateral;  Surgeon: Mehul Rich MD;  Location: [facility];  Service: Vascular;  Laterality: Left;  RN PREOP ON 22. BINAX ON 22---NEED CONSENT       Review of patient's allergies indicates:  No Known Allergies    No current facility-administered medications on file prior to encounter.     Current Outpatient Medications on File Prior to Encounter   Medication Sig    acetaminophen (TYLENOL) 500 MG tablet Take 1,000 mg by mouth every 6 (six) hours as needed for Pain.    amoxicillin-clavulanate 875-125mg (AUGMENTIN) 875-125 mg per tablet Take 1 tablet by mouth every 12 (twelve) hours.    amoxicillin-clavulanate 875-125mg (AUGMENTIN) 875-125 mg per tablet Take 1 tablet by mouth every 12 (twelve) hours. for 7 days    clopidogreL (PLAVIX) 75 mg tablet Take 1 tablet (75 mg total) by mouth once daily.    oxyCODONE-acetaminophen (PERCOCET) 5-325 mg per tablet Take 1 tablet by mouth every 4 (four) hours as needed for Pain.     Family History    None       Tobacco Use    Smoking status: Former Smoker     Quit date: 2022     Years since quittin.1    Smokeless tobacco: Never Used   Substance and Sexual Activity    Alcohol use: Never    Drug use: Not Currently    Sexual activity: Not on file     Review of Systems   Gastrointestinal:  Negative for melena.   Genitourinary:  Negative for hematuria.   Objective:     Vital Signs (Most Recent):  Temp: 97.9 °F (36.6 °C) (22)  Pulse: 73 (22)  Resp: 18 (22)  BP: 128/84 (22)  SpO2: (!) 94 % (22)   Vital Signs (24h Range):  Temp:  [97.6 °F (36.4 °C)-98.4 °F (36.9 °C)] 97.9 °F (36.6 °C)  Pulse:  [68-83] 73  Resp:  [17-18] 18  SpO2:  [94 %-98 %] 94 %  BP: (123-135)/(69-84) 128/84     Weight: 77.1 kg (170  lb)  Body mass index is 24.39 kg/m².    SpO2: (!) 94 %  O2 Device (Oxygen Therapy): room air      Intake/Output Summary (Last 24 hours) at 7/20/2022 0743  Last data filed at 7/19/2022 1720  Gross per 24 hour   Intake 480 ml   Output 1300 ml   Net -820 ml         Lines/Drains/Airways       Peripheral Intravenous Line  Duration                  Peripheral IV - Single Lumen 07/15/22 0325 20 G Left Forearm 5 days                  Exam unchanged vs 7/19/22  Physical Exam  Constitutional:       General: He is not in acute distress.     Appearance: He is well-developed. He is not ill-appearing, toxic-appearing or diaphoretic.   HENT:      Head: Normocephalic and atraumatic.   Eyes:      General: No scleral icterus.     Extraocular Movements: Extraocular movements intact.      Conjunctiva/sclera: Conjunctivae normal.      Pupils: Pupils are equal, round, and reactive to light.   Neck:      Thyroid: No thyromegaly.      Vascular: No JVD.      Trachea: No tracheal deviation.   Cardiovascular:      Rate and Rhythm: Normal rate and regular rhythm.      Pulses:           Carotid pulses are 2+ on the right side and 2+ on the left side.     Heart sounds: S1 normal and S2 normal. No murmur heard.    No friction rub. No gallop.      Comments: R rad access site c/d/I.  Pulmonary:      Effort: Pulmonary effort is normal. No respiratory distress.      Breath sounds: Normal breath sounds. No stridor. No wheezing, rhonchi or rales.   Chest:      Chest wall: No tenderness.   Abdominal:      General: There is no distension.      Palpations: Abdomen is soft.   Musculoskeletal:         General: No swelling or tenderness. Normal range of motion.      Cervical back: Normal range of motion and neck supple. No rigidity.      Right lower leg: No edema.      Left lower leg: No edema.      Comments: L middle toe dry gangrene   Skin:     General: Skin is warm and dry.      Coloration: Skin is not jaundiced.   Neurological:      General: No focal  deficit present.      Mental Status: He is alert and oriented to person, place, and time.      Cranial Nerves: No cranial nerve deficit.   Psychiatric:         Mood and Affect: Mood normal.         Behavior: Behavior normal.       Current Medications:   aspirin  81 mg Oral Daily    atorvastatin  80 mg Oral QHS    clopidogreL  75 mg Oral Daily    metoprolol succinate  25 mg Oral Daily    sacubitriL-valsartan  1 tablet Oral BID         acetaminophen, acetaminophen, atropine, dextrose 10%, dextrose 10%, glucagon (human recombinant), glucose, glucose, hydrALAZINE, HYDROcodone-acetaminophen, HYDROcodone-acetaminophen, HYDROcodone-acetaminophen, ibuprofen, insulin aspart U-100, melatonin, morphine, naloxone, nitroGLYCERIN, ondansetron, ondansetron, oxyCODONE, sodium chloride 0.9%, sodium chloride 0.9%    Laboratory (all labs reviewed):  CBC:  Recent Labs   Lab 07/15/22  0325 07/15/22  0548 07/16/22  0644 07/17/22  0354 07/19/22  0444   WBC 12.11 11.66 11.65 10.19 8.70   Hemoglobin 13.2 L 13.4 L 13.6 L 13.2 L 14.4   Hematocrit 37.3 L 38.0 L 39.1 L 39.4 L 41.4   Platelets 268 302 321 296 316         CHEMISTRIES:  Recent Labs   Lab 07/15/22  0325 07/15/22  0548 07/16/22  0644 07/17/22  0354 07/18/22  0510 07/19/22  0444   Glucose 180 H 130 H 133 H 127 H 126 H 101   Sodium 137 136 138 134 L 136 135 L   Potassium 3.6 3.1 L 3.7 3.3 L 3.5 3.5   BUN 17 14 10 16 17 12   Creatinine 0.8 0.7 0.6 0.7 0.6 0.6   eGFR if African American >60 >60 >60 >60 >60 >60   eGFR if non African American >60 >60 >60 >60 >60 >60   Calcium 9.1 9.0 9.3 9.0 8.7 9.0   Magnesium 1.9  --   --   --   --   --          CARDIAC BIOMARKERS:  Recent Labs   Lab 07/16/22  0101 07/16/22  0644   Troponin I 0.073 H 0.114 H         COAGS:        LIPIDS/LFTS:  Recent Labs   Lab 06/18/22  0103 07/15/22  0325 07/16/22  0644   Cholesterol  --   --  144   Triglycerides  --   --  122   HDL  --   --  35 L   LDL Cholesterol  --   --  84.6   Non-HDL Cholesterol  --   --  109    AST 27 21  --    ALT 21 13  --          BNP:  Recent Labs   Lab 07/15/22  2102    H         TSH:        Free T4:        Diagnostic Results:  ECG (personally reviewed and interpreted tracing(s)):  7/16/22 0038 (media tab) SR 93, PVCs, ASMI age indet    Chest X-Ray (personally reviewed and interpreted image(s)): 7/16/22 NAD    Carotid US 7/19/22  There is 0-19% right Internal Carotid Stenosis.  There is 0-19% left Internal Carotid Stenosis.    Cath: 7/18/22 (images personally reviewed and interpreted)  LVEDP: 14mmHg  LVEF: 30% by echo  Wall Motion: LAD WMA  Dominance: Right  LM: distal 60%, iFR 0.86  LAD: prox  after S1, distal vessel fills via L-L and R-L george  Ramus: large, MLI  LCx: large, mid 20-30%, no step-up with iFR pullback  RCA: mid , dist vessel fills via L-L and R-L george  Hemostasis:  R Radial band  Impression:  NSTEMI  LM/2V CAD  Severe LV dysfxn  Severe PAD s/p recent L SFA PTAS, needing L 3rd toe amputation +/- RLE angio  R rad vasband for hemostasis  Plan:  Cont med rx.  Cont ASA/Plavix/Statin.  Start entresto/toprol, titrate as BP/HR will allow.  Case d/w Dr. Wynn, will transfer to [facility] for CABG vs MV PCI eval.  Lifevest ordered.  Repeat echo in 3 months (mid Oct 2022) for reassessment of LVEF.    Echo: 7/16/22 (images prev personally reviewed and interpreted)  The left ventricle is normal in size with moderate concentric hypertrophy and moderately decreased systolic function.  The estimated ejection fraction is 30%.  Mod-severe global hypokinesis with LAD territory WMA.  Grade II left ventricular diastolic dysfunction.  Normal right ventricular size with normal right ventricular systolic function.  Mild-to-moderate mitral regurgitation.  Mild tricuspid regurgitation.  The estimated PA systolic pressure is 62 mmHg.  There is pulmonary hypertension.    LE art US 7/15/22  Interval placement of left lower extremity arterial vascular stent, the vascular stent appears patent.  The  dorsalis pedis artery bilaterally demonstrates evidence for reversal of flow.  Otherwise the arterial vascular structures demonstrate evidence for arterial atherosclerotic disease, without evidence for occlusion.

## 2022-07-21 LAB
POCT GLUCOSE: 103 MG/DL (ref 70–110)
POCT GLUCOSE: 118 MG/DL (ref 70–110)
POCT GLUCOSE: 138 MG/DL (ref 70–110)
POCT GLUCOSE: 154 MG/DL (ref 70–110)
POCT GLUCOSE: 191 MG/DL (ref 70–110)

## 2022-07-21 PROCEDURE — 99233 PR SUBSEQUENT HOSPITAL CARE,LEVL III: ICD-10-PCS | Mod: ,,, | Performed by: INTERNAL MEDICINE

## 2022-07-21 PROCEDURE — 99233 SBSQ HOSP IP/OBS HIGH 50: CPT | Mod: ,,, | Performed by: INTERNAL MEDICINE

## 2022-07-21 PROCEDURE — 25000003 PHARM REV CODE 250: Performed by: INTERNAL MEDICINE

## 2022-07-21 PROCEDURE — 99233 PR SUBSEQUENT HOSPITAL CARE,LEVL III: ICD-10-PCS | Mod: ,,, | Performed by: SURGERY

## 2022-07-21 PROCEDURE — 99233 SBSQ HOSP IP/OBS HIGH 50: CPT | Mod: ,,, | Performed by: SURGERY

## 2022-07-21 PROCEDURE — 94761 N-INVAS EAR/PLS OXIMETRY MLT: CPT

## 2022-07-21 PROCEDURE — 11000001 HC ACUTE MED/SURG PRIVATE ROOM

## 2022-07-21 RX ADMIN — CLOPIDOGREL BISULFATE 75 MG: 75 TABLET, FILM COATED ORAL at 09:07

## 2022-07-21 RX ADMIN — HYDROCODONE BITARTRATE AND ACETAMINOPHEN 1 TABLET: 10; 325 TABLET ORAL at 01:07

## 2022-07-21 RX ADMIN — METOPROLOL SUCCINATE 25 MG: 25 TABLET, EXTENDED RELEASE ORAL at 09:07

## 2022-07-21 RX ADMIN — ATORVASTATIN CALCIUM 80 MG: 40 TABLET, FILM COATED ORAL at 08:07

## 2022-07-21 RX ADMIN — ASPIRIN 81 MG CHEWABLE TABLET 81 MG: 81 TABLET CHEWABLE at 09:07

## 2022-07-21 RX ADMIN — SACUBITRIL AND VALSARTAN 2 TABLET: 24; 26 TABLET, FILM COATED ORAL at 09:07

## 2022-07-21 RX ADMIN — HYDROCODONE BITARTRATE AND ACETAMINOPHEN 1 TABLET: 10; 325 TABLET ORAL at 12:07

## 2022-07-21 RX ADMIN — HYDROCODONE BITARTRATE AND ACETAMINOPHEN 1 TABLET: 10; 325 TABLET ORAL at 06:07

## 2022-07-21 RX ADMIN — HYDROCODONE BITARTRATE AND ACETAMINOPHEN 1 TABLET: 10; 325 TABLET ORAL at 05:07

## 2022-07-21 RX ADMIN — SACUBITRIL AND VALSARTAN 2 TABLET: 24; 26 TABLET, FILM COATED ORAL at 08:07

## 2022-07-21 NOTE — ASSESSMENT & PLAN NOTE
Had cardiac events,S/P [place],for NSTEMI  LM/2V CAD  Severe LV dysfxn  Severe PAD s/p recent L SFA PTAS, needing L 3rd toe amputation +/- RLE angio  R rad vasband for hemostasis     Plan:  Cont med rx.  Cont ASA/Plavix/Statin.  Start entresto/toprol, titrate as BP/HR will allow.  Case d/w Dr. Wynn, will transfer to [facility] for CABG vs MV PCI eval.  Lifevest ordered.  Repeat echo in 3 months (mid Oct 2022) for reassessment of LVEF.   CT chest is done.no acute process.  [place] [facility] [place]

## 2022-07-21 NOTE — SUBJECTIVE & OBJECTIVE
Medications Prior to Admission   Medication Sig Dispense Refill Last Dose    acetaminophen (TYLENOL) 500 MG tablet Take 1,000 mg by mouth every 6 (six) hours as needed for Pain.       amoxicillin-clavulanate 875-125mg (AUGMENTIN) 875-125 mg per tablet Take 1 tablet by mouth every 12 (twelve) hours. 20 tablet 0     amoxicillin-clavulanate 875-125mg (AUGMENTIN) 875-125 mg per tablet Take 1 tablet by mouth every 12 (twelve) hours. for 7 days 14 tablet 0     clopidogreL (PLAVIX) 75 mg tablet Take 1 tablet (75 mg total) by mouth once daily. 30 tablet 11     oxyCODONE-acetaminophen (PERCOCET) 5-325 mg per tablet Take 1 tablet by mouth every 4 (four) hours as needed for Pain. 28 tablet 0        Review of patient's allergies indicates:  No Known Allergies    Past Medical History:   Diagnosis Date    PAD (peripheral artery disease)      Past Surgical History:   Procedure Laterality Date    ANGIOGRAPHY OF LOWER EXTREMITY Left 2022    Procedure: Angiogram Extremity Unilateral;  Surgeon: Mehul Rich MD;  Location: [facility] OR;  Service: Vascular;  Laterality: Left;  RN PREOP ON 22. BINAX ON 22---NEED CONSENT    LEFT HEART CATHETERIZATION Left 2022    Procedure: Left heart cath;  Surgeon: Noah Huffman MD;  Location: [facility] CATH LAB;  Service: Cardiology;  Laterality: Left;  not before 9am, R rad access     Family History    None       Tobacco Use    Smoking status: Former Smoker     Quit date: 2022     Years since quittin.1    Smokeless tobacco: Never Used   Substance and Sexual Activity    Alcohol use: Never    Drug use: Not Currently    Sexual activity: Not on file     Review of Systems   Constitutional:  Negative for chills and fever.   HENT:  Negative for congestion.    Eyes:  Negative for visual disturbance.   Respiratory:  Negative for shortness of breath.    Cardiovascular:  Negative for chest pain.   Gastrointestinal:  Negative for abdominal distention.   Endocrine:  Negative for cold intolerance.   Genitourinary:  Negative for flank pain.   Musculoskeletal:  Negative for back pain.   Skin:  Positive for color change and wound. Negative for pallor and rash.   Allergic/Immunologic: Negative for immunocompromised state.   Neurological:  Negative for dizziness.   Hematological:  Does not bruise/bleed easily.   Psychiatric/Behavioral:  Negative for agitation.    Objective:     Vital Signs (Most Recent):  Temp: 98.4 °F (36.9 °C) (07/21/22 1132)  Pulse: 73 (07/21/22 1132)  Resp: 20 (07/21/22 1132)  BP: 123/81 (07/21/22 1132)  SpO2: 99 % (07/21/22 1132) Vital Signs (24h Range):  Temp:  [97.5 °F (36.4 °C)-98.4 °F (36.9 °C)] 98.4 °F (36.9 °C)  Pulse:  [63-85] 73  Resp:  [16-20] 20  SpO2:  [96 %-99 %] 99 %  BP: (123-155)/(81-94) 123/81     Weight: 77.1 kg (170 lb)  Body mass index is 24.39 kg/m².    Physical Exam  Vitals reviewed.   Constitutional:       General: He is not in acute distress.     Appearance: He is well-developed. He is not diaphoretic.   HENT:      Head: Normocephalic and atraumatic.   Eyes:      Conjunctiva/sclera: Conjunctivae normal.   Cardiovascular:      Rate and Rhythm: Normal rate.      Pulses:           Femoral pulses are 2+ on the right side and 2+ on the left side.       Dorsalis pedis pulses are detected w/ Doppler on the right side and detected w/ Doppler on the left side.        Posterior tibial pulses are detected w/ Doppler on the right side and detected w/ Doppler on the left side.   Pulmonary:      Effort: Pulmonary effort is normal.   Abdominal:      General: There is no distension.      Palpations: Abdomen is soft. There is no mass.      Tenderness: There is no abdominal tenderness. There is no guarding or rebound.      Hernia: No hernia is present.   Musculoskeletal:         General: No deformity. Normal range of motion.      Cervical back: Neck supple.   Skin:     Findings: No rash.   Neurological:      Mental Status: He is alert and oriented to  person, place, and time.       Significant Labs:  All pertinent labs from the last 24 hours have been reviewed.    Significant Diagnostics:  I have reviewed all pertinent imaging results/findings within the past 24 hours.

## 2022-07-21 NOTE — PROGRESS NOTES
"[facility] Medicine  Progress Note    Patient Name: Yo Pink  MRN: [id number]  Patient Class: IP- Inpatient   Admission Date: 7/15/2022  Length of Stay: 4 days  Attending Physician: Juan Alberto Alas MD  Primary Care Provider: [facility]        Subjective:     Principal Problem:Dry gangrene        HPI:  57 y.o. male PMHx PAD s.p vascular stent LLE presents with left foot pain x 2-3 days. Symptoms have been progressively getting worse. Patient states he was placed on antibiotics for dry gangrene and concern of infection.  He believes that his toe looks worse and he completed his antibiotics yesterday.  He was on call with virtual PCP who after assessment of toe told him to come to ED for further evaluation.  There is no report of fever, chills, CP, numbness and tingling.  He does state he stopped smoking a few weeks ago and he sometimes has to "clear his lungs"     ED course: Pain better after morphine. Found to be afebrile. WBC nl. Elevated ESR (33) and CRP (93.9). Bilateral foot xray shows Soft tissue abnormality of the L 3rd digit. Arterial US lower extremity  shows Interval placement of left lower extremity arterial vascular stent, the vascular stent appears patent.  The dorsalis pedis artery bilaterally demonstrates evidence for reversal of flow.  No evidence for occlusion.      Overview/Hospital Course:  56 y/o male PMH PAD h/o LLE angiogram on 07/05/2022 admitted on 07/15/2022 to observation for left 3rd toe gangrene that is worsening since angiogram procedure.  Ultrasound lower extremity without DVT.  Shows vascular stent appears patent.  X-ray of bilateral feet with no acute fractures or dislocations.  Soft tissue abnormality of the 3rd digit on the left foot noted.  Vascular podiatry consulted.       Patient started on IV abx but complained of burning sensation all over with infusion. Overnight on 07/15, rapid response called for SOB and diaphoresis. Stated that " it lasted briefly and then resolved.  Initial and subsequent Troponin elevated. Repeat CXR with no acute process. BNP elevated. Echo with EF of 30%, moderately decreased systolic function, grade 2 diastolic dysfunction, pulmonary hypertension, and moderate to severe global hypokinesis with WMA. Lasix IV given once with improvement. Cardiology consulted- Suspect ACS, continue on aspirin,plavix,and statin.  Plan for heart catheterization on 07/18.  ID consulted. IV abx discontinued. Awaiting vascular surgery recommendations. Podiatry plans for left 3rd toe amputation, pending vascular recs and cardiology procedure. Continue pain control and blood pressure control,  Had cardiac events,S/P C,for NSTEMI  LM/2V CAD  Severe LV dysfxn  Severe PAD s/p recent L SFA PTAS, needing L 3rd toe amputation +/- RLE angio  R rad vasband for hemostasis     Plan:  Cont med rx.  Cont ASA/Plavix/Statin.  Start entresto/toprol, titrate as BP/HR will allow.  Case d/w Dr. Wynn, will transfer to [facility] for CABG vs MV PCI eval.  Lifevest ordered.  Repeat echo in 3 months (mid Oct 2022) for reassessment of LVEF.   CT chest is done.no acute process.  Pending transfer to [facility] [place].  CC is toe pain.      Interval History:denies chest pain.  CC is Toe pain.    Review of Systems   Constitutional:  Positive for fatigue. Negative for chills, diaphoresis and fever.   HENT:  Negative for congestion and trouble swallowing.    Respiratory:  Negative for cough, chest tightness, shortness of breath (has significantly improved) and wheezing.         + orthopnea   Cardiovascular:  Negative for chest pain, palpitations and leg swelling (feet swelling resolved.).   Gastrointestinal:  Negative for abdominal distention, abdominal pain, blood in stool, diarrhea, nausea and vomiting.   Genitourinary:  Negative for difficulty urinating, dysuria and hematuria.   Musculoskeletal:  Positive for arthralgias and myalgias. Negative for joint swelling.   Skin:  Positive for  wound.   Neurological:  Negative for dizziness, weakness and headaches.   Psychiatric/Behavioral:  Negative for confusion, decreased concentration and hallucinations.      Objective:     Vital Signs (Most Recent):  Temp: 97.6 °F (36.4 °C) (07/21/22 0735)  Pulse: 85 (07/21/22 0916)  Resp: 20 (07/21/22 0735)  BP: (!) 155/94 (07/21/22 0735)  SpO2: 96 % (07/21/22 0916)   Vital Signs (24h Range):  Temp:  [97.5 °F (36.4 °C)-97.8 °F (36.6 °C)] 97.6 °F (36.4 °C)  Pulse:  [63-85] 85  Resp:  [16-20] 20  SpO2:  [96 %-99 %] 96 %  BP: (116-155)/(70-94) 155/94     Weight: 77.1 kg (170 lb)  Body mass index is 24.39 kg/m².    Intake/Output Summary (Last 24 hours) at 7/21/2022 1115  Last data filed at 7/21/2022 0600  Gross per 24 hour   Intake 300 ml   Output 2000 ml   Net -1700 ml        Physical Exam  Vitals and nursing note reviewed.   Constitutional:       General: He is not in acute distress.     Appearance: He is not ill-appearing.   HENT:      Head: Normocephalic and atraumatic.      Right Ear: External ear normal.      Left Ear: External ear normal.      Nose: Nose normal.      Mouth/Throat:      Mouth: Mucous membranes are moist.   Eyes:      Extraocular Movements: Extraocular movements intact.      Conjunctiva/sclera: Conjunctivae normal.   Cardiovascular:      Rate and Rhythm: Normal rate and regular rhythm.      Heart sounds: No murmur heard.    No gallop.   Pulmonary:      Effort: Pulmonary effort is normal. No respiratory distress.      Breath sounds: Normal breath sounds. No wheezing.   Abdominal:      General: There is no distension.      Palpations: Abdomen is soft.      Tenderness: There is no abdominal tenderness. There is no guarding.   Musculoskeletal:         General: No swelling or tenderness.      Cervical back: Normal range of motion.      Right lower leg: No edema.      Left lower leg: No edema.      Comments: Trace pedal edema resolved bilaterally. Left third toe findings c/w dry gangrene; no drainage,  erythema or swelling. Bilateral feet with dusky toes. Feet are cool to touch   Skin:     General: Skin is warm and dry.   Neurological:      General: No focal deficit present.      Mental Status: He is alert and oriented to person, place, and time.      Sensory: Sensory deficit (diminished sensation in bilateral feet) present.   Psychiatric:         Mood and Affect: Mood normal.         Behavior: Behavior normal.         Thought Content: Thought content normal.       Significant Labs: All pertinent labs within the past 24 hours have been reviewed.    Significant Imaging: I have reviewed all pertinent imaging results/findings within the past 24 hours.    CC is toe pain.  Assessment/Plan:      * Dry gangrene  -Left 3rd digit with findings c/w dry gangrene  -US b/l LE:  The vascular stent appears pain in left lower extremity.  Arterial atherosclerotic disease present, without evidence of occlusion.  -XR bilateral feet:  No acute fractures or dislocation.  Left 3rd digit with soft tissue abnormality  -ID consulted: IV abx discontinued 07/15. Monitor off abx.   -Blood cx with NGTD  -Podiatry and vascular consulted  -Podiatry plan for left 3rd toe amputation, pending vascular evaluation and cardiology procedure ,delayed duo to cardiac events.    NSTEMI (non-ST elevated myocardial infarction)  Had cardiac events,S/P [facility],for NSTEMI  LM/2V CAD  Severe LV dysfxn  Severe PAD s/p recent L SFA PTAS, needing L 3rd toe amputation +/- RLE angio  R rad vasband for hemostasis     Plan:  Cont med rx.  Cont ASA/Plavix/Statin.  Start entresto/toprol, titrate as BP/HR will allow.  Case d/w Dr. Wynn, will transfer to [facility] for CABG vs MV PCI eval.  Lifevest ordered.  Repeat echo in 3 months (mid Oct 2022) for reassessment of LVEF.   CT chest is done.no acute process.  Pending transfer to [facility] [place].      Ischemic cardiomyopathy  -Echo with EF of 30%, moderately decreased systolic function, grade 2 diastolic dysfunction, pulmonary  hypertension, and moderate to severe global hypokinesis with WMA. ]  -Symptoms of orthopnea pedal edema improved with Lasix IV x 1 o 07/16  -Overall, does not appear overloaded.   -Cardiology consulted: . LifeVest ordered      Dyslipidemia  Continue statin: atorvastatin 80mg qhs  Lipid panel: Chol 144,Trig 122, HDL 35, LDL 84    Hypokalemia  K 3.1 on 07/15  Replace as needed  Resolved 07/18    ACS (acute coronary syndrome)  -Overnight on 07/15, patient with SOB and diaphoresis. No CP, but admits later intermittent chest discomfort  -chest x-ray no acute process  -unable to find EKG from 7/15.  Per documentation, EKG reading sinus rhythm with fusion complexes and PACs  -Initial troponin 0.07, repeat trop was 0.114  -Echo with EF of 30%, moderately decreased systolic function, grade 2 diastolic dysfunction, pulmonary hypertension, and moderate to severe global hypokinesis with WMA.   -BNP elevated >900  -Cardiology consulted: plans for heart cath on 07/18. Continue lovenox, ASA, statin.       PAD (peripheral artery disease)  -s/p LLE angio with stent placement on 07/05 by Dr. Rich   -Consider RLE angio this admission since early signs of possible ulceration  -Mechanical thrombectomy is NOT indicated for patient  -Consulted Podiatry and Vascular surgery  -Continue ASA, Statin, Blood pressure reduction.    Hypertensive urgency  -SBP in the 180s in the ED  -Noted history of HTN, but patient states he has not been on medications  -Started Norvasc 10mg qd on 07/15  -BP improving  -Continue pain control       VTE Risk Mitigation (From admission, onward)         Ordered     enoxaparin injection 80 mg  Every 12 hours (non-standard times)         07/16/22 1205     IP VTE HIGH RISK PATIENT  Once         07/15/22 0438     Place sequential compression device  Until discontinued         07/15/22 0438     Reason for No Pharmacological VTE Prophylaxis  Once        Question:  Reasons:  Answer:  Physician Provided (leave comment)   Comment:  procedure in am    07/15/22 0438                Discharge Planning   RAPHAEL:      Code Status: Full Code   Is the patient medically ready for discharge?:     Reason for patient still in hospital (select all that apply): Patient trending condition  Discharge Plan A: Home   Discharge Delays: None known at this time              Juan Alberto Alas MD  Department of Hospital Medicine   [facility] Surg

## 2022-07-21 NOTE — PROGRESS NOTES
Spoke with Dr. Wynn cardiothoracic surgery regarding patients plan of care. He would like to evaluate the patient at [facility] for further management of his cardiac disease. No acute toe invitation at this time. Will continue to evaluate for signs of local infection.

## 2022-07-21 NOTE — PROGRESS NOTES
[facility] Surg  Vascular Surgery  Progress Note    Patient Name: Yo Pink  MRN: [id number]  Admission Date: 7/15/2022  Primary Care Provider: [facility]    Subjective:     Interval History: No new complaints today    Post-Op Info:  Procedure(s) (LRB):  Left heart cath (Left)  Instantaneous Wave-Free Ratio (IFR) (N/A)   3 Days Post-Op     Medications Prior to Admission   Medication Sig Dispense Refill Last Dose    acetaminophen (TYLENOL) 500 MG tablet Take 1,000 mg by mouth every 6 (six) hours as needed for Pain.       amoxicillin-clavulanate 875-125mg (AUGMENTIN) 875-125 mg per tablet Take 1 tablet by mouth every 12 (twelve) hours. 20 tablet 0     amoxicillin-clavulanate 875-125mg (AUGMENTIN) 875-125 mg per tablet Take 1 tablet by mouth every 12 (twelve) hours. for 7 days 14 tablet 0     clopidogreL (PLAVIX) 75 mg tablet Take 1 tablet (75 mg total) by mouth once daily. 30 tablet 11     oxyCODONE-acetaminophen (PERCOCET) 5-325 mg per tablet Take 1 tablet by mouth every 4 (four) hours as needed for Pain. 28 tablet 0        Review of patient's allergies indicates:  No Known Allergies    Past Medical History:   Diagnosis Date    PAD (peripheral artery disease)      Past Surgical History:   Procedure Laterality Date    ANGIOGRAPHY OF LOWER EXTREMITY Left 2022    Procedure: Angiogram Extremity Unilateral;  Surgeon: Mehul Rich MD;  Location: [facility] OR;  Service: Vascular;  Laterality: Left;  RN PREOP ON 22. BINAX ON 22---NEED CONSENT    LEFT HEART CATHETERIZATION Left 2022    Procedure: Left heart cath;  Surgeon: Noah Huffman MD;  Location: [facility] CATH LAB;  Service: Cardiology;  Laterality: Left;  not before 9am, R rad access     Family History    None       Tobacco Use    Smoking status: Former Smoker     Quit date: 2022     Years since quittin.1    Smokeless tobacco: Never Used   Substance and Sexual Activity    Alcohol use: Never    Drug use:  Not Currently    Sexual activity: Not on file     Review of Systems   Constitutional:  Negative for chills and fever.   HENT:  Negative for congestion.    Eyes:  Negative for visual disturbance.   Respiratory:  Negative for shortness of breath.    Cardiovascular:  Negative for chest pain.   Gastrointestinal:  Negative for abdominal distention.   Endocrine: Negative for cold intolerance.   Genitourinary:  Negative for flank pain.   Musculoskeletal:  Negative for back pain.   Skin:  Positive for color change and wound. Negative for pallor and rash.   Allergic/Immunologic: Negative for immunocompromised state.   Neurological:  Negative for dizziness.   Hematological:  Does not bruise/bleed easily.   Psychiatric/Behavioral:  Negative for agitation.    Objective:     Vital Signs (Most Recent):  Temp: 98.4 °F (36.9 °C) (07/21/22 1132)  Pulse: 73 (07/21/22 1132)  Resp: 20 (07/21/22 1132)  BP: 123/81 (07/21/22 1132)  SpO2: 99 % (07/21/22 1132) Vital Signs (24h Range):  Temp:  [97.5 °F (36.4 °C)-98.4 °F (36.9 °C)] 98.4 °F (36.9 °C)  Pulse:  [63-85] 73  Resp:  [16-20] 20  SpO2:  [96 %-99 %] 99 %  BP: (123-155)/(81-94) 123/81     Weight: 77.1 kg (170 lb)  Body mass index is 24.39 kg/m².    Physical Exam  Vitals reviewed.   Constitutional:       General: He is not in acute distress.     Appearance: He is well-developed. He is not diaphoretic.   HENT:      Head: Normocephalic and atraumatic.   Eyes:      Conjunctiva/sclera: Conjunctivae normal.   Cardiovascular:      Rate and Rhythm: Normal rate.      Pulses:           Femoral pulses are 2+ on the right side and 2+ on the left side.       Dorsalis pedis pulses are detected w/ Doppler on the right side and detected w/ Doppler on the left side.        Posterior tibial pulses are detected w/ Doppler on the right side and detected w/ Doppler on the left side.   Pulmonary:      Effort: Pulmonary effort is normal.   Abdominal:      General: There is no distension.      Palpations:  Abdomen is soft. There is no mass.      Tenderness: There is no abdominal tenderness. There is no guarding or rebound.      Hernia: No hernia is present.   Musculoskeletal:         General: No deformity. Normal range of motion.      Cervical back: Neck supple.   Skin:     Findings: No rash.   Neurological:      Mental Status: He is alert and oriented to person, place, and time.       Significant Labs:  All pertinent labs from the last 24 hours have been reviewed.    Significant Diagnostics:  I have reviewed all pertinent imaging results/findings within the past 24 hours.    Assessment/Plan:     * Dry gangrene  -Will perform L 3rd toe amputation as soon as safely able.  Subsequent RLE angiogram for critical limb ischemia.  Will d/w CT Surgery        Mehul Rich MD  Vascular Surgery  [facility]urg

## 2022-07-21 NOTE — PLAN OF CARE
Problem: Adult Inpatient Plan of Care  Goal: Plan of Care Review  Outcome: Ongoing, Progressing  Goal: Optimal Comfort and Wellbeing  Outcome: Ongoing, Progressing     Problem: Fall Injury Risk  Goal: Absence of Fall and Fall-Related Injury  Outcome: Ongoing, Progressing   Pt free from falls, injury or any further trauma throughout shift.Continued medications as ordered. Complaints of pain, prn medication given. Pt in no distress. Will cont to monitor.

## 2022-07-21 NOTE — ASSESSMENT & PLAN NOTE
Spontaneous SOB/diaphoresis (once with abx admin, once without abx admin).  Trop 0.07->0.11  EF 30% on echo  Above consistent with NSTEMI  Cath 7/18/22 revealed LM/2V CAD  Severe LV dysfxn  Severe PAD s/p recent L SFA PTAS, needing L 3rd toe amputation +/- RLE angio  R rad vasband for hemostasis  Cont med rx.  Cont ASA/Plavix/Statin.  Titrate entresto  Cont toprol, titrate as BP/HR will allow.  Case d/w Dr. Wynn, will transfer to [facility] for CABG vs MV PCI eval.  Awaiting bed.  Lifevest ordered.  Preop Carotid US and CT chest complete  Repeat echo in 3 months (late Oct 2022) for reassessment of LVEF.

## 2022-07-21 NOTE — HPI
Mr. Pink is a 57 year old male with a PMH of HTN, HLD, PAD, and CAD who presented to outside hospital 7/23 with complaints of continued issues with bilateral foot wounds. He states that these wounds have been present for months, but have continued to worsen. Most notably his left 3rd toe has evidence of dry gangrene which he has seen both Podiatry and Vascular Surgery teams for. Most recently he underwent angiogram with angioplasty of the LLE on 7/05/22. Since that time he has continued wound care to the area with dry betadine paint as instructed by his podiatrist. As her was being evaluated for his foot wound, he was noted to have an episode of shortness of breath which was investigated by the ED team. After evaluation he was found to have multivessel CAD and evidence of an acute myocardial infarction with decrease EF. [place] [facility] [place]. Vascular Surgery consulted for continued care of his PAD and foot wounds.

## 2022-07-21 NOTE — CONSULTS
[facility] Surg  Vascular Surgery  Consult Note    Inpatient consult to Vascular Surgery  Consult performed by: Mehul Rich MD  Consult ordered by: Noah Huffman MD        Subjective:     Chief Complaint/Reason for Admission: BLE wounds    History of Present Illness:             Mehul Rich MD [facility]                         07/21/2022    HPI:  oY Pink is a 57 y.o. male with   Patient Active Problem List   Diagnosis    Dry gangrene    Hypertensive urgency    PAD (peripheral artery disease)    ACS (acute coronary syndrome)    Hypokalemia    Dyslipidemia    Ischemic cardiomyopathy    NSTEMI (non-ST elevated myocardial infarction)    being managed by PCP and specialists who is here today for evaluation of bilateral toe wounds.  Patient states location is bilateral feet occurring for months.  Associated signs and symptoms include discoloration and pain.  Quality is dull and severity is 6/10.  Symptoms began mo ago.  Alleviating factors include wound care.  Worsening factors include pressure.  Cardiology evaluated patient and is planning heart cath 7/18/22.    no MI  no Stroke  Tobacco use: daily    Past Medical History:   Diagnosis Date    PAD (peripheral artery disease)      Past Surgical History:   Procedure Laterality Date    ANGIOGRAPHY OF LOWER EXTREMITY Left 7/5/2022    Procedure: Angiogram Extremity Unilateral;  Surgeon: Mehul Rich MD;  Location: [facility] OR;  Service: Vascular;  Laterality: Left;  RN PREOP ON 7/1/22. BINAX ON 7/5/22---NEED CONSENT    LEFT HEART CATHETERIZATION Left 7/18/2022    Procedure: Left heart cath;  Surgeon: Noah Huffman MD;  Location: [facility] CATH LAB;  Service: Cardiology;  Laterality: Left;  not before 9am, R rad access     History reviewed. No pertinent family history.  Social History     Socioeconomic History    Marital status: Single   Tobacco Use    Smoking status: Former Smoker      Quit date: 2022     Years since quittin.1    Smokeless tobacco: Never Used   Substance and Sexual Activity    Alcohol use: Never    Drug use: Not Currently       Current Facility-Administered Medications:     acetaminophen tablet 650 mg, 650 mg, Oral, Q4H PRN, Noah Huffman MD, 650 mg at 22 0949    acetaminophen tablet 650 mg, 650 mg, Oral, Q4H PRN, Noah Huffman MD    aspirin chewable tablet 81 mg, 81 mg, Oral, Daily, Noah Huffman MD, 81 mg at 22 0807    atorvastatin tablet 80 mg, 80 mg, Oral, QHS, Noah Huffman MD, 80 mg at 22 204    atropine injection 0.5 mg, 0.5 mg, Intravenous, PRN, Noah Huffman MD    clopidogreL tablet 75 mg, 75 mg, Oral, Daily, Noah Huffman MD, 75 mg at 22 0808    dextrose 10% bolus 125 mL, 12.5 g, Intravenous, PRN, Noah Huffman MD    dextrose 10% bolus 250 mL, 25 g, Intravenous, PRN, Noah Huffman MD    glucagon (human recombinant) injection 1 mg, 1 mg, Intramuscular, PRN, Noah Huffman MD    glucose chewable tablet 16 g, 16 g, Oral, PRN, Noah Huffman MD    glucose chewable tablet 24 g, 24 g, Oral, PRN, Noah Huffman MD    hydrALAZINE injection 10 mg, 10 mg, Intravenous, Q6H PRN, Noah Huffman MD    HYDROcodone-acetaminophen  mg per tablet 1 tablet, 1 tablet, Oral, Q4H PRN, Noah Huffman MD, 1 tablet at 22 0551    HYDROcodone-acetaminophen 5-325 mg per tablet 1 tablet, 1 tablet, Oral, Q6H PRN, Noah Huffman MD, 1 tablet at 22 0241    HYDROcodone-acetaminophen 5-325 mg per tablet 1 tablet, 1 tablet, Oral, Q4H PRN, Noah Huffman MD, 1 tablet at 22 0237    ibuprofen tablet 400 mg, 400 mg, Oral, Q6H PRN, Noah Huffman MD    insulin aspart U-100 pen 0-5 Units, 0-5 Units, Subcutaneous, QID (AC + HS) PRN, Noah Huffman MD    melatonin tablet 6 mg, 6 mg, Oral, Nightly PRN, Noah Huffman MD    metoprolol  succinate (TOPROL-XL) 24 hr tablet 25 mg, 25 mg, Oral, Daily, Noah Huffman MD, 25 mg at 07/20/22 0808    morphine injection 2 mg, 2 mg, Intravenous, Q10 Min PRN, Noah Huffman MD    naloxone 0.4 mg/mL injection 0.02 mg, 0.02 mg, Intravenous, PRN, Noah Huffman MD    nitroGLYCERIN SL tablet 0.4 mg, 0.4 mg, Sublingual, Q5 Min PRN, Noah Huffman MD    ondansetron disintegrating tablet 8 mg, 8 mg, Oral, Q8H PRN, Noah Huffman MD    ondansetron injection 4 mg, 4 mg, Intravenous, Q8H PRN, Noah Huffman MD    oxyCODONE immediate release tablet 5 mg, 5 mg, Oral, Q6H PRN, Noah Huffman MD, 5 mg at 07/20/22 0423    sacubitriL-valsartan 24-26 mg per tablet 2 tablet, 2 tablet, Oral, BID, Noah Huffman MD    sodium chloride 0.9% bolus 250 mL, 250 mL, Intravenous, PRN, Noah Huffman MD    sodium chloride 0.9% flush 10 mL, 10 mL, Intravenous, Q12H PRN, Noah Huffman MD        Medications Prior to Admission   Medication Sig Dispense Refill Last Dose    acetaminophen (TYLENOL) 500 MG tablet Take 1,000 mg by mouth every 6 (six) hours as needed for Pain.       amoxicillin-clavulanate 875-125mg (AUGMENTIN) 875-125 mg per tablet Take 1 tablet by mouth every 12 (twelve) hours. 20 tablet 0     amoxicillin-clavulanate 875-125mg (AUGMENTIN) 875-125 mg per tablet Take 1 tablet by mouth every 12 (twelve) hours. for 7 days 14 tablet 0     clopidogreL (PLAVIX) 75 mg tablet Take 1 tablet (75 mg total) by mouth once daily. 30 tablet 11     oxyCODONE-acetaminophen (PERCOCET) 5-325 mg per tablet Take 1 tablet by mouth every 4 (four) hours as needed for Pain. 28 tablet 0        Review of patient's allergies indicates:  No Known Allergies    Past Medical History:   Diagnosis Date    PAD (peripheral artery disease)      Past Surgical History:   Procedure Laterality Date    ANGIOGRAPHY OF LOWER EXTREMITY Left 7/5/2022    Procedure: Angiogram Extremity Unilateral;  Surgeon:  Mehul Rich MD;  Location: [facility] OR;  Service: Vascular;  Laterality: Left;  RN PREOP ON 22. BINAX ON 22---NEED CONSENT    LEFT HEART CATHETERIZATION Left 2022    Procedure: Left heart cath;  Surgeon: Noah Huffman MD;  Location: [facility] CATH LAB;  Service: Cardiology;  Laterality: Left;  not before 9am, R rad access     Family History    None       Tobacco Use    Smoking status: Former Smoker     Quit date: 2022     Years since quittin.1    Smokeless tobacco: Never Used   Substance and Sexual Activity    Alcohol use: Never    Drug use: Not Currently    Sexual activity: Not on file     Review of Systems   Constitutional:  Negative for chills and fever.   HENT:  Negative for congestion.    Eyes:  Negative for visual disturbance.   Respiratory:  Negative for shortness of breath.    Cardiovascular:  Negative for chest pain.   Gastrointestinal:  Negative for abdominal distention.   Endocrine: Negative for cold intolerance.   Genitourinary:  Negative for flank pain.   Musculoskeletal:  Negative for back pain.   Skin:  Positive for color change and wound. Negative for pallor and rash.   Allergic/Immunologic: Negative for immunocompromised state.   Neurological:  Negative for dizziness.   Hematological:  Does not bruise/bleed easily.   Psychiatric/Behavioral:  Negative for agitation.    Objective:     Vital Signs (Most Recent):  Temp: 98.4 °F (36.9 °C) (22 1132)  Pulse: 73 (22 1132)  Resp: 20 (22 1132)  BP: 123/81 (22 1132)  SpO2: 99 % (22 1132) Vital Signs (24h Range):  Temp:  [97.5 °F (36.4 °C)-98.4 °F (36.9 °C)] 98.4 °F (36.9 °C)  Pulse:  [63-85] 73  Resp:  [16-20] 20  SpO2:  [96 %-99 %] 99 %  BP: (123-155)/(81-94) 123/81     Weight: 77.1 kg (170 lb)  Body mass index is 24.39 kg/m².    Physical Exam  Vitals reviewed.   Constitutional:       General: He is not in acute distress.     Appearance: He is well-developed. He is not diaphoretic.   HENT:       Head: Normocephalic and atraumatic.   Eyes:      Conjunctiva/sclera: Conjunctivae normal.   Cardiovascular:      Rate and Rhythm: Normal rate.      Pulses:           Femoral pulses are 2+ on the right side and 2+ on the left side.       Dorsalis pedis pulses are detected w/ Doppler on the right side and detected w/ Doppler on the left side.        Posterior tibial pulses are detected w/ Doppler on the right side and detected w/ Doppler on the left side.   Pulmonary:      Effort: Pulmonary effort is normal.   Abdominal:      General: There is no distension.      Palpations: Abdomen is soft. There is no mass.      Tenderness: There is no abdominal tenderness. There is no guarding or rebound.      Hernia: No hernia is present.   Musculoskeletal:         General: No deformity. Normal range of motion.      Cervical back: Neck supple.   Skin:     Findings: No rash.   Neurological:      Mental Status: He is alert and oriented to person, place, and time.       Significant Labs:  All pertinent labs from the last 24 hours have been reviewed.    Significant Diagnostics:  I have reviewed all pertinent imaging results/findings within the past 24 hours.    Assessment/Plan:     * Dry gangrene  Will perform L 3rd toe amputation as soon as safely able.  Subsequent RLE angiogram for critical limb ischemia.        Thank you for your consult. I will follow-up with patient. Please contact us if you have any additional questions.    Mehul Rich MD  Vascular Surgery  [facility] Surg

## 2022-07-21 NOTE — SUBJECTIVE & OBJECTIVE
Past Medical History:   Diagnosis Date    PAD (peripheral artery disease)        Past Surgical History:   Procedure Laterality Date    ANGIOGRAPHY OF LOWER EXTREMITY Left 2022    Procedure: Angiogram Extremity Unilateral;  Surgeon: Mehul Rich MD;  Location: [facility] OR;  Service: Vascular;  Laterality: Left;  RN PREOP ON 22. BINAX ON 22---NEED CONSENT    LEFT HEART CATHETERIZATION Left 2022    Procedure: Left heart cath;  Surgeon: Noah Huffman MD;  Location: [facility] CATH LAB;  Service: Cardiology;  Laterality: Left;  not before 9am, R rad access       Review of patient's allergies indicates:  No Known Allergies    No current facility-administered medications on file prior to encounter.     Current Outpatient Medications on File Prior to Encounter   Medication Sig    acetaminophen (TYLENOL) 500 MG tablet Take 1,000 mg by mouth every 6 (six) hours as needed for Pain.    amoxicillin-clavulanate 875-125mg (AUGMENTIN) 875-125 mg per tablet Take 1 tablet by mouth every 12 (twelve) hours.    amoxicillin-clavulanate 875-125mg (AUGMENTIN) 875-125 mg per tablet Take 1 tablet by mouth every 12 (twelve) hours. for 7 days    clopidogreL (PLAVIX) 75 mg tablet Take 1 tablet (75 mg total) by mouth once daily.    oxyCODONE-acetaminophen (PERCOCET) 5-325 mg per tablet Take 1 tablet by mouth every 4 (four) hours as needed for Pain.     Family History    None       Tobacco Use    Smoking status: Former Smoker     Quit date: 2022     Years since quittin.1    Smokeless tobacco: Never Used   Substance and Sexual Activity    Alcohol use: Never    Drug use: Not Currently    Sexual activity: Not on file     Review of Systems   Gastrointestinal:  Negative for melena.   Genitourinary:  Negative for hematuria.   Objective:     Vital Signs (Most Recent):  Temp: 97.6 °F (36.4 °C) (22)  Pulse: 71 (22)  Resp: 20 (22)  BP: (!) 155/94 (22)  SpO2: 97 % (22)    Vital Signs (24h Range):  Temp:  [97.5 °F (36.4 °C)-97.8 °F (36.6 °C)] 97.6 °F (36.4 °C)  Pulse:  [63-72] 71  Resp:  [16-20] 20  SpO2:  [97 %-99 %] 97 %  BP: (116-155)/(70-94) 155/94     Weight: 77.1 kg (170 lb)  Body mass index is 24.39 kg/m².    SpO2: 97 %  O2 Device (Oxygen Therapy): room air      Intake/Output Summary (Last 24 hours) at 7/21/2022 0820  Last data filed at 7/21/2022 0600  Gross per 24 hour   Intake 300 ml   Output 2000 ml   Net -1700 ml         Lines/Drains/Airways       Peripheral Intravenous Line  Duration                  Peripheral IV - Single Lumen 07/15/22 0325 20 G Left Forearm 6 days                  Exam unchanged vs 7/20/22  Physical Exam  Constitutional:       General: He is not in acute distress.     Appearance: He is well-developed. He is not ill-appearing, toxic-appearing or diaphoretic.   HENT:      Head: Normocephalic and atraumatic.   Eyes:      General: No scleral icterus.     Extraocular Movements: Extraocular movements intact.      Conjunctiva/sclera: Conjunctivae normal.      Pupils: Pupils are equal, round, and reactive to light.   Neck:      Thyroid: No thyromegaly.      Vascular: No JVD.      Trachea: No tracheal deviation.   Cardiovascular:      Rate and Rhythm: Normal rate and regular rhythm.      Pulses:           Carotid pulses are 2+ on the right side and 2+ on the left side.     Heart sounds: S1 normal and S2 normal. No murmur heard.    No friction rub. No gallop.      Comments: R rad access site c/d/I.  Pulmonary:      Effort: Pulmonary effort is normal. No respiratory distress.      Breath sounds: Normal breath sounds. No stridor. No wheezing, rhonchi or rales.   Chest:      Chest wall: No tenderness.   Abdominal:      General: There is no distension.      Palpations: Abdomen is soft.   Musculoskeletal:         General: No swelling or tenderness. Normal range of motion.      Cervical back: Normal range of motion and neck supple. No rigidity.      Right lower leg: No  edema.      Left lower leg: No edema.      Comments: L middle toe dry gangrene   Skin:     General: Skin is warm and dry.      Coloration: Skin is not jaundiced.   Neurological:      General: No focal deficit present.      Mental Status: He is alert and oriented to person, place, and time.      Cranial Nerves: No cranial nerve deficit.   Psychiatric:         Mood and Affect: Mood normal.         Behavior: Behavior normal.       Current Medications:   aspirin  81 mg Oral Daily    atorvastatin  80 mg Oral QHS    clopidogreL  75 mg Oral Daily    metoprolol succinate  25 mg Oral Daily    sacubitriL-valsartan  1 tablet Oral BID         acetaminophen, acetaminophen, atropine, dextrose 10%, dextrose 10%, glucagon (human recombinant), glucose, glucose, hydrALAZINE, HYDROcodone-acetaminophen, HYDROcodone-acetaminophen, HYDROcodone-acetaminophen, ibuprofen, insulin aspart U-100, melatonin, morphine, naloxone, nitroGLYCERIN, ondansetron, ondansetron, oxyCODONE, sodium chloride 0.9%, sodium chloride 0.9%    Laboratory (all labs reviewed):  CBC:  Recent Labs   Lab 07/15/22  0325 07/15/22  0548 07/16/22  0644 07/17/22  0354 07/19/22  0444   WBC 12.11 11.66 11.65 10.19 8.70   Hemoglobin 13.2 L 13.4 L 13.6 L 13.2 L 14.4   Hematocrit 37.3 L 38.0 L 39.1 L 39.4 L 41.4   Platelets 268 302 321 296 316         CHEMISTRIES:  Recent Labs   Lab 07/15/22  0325 07/15/22  0548 07/16/22  0644 07/17/22  0354 07/18/22  0510 07/19/22  0444   Glucose 180 H 130 H 133 H 127 H 126 H 101   Sodium 137 136 138 134 L 136 135 L   Potassium 3.6 3.1 L 3.7 3.3 L 3.5 3.5   BUN 17 14 10 16 17 12   Creatinine 0.8 0.7 0.6 0.7 0.6 0.6   eGFR if African American >60 >60 >60 >60 >60 >60   eGFR if non African American >60 >60 >60 >60 >60 >60   Calcium 9.1 9.0 9.3 9.0 8.7 9.0   Magnesium 1.9  --   --   --   --   --          CARDIAC BIOMARKERS:  Recent Labs   Lab 07/16/22  0101 07/16/22  0644   Troponin I 0.073 H 0.114 H         COAGS:        LIPIDS/LFTS:  Recent Labs    Lab 06/18/22  0103 07/15/22  0325 07/16/22  0644   Cholesterol  --   --  144   Triglycerides  --   --  122   HDL  --   --  35 L   LDL Cholesterol  --   --  84.6   Non-HDL Cholesterol  --   --  109   AST 27 21  --    ALT 21 13  --          BNP:  Recent Labs   Lab 07/15/22  2102    H         TSH:        Free T4:        Diagnostic Results:  ECG (personally reviewed and interpreted tracing(s)):  7/16/22 0038 (media tab) SR 93, PVCs, ASMI age indet    Chest X-Ray (personally reviewed and interpreted image(s)): 7/16/22 NAD    Carotid US 7/19/22  There is 0-19% right Internal Carotid Stenosis.  There is 0-19% left Internal Carotid Stenosis.    Cath: 7/18/22 (images personally reviewed and interpreted)  LVEDP: 14mmHg  LVEF: 30% by echo  Wall Motion: LAD WMA  Dominance: Right  LM: distal 60%, iFR 0.86  LAD: prox  after S1, distal vessel fills via L-L and R-L george  Ramus: large, MLI  LCx: large, mid 20-30%, no step-up with iFR pullback  RCA: mid , dist vessel fills via L-L and R-L george  Hemostasis:  R Radial band  Impression:  NSTEMI  LM/2V CAD  Severe LV dysfxn  Severe PAD s/p recent L SFA PTAS, needing L 3rd toe amputation +/- RLE angio  R rad vasband for hemostasis  Plan:  Cont med rx.  Cont ASA/Plavix/Statin.  Start entresto/toprol, titrate as BP/HR will allow.  Case d/w Dr. Wynn, will transfer to [facility] for CABG vs MV PCI eval.  Lifevest ordered.  Repeat echo in 3 months (mid Oct 2022) for reassessment of LVEF.    Echo: 7/16/22 (images prev personally reviewed and interpreted)  The left ventricle is normal in size with moderate concentric hypertrophy and moderately decreased systolic function.  The estimated ejection fraction is 30%.  Mod-severe global hypokinesis with LAD territory WMA.  Grade II left ventricular diastolic dysfunction.  Normal right ventricular size with normal right ventricular systolic function.  Mild-to-moderate mitral regurgitation.  Mild tricuspid regurgitation.  The estimated PA  systolic pressure is 62 mmHg.  There is pulmonary hypertension.    LE art US 7/15/22  Interval placement of left lower extremity arterial vascular stent, the vascular stent appears patent.  The dorsalis pedis artery bilaterally demonstrates evidence for reversal of flow.  Otherwise the arterial vascular structures demonstrate evidence for arterial atherosclerotic disease, without evidence for occlusion.

## 2022-07-21 NOTE — SUBJECTIVE & OBJECTIVE
Medications Prior to Admission   Medication Sig Dispense Refill Last Dose    acetaminophen (TYLENOL) 500 MG tablet Take 1,000 mg by mouth every 6 (six) hours as needed for Pain.       amoxicillin-clavulanate 875-125mg (AUGMENTIN) 875-125 mg per tablet Take 1 tablet by mouth every 12 (twelve) hours. 20 tablet 0     amoxicillin-clavulanate 875-125mg (AUGMENTIN) 875-125 mg per tablet Take 1 tablet by mouth every 12 (twelve) hours. for 7 days 14 tablet 0     clopidogreL (PLAVIX) 75 mg tablet Take 1 tablet (75 mg total) by mouth once daily. 30 tablet 11     oxyCODONE-acetaminophen (PERCOCET) 5-325 mg per tablet Take 1 tablet by mouth every 4 (four) hours as needed for Pain. 28 tablet 0        Review of patient's allergies indicates:  No Known Allergies    Past Medical History:   Diagnosis Date    PAD (peripheral artery disease)      Past Surgical History:   Procedure Laterality Date    ANGIOGRAPHY OF LOWER EXTREMITY Left 2022    Procedure: Angiogram Extremity Unilateral;  Surgeon: Mehul Rich MD;  Location: [facility] OR;  Service: Vascular;  Laterality: Left;  RN PREOP ON 22. BINAX ON 22---NEED CONSENT    LEFT HEART CATHETERIZATION Left 2022    Procedure: Left heart cath;  Surgeon: Noah Huffman MD;  Location: [facility] CATH LAB;  Service: Cardiology;  Laterality: Left;  not before 9am, R rad access     Family History    None       Tobacco Use    Smoking status: Former Smoker     Quit date: 2022     Years since quittin.1    Smokeless tobacco: Never Used   Substance and Sexual Activity    Alcohol use: Never    Drug use: Not Currently    Sexual activity: Not on file     Review of Systems   Constitutional:  Negative for chills and fever.   HENT:  Negative for congestion.    Eyes:  Negative for visual disturbance.   Respiratory:  Negative for shortness of breath.    Cardiovascular:  Negative for chest pain.   Gastrointestinal:  Negative for abdominal distention.   Endocrine: Negative for cold  intolerance.   Genitourinary:  Negative for flank pain.   Musculoskeletal:  Negative for back pain.   Skin:  Positive for color change and wound. Negative for pallor and rash.   Allergic/Immunologic: Negative for immunocompromised state.   Neurological:  Negative for dizziness.   Hematological:  Does not bruise/bleed easily.   Psychiatric/Behavioral:  Negative for agitation.    Objective:     Vital Signs (Most Recent):  Temp: 98.4 °F (36.9 °C) (07/21/22 1132)  Pulse: 73 (07/21/22 1132)  Resp: 20 (07/21/22 1132)  BP: 123/81 (07/21/22 1132)  SpO2: 99 % (07/21/22 1132) Vital Signs (24h Range):  Temp:  [97.5 °F (36.4 °C)-98.4 °F (36.9 °C)] 98.4 °F (36.9 °C)  Pulse:  [63-85] 73  Resp:  [16-20] 20  SpO2:  [96 %-99 %] 99 %  BP: (123-155)/(81-94) 123/81     Weight: 77.1 kg (170 lb)  Body mass index is 24.39 kg/m².    Physical Exam  Vitals reviewed.   Constitutional:       General: He is not in acute distress.     Appearance: He is well-developed. He is not diaphoretic.   HENT:      Head: Normocephalic and atraumatic.   Eyes:      Conjunctiva/sclera: Conjunctivae normal.   Cardiovascular:      Rate and Rhythm: Normal rate.      Pulses:           Femoral pulses are 2+ on the right side and 2+ on the left side.       Dorsalis pedis pulses are detected w/ Doppler on the right side and detected w/ Doppler on the left side.        Posterior tibial pulses are detected w/ Doppler on the right side and detected w/ Doppler on the left side.   Pulmonary:      Effort: Pulmonary effort is normal.   Abdominal:      General: There is no distension.      Palpations: Abdomen is soft. There is no mass.      Tenderness: There is no abdominal tenderness. There is no guarding or rebound.      Hernia: No hernia is present.   Musculoskeletal:         General: No deformity. Normal range of motion.      Cervical back: Neck supple.   Skin:     Findings: No rash.   Neurological:      Mental Status: He is alert and oriented to person, place, and time.        Significant Labs:  All pertinent labs from the last 24 hours have been reviewed.    Significant Diagnostics:  I have reviewed all pertinent imaging results/findings within the past 24 hours.

## 2022-07-21 NOTE — SUBJECTIVE & OBJECTIVE
Interval History:denies chest pain.  CC is Toe pain.    Review of Systems   Constitutional:  Positive for fatigue. Negative for chills, diaphoresis and fever.   HENT:  Negative for congestion and trouble swallowing.    Respiratory:  Negative for cough, chest tightness, shortness of breath (has significantly improved) and wheezing.         + orthopnea   Cardiovascular:  Negative for chest pain, palpitations and leg swelling (feet swelling resolved.).   Gastrointestinal:  Negative for abdominal distention, abdominal pain, blood in stool, diarrhea, nausea and vomiting.   Genitourinary:  Negative for difficulty urinating, dysuria and hematuria.   Musculoskeletal:  Positive for arthralgias and myalgias. Negative for joint swelling.   Skin:  Positive for wound.   Neurological:  Negative for dizziness, weakness and headaches.   Psychiatric/Behavioral:  Negative for confusion, decreased concentration and hallucinations.      Objective:     Vital Signs (Most Recent):  Temp: 97.6 °F (36.4 °C) (07/21/22 0735)  Pulse: 85 (07/21/22 0916)  Resp: 20 (07/21/22 0735)  BP: (!) 155/94 (07/21/22 0735)  SpO2: 96 % (07/21/22 0916)   Vital Signs (24h Range):  Temp:  [97.5 °F (36.4 °C)-97.8 °F (36.6 °C)] 97.6 °F (36.4 °C)  Pulse:  [63-85] 85  Resp:  [16-20] 20  SpO2:  [96 %-99 %] 96 %  BP: (116-155)/(70-94) 155/94     Weight: 77.1 kg (170 lb)  Body mass index is 24.39 kg/m².    Intake/Output Summary (Last 24 hours) at 7/21/2022 1115  Last data filed at 7/21/2022 0600  Gross per 24 hour   Intake 300 ml   Output 2000 ml   Net -1700 ml        Physical Exam  Vitals and nursing note reviewed.   Constitutional:       General: He is not in acute distress.     Appearance: He is not ill-appearing.   HENT:      Head: Normocephalic and atraumatic.      Right Ear: External ear normal.      Left Ear: External ear normal.      Nose: Nose normal.      Mouth/Throat:      Mouth: Mucous membranes are moist.   Eyes:      Extraocular Movements: Extraocular  movements intact.      Conjunctiva/sclera: Conjunctivae normal.   Cardiovascular:      Rate and Rhythm: Normal rate and regular rhythm.      Heart sounds: No murmur heard.    No gallop.   Pulmonary:      Effort: Pulmonary effort is normal. No respiratory distress.      Breath sounds: Normal breath sounds. No wheezing.   Abdominal:      General: There is no distension.      Palpations: Abdomen is soft.      Tenderness: There is no abdominal tenderness. There is no guarding.   Musculoskeletal:         General: No swelling or tenderness.      Cervical back: Normal range of motion.      Right lower leg: No edema.      Left lower leg: No edema.      Comments: Trace pedal edema resolved bilaterally. Left third toe findings c/w dry gangrene; no drainage, erythema or swelling. Bilateral feet with dusky toes. Feet are cool to touch   Skin:     General: Skin is warm and dry.   Neurological:      General: No focal deficit present.      Mental Status: He is alert and oriented to person, place, and time.      Sensory: Sensory deficit (diminished sensation in bilateral feet) present.   Psychiatric:         Mood and Affect: Mood normal.         Behavior: Behavior normal.         Thought Content: Thought content normal.       Significant Labs: All pertinent labs within the past 24 hours have been reviewed.    Significant Imaging: I have reviewed all pertinent imaging results/findings within the past 24 hours.

## 2022-07-21 NOTE — ASSESSMENT & PLAN NOTE
-Will perform L 3rd toe amputation as soon as safely able.  Subsequent RLE angiogram for critical limb ischemia.  Will d/w CT Surgery

## 2022-07-21 NOTE — PROGRESS NOTES
[facility] Surg  Vascular Surgery  Progress Note    Patient Name: Yo Pink  MRN: [id number]  Admission Date: 7/15/2022  Primary Care Provider: [facility]    Subjective:     Interval History: resting comfortably    Post-Op Info:  Procedure(s) (LRB):  Left heart cath (Left)  Instantaneous Wave-Free Ratio (IFR) (N/A)   3 Days Post-Op     Medications Prior to Admission   Medication Sig Dispense Refill Last Dose    acetaminophen (TYLENOL) 500 MG tablet Take 1,000 mg by mouth every 6 (six) hours as needed for Pain.       amoxicillin-clavulanate 875-125mg (AUGMENTIN) 875-125 mg per tablet Take 1 tablet by mouth every 12 (twelve) hours. 20 tablet 0     amoxicillin-clavulanate 875-125mg (AUGMENTIN) 875-125 mg per tablet Take 1 tablet by mouth every 12 (twelve) hours. for 7 days 14 tablet 0     clopidogreL (PLAVIX) 75 mg tablet Take 1 tablet (75 mg total) by mouth once daily. 30 tablet 11     oxyCODONE-acetaminophen (PERCOCET) 5-325 mg per tablet Take 1 tablet by mouth every 4 (four) hours as needed for Pain. 28 tablet 0        Review of patient's allergies indicates:  No Known Allergies    Past Medical History:   Diagnosis Date    PAD (peripheral artery disease)      Past Surgical History:   Procedure Laterality Date    ANGIOGRAPHY OF LOWER EXTREMITY Left 2022    Procedure: Angiogram Extremity Unilateral;  Surgeon: Mehul Rich MD;  Location: [facility] OR;  Service: Vascular;  Laterality: Left;  RN PREOP ON 22. BINAX ON 22---NEED CONSENT    LEFT HEART CATHETERIZATION Left 2022    Procedure: Left heart cath;  Surgeon: Noah Huffman MD;  Location: [facility] CATH LAB;  Service: Cardiology;  Laterality: Left;  not before 9am, R rad access     Family History    None       Tobacco Use    Smoking status: Former Smoker     Quit date: 2022     Years since quittin.1    Smokeless tobacco: Never Used   Substance and Sexual Activity    Alcohol use: Never    Drug use: Not  Currently    Sexual activity: Not on file     Review of Systems   Constitutional:  Negative for chills and fever.   HENT:  Negative for congestion.    Eyes:  Negative for visual disturbance.   Respiratory:  Negative for shortness of breath.    Cardiovascular:  Negative for chest pain.   Gastrointestinal:  Negative for abdominal distention.   Endocrine: Negative for cold intolerance.   Genitourinary:  Negative for flank pain.   Musculoskeletal:  Negative for back pain.   Skin:  Positive for color change and wound. Negative for pallor and rash.   Allergic/Immunologic: Negative for immunocompromised state.   Neurological:  Negative for dizziness.   Hematological:  Does not bruise/bleed easily.   Psychiatric/Behavioral:  Negative for agitation.    Objective:     Vital Signs (Most Recent):  Temp: 98.4 °F (36.9 °C) (07/21/22 1132)  Pulse: 73 (07/21/22 1132)  Resp: 20 (07/21/22 1132)  BP: 123/81 (07/21/22 1132)  SpO2: 99 % (07/21/22 1132) Vital Signs (24h Range):  Temp:  [97.5 °F (36.4 °C)-98.4 °F (36.9 °C)] 98.4 °F (36.9 °C)  Pulse:  [63-85] 73  Resp:  [16-20] 20  SpO2:  [96 %-99 %] 99 %  BP: (123-155)/(81-94) 123/81     Weight: 77.1 kg (170 lb)  Body mass index is 24.39 kg/m².    Physical Exam  Vitals reviewed.   Constitutional:       General: He is not in acute distress.     Appearance: He is well-developed. He is not diaphoretic.   HENT:      Head: Normocephalic and atraumatic.   Eyes:      Conjunctiva/sclera: Conjunctivae normal.   Cardiovascular:      Rate and Rhythm: Normal rate.      Pulses:           Femoral pulses are 2+ on the right side and 2+ on the left side.       Dorsalis pedis pulses are detected w/ Doppler on the right side and detected w/ Doppler on the left side.        Posterior tibial pulses are detected w/ Doppler on the right side and detected w/ Doppler on the left side.   Pulmonary:      Effort: Pulmonary effort is normal.   Abdominal:      General: There is no distension.      Palpations:  Abdomen is soft. There is no mass.      Tenderness: There is no abdominal tenderness. There is no guarding or rebound.      Hernia: No hernia is present.   Musculoskeletal:         General: No deformity. Normal range of motion.      Cervical back: Neck supple.   Skin:     Findings: No rash.   Neurological:      Mental Status: He is alert and oriented to person, place, and time.       Significant Labs:  All pertinent labs from the last 24 hours have been reviewed.    Significant Diagnostics:  I have reviewed all pertinent imaging results/findings within the past 24 hours.    Assessment/Plan:     * Dry gangrene  -Will perform L 3rd toe amputation as soon as safely able.  Subsequent RLE angiogram for critical limb ischemia.  CT Surgery would like to eval patient at [facility] prior to toe amputation, if local infection develops will proceed with amputation        Mehul Rich MD  Vascular Surgery  [facility]

## 2022-07-21 NOTE — PROGRESS NOTES
[facility] Surg  Cardiology  Progress Note    Patient Name: Yo Pink  MRN: [id number]  Admission Date: 7/15/2022  Hospital Length of Stay: 4 days  Code Status: Full Code   Attending Physician: Juan Alberto Alas MD   Primary Care Physician: [facility] [place]  Expected Discharge Date:   Principal Problem:Dry gangrene    Subjective:     Interval Hx: no cp/sob.  Resting comfortably.  Awaiting tx to [facility] for CABG eval.    Tele: SR 70s, PVCs (personally reviewed and interpreted)        Past Medical History:   Diagnosis Date    PAD (peripheral artery disease)        Past Surgical History:   Procedure Laterality Date    ANGIOGRAPHY OF LOWER EXTREMITY Left 7/5/2022    Procedure: Angiogram Extremity Unilateral;  Surgeon: Mehul Rich MD;  Location: [facility] OR;  Service: Vascular;  Laterality: Left;  RN PREOP ON 7/1/22. BINAX ON 7/5/22---NEED CONSENT    LEFT HEART CATHETERIZATION Left 7/18/2022    Procedure: Left heart cath;  Surgeon: Noah Huffman MD;  Location: [facility] CATH LAB;  Service: Cardiology;  Laterality: Left;  not before 9am, R rad access       Review of patient's allergies indicates:  No Known Allergies    No current facility-administered medications on file prior to encounter.     Current Outpatient Medications on File Prior to Encounter   Medication Sig    acetaminophen (TYLENOL) 500 MG tablet Take 1,000 mg by mouth every 6 (six) hours as needed for Pain.    amoxicillin-clavulanate 875-125mg (AUGMENTIN) 875-125 mg per tablet Take 1 tablet by mouth every 12 (twelve) hours.    amoxicillin-clavulanate 875-125mg (AUGMENTIN) 875-125 mg per tablet Take 1 tablet by mouth every 12 (twelve) hours. for 7 days    clopidogreL (PLAVIX) 75 mg tablet Take 1 tablet (75 mg total) by mouth once daily.    oxyCODONE-acetaminophen (PERCOCET) 5-325 mg per tablet Take 1 tablet by mouth every 4 (four) hours as needed for Pain.     Family History    None       Tobacco Use    Smoking status: Former  Smoker     Quit date: 2022     Years since quittin.1    Smokeless tobacco: Never Used   Substance and Sexual Activity    Alcohol use: Never    Drug use: Not Currently    Sexual activity: Not on file     Review of Systems   Gastrointestinal:  Negative for melena.   Genitourinary:  Negative for hematuria.   Objective:     Vital Signs (Most Recent):  Temp: 97.6 °F (36.4 °C) (22 07)  Pulse: 71 (22 07)  Resp: 20 (22)  BP: (!) 155/94 (22)  SpO2: 97 % (22)   Vital Signs (24h Range):  Temp:  [97.5 °F (36.4 °C)-97.8 °F (36.6 °C)] 97.6 °F (36.4 °C)  Pulse:  [63-72] 71  Resp:  [16-20] 20  SpO2:  [97 %-99 %] 97 %  BP: (116-155)/(70-94) 155/94     Weight: 77.1 kg (170 lb)  Body mass index is 24.39 kg/m².    SpO2: 97 %  O2 Device (Oxygen Therapy): room air      Intake/Output Summary (Last 24 hours) at 2022 0820  Last data filed at 2022 0600  Gross per 24 hour   Intake 300 ml   Output 2000 ml   Net -1700 ml         Lines/Drains/Airways       Peripheral Intravenous Line  Duration                  Peripheral IV - Single Lumen 07/15/22 0325 20 G Left Forearm 6 days                  Exam unchanged vs 22  Physical Exam  Constitutional:       General: He is not in acute distress.     Appearance: He is well-developed. He is not ill-appearing, toxic-appearing or diaphoretic.   HENT:      Head: Normocephalic and atraumatic.   Eyes:      General: No scleral icterus.     Extraocular Movements: Extraocular movements intact.      Conjunctiva/sclera: Conjunctivae normal.      Pupils: Pupils are equal, round, and reactive to light.   Neck:      Thyroid: No thyromegaly.      Vascular: No JVD.      Trachea: No tracheal deviation.   Cardiovascular:      Rate and Rhythm: Normal rate and regular rhythm.      Pulses:           Carotid pulses are 2+ on the right side and 2+ on the left side.     Heart sounds: S1 normal and S2 normal. No murmur heard.    No friction rub. No  gallop.      Comments: R rad access site c/d/I.  Pulmonary:      Effort: Pulmonary effort is normal. No respiratory distress.      Breath sounds: Normal breath sounds. No stridor. No wheezing, rhonchi or rales.   Chest:      Chest wall: No tenderness.   Abdominal:      General: There is no distension.      Palpations: Abdomen is soft.   Musculoskeletal:         General: No swelling or tenderness. Normal range of motion.      Cervical back: Normal range of motion and neck supple. No rigidity.      Right lower leg: No edema.      Left lower leg: No edema.      Comments: L middle toe dry gangrene   Skin:     General: Skin is warm and dry.      Coloration: Skin is not jaundiced.   Neurological:      General: No focal deficit present.      Mental Status: He is alert and oriented to person, place, and time.      Cranial Nerves: No cranial nerve deficit.   Psychiatric:         Mood and Affect: Mood normal.         Behavior: Behavior normal.       Current Medications:   aspirin  81 mg Oral Daily    atorvastatin  80 mg Oral QHS    clopidogreL  75 mg Oral Daily    metoprolol succinate  25 mg Oral Daily    sacubitriL-valsartan  1 tablet Oral BID         acetaminophen, acetaminophen, atropine, dextrose 10%, dextrose 10%, glucagon (human recombinant), glucose, glucose, hydrALAZINE, HYDROcodone-acetaminophen, HYDROcodone-acetaminophen, HYDROcodone-acetaminophen, ibuprofen, insulin aspart U-100, melatonin, morphine, naloxone, nitroGLYCERIN, ondansetron, ondansetron, oxyCODONE, sodium chloride 0.9%, sodium chloride 0.9%    Laboratory (all labs reviewed):  CBC:  Recent Labs   Lab 07/15/22  0325 07/15/22  0548 07/16/22  0644 07/17/22  0354 07/19/22  0444   WBC 12.11 11.66 11.65 10.19 8.70   Hemoglobin 13.2 L 13.4 L 13.6 L 13.2 L 14.4   Hematocrit 37.3 L 38.0 L 39.1 L 39.4 L 41.4   Platelets 268 302 321 296 316         CHEMISTRIES:  Recent Labs   Lab 07/15/22  0325 07/15/22  0548 07/16/22  0644 07/17/22  0354 07/18/22  0510  07/19/22  0444   Glucose 180 H 130 H 133 H 127 H 126 H 101   Sodium 137 136 138 134 L 136 135 L   Potassium 3.6 3.1 L 3.7 3.3 L 3.5 3.5   BUN 17 14 10 16 17 12   Creatinine 0.8 0.7 0.6 0.7 0.6 0.6   eGFR if African American >60 >60 >60 >60 >60 >60   eGFR if non African American >60 >60 >60 >60 >60 >60   Calcium 9.1 9.0 9.3 9.0 8.7 9.0   Magnesium 1.9  --   --   --   --   --          CARDIAC BIOMARKERS:  Recent Labs   Lab 07/16/22  0101 07/16/22  0644   Troponin I 0.073 H 0.114 H         COAGS:        LIPIDS/LFTS:  Recent Labs   Lab 06/18/22  0103 07/15/22  0325 07/16/22  0644   Cholesterol  --   --  144   Triglycerides  --   --  122   HDL  --   --  35 L   LDL Cholesterol  --   --  84.6   Non-HDL Cholesterol  --   --  109   AST 27 21  --    ALT 21 13  --          BNP:  Recent Labs   Lab 07/15/22  2102    H         TSH:        Free T4:        Diagnostic Results:  ECG (personally reviewed and interpreted tracing(s)):  7/16/22 0038 (media tab) SR 93, PVCs, ASMI age indet    Chest X-Ray (personally reviewed and interpreted image(s)): 7/16/22 NAD    Carotid US 7/19/22  · There is 0-19% right Internal Carotid Stenosis.  · There is 0-19% left Internal Carotid Stenosis.    Cath: 7/18/22 (images personally reviewed and interpreted)  LVEDP: 14mmHg  LVEF: 30% by echo  Wall Motion: LAD WMA  Dominance: Right  LM: distal 60%, iFR 0.86  LAD: prox  after S1, distal vessel fills via L-L and R-L george  Ramus: large, MLI  LCx: large, mid 20-30%, no step-up with iFR pullback  RCA: mid , dist vessel fills via L-L and R-L george  Hemostasis:  R Radial band  Impression:  NSTEMI  LM/2V CAD  Severe LV dysfxn  Severe PAD s/p recent L SFA PTAS, needing L 3rd toe amputation +/- RLE angio  R rad vasband for hemostasis  Plan:  Cont med rx.  Cont ASA/Plavix/Statin.  Start entresto/toprol, titrate as BP/HR will allow.  Case d/w Dr. Wynn, will transfer to [facility] for CABG vs MV PCI eval.  Lifevest ordered.  Repeat echo in 3 months (mid  Oct 2022) for reassessment of LVEF.    Echo: 7/16/22 (images prev personally reviewed and interpreted)  · The left ventricle is normal in size with moderate concentric hypertrophy and moderately decreased systolic function.  · The estimated ejection fraction is 30%.  · Mod-severe global hypokinesis with LAD territory WMA.  · Grade II left ventricular diastolic dysfunction.  · Normal right ventricular size with normal right ventricular systolic function.  · Mild-to-moderate mitral regurgitation.  · Mild tricuspid regurgitation.  · The estimated PA systolic pressure is 62 mmHg.  · There is pulmonary hypertension.    LE art US 7/15/22  Interval placement of left lower extremity arterial vascular stent, the vascular stent appears patent.  The dorsalis pedis artery bilaterally demonstrates evidence for reversal of flow.  Otherwise the arterial vascular structures demonstrate evidence for arterial atherosclerotic disease, without evidence for occlusion.              Assessment and Plan:     * Dry gangrene  Mgmt per IM/vasc/podiatry  Apparent plan for L 3rd toe amputation and RLE angio next week.  Will need to hold pending CABG eval  Cont ASA 81mg qd  Cont plavix 75mg qd  Cont atorva 80mg qhs  Check lipids/LFT 3 months (mid Oct 2022)    Ischemic cardiomyopathy  EF 30%  Not grossly overloaded on exam  LAD WMA on echo  Titrate entresto    ACS (acute coronary syndrome)  Spontaneous SOB/diaphoresis (once with abx admin, once without abx admin).  Trop 0.07->0.11  EF 30% on echo  Above consistent with NSTEMI  Cath 7/18/22 revealed LM/2V CAD  Severe LV dysfxn  Severe PAD s/p recent L SFA PTAS, needing L 3rd toe amputation +/- RLE angio  R rad vasband for hemostasis  Cont med rx.  Cont ASA/Plavix/Statin.  Titrate entresto  Cont toprol, titrate as BP/HR will allow.  Case d/w Dr. Wynn, will transfer to [facility] for CABG vs MV PCI eval.  Awaiting bed.  Lifevest ordered.  Preop Carotid US and CT chest complete  Repeat echo in 3 months  (late Oct 2022) for reassessment of LVEF.        Hypertensive urgency  Cont med rx  BP controlled    PAD (peripheral artery disease)  Mgmt per Dr. Rich (Vasc Surg) and Podiatry    Dyslipidemia  Cont atorva 80mg qhs  Check lipids/LFT 3 months (mid Oct 2022)    NSTEMI (non-ST elevated myocardial infarction)  As per ACS section        VTE Risk Mitigation (From admission, onward)         Ordered     enoxaparin injection 80 mg  Every 12 hours (non-standard times)         07/16/22 1205     IP VTE HIGH RISK PATIENT  Once         07/15/22 0438     Place sequential compression device  Until discontinued         07/15/22 0438     Reason for No Pharmacological VTE Prophylaxis  Once        Question:  Reasons:  Answer:  Physician Provided (leave comment)  Comment:  procedure in am    07/15/22 0438                Noah Huffman MD  Cardiology  [facility] Surg

## 2022-07-21 NOTE — ASSESSMENT & PLAN NOTE
-Will perform L 3rd toe amputation as soon as safely able.  Subsequent RLE angiogram for critical limb ischemia.  CT Surgery would like to eval patient at [facility] prior to toe amputation, if local infection develops will proceed with amputation

## 2022-07-21 NOTE — SUBJECTIVE & OBJECTIVE
Medications Prior to Admission   Medication Sig Dispense Refill Last Dose    acetaminophen (TYLENOL) 500 MG tablet Take 1,000 mg by mouth every 6 (six) hours as needed for Pain.       amoxicillin-clavulanate 875-125mg (AUGMENTIN) 875-125 mg per tablet Take 1 tablet by mouth every 12 (twelve) hours. 20 tablet 0     amoxicillin-clavulanate 875-125mg (AUGMENTIN) 875-125 mg per tablet Take 1 tablet by mouth every 12 (twelve) hours. for 7 days 14 tablet 0     clopidogreL (PLAVIX) 75 mg tablet Take 1 tablet (75 mg total) by mouth once daily. 30 tablet 11     oxyCODONE-acetaminophen (PERCOCET) 5-325 mg per tablet Take 1 tablet by mouth every 4 (four) hours as needed for Pain. 28 tablet 0        Review of patient's allergies indicates:  No Known Allergies    Past Medical History:   Diagnosis Date    PAD (peripheral artery disease)      Past Surgical History:   Procedure Laterality Date    ANGIOGRAPHY OF LOWER EXTREMITY Left 2022    Procedure: Angiogram Extremity Unilateral;  Surgeon: Mehul Rich MD;  Location: [facility] OR;  Service: Vascular;  Laterality: Left;  RN PREOP ON 22. BINAX ON 22---NEED CONSENT    LEFT HEART CATHETERIZATION Left 2022    Procedure: Left heart cath;  Surgeon: Noah Huffman MD;  Location: [facility] CATH LAB;  Service: Cardiology;  Laterality: Left;  not before 9am, R rad access     Family History    None       Tobacco Use    Smoking status: Former Smoker     Quit date: 2022     Years since quittin.1    Smokeless tobacco: Never Used   Substance and Sexual Activity    Alcohol use: Never    Drug use: Not Currently    Sexual activity: Not on file     Review of Systems   Constitutional:  Negative for chills and fever.   HENT:  Negative for congestion.    Eyes:  Negative for visual disturbance.   Respiratory:  Negative for shortness of breath.    Cardiovascular:  Negative for chest pain.   Gastrointestinal:  Negative for abdominal distention.   Endocrine:  Negative for cold intolerance.   Genitourinary:  Negative for flank pain.   Musculoskeletal:  Negative for back pain.   Skin:  Positive for color change and wound. Negative for pallor and rash.   Allergic/Immunologic: Negative for immunocompromised state.   Neurological:  Negative for dizziness.   Hematological:  Does not bruise/bleed easily.   Psychiatric/Behavioral:  Negative for agitation.    Objective:     Vital Signs (Most Recent):  Temp: 98.4 °F (36.9 °C) (07/21/22 1132)  Pulse: 73 (07/21/22 1132)  Resp: 20 (07/21/22 1132)  BP: 123/81 (07/21/22 1132)  SpO2: 99 % (07/21/22 1132) Vital Signs (24h Range):  Temp:  [97.5 °F (36.4 °C)-98.4 °F (36.9 °C)] 98.4 °F (36.9 °C)  Pulse:  [63-85] 73  Resp:  [16-20] 20  SpO2:  [96 %-99 %] 99 %  BP: (123-155)/(81-94) 123/81     Weight: 77.1 kg (170 lb)  Body mass index is 24.39 kg/m².    Physical Exam  Vitals reviewed.   Constitutional:       General: He is not in acute distress.     Appearance: He is well-developed. He is not diaphoretic.   HENT:      Head: Normocephalic and atraumatic.   Eyes:      Conjunctiva/sclera: Conjunctivae normal.   Cardiovascular:      Rate and Rhythm: Normal rate.      Pulses:           Femoral pulses are 2+ on the right side and 2+ on the left side.       Dorsalis pedis pulses are detected w/ Doppler on the right side and detected w/ Doppler on the left side.        Posterior tibial pulses are detected w/ Doppler on the right side and detected w/ Doppler on the left side.   Pulmonary:      Effort: Pulmonary effort is normal.   Abdominal:      General: There is no distension.      Palpations: Abdomen is soft. There is no mass.      Tenderness: There is no abdominal tenderness. There is no guarding or rebound.      Hernia: No hernia is present.   Musculoskeletal:         General: No deformity. Normal range of motion.      Cervical back: Neck supple.   Skin:     Findings: No rash.   Neurological:      Mental Status: He is alert and oriented to  person, place, and time.       Significant Labs:  All pertinent labs from the last 24 hours have been reviewed.    Significant Diagnostics:  I have reviewed all pertinent imaging results/findings within the past 24 hours.

## 2022-07-22 LAB
ANION GAP SERPL CALC-SCNC: 10 MMOL/L (ref 8–16)
BUN SERPL-MCNC: 12 MG/DL (ref 6–20)
CALCIUM SERPL-MCNC: 9.4 MG/DL (ref 8.7–10.5)
CHLORIDE SERPL-SCNC: 103 MMOL/L (ref 95–110)
CO2 SERPL-SCNC: 24 MMOL/L (ref 23–29)
CREAT SERPL-MCNC: 0.7 MG/DL (ref 0.5–1.4)
EST. GFR  (AFRICAN AMERICAN): >60 ML/MIN/1.73 M^2
EST. GFR  (NON AFRICAN AMERICAN): >60 ML/MIN/1.73 M^2
GLUCOSE SERPL-MCNC: 115 MG/DL (ref 70–110)
POCT GLUCOSE: 109 MG/DL (ref 70–110)
POCT GLUCOSE: 121 MG/DL (ref 70–110)
POCT GLUCOSE: 128 MG/DL (ref 70–110)
POCT GLUCOSE: 136 MG/DL (ref 70–110)
POTASSIUM SERPL-SCNC: 3.7 MMOL/L (ref 3.5–5.1)
SODIUM SERPL-SCNC: 137 MMOL/L (ref 136–145)

## 2022-07-22 PROCEDURE — 25000003 PHARM REV CODE 250: Performed by: INTERNAL MEDICINE

## 2022-07-22 PROCEDURE — 36415 COLL VENOUS BLD VENIPUNCTURE: CPT | Performed by: INTERNAL MEDICINE

## 2022-07-22 PROCEDURE — 11000001 HC ACUTE MED/SURG PRIVATE ROOM

## 2022-07-22 PROCEDURE — 99233 PR SUBSEQUENT HOSPITAL CARE,LEVL III: ICD-10-PCS | Mod: ,,, | Performed by: INTERNAL MEDICINE

## 2022-07-22 PROCEDURE — 99233 SBSQ HOSP IP/OBS HIGH 50: CPT | Mod: ,,, | Performed by: INTERNAL MEDICINE

## 2022-07-22 PROCEDURE — 80048 BASIC METABOLIC PNL TOTAL CA: CPT | Performed by: INTERNAL MEDICINE

## 2022-07-22 RX ADMIN — SACUBITRIL AND VALSARTAN 2 TABLET: 24; 26 TABLET, FILM COATED ORAL at 08:07

## 2022-07-22 RX ADMIN — HYDROCODONE BITARTRATE AND ACETAMINOPHEN 1 TABLET: 10; 325 TABLET ORAL at 12:07

## 2022-07-22 RX ADMIN — HYDROCODONE BITARTRATE AND ACETAMINOPHEN 1 TABLET: 10; 325 TABLET ORAL at 06:07

## 2022-07-22 RX ADMIN — ATORVASTATIN CALCIUM 80 MG: 40 TABLET, FILM COATED ORAL at 09:07

## 2022-07-22 RX ADMIN — ASPIRIN 81 MG CHEWABLE TABLET 81 MG: 81 TABLET CHEWABLE at 08:07

## 2022-07-22 RX ADMIN — HYDROCODONE BITARTRATE AND ACETAMINOPHEN 1 TABLET: 10; 325 TABLET ORAL at 10:07

## 2022-07-22 RX ADMIN — METOPROLOL SUCCINATE 25 MG: 25 TABLET, EXTENDED RELEASE ORAL at 08:07

## 2022-07-22 RX ADMIN — CLOPIDOGREL BISULFATE 75 MG: 75 TABLET, FILM COATED ORAL at 08:07

## 2022-07-22 RX ADMIN — SACUBITRIL AND VALSARTAN 2 TABLET: 24; 26 TABLET, FILM COATED ORAL at 09:07

## 2022-07-22 RX ADMIN — HYDROCODONE BITARTRATE AND ACETAMINOPHEN 1 TABLET: 10; 325 TABLET ORAL at 04:07

## 2022-07-22 RX ADMIN — HYDROCODONE BITARTRATE AND ACETAMINOPHEN 1 TABLET: 10; 325 TABLET ORAL at 09:07

## 2022-07-22 NOTE — SUBJECTIVE & OBJECTIVE
Interval History:denies chest pain.  CC is Toe pain.    Review of Systems   Constitutional:  Positive for fatigue. Negative for chills, diaphoresis and fever.   HENT:  Negative for congestion and trouble swallowing.    Respiratory:  Negative for cough, chest tightness, shortness of breath (has significantly improved) and wheezing.         + orthopnea   Cardiovascular:  Negative for chest pain, palpitations and leg swelling (feet swelling resolved.).   Gastrointestinal:  Negative for abdominal distention, abdominal pain, blood in stool, diarrhea, nausea and vomiting.   Genitourinary:  Negative for difficulty urinating, dysuria and hematuria.   Musculoskeletal:  Positive for arthralgias and myalgias. Negative for joint swelling.   Skin:  Positive for wound.   Neurological:  Negative for dizziness, weakness and headaches.   Psychiatric/Behavioral:  Negative for confusion, decreased concentration and hallucinations.      Objective:     Vital Signs (Most Recent):  Temp: 97.6 °F (36.4 °C) (07/22/22 0731)  Pulse: 63 (07/22/22 0731)  Resp: 19 (07/22/22 0731)  BP: 132/75 (07/22/22 0731)  SpO2: 98 % (07/22/22 0731)   Vital Signs (24h Range):  Temp:  [97.6 °F (36.4 °C)-98.5 °F (36.9 °C)] 97.6 °F (36.4 °C)  Pulse:  [63-80] 63  Resp:  [16-20] 19  SpO2:  [97 %-99 %] 98 %  BP: (122-147)/(75-95) 132/75     Weight: 77.1 kg (170 lb)  Body mass index is 24.39 kg/m².    Intake/Output Summary (Last 24 hours) at 7/22/2022 1020  Last data filed at 7/22/2022 0830  Gross per 24 hour   Intake 460 ml   Output 1500 ml   Net -1040 ml        Physical Exam  Vitals and nursing note reviewed.   Constitutional:       General: He is not in acute distress.     Appearance: He is not ill-appearing.   HENT:      Head: Normocephalic and atraumatic.      Right Ear: External ear normal.      Left Ear: External ear normal.      Nose: Nose normal.      Mouth/Throat:      Mouth: Mucous membranes are moist.   Eyes:      Extraocular Movements: Extraocular  movements intact.      Conjunctiva/sclera: Conjunctivae normal.   Cardiovascular:      Rate and Rhythm: Normal rate and regular rhythm.      Heart sounds: No murmur heard.    No gallop.   Pulmonary:      Effort: Pulmonary effort is normal. No respiratory distress.      Breath sounds: Normal breath sounds. No wheezing.   Abdominal:      General: There is no distension.      Palpations: Abdomen is soft.      Tenderness: There is no abdominal tenderness. There is no guarding.   Musculoskeletal:         General: No swelling or tenderness.      Cervical back: Normal range of motion.      Right lower leg: No edema.      Left lower leg: No edema.      Comments: Trace pedal edema resolved bilaterally. Left third toe findings c/w dry gangrene; no drainage, erythema or swelling. Bilateral feet with dusky toes. Feet are cool to touch   Skin:     General: Skin is warm and dry.   Neurological:      General: No focal deficit present.      Mental Status: He is alert and oriented to person, place, and time.      Sensory: Sensory deficit (diminished sensation in bilateral feet) present.   Psychiatric:         Mood and Affect: Mood normal.         Behavior: Behavior normal.         Thought Content: Thought content normal.       Significant Labs: All pertinent labs within the past 24 hours have been reviewed.    Significant Imaging: I have reviewed all pertinent imaging results/findings within the past 24 hours.

## 2022-07-22 NOTE — PROGRESS NOTES
"[facility] Medicine  Progress Note    Patient Name: Yo Pink  MRN: [id number]  Patient Class: IP- Inpatient   Admission Date: 7/15/2022  Length of Stay: 5 days  Attending Physician: Juan Alberto Alas MD  Primary Care Provider: [facility]        Subjective:     Principal Problem:Dry gangrene        HPI:  57 y.o. male PMHx PAD s.p vascular stent LLE presents with left foot pain x 2-3 days. Symptoms have been progressively getting worse. Patient states he was placed on antibiotics for dry gangrene and concern of infection.  He believes that his toe looks worse and he completed his antibiotics yesterday.  He was on call with virtual PCP who after assessment of toe told him to come to ED for further evaluation.  There is no report of fever, chills, CP, numbness and tingling.  He does state he stopped smoking a few weeks ago and he sometimes has to "clear his lungs"     ED course: Pain better after morphine. Found to be afebrile. WBC nl. Elevated ESR (33) and CRP (93.9). Bilateral foot xray shows Soft tissue abnormality of the L 3rd digit. Arterial US lower extremity  shows Interval placement of left lower extremity arterial vascular stent, the vascular stent appears patent.  The dorsalis pedis artery bilaterally demonstrates evidence for reversal of flow.  No evidence for occlusion.      Overview/Hospital Course:  56 y/o male PMH PAD h/o LLE angiogram on 07/05/2022 admitted on 07/15/2022 to observation for left 3rd toe gangrene that is worsening since angiogram procedure.  Ultrasound lower extremity without DVT.  Shows vascular stent appears patent.  X-ray of bilateral feet with no acute fractures or dislocations.  Soft tissue abnormality of the 3rd digit on the left foot noted.  Vascular podiatry consulted.       Patient started on IV abx but complained of burning sensation all over with infusion. Overnight on 07/15, rapid response called for SOB and diaphoresis. Stated that " it lasted briefly and then resolved.  Initial and subsequent Troponin elevated. Repeat CXR with no acute process. BNP elevated. Echo with EF of 30%, moderately decreased systolic function, grade 2 diastolic dysfunction, pulmonary hypertension, and moderate to severe global hypokinesis with WMA. Lasix IV given once with improvement. Cardiology consulted- Suspect ACS, continue on aspirin,plavix,and statin.  Plan for heart catheterization on 07/18.  ID consulted. IV abx discontinued. Awaiting vascular surgery recommendations. Podiatry plans for left 3rd toe amputation, pending vascular recs and cardiology procedure. Continue pain control and blood pressure control,  Had cardiac events,S/P C,for NSTEMI  LM/2V CAD  Severe LV dysfxn  Severe PAD s/p recent L SFA PTAS, needing L 3rd toe amputation +/- RLE angio  R rad vasband for hemostasis     Plan:  Cont med rx.  Cont ASA/Plavix/Statin.  Start entresto/toprol, titrate as BP/HR will allow.  Case d/w Dr. Wynn, will transfer to [facility] for CABG vs MV PCI eval.  Lifevest ordered.  Repeat echo in 3 months (mid Oct 2022) for reassessment of LVEF.   CT chest is done.no acute process.  Pending transfer to [facility].  CC is toe pain.      Interval History:denies chest pain.  CC is Toe pain.    Review of Systems   Constitutional:  Positive for fatigue. Negative for chills, diaphoresis and fever.   HENT:  Negative for congestion and trouble swallowing.    Respiratory:  Negative for cough, chest tightness, shortness of breath (has significantly improved) and wheezing.         + orthopnea   Cardiovascular:  Negative for chest pain, palpitations and leg swelling (feet swelling resolved.).   Gastrointestinal:  Negative for abdominal distention, abdominal pain, blood in stool, diarrhea, nausea and vomiting.   Genitourinary:  Negative for difficulty urinating, dysuria and hematuria.   Musculoskeletal:  Positive for arthralgias and myalgias. Negative for joint swelling.   Skin:  Positive for  wound.   Neurological:  Negative for dizziness, weakness and headaches.   Psychiatric/Behavioral:  Negative for confusion, decreased concentration and hallucinations.      Objective:     Vital Signs (Most Recent):  Temp: 97.6 °F (36.4 °C) (07/22/22 0731)  Pulse: 63 (07/22/22 0731)  Resp: 19 (07/22/22 0731)  BP: 132/75 (07/22/22 0731)  SpO2: 98 % (07/22/22 0731)   Vital Signs (24h Range):  Temp:  [97.6 °F (36.4 °C)-98.5 °F (36.9 °C)] 97.6 °F (36.4 °C)  Pulse:  [63-80] 63  Resp:  [16-20] 19  SpO2:  [97 %-99 %] 98 %  BP: (122-147)/(75-95) 132/75     Weight: 77.1 kg (170 lb)  Body mass index is 24.39 kg/m².    Intake/Output Summary (Last 24 hours) at 7/22/2022 1020  Last data filed at 7/22/2022 0830  Gross per 24 hour   Intake 460 ml   Output 1500 ml   Net -1040 ml        Physical Exam  Vitals and nursing note reviewed.   Constitutional:       General: He is not in acute distress.     Appearance: He is not ill-appearing.   HENT:      Head: Normocephalic and atraumatic.      Right Ear: External ear normal.      Left Ear: External ear normal.      Nose: Nose normal.      Mouth/Throat:      Mouth: Mucous membranes are moist.   Eyes:      Extraocular Movements: Extraocular movements intact.      Conjunctiva/sclera: Conjunctivae normal.   Cardiovascular:      Rate and Rhythm: Normal rate and regular rhythm.      Heart sounds: No murmur heard.    No gallop.   Pulmonary:      Effort: Pulmonary effort is normal. No respiratory distress.      Breath sounds: Normal breath sounds. No wheezing.   Abdominal:      General: There is no distension.      Palpations: Abdomen is soft.      Tenderness: There is no abdominal tenderness. There is no guarding.   Musculoskeletal:         General: No swelling or tenderness.      Cervical back: Normal range of motion.      Right lower leg: No edema.      Left lower leg: No edema.      Comments: Trace pedal edema resolved bilaterally. Left third toe findings c/w dry gangrene; no drainage,  erythema or swelling. Bilateral feet with dusky toes. Feet are cool to touch   Skin:     General: Skin is warm and dry.   Neurological:      General: No focal deficit present.      Mental Status: He is alert and oriented to person, place, and time.      Sensory: Sensory deficit (diminished sensation in bilateral feet) present.   Psychiatric:         Mood and Affect: Mood normal.         Behavior: Behavior normal.         Thought Content: Thought content normal.       Significant Labs: All pertinent labs within the past 24 hours have been reviewed.    Significant Imaging: I have reviewed all pertinent imaging results/findings within the past 24 hours.    CC is toe pain.  Assessment/Plan:      * Dry gangrene  -Left 3rd digit with findings c/w dry gangrene  -US b/l LE:  The vascular stent appears pain in left lower extremity.  Arterial atherosclerotic disease present, without evidence of occlusion.  -XR bilateral feet:  No acute fractures or dislocation.  Left 3rd digit with soft tissue abnormality  -ID consulted: IV abx discontinued 07/15. Monitor off abx.   -Blood cx with NGTD  -Podiatry and vascular consulted  -Podiatry plan for left 3rd toe amputation, pending vascular evaluation and cardiology procedure ,delayed duo to cardiac events.    NSTEMI (non-ST elevated myocardial infarction)  Had cardiac events,S/P [facility],for NSTEMI  LM/2V CAD  Severe LV dysfxn  Severe PAD s/p recent L SFA PTAS, needing L 3rd toe amputation +/- RLE angio  R rad vasband for hemostasis     Plan:  Cont med rx.  Cont ASA/Plavix/Statin.  Start entresto/toprol, titrate as BP/HR will allow.  Case d/w Dr. Wynn, will transfer to [facility] for CABG vs MV PCI eval.  Lifevest ordered.  Repeat echo in 3 months (mid Oct 2022) for reassessment of LVEF.   CT chest is done.no acute process.  Pending transfer to [facility].      Ischemic cardiomyopathy  -Echo with EF of 30%, moderately decreased systolic function, grade 2 diastolic dysfunction, pulmonary  hypertension, and moderate to severe global hypokinesis with WMA. ]  -Symptoms of orthopnea pedal edema improved with Lasix IV x 1 o 07/16  -Overall, does not appear overloaded.   -Cardiology consulted: . LifeVest ordered      Dyslipidemia  Continue statin: atorvastatin 80mg qhs  Lipid panel: Chol 144,Trig 122, HDL 35, LDL 84    Hypokalemia  K 3.1 on 07/15  Replace as needed  Resolved 07/18    ACS (acute coronary syndrome)  -Overnight on 07/15, patient with SOB and diaphoresis. No CP, but admits later intermittent chest discomfort  -chest x-ray no acute process  -unable to find EKG from 7/15.  Per documentation, EKG reading sinus rhythm with fusion complexes and PACs  -Initial troponin 0.07, repeat trop was 0.114  -Echo with EF of 30%, moderately decreased systolic function, grade 2 diastolic dysfunction, pulmonary hypertension, and moderate to severe global hypokinesis with WMA.   -BNP elevated >900  -Cardiology consulted: plans for heart cath on 07/18. Continue lovenox, ASA, statin.       PAD (peripheral artery disease)  -s/p LLE angio with stent placement on 07/05 by Dr. Rich   -Consider RLE angio this admission since early signs of possible ulceration  -Mechanical thrombectomy is NOT indicated for patient  -Consulted Podiatry and Vascular surgery  -Continue ASA, Statin, Blood pressure reduction.    Hypertensive urgency  -SBP in the 180s in the ED  -Noted history of HTN, but patient states he has not been on medications  -Started Norvasc 10mg qd on 07/15  -BP improving  -Continue pain control       VTE Risk Mitigation (From admission, onward)         Ordered     enoxaparin injection 80 mg  Every 12 hours (non-standard times)         07/16/22 1205     IP VTE HIGH RISK PATIENT  Once         07/15/22 0438     Place sequential compression device  Until discontinued         07/15/22 0438     Reason for No Pharmacological VTE Prophylaxis  Once        Question:  Reasons:  Answer:  Physician Provided (leave comment)   Comment:  procedure in am    07/15/22 0438                Discharge Planning   RAPHAEL:      Code Status: Full Code   Is the patient medically ready for discharge?:     Reason for patient still in hospital (select all that apply): Patient trending condition  Discharge Plan A: Home   Discharge Delays: None known at this time              Juan Alberto Alas MD  Department of Hospital Medicine   [facility] S[facility]

## 2022-07-22 NOTE — PLAN OF CARE
Problem: Adult Inpatient Plan of Care  Goal: Plan of Care Review  Outcome: Ongoing, Progressing  Goal: Patient-Specific Goal (Individualized)  Outcome: Ongoing, Progressing  Goal: Optimal Comfort and Wellbeing  Outcome: Ongoing, Progressing     Problem: Impaired Wound Healing  Goal: Optimal Wound Healing  Outcome: Ongoing, Progressing   Pt free from falls, injury or any further trauma throughout shift. Continued medications as ordered. Complaints of pain, prn medication given. Wound care done. Pt in no distress. Will cont to monitor.

## 2022-07-23 LAB
LEFT ABI: 1.09
LEFT ANT TIBIAL SYS PSV: 7 CM/S
LEFT ARM BP: 163 MMHG
LEFT CFA PSV: 107 CM/S
LEFT DORSALIS PEDIS: 189 MMHG
LEFT EXTERNAL ILIAC PSV: 120 CM/S
LEFT PERONEAL SYS PSV: 26 CM/S
LEFT POPLITEAL PSV: 54 CM/S
LEFT POST TIBIAL SYS PSV: 65 CM/S
LEFT POSTERIOR TIBIAL: 183 MMHG
LEFT PROFUNDA SYS PSV: 95 CM/S
LEFT SUPER FEMORAL OSTIAL SYS PSV: 99 CM/S
LEFT SUPER FEMORAL PROX SYS PSV: 95 CM/S
LEFT TBI: 0.21
LEFT TIB/PER TRUNK SYS PSV: 58 CM/S
LEFT TOE PRESSURE: 36 MMHG
OHS CV LEFT LOWER EXTREMITY ABI (NO CALC): 1.09
OHS CV LOWER EXTREMITY STENT MEASUREMENTS - LEFT - MSFA S1 - DIST: 47
OHS CV LOWER EXTREMITY STENT MEASUREMENTS - LEFT - MSFA S1 - MID: 54
OHS CV LOWER EXTREMITY STENT MEASUREMENTS - LEFT - MSFA S1 - OST: 118
OHS CV LOWER EXTREMITY STENT MEASUREMENTS - LEFT - MSFA S1 - PROX: 56
POCT GLUCOSE: 104 MG/DL (ref 70–110)
POCT GLUCOSE: 108 MG/DL (ref 70–110)
POCT GLUCOSE: 124 MG/DL (ref 70–110)
POCT GLUCOSE: 150 MG/DL (ref 70–110)
RIGHT ABI: 1.19
RIGHT ARM BP: 173 MMHG
RIGHT DORSALIS PEDIS: 206 MMHG
RIGHT POSTERIOR TIBIAL: 190 MMHG
RIGHT TBI: 0.81
RIGHT TOE PRESSURE: 140 MMHG

## 2022-07-23 PROCEDURE — 99233 SBSQ HOSP IP/OBS HIGH 50: CPT | Mod: ,,, | Performed by: INTERNAL MEDICINE

## 2022-07-23 PROCEDURE — 99233 PR SUBSEQUENT HOSPITAL CARE,LEVL III: ICD-10-PCS | Mod: ,,, | Performed by: INTERNAL MEDICINE

## 2022-07-23 PROCEDURE — 11000001 HC ACUTE MED/SURG PRIVATE ROOM

## 2022-07-23 PROCEDURE — 25000003 PHARM REV CODE 250: Performed by: INTERNAL MEDICINE

## 2022-07-23 RX ADMIN — ASPIRIN 81 MG CHEWABLE TABLET 81 MG: 81 TABLET CHEWABLE at 08:07

## 2022-07-23 RX ADMIN — ATORVASTATIN CALCIUM 80 MG: 40 TABLET, FILM COATED ORAL at 09:07

## 2022-07-23 RX ADMIN — METOPROLOL SUCCINATE 25 MG: 25 TABLET, EXTENDED RELEASE ORAL at 08:07

## 2022-07-23 RX ADMIN — HYDROCODONE BITARTRATE AND ACETAMINOPHEN 1 TABLET: 10; 325 TABLET ORAL at 04:07

## 2022-07-23 RX ADMIN — SACUBITRIL AND VALSARTAN 2 TABLET: 24; 26 TABLET, FILM COATED ORAL at 08:07

## 2022-07-23 RX ADMIN — HYDROCODONE BITARTRATE AND ACETAMINOPHEN 1 TABLET: 10; 325 TABLET ORAL at 07:07

## 2022-07-23 RX ADMIN — SACUBITRIL AND VALSARTAN 2 TABLET: 24; 26 TABLET, FILM COATED ORAL at 09:07

## 2022-07-23 RX ADMIN — CLOPIDOGREL BISULFATE 75 MG: 75 TABLET, FILM COATED ORAL at 08:07

## 2022-07-23 RX ADMIN — HYDROCODONE BITARTRATE AND ACETAMINOPHEN 1 TABLET: 10; 325 TABLET ORAL at 09:07

## 2022-07-23 RX ADMIN — HYDROCODONE BITARTRATE AND ACETAMINOPHEN 1 TABLET: 10; 325 TABLET ORAL at 03:07

## 2022-07-23 NOTE — PROGRESS NOTES
[facility]  Cardiology  Progress Note    Patient Name: Yo Pink  MRN: [id number]  Admission Date: 7/15/2022  Hospital Length of Stay: 5 days  Code Status: Full Code   Attending Physician: Juan Alberto Alas MD   Primary Care Physician: [facility] [place]  Expected Discharge Date:   Principal Problem:Dry gangrene    Subjective:         Interval History: doing fine. No chest pain    Review of Systems   Constitutional: Negative.   HENT: Negative.     Eyes: Negative.    Cardiovascular: Negative.    Respiratory: Negative.     Endocrine: Negative.    Hematologic/Lymphatic: Negative.    Skin: Negative.    Musculoskeletal: Negative.    Gastrointestinal: Negative.    Genitourinary: Negative.    Neurological: Negative.    Psychiatric/Behavioral: Negative.     Allergic/Immunologic: Negative.    Objective:     Vital Signs (Most Recent):  Temp: 97.9 °F (36.6 °C) (07/22/22 2023)  Pulse: 67 (07/22/22 2023)  Resp: 18 (07/22/22 2156)  BP: (!) 135/90 (07/22/22 2023)  SpO2: 100 % (07/22/22 2023)   Vital Signs (24h Range):  Temp:  [97.6 °F (36.4 °C)-98 °F (36.7 °C)] 97.9 °F (36.6 °C)  Pulse:  [63-75] 67  Resp:  [16-20] 18  SpO2:  [97 %-100 %] 100 %  BP: (122-135)/(75-90) 135/90     Weight: 77.1 kg (170 lb)  Body mass index is 24.39 kg/m².     SpO2: 100 %  O2 Device (Oxygen Therapy): room air      Intake/Output Summary (Last 24 hours) at 7/22/2022 2215  Last data filed at 7/22/2022 1807  Gross per 24 hour   Intake 940 ml   Output 1800 ml   Net -860 ml       Lines/Drains/Airways       Peripheral Intravenous Line  Duration                  Peripheral IV - Single Lumen 07/15/22 0325 20 G Left Forearm 7 days                    Physical Exam  Constitutional:       General: He is not in acute distress.     Appearance: He is well-developed. He is not ill-appearing, toxic-appearing or diaphoretic.   HENT:      Head: Normocephalic and atraumatic.   Eyes:      General: No scleral icterus.     Extraocular Movements:  Extraocular movements intact.      Conjunctiva/sclera: Conjunctivae normal.      Pupils: Pupils are equal, round, and reactive to light.   Neck:      Thyroid: No thyromegaly.      Vascular: No JVD.      Trachea: No tracheal deviation.   Cardiovascular:      Rate and Rhythm: Normal rate and regular rhythm.      Pulses:           Carotid pulses are 2+ on the right side and 2+ on the left side.     Heart sounds: S1 normal and S2 normal. No murmur heard.    No friction rub. No gallop.      Comments: R rad access site c/d/I.  Pulmonary:      Effort: Pulmonary effort is normal. No respiratory distress.      Breath sounds: Normal breath sounds. No stridor. No wheezing, rhonchi or rales.   Chest:      Chest wall: No tenderness.   Abdominal:      General: There is no distension.      Palpations: Abdomen is soft.   Musculoskeletal:         General: No swelling or tenderness. Normal range of motion.      Cervical back: Normal range of motion and neck supple. No rigidity.      Right lower leg: No edema.      Left lower leg: No edema.      Comments: L middle toe dry gangrene   Skin:     General: Skin is warm and dry.      Coloration: Skin is not jaundiced.   Neurological:      General: No focal deficit present.      Mental Status: He is alert and oriented to person, place, and time.      Cranial Nerves: No cranial nerve deficit.   Psychiatric:         Mood and Affect: Mood normal.         Behavior: Behavior normal.       Significant Labs: BMP:   Recent Labs   Lab 07/22/22  0503   *      K 3.7      CO2 24   BUN 12   CREATININE 0.7   CALCIUM 9.4   , CMP   Recent Labs   Lab 07/22/22  0503      K 3.7      CO2 24   *   BUN 12   CREATININE 0.7   CALCIUM 9.4   ANIONGAP 10   ESTGFRAFRICA >60   EGFRNONAA >60   , CBC No results for input(s): WBC, HGB, HCT, PLT in the last 48 hours., INR No results for input(s): INR, PROTIME in the last 48 hours., Lipid Panel No results for input(s): CHOL, HDL,  LDLCALC, TRIG, CHOLHDL in the last 48 hours., Troponin No results for input(s): TROPONINI in the last 48 hours., and All pertinent lab results from the last 24 hours have been reviewed.    Significant Imaging: Echocardiogram: Transthoracic echo (TTE) complete (Cupid Only):   Results for orders placed or performed during the hospital encounter of 07/15/22   Echo   Result Value Ref Range    BSA 1.95 m2    TDI SEPTAL 0.04 m/s    LV LATERAL E/E' RATIO 18.67 m/s    LV SEPTAL E/E' RATIO 28.00 m/s    LA WIDTH 4.94 cm    AORTIC VALVE CUSP SEPERATION 2.40 cm    TDI LATERAL 0.06 m/s    PV PEAK VELOCITY 1.00 cm/s    LVIDd 5.05 3.5 - 6.0 cm    IVS 1.64 (A) 0.6 - 1.1 cm    Posterior Wall 1.26 (A) 0.6 - 1.1 cm    Ao root annulus 3.59 cm    LVIDs 3.92 2.1 - 4.0 cm    FS 22 28 - 44 %    LA volume 124.14 cm3    Sinus 3.20 cm    STJ 2.95 cm    Ascending aorta 3.06 cm    LV mass 311.50 g    LA size 4.63 cm    RVDD 3.34 cm    TAPSE 1.72 cm    RV S' 15.01 cm/s    Left Ventricle Relative Wall Thickness 0.50 cm    AV mean gradient 6 mmHg    AV valve area 2.67 cm2    AV Velocity Ratio 0.64     AV index (prosthetic) 0.62     MV valve area p 1/2 method 4.59 cm2    E/A ratio 1.56     Mean e' 0.05 m/s    E wave deceleration time 165.20 msec    IVRT 99.90 msec    Pulm vein S/D ratio 0.58     LVOT diameter 2.35 cm    LVOT area 4.3 cm2    LVOT peak edwin 1.04 m/s    LVOT peak VTI 15.72 cm    Ao peak edwin 1.62 m/s    Ao VTI 25.49 cm    LVOT stroke volume 68.15 cm3    AV peak gradient 10 mmHg    E/E' ratio 22.40 m/s    MV Peak E Edwin 1.12 m/s    TR Max Edwin 3.42 m/s    MV stenosis pressure 1/2 time 47.91 ms    MV Peak A Edwin 0.72 m/s    PV Peak S Edwin 0.63 m/s    PV Peak D Edwin 1.08 m/s    LV Systolic Volume 66.86 mL    LV Systolic Volume Index 34.3 mL/m2    LV Diastolic Volume 120.84 mL    LV Diastolic Volume Index 61.97 mL/m2    LA Volume Index 63.7 mL/m2    LV Mass Index 160 g/m2    RA Major Axis 5.21 cm    Left Atrium Minor Axis 6.22 cm    Left Atrium  Major Axis 6.56 cm    Triscuspid Valve Regurgitation Peak Gradient 47 mmHg    RA Width 3.57 cm    Right Atrial Pressure (from IVC) 15 mmHg    EF 30 %    TV rest pulmonary artery pressure 62 mmHg    Narrative    · The left ventricle is normal in size with moderate concentric   hypertrophy and moderately decreased systolic function.  · The estimated ejection fraction is 30%.  · Mod-severe global hypokinesis with LAD territory WMA.  · Grade II left ventricular diastolic dysfunction.  · Normal right ventricular size with normal right ventricular systolic   function.  · Mild-to-moderate mitral regurgitation.  · Mild tricuspid regurgitation.  · The estimated PA systolic pressure is 62 mmHg.  · There is pulmonary hypertension.        Assessment and Plan:     Brief HPI:     * Dry gangrene  Mgmt per IM/vasc/podiatry  Apparent plan for L 3rd toe amputation and RLE angio next week.  Will need to hold pending CABG eval  Cont ASA 81mg qd  Cont plavix 75mg qd  Cont atorva 80mg qhs  Check lipids/LFT 3 months (mid Oct 2022)    NSTEMI (non-ST elevated myocardial infarction)  As per ACS section    Ischemic cardiomyopathy  EF 30%  Not grossly overloaded on exam  LAD WMA on echo  Titrate entresto    Dyslipidemia  Cont atorva 80mg qhs  Check lipids/LFT 3 months (mid Oct 2022)    ACS (acute coronary syndrome)  Spontaneous SOB/diaphoresis (once with abx admin, once without abx admin).  Trop 0.07->0.11  EF 30% on echo  Above consistent with NSTEMI  Cath 7/18/22 revealed LM/2V CAD  Severe LV dysfxn  Severe PAD s/p recent L SFA PTAS, needing L 3rd toe amputation +/- RLE angio  R rad vasband for hemostasis  Cont med rx.  Cont ASA/Plavix/Statin.  Titrate entresto  Cont toprol, titrate as BP/HR will allow.  Case d/w Dr. Wynn, will transfer to [facility] for CABG vs MV PCI eval.  Awaiting bed.  Lifevest ordered.  Preop Carotid US and CT chest complete  Repeat echo in 3 months (late Oct 2022) for reassessment of LVEF.        PAD (peripheral artery  disease)  Mgmt per Dr. Rich (Vasc Surg) and Podiatry    Hypertensive urgency  Cont med rx  BP controlled        VTE Risk Mitigation (From admission, onward)         Ordered     enoxaparin injection 80 mg  Every 12 hours (non-standard times)         07/16/22 1205     IP VTE HIGH RISK PATIENT  Once         07/15/22 0438     Place sequential compression device  Until discontinued         07/15/22 0438     Reason for No Pharmacological VTE Prophylaxis  Once        Question:  Reasons:  Answer:  Physician Provided (leave comment)  Comment:  procedure in am    07/15/22 0438                Mark De Jesus MD  Cardiology  [facility]

## 2022-07-23 NOTE — PROGRESS NOTES
[facility] Surg  Cardiology  Progress Note    Patient Name: Yo Pink  MRN: [id number]  Admission Date: 7/15/2022  Hospital Length of Stay: 6 days  Code Status: Full Code   Attending Physician: Juan Alberto Alas MD   Primary Care Physician: [facility] [place]  Expected Discharge Date:   Principal Problem:Dry gangrene    Subjective:           Interval History: doing fine. No chest pain    Review of Systems   Constitutional: Negative.   HENT: Negative.     Eyes: Negative.    Endocrine: Negative.    Hematologic/Lymphatic: Negative.    Skin: Negative.    Musculoskeletal: Negative.    Gastrointestinal: Negative.    Genitourinary: Negative.    Neurological: Negative.    Psychiatric/Behavioral: Negative.     Allergic/Immunologic: Negative.    Objective:     Vital Signs (Most Recent):  Temp: 97.5 °F (36.4 °C) (07/23/22 1102)  Pulse: 68 (07/23/22 1102)  Resp: 19 (07/23/22 1102)  BP: 128/83 (07/23/22 1102)  SpO2: 99 % (07/23/22 1102)   Vital Signs (24h Range):  Temp:  [97.5 °F (36.4 °C)-98.5 °F (36.9 °C)] 97.5 °F (36.4 °C)  Pulse:  [63-75] 68  Resp:  [18-21] 19  SpO2:  [98 %-100 %] 99 %  BP: (126-152)/(76-90) 128/83     Weight: 77.1 kg (170 lb)  Body mass index is 24.39 kg/m².     SpO2: 99 %  O2 Device (Oxygen Therapy): room air      Intake/Output Summary (Last 24 hours) at 7/23/2022 1307  Last data filed at 7/23/2022 1218  Gross per 24 hour   Intake 480 ml   Output 1400 ml   Net -920 ml         Lines/Drains/Airways       Peripheral Intravenous Line  Duration                  Peripheral IV - Single Lumen 07/15/22 0325 20 G Left Forearm 8 days                    Physical Exam  Constitutional:       General: He is not in acute distress.     Appearance: He is well-developed. He is not ill-appearing, toxic-appearing or diaphoretic.   HENT:      Head: Normocephalic and atraumatic.   Eyes:      General: No scleral icterus.     Extraocular Movements: Extraocular movements intact.      Conjunctiva/sclera:  Conjunctivae normal.      Pupils: Pupils are equal, round, and reactive to light.   Neck:      Thyroid: No thyromegaly.      Vascular: No JVD.      Trachea: No tracheal deviation.   Cardiovascular:      Rate and Rhythm: Normal rate and regular rhythm.      Pulses:           Carotid pulses are 2+ on the right side and 2+ on the left side.     Heart sounds: S1 normal and S2 normal. No murmur heard.    No friction rub. No gallop.      Comments: R rad access site c/d/I.  Pulmonary:      Effort: Pulmonary effort is normal. No respiratory distress.      Breath sounds: Normal breath sounds. No stridor. No wheezing, rhonchi or rales.   Chest:      Chest wall: No tenderness.   Abdominal:      General: There is no distension.      Palpations: Abdomen is soft.   Musculoskeletal:         General: No swelling or tenderness. Normal range of motion.      Cervical back: Normal range of motion and neck supple. No rigidity.      Right lower leg: No edema.      Left lower leg: No edema.      Comments: L middle toe dry gangrene   Skin:     General: Skin is warm and dry.      Coloration: Skin is not jaundiced.   Neurological:      General: No focal deficit present.      Mental Status: He is alert and oriented to person, place, and time.      Cranial Nerves: No cranial nerve deficit.   Psychiatric:         Mood and Affect: Mood normal.         Behavior: Behavior normal.       Significant Labs: BMP:   Recent Labs   Lab 07/22/22  0503   *      K 3.7      CO2 24   BUN 12   CREATININE 0.7   CALCIUM 9.4     , CMP   Recent Labs   Lab 07/22/22  0503      K 3.7      CO2 24   *   BUN 12   CREATININE 0.7   CALCIUM 9.4   ANIONGAP 10   ESTGFRAFRICA >60   EGFRNONAA >60     , CBC No results for input(s): WBC, HGB, HCT, PLT in the last 48 hours., INR No results for input(s): INR, PROTIME in the last 48 hours., Lipid Panel No results for input(s): CHOL, HDL, LDLCALC, TRIG, CHOLHDL in the last 48 hours., Troponin No  results for input(s): TROPONINI in the last 48 hours., and All pertinent lab results from the last 24 hours have been reviewed.    Significant Imaging: Echocardiogram: Transthoracic echo (TTE) complete (Cupid Only):   Results for orders placed or performed during the hospital encounter of 07/15/22   Echo   Result Value Ref Range    BSA 1.95 m2    TDI SEPTAL 0.04 m/s    LV LATERAL E/E' RATIO 18.67 m/s    LV SEPTAL E/E' RATIO 28.00 m/s    LA WIDTH 4.94 cm    AORTIC VALVE CUSP SEPERATION 2.40 cm    TDI LATERAL 0.06 m/s    PV PEAK VELOCITY 1.00 cm/s    LVIDd 5.05 3.5 - 6.0 cm    IVS 1.64 (A) 0.6 - 1.1 cm    Posterior Wall 1.26 (A) 0.6 - 1.1 cm    Ao root annulus 3.59 cm    LVIDs 3.92 2.1 - 4.0 cm    FS 22 28 - 44 %    LA volume 124.14 cm3    Sinus 3.20 cm    STJ 2.95 cm    Ascending aorta 3.06 cm    LV mass 311.50 g    LA size 4.63 cm    RVDD 3.34 cm    TAPSE 1.72 cm    RV S' 15.01 cm/s    Left Ventricle Relative Wall Thickness 0.50 cm    AV mean gradient 6 mmHg    AV valve area 2.67 cm2    AV Velocity Ratio 0.64     AV index (prosthetic) 0.62     MV valve area p 1/2 method 4.59 cm2    E/A ratio 1.56     Mean e' 0.05 m/s    E wave deceleration time 165.20 msec    IVRT 99.90 msec    Pulm vein S/D ratio 0.58     LVOT diameter 2.35 cm    LVOT area 4.3 cm2    LVOT peak edwin 1.04 m/s    LVOT peak VTI 15.72 cm    Ao peak edwin 1.62 m/s    Ao VTI 25.49 cm    LVOT stroke volume 68.15 cm3    AV peak gradient 10 mmHg    E/E' ratio 22.40 m/s    MV Peak E Edwin 1.12 m/s    TR Max Edwin 3.42 m/s    MV stenosis pressure 1/2 time 47.91 ms    MV Peak A Edwin 0.72 m/s    PV Peak S Edwin 0.63 m/s    PV Peak D Edwin 1.08 m/s    LV Systolic Volume 66.86 mL    LV Systolic Volume Index 34.3 mL/m2    LV Diastolic Volume 120.84 mL    LV Diastolic Volume Index 61.97 mL/m2    LA Volume Index 63.7 mL/m2    LV Mass Index 160 g/m2    RA Major Axis 5.21 cm    Left Atrium Minor Axis 6.22 cm    Left Atrium Major Axis 6.56 cm    Triscuspid Valve Regurgitation Peak  Gradient 47 mmHg    RA Width 3.57 cm    Right Atrial Pressure (from IVC) 15 mmHg    EF 30 %    TV rest pulmonary artery pressure 62 mmHg    Narrative    · The left ventricle is normal in size with moderate concentric   hypertrophy and moderately decreased systolic function.  · The estimated ejection fraction is 30%.  · Mod-severe global hypokinesis with LAD territory WMA.  · Grade II left ventricular diastolic dysfunction.  · Normal right ventricular size with normal right ventricular systolic   function.  · Mild-to-moderate mitral regurgitation.  · Mild tricuspid regurgitation.  · The estimated PA systolic pressure is 62 mmHg.  · There is pulmonary hypertension.        Assessment and Plan:     Brief HPI:     * Dry gangrene  Mgmt per IM/vasc/podiatry  Apparent plan for L 3rd toe amputation and RLE angio next week.  Will need to hold pending CABG eval  Cont ASA 81mg qd  Cont plavix 75mg qd  Cont atorva 80mg qhs  Check lipids/LFT 3 months (mid Oct 2022)    NSTEMI (non-ST elevated myocardial infarction)  As per ACS section    Ischemic cardiomyopathy  EF 30%  Not grossly overloaded on exam  LAD WMA on echo  Titrate entresto    Dyslipidemia  Cont atorva 80mg qhs  Check lipids/LFT 3 months (mid Oct 2022)    ACS (acute coronary syndrome)  Spontaneous SOB/diaphoresis (once with abx admin, once without abx admin).  Trop 0.07->0.11  EF 30% on echo  Above consistent with NSTEMI  Cath 7/18/22 revealed LM/2V CAD  Severe LV dysfxn  Severe PAD s/p recent L SFA PTAS, needing L 3rd toe amputation +/- RLE angio  R rad vasband for hemostasis  Cont med rx.  Cont ASA/Plavix/Statin.  Titrate entresto  Cont toprol, titrate as BP/HR will allow.  Case d/w Dr. Wynn, will transfer to [facility] for CABG vs MV PCI eval.  Awaiting bed.  Lifevest ordered.  Preop Carotid US and CT chest complete  Repeat echo in 3 months (late Oct 2022) for reassessment of LVEF.        PAD (peripheral artery disease)  Mgmt per Dr. Rich (Vasc Surg) and  Podiatry    Hypertensive urgency  Cont med rx  BP controlled        VTE Risk Mitigation (From admission, onward)         Ordered     enoxaparin injection 80 mg  Every 12 hours (non-standard times)         07/16/22 1205     IP VTE HIGH RISK PATIENT  Once         07/15/22 0438     Place sequential compression device  Until discontinued         07/15/22 0438     Reason for No Pharmacological VTE Prophylaxis  Once        Question:  Reasons:  Answer:  Physician Provided (leave comment)  Comment:  procedure in am    07/15/22 0438                Mark De Jesus MD  Cardiology  [facility]

## 2022-07-23 NOTE — SUBJECTIVE & OBJECTIVE
Interval History: doing fine. No chest pain    Review of Systems   Constitutional: Negative.   HENT: Negative.     Eyes: Negative.    Cardiovascular: Negative.    Respiratory: Negative.     Endocrine: Negative.    Hematologic/Lymphatic: Negative.    Skin: Negative.    Musculoskeletal: Negative.    Gastrointestinal: Negative.    Genitourinary: Negative.    Neurological: Negative.    Psychiatric/Behavioral: Negative.     Allergic/Immunologic: Negative.    Objective:     Vital Signs (Most Recent):  Temp: 97.9 °F (36.6 °C) (07/22/22 2023)  Pulse: 67 (07/22/22 2023)  Resp: 18 (07/22/22 2156)  BP: (!) 135/90 (07/22/22 2023)  SpO2: 100 % (07/22/22 2023)   Vital Signs (24h Range):  Temp:  [97.6 °F (36.4 °C)-98 °F (36.7 °C)] 97.9 °F (36.6 °C)  Pulse:  [63-75] 67  Resp:  [16-20] 18  SpO2:  [97 %-100 %] 100 %  BP: (122-135)/(75-90) 135/90     Weight: 77.1 kg (170 lb)  Body mass index is 24.39 kg/m².     SpO2: 100 %  O2 Device (Oxygen Therapy): room air      Intake/Output Summary (Last 24 hours) at 7/22/2022 2215  Last data filed at 7/22/2022 1807  Gross per 24 hour   Intake 940 ml   Output 1800 ml   Net -860 ml       Lines/Drains/Airways       Peripheral Intravenous Line  Duration                  Peripheral IV - Single Lumen 07/15/22 0325 20 G Left Forearm 7 days                    Physical Exam  Constitutional:       General: He is not in acute distress.     Appearance: He is well-developed. He is not ill-appearing, toxic-appearing or diaphoretic.   HENT:      Head: Normocephalic and atraumatic.   Eyes:      General: No scleral icterus.     Extraocular Movements: Extraocular movements intact.      Conjunctiva/sclera: Conjunctivae normal.      Pupils: Pupils are equal, round, and reactive to light.   Neck:      Thyroid: No thyromegaly.      Vascular: No JVD.      Trachea: No tracheal deviation.   Cardiovascular:      Rate and Rhythm: Normal rate and regular rhythm.      Pulses:           Carotid pulses are 2+ on the right  side and 2+ on the left side.     Heart sounds: S1 normal and S2 normal. No murmur heard.    No friction rub. No gallop.      Comments: R rad access site c/d/I.  Pulmonary:      Effort: Pulmonary effort is normal. No respiratory distress.      Breath sounds: Normal breath sounds. No stridor. No wheezing, rhonchi or rales.   Chest:      Chest wall: No tenderness.   Abdominal:      General: There is no distension.      Palpations: Abdomen is soft.   Musculoskeletal:         General: No swelling or tenderness. Normal range of motion.      Cervical back: Normal range of motion and neck supple. No rigidity.      Right lower leg: No edema.      Left lower leg: No edema.      Comments: L middle toe dry gangrene   Skin:     General: Skin is warm and dry.      Coloration: Skin is not jaundiced.   Neurological:      General: No focal deficit present.      Mental Status: He is alert and oriented to person, place, and time.      Cranial Nerves: No cranial nerve deficit.   Psychiatric:         Mood and Affect: Mood normal.         Behavior: Behavior normal.       Significant Labs: BMP:   Recent Labs   Lab 07/22/22  0503   *      K 3.7      CO2 24   BUN 12   CREATININE 0.7   CALCIUM 9.4   , CMP   Recent Labs   Lab 07/22/22  0503      K 3.7      CO2 24   *   BUN 12   CREATININE 0.7   CALCIUM 9.4   ANIONGAP 10   ESTGFRAFRICA >60   EGFRNONAA >60   , CBC No results for input(s): WBC, HGB, HCT, PLT in the last 48 hours., INR No results for input(s): INR, PROTIME in the last 48 hours., Lipid Panel No results for input(s): CHOL, HDL, LDLCALC, TRIG, CHOLHDL in the last 48 hours., Troponin No results for input(s): TROPONINI in the last 48 hours., and All pertinent lab results from the last 24 hours have been reviewed.    Significant Imaging: Echocardiogram: Transthoracic echo (TTE) complete (Cupid Only):   Results for orders placed or performed during the hospital encounter of 07/15/22   Echo   Result  Value Ref Range    BSA 1.95 m2    TDI SEPTAL 0.04 m/s    LV LATERAL E/E' RATIO 18.67 m/s    LV SEPTAL E/E' RATIO 28.00 m/s    LA WIDTH 4.94 cm    AORTIC VALVE CUSP SEPERATION 2.40 cm    TDI LATERAL 0.06 m/s    PV PEAK VELOCITY 1.00 cm/s    LVIDd 5.05 3.5 - 6.0 cm    IVS 1.64 (A) 0.6 - 1.1 cm    Posterior Wall 1.26 (A) 0.6 - 1.1 cm    Ao root annulus 3.59 cm    LVIDs 3.92 2.1 - 4.0 cm    FS 22 28 - 44 %    LA volume 124.14 cm3    Sinus 3.20 cm    STJ 2.95 cm    Ascending aorta 3.06 cm    LV mass 311.50 g    LA size 4.63 cm    RVDD 3.34 cm    TAPSE 1.72 cm    RV S' 15.01 cm/s    Left Ventricle Relative Wall Thickness 0.50 cm    AV mean gradient 6 mmHg    AV valve area 2.67 cm2    AV Velocity Ratio 0.64     AV index (prosthetic) 0.62     MV valve area p 1/2 method 4.59 cm2    E/A ratio 1.56     Mean e' 0.05 m/s    E wave deceleration time 165.20 msec    IVRT 99.90 msec    Pulm vein S/D ratio 0.58     LVOT diameter 2.35 cm    LVOT area 4.3 cm2    LVOT peak edwin 1.04 m/s    LVOT peak VTI 15.72 cm    Ao peak edwin 1.62 m/s    Ao VTI 25.49 cm    LVOT stroke volume 68.15 cm3    AV peak gradient 10 mmHg    E/E' ratio 22.40 m/s    MV Peak E Edwin 1.12 m/s    TR Max Edwin 3.42 m/s    MV stenosis pressure 1/2 time 47.91 ms    MV Peak A Edwin 0.72 m/s    PV Peak S Edwin 0.63 m/s    PV Peak D Edwin 1.08 m/s    LV Systolic Volume 66.86 mL    LV Systolic Volume Index 34.3 mL/m2    LV Diastolic Volume 120.84 mL    LV Diastolic Volume Index 61.97 mL/m2    LA Volume Index 63.7 mL/m2    LV Mass Index 160 g/m2    RA Major Axis 5.21 cm    Left Atrium Minor Axis 6.22 cm    Left Atrium Major Axis 6.56 cm    Triscuspid Valve Regurgitation Peak Gradient 47 mmHg    RA Width 3.57 cm    Right Atrial Pressure (from IVC) 15 mmHg    EF 30 %    TV rest pulmonary artery pressure 62 mmHg    Narrative    · The left ventricle is normal in size with moderate concentric   hypertrophy and moderately decreased systolic function.  · The estimated ejection fraction is  30%.  · Mod-severe global hypokinesis with LAD territory WMA.  · Grade II left ventricular diastolic dysfunction.  · Normal right ventricular size with normal right ventricular systolic   function.  · Mild-to-moderate mitral regurgitation.  · Mild tricuspid regurgitation.  · The estimated PA systolic pressure is 62 mmHg.  · There is pulmonary hypertension.

## 2022-07-23 NOTE — SUBJECTIVE & OBJECTIVE
Interval History: doing fine. No chest pain    Review of Systems   Constitutional: Negative.   HENT: Negative.     Eyes: Negative.    Endocrine: Negative.    Hematologic/Lymphatic: Negative.    Skin: Negative.    Musculoskeletal: Negative.    Gastrointestinal: Negative.    Genitourinary: Negative.    Neurological: Negative.    Psychiatric/Behavioral: Negative.     Allergic/Immunologic: Negative.    Objective:     Vital Signs (Most Recent):  Temp: 97.5 °F (36.4 °C) (07/23/22 1102)  Pulse: 68 (07/23/22 1102)  Resp: 19 (07/23/22 1102)  BP: 128/83 (07/23/22 1102)  SpO2: 99 % (07/23/22 1102)   Vital Signs (24h Range):  Temp:  [97.5 °F (36.4 °C)-98.5 °F (36.9 °C)] 97.5 °F (36.4 °C)  Pulse:  [63-75] 68  Resp:  [18-21] 19  SpO2:  [98 %-100 %] 99 %  BP: (126-152)/(76-90) 128/83     Weight: 77.1 kg (170 lb)  Body mass index is 24.39 kg/m².     SpO2: 99 %  O2 Device (Oxygen Therapy): room air      Intake/Output Summary (Last 24 hours) at 7/23/2022 1307  Last data filed at 7/23/2022 1218  Gross per 24 hour   Intake 480 ml   Output 1400 ml   Net -920 ml         Lines/Drains/Airways       Peripheral Intravenous Line  Duration                  Peripheral IV - Single Lumen 07/15/22 0325 20 G Left Forearm 8 days                    Physical Exam  Constitutional:       General: He is not in acute distress.     Appearance: He is well-developed. He is not ill-appearing, toxic-appearing or diaphoretic.   HENT:      Head: Normocephalic and atraumatic.   Eyes:      General: No scleral icterus.     Extraocular Movements: Extraocular movements intact.      Conjunctiva/sclera: Conjunctivae normal.      Pupils: Pupils are equal, round, and reactive to light.   Neck:      Thyroid: No thyromegaly.      Vascular: No JVD.      Trachea: No tracheal deviation.   Cardiovascular:      Rate and Rhythm: Normal rate and regular rhythm.      Pulses:           Carotid pulses are 2+ on the right side and 2+ on the left side.     Heart sounds: S1 normal and  S2 normal. No murmur heard.    No friction rub. No gallop.      Comments: R rad access site c/d/I.  Pulmonary:      Effort: Pulmonary effort is normal. No respiratory distress.      Breath sounds: Normal breath sounds. No stridor. No wheezing, rhonchi or rales.   Chest:      Chest wall: No tenderness.   Abdominal:      General: There is no distension.      Palpations: Abdomen is soft.   Musculoskeletal:         General: No swelling or tenderness. Normal range of motion.      Cervical back: Normal range of motion and neck supple. No rigidity.      Right lower leg: No edema.      Left lower leg: No edema.      Comments: L middle toe dry gangrene   Skin:     General: Skin is warm and dry.      Coloration: Skin is not jaundiced.   Neurological:      General: No focal deficit present.      Mental Status: He is alert and oriented to person, place, and time.      Cranial Nerves: No cranial nerve deficit.   Psychiatric:         Mood and Affect: Mood normal.         Behavior: Behavior normal.       Significant Labs: BMP:   Recent Labs   Lab 07/22/22  0503   *      K 3.7      CO2 24   BUN 12   CREATININE 0.7   CALCIUM 9.4     , CMP   Recent Labs   Lab 07/22/22  0503      K 3.7      CO2 24   *   BUN 12   CREATININE 0.7   CALCIUM 9.4   ANIONGAP 10   ESTGFRAFRICA >60   EGFRNONAA >60     , CBC No results for input(s): WBC, HGB, HCT, PLT in the last 48 hours., INR No results for input(s): INR, PROTIME in the last 48 hours., Lipid Panel No results for input(s): CHOL, HDL, LDLCALC, TRIG, CHOLHDL in the last 48 hours., Troponin No results for input(s): TROPONINI in the last 48 hours., and All pertinent lab results from the last 24 hours have been reviewed.    Significant Imaging: Echocardiogram: Transthoracic echo (TTE) complete (Cupid Only):   Results for orders placed or performed during the hospital encounter of 07/15/22   Echo   Result Value Ref Range    BSA 1.95 m2    TDI SEPTAL 0.04 m/s     LV LATERAL E/E' RATIO 18.67 m/s    LV SEPTAL E/E' RATIO 28.00 m/s    LA WIDTH 4.94 cm    AORTIC VALVE CUSP SEPERATION 2.40 cm    TDI LATERAL 0.06 m/s    PV PEAK VELOCITY 1.00 cm/s    LVIDd 5.05 3.5 - 6.0 cm    IVS 1.64 (A) 0.6 - 1.1 cm    Posterior Wall 1.26 (A) 0.6 - 1.1 cm    Ao root annulus 3.59 cm    LVIDs 3.92 2.1 - 4.0 cm    FS 22 28 - 44 %    LA volume 124.14 cm3    Sinus 3.20 cm    STJ 2.95 cm    Ascending aorta 3.06 cm    LV mass 311.50 g    LA size 4.63 cm    RVDD 3.34 cm    TAPSE 1.72 cm    RV S' 15.01 cm/s    Left Ventricle Relative Wall Thickness 0.50 cm    AV mean gradient 6 mmHg    AV valve area 2.67 cm2    AV Velocity Ratio 0.64     AV index (prosthetic) 0.62     MV valve area p 1/2 method 4.59 cm2    E/A ratio 1.56     Mean e' 0.05 m/s    E wave deceleration time 165.20 msec    IVRT 99.90 msec    Pulm vein S/D ratio 0.58     LVOT diameter 2.35 cm    LVOT area 4.3 cm2    LVOT peak edwin 1.04 m/s    LVOT peak VTI 15.72 cm    Ao peak edwin 1.62 m/s    Ao VTI 25.49 cm    LVOT stroke volume 68.15 cm3    AV peak gradient 10 mmHg    E/E' ratio 22.40 m/s    MV Peak E Edwin 1.12 m/s    TR Max Edwin 3.42 m/s    MV stenosis pressure 1/2 time 47.91 ms    MV Peak A Edwin 0.72 m/s    PV Peak S Edwin 0.63 m/s    PV Peak D Edwin 1.08 m/s    LV Systolic Volume 66.86 mL    LV Systolic Volume Index 34.3 mL/m2    LV Diastolic Volume 120.84 mL    LV Diastolic Volume Index 61.97 mL/m2    LA Volume Index 63.7 mL/m2    LV Mass Index 160 g/m2    RA Major Axis 5.21 cm    Left Atrium Minor Axis 6.22 cm    Left Atrium Major Axis 6.56 cm    Triscuspid Valve Regurgitation Peak Gradient 47 mmHg    RA Width 3.57 cm    Right Atrial Pressure (from IVC) 15 mmHg    EF 30 %    TV rest pulmonary artery pressure 62 mmHg    Narrative    · The left ventricle is normal in size with moderate concentric   hypertrophy and moderately decreased systolic function.  · The estimated ejection fraction is 30%.  · Mod-severe global hypokinesis with LAD territory  WMA.  · Grade II left ventricular diastolic dysfunction.  · Normal right ventricular size with normal right ventricular systolic   function.  · Mild-to-moderate mitral regurgitation.  · Mild tricuspid regurgitation.  · The estimated PA systolic pressure is 62 mmHg.  · There is pulmonary hypertension.

## 2022-07-23 NOTE — PLAN OF CARE
Problem: Adult Inpatient Plan of Care  Goal: Plan of Care Review  Outcome: Ongoing, Progressing     Problem: Adult Inpatient Plan of Care  Goal: Optimal Comfort and Wellbeing  Outcome: Ongoing, Progressing     Problem: Impaired Wound Healing  Goal: Optimal Wound Healing  Outcome: Ongoing, Progressing

## 2022-07-23 NOTE — SUBJECTIVE & OBJECTIVE
Interval History:denies chest pain.  CC is Toe pain.    Review of Systems   Constitutional:  Positive for fatigue. Negative for chills, diaphoresis and fever.   HENT:  Negative for congestion and trouble swallowing.    Respiratory:  Negative for cough, chest tightness, shortness of breath (has significantly improved) and wheezing.         + orthopnea   Cardiovascular:  Negative for chest pain, palpitations and leg swelling (feet swelling resolved.).   Gastrointestinal:  Negative for abdominal distention, abdominal pain, blood in stool, diarrhea, nausea and vomiting.   Genitourinary:  Negative for difficulty urinating, dysuria and hematuria.   Musculoskeletal:  Positive for arthralgias and myalgias. Negative for joint swelling.   Skin:  Positive for wound.   Neurological:  Negative for dizziness, weakness and headaches.   Psychiatric/Behavioral:  Negative for confusion, decreased concentration and hallucinations.      Objective:     Vital Signs (Most Recent):  Temp: 98.5 °F (36.9 °C) (07/23/22 0747)  Pulse: 75 (07/23/22 0747)  Resp: 18 (07/23/22 0749)  BP: (!) 152/87 (07/23/22 0747)  SpO2: 98 % (07/23/22 0747)   Vital Signs (24h Range):  Temp:  [97.6 °F (36.4 °C)-98.5 °F (36.9 °C)] 98.5 °F (36.9 °C)  Pulse:  [63-75] 75  Resp:  [18-21] 18  SpO2:  [98 %-100 %] 98 %  BP: (126-152)/(76-90) 152/87     Weight: 77.1 kg (170 lb)  Body mass index is 24.39 kg/m².    Intake/Output Summary (Last 24 hours) at 7/23/2022 0901  Last data filed at 7/23/2022 0700  Gross per 24 hour   Intake 480 ml   Output 1000 ml   Net -520 ml        Physical Exam  Vitals and nursing note reviewed.   Constitutional:       General: He is not in acute distress.     Appearance: He is not ill-appearing.   HENT:      Head: Normocephalic and atraumatic.      Right Ear: External ear normal.      Left Ear: External ear normal.      Nose: Nose normal.      Mouth/Throat:      Mouth: Mucous membranes are moist.   Eyes:      Extraocular Movements: Extraocular  movements intact.      Conjunctiva/sclera: Conjunctivae normal.   Cardiovascular:      Rate and Rhythm: Normal rate and regular rhythm.      Heart sounds: No murmur heard.    No gallop.   Pulmonary:      Effort: Pulmonary effort is normal. No respiratory distress.      Breath sounds: Normal breath sounds. No wheezing.   Abdominal:      General: There is no distension.      Palpations: Abdomen is soft.      Tenderness: There is no abdominal tenderness. There is no guarding.   Musculoskeletal:         General: No swelling or tenderness.      Cervical back: Normal range of motion.      Right lower leg: No edema.      Left lower leg: No edema.      Comments: Trace pedal edema resolved bilaterally. Left third toe findings c/w dry gangrene; no drainage, erythema or swelling. Bilateral feet with dusky toes. Feet are cool to touch   Skin:     General: Skin is warm and dry.   Neurological:      General: No focal deficit present.      Mental Status: He is alert and oriented to person, place, and time.      Sensory: Sensory deficit (diminished sensation in bilateral feet) present.   Psychiatric:         Mood and Affect: Mood normal.         Behavior: Behavior normal.         Thought Content: Thought content normal.       Significant Labs: All pertinent labs within the past 24 hours have been reviewed.    Significant Imaging: I have reviewed all pertinent imaging results/findings within the past 24 hours.

## 2022-07-23 NOTE — PLAN OF CARE
Problem: Impaired Wound Healing  Goal: Optimal Wound Healing  Outcome: Ongoing, Progressing     Problem: Fall Injury Risk  Goal: Absence of Fall and Fall-Related Injury  Outcome: Ongoing, Progressing

## 2022-07-23 NOTE — PROGRESS NOTES
"[facility]  Progress Note    Patient Name: Yo Pink  MRN: [id number]  Patient Class: IP- Inpatient   Admission Date: 7/15/2022  Length of Stay: 6 days  Attending Physician: Juan Alberto Alas MD  Primary Care Provider: [facility]        Subjective:     Principal Problem:Dry gangrene        HPI:  57 y.o. male PMHx PAD s.p vascular stent LLE presents with left foot pain x 2-3 days. Symptoms have been progressively getting worse. Patient states he was placed on antibiotics for dry gangrene and concern of infection.  He believes that his toe looks worse and he completed his antibiotics yesterday.  He was on call with virtual PCP who after assessment of toe told him to come to ED for further evaluation.  There is no report of fever, chills, CP, numbness and tingling.  He does state he stopped smoking a few weeks ago and he sometimes has to "clear his lungs"     ED course: Pain better after morphine. Found to be afebrile. WBC nl. Elevated ESR (33) and CRP (93.9). Bilateral foot xray shows Soft tissue abnormality of the L 3rd digit. Arterial US lower extremity  shows Interval placement of left lower extremity arterial vascular stent, the vascular stent appears patent.  The dorsalis pedis artery bilaterally demonstrates evidence for reversal of flow.  No evidence for occlusion.      Overview/Hospital Course:  58 y/o male PMH PAD h/o LLE angiogram on 07/05/2022 admitted on 07/15/2022 to observation for left 3rd toe gangrene that is worsening since angiogram procedure.  Ultrasound lower extremity without DVT.  Shows vascular stent appears patent.  X-ray of bilateral feet with no acute fractures or dislocations.  Soft tissue abnormality of the 3rd digit on the left foot noted.  Vascular podiatry consulted.       Patient started on IV abx but complained of burning sensation all over with infusion. Overnight on 07/15, rapid response called for SOB and diaphoresis. Stated that " it lasted briefly and then resolved.  Initial and subsequent Troponin elevated. Repeat CXR with no acute process. BNP elevated. Echo with EF of 30%, moderately decreased systolic function, grade 2 diastolic dysfunction, pulmonary hypertension, and moderate to severe global hypokinesis with WMA. Lasix IV given once with improvement. Cardiology consulted- Suspect ACS, continue on aspirin,plavix,and statin.  Plan for heart catheterization on 07/18.  ID consulted. IV abx discontinued. Awaiting vascular surgery recommendations. Podiatry plans for left 3rd toe amputation, pending vascular recs and cardiology procedure. Continue pain control and blood pressure control,  Had cardiac events,S/P C,for NSTEMI  LM/2V CAD  Severe LV dysfxn  Severe PAD s/p recent L SFA PTAS, needing L 3rd toe amputation +/- RLE angio  R rad vasband for hemostasis     Plan:  Cont med rx.  Cont ASA/Plavix/Statin.  Start entresto/toprol, titrate as BP/HR will allow.  Case d/w Dr. Wynn, will transfer to [facility] for CABG vs MV PCI eval.  Lifevest ordered.  Repeat echo in 3 months (mid Oct 2022) for reassessment of LVEF.   CT chest is done.no acute process.  Pending transfer to [facility].  CC is toe pain.      Interval History:denies chest pain.  CC is Toe pain.    Review of Systems   Constitutional:  Positive for fatigue. Negative for chills, diaphoresis and fever.   HENT:  Negative for congestion and trouble swallowing.    Respiratory:  Negative for cough, chest tightness, shortness of breath (has significantly improved) and wheezing.         + orthopnea   Cardiovascular:  Negative for chest pain, palpitations and leg swelling (feet swelling resolved.).   Gastrointestinal:  Negative for abdominal distention, abdominal pain, blood in stool, diarrhea, nausea and vomiting.   Genitourinary:  Negative for difficulty urinating, dysuria and hematuria.   Musculoskeletal:  Positive for arthralgias and myalgias. Negative for joint swelling.   Skin:  Positive for  wound.   Neurological:  Negative for dizziness, weakness and headaches.   Psychiatric/Behavioral:  Negative for confusion, decreased concentration and hallucinations.      Objective:     Vital Signs (Most Recent):  Temp: 98.5 °F (36.9 °C) (07/23/22 0747)  Pulse: 75 (07/23/22 0747)  Resp: 18 (07/23/22 0749)  BP: (!) 152/87 (07/23/22 0747)  SpO2: 98 % (07/23/22 0747)   Vital Signs (24h Range):  Temp:  [97.6 °F (36.4 °C)-98.5 °F (36.9 °C)] 98.5 °F (36.9 °C)  Pulse:  [63-75] 75  Resp:  [18-21] 18  SpO2:  [98 %-100 %] 98 %  BP: (126-152)/(76-90) 152/87     Weight: 77.1 kg (170 lb)  Body mass index is 24.39 kg/m².    Intake/Output Summary (Last 24 hours) at 7/23/2022 0901  Last data filed at 7/23/2022 0700  Gross per 24 hour   Intake 480 ml   Output 1000 ml   Net -520 ml        Physical Exam  Vitals and nursing note reviewed.   Constitutional:       General: He is not in acute distress.     Appearance: He is not ill-appearing.   HENT:      Head: Normocephalic and atraumatic.      Right Ear: External ear normal.      Left Ear: External ear normal.      Nose: Nose normal.      Mouth/Throat:      Mouth: Mucous membranes are moist.   Eyes:      Extraocular Movements: Extraocular movements intact.      Conjunctiva/sclera: Conjunctivae normal.   Cardiovascular:      Rate and Rhythm: Normal rate and regular rhythm.      Heart sounds: No murmur heard.    No gallop.   Pulmonary:      Effort: Pulmonary effort is normal. No respiratory distress.      Breath sounds: Normal breath sounds. No wheezing.   Abdominal:      General: There is no distension.      Palpations: Abdomen is soft.      Tenderness: There is no abdominal tenderness. There is no guarding.   Musculoskeletal:         General: No swelling or tenderness.      Cervical back: Normal range of motion.      Right lower leg: No edema.      Left lower leg: No edema.      Comments: Trace pedal edema resolved bilaterally. Left third toe findings c/w dry gangrene; no drainage,  erythema or swelling. Bilateral feet with dusky toes. Feet are cool to touch   Skin:     General: Skin is warm and dry.   Neurological:      General: No focal deficit present.      Mental Status: He is alert and oriented to person, place, and time.      Sensory: Sensory deficit (diminished sensation in bilateral feet) present.   Psychiatric:         Mood and Affect: Mood normal.         Behavior: Behavior normal.         Thought Content: Thought content normal.       Significant Labs: All pertinent labs within the past 24 hours have been reviewed.    Significant Imaging: I have reviewed all pertinent imaging results/findings within the past 24 hours.    CC is toe pain.  Assessment/Plan:      * Dry gangrene  -Left 3rd digit with findings c/w dry gangrene  -US b/l LE:  The vascular stent appears pain in left lower extremity.  Arterial atherosclerotic disease present, without evidence of occlusion.  -XR bilateral feet:  No acute fractures or dislocation.  Left 3rd digit with soft tissue abnormality  -ID consulted: IV abx discontinued 07/15. Monitor off abx.   -Blood cx with NGTD  -Podiatry and vascular consulted  -Podiatry plan for left 3rd toe amputation, pending vascular evaluation and cardiology procedure ,delayed duo to cardiac events.    NSTEMI (non-ST elevated myocardial infarction)  Had cardiac events,S/P [facility],for NSTEMI  LM/2V CAD  Severe LV dysfxn  Severe PAD s/p recent L SFA PTAS, needing L 3rd toe amputation +/- RLE angio  R rad vasband for hemostasis     Plan:  Cont med rx.  Cont ASA/Plavix/Statin.  Start entresto/toprol, titrate as BP/HR will allow.  Case d/w Dr. Wynn, will transfer to [facility] for CABG vs MV PCI eval.  Lifevest ordered.  Repeat echo in 3 months (mid Oct 2022) for reassessment of LVEF.   CT chest is done.no acute process.  Pending transfer to [facility].      Ischemic cardiomyopathy  -Echo with EF of 30%, moderately decreased systolic function, grade 2 diastolic dysfunction, pulmonary  hypertension, and moderate to severe global hypokinesis with WMA. ]  -Symptoms of orthopnea pedal edema improved with Lasix IV x 1 o 07/16  -Overall, does not appear overloaded.   -Cardiology consulted: . LifeVest ordered      Dyslipidemia  Continue statin: atorvastatin 80mg qhs  Lipid panel: Chol 144,Trig 122, HDL 35, LDL 84    Hypokalemia  K 3.1 on 07/15  Replace as needed  Resolved 07/18    ACS (acute coronary syndrome)  -Overnight on 07/15, patient with SOB and diaphoresis. No CP, but admits later intermittent chest discomfort  -chest x-ray no acute process  -unable to find EKG from 7/15.  Per documentation, EKG reading sinus rhythm with fusion complexes and PACs  -Initial troponin 0.07, repeat trop was 0.114  -Echo with EF of 30%, moderately decreased systolic function, grade 2 diastolic dysfunction, pulmonary hypertension, and moderate to severe global hypokinesis with WMA.   -BNP elevated >900  -Cardiology consulted: plans for heart cath on 07/18. Continue lovenox, ASA, statin.       PAD (peripheral artery disease)  -s/p LLE angio with stent placement on 07/05 by Dr. Rich   -Consider RLE angio this admission since early signs of possible ulceration  -Mechanical thrombectomy is NOT indicated for patient  -Consulted Podiatry and Vascular surgery  -Continue ASA, Statin, Blood pressure reduction.    Hypertensive urgency  -SBP in the 180s in the ED  -Noted history of HTN, but patient states he has not been on medications  -Started Norvasc 10mg qd on 07/15  -BP improving  -Continue pain control       VTE Risk Mitigation (From admission, onward)         Ordered     enoxaparin injection 80 mg  Every 12 hours (non-standard times)         07/16/22 1205     IP VTE HIGH RISK PATIENT  Once         07/15/22 0438     Place sequential compression device  Until discontinued         07/15/22 0438     Reason for No Pharmacological VTE Prophylaxis  Once        Question:  Reasons:  Answer:  Physician Provided (leave comment)   Comment:  procedure in am    07/15/22 0438                Discharge Planning   RAPHAEL:      Code Status: Full Code   Is the patient medically ready for discharge?:     Reason for patient still in hospital (select all that apply): Patient trending condition  Discharge Plan A: Home   Discharge Delays: None known at this time              Juan Alberto Alas MD  Department of Hospital Medicine   [facility]

## 2022-07-24 LAB
ALBUMIN SERPL BCP-MCNC: 3.3 G/DL (ref 3.5–5.2)
ALP SERPL-CCNC: 82 U/L (ref 55–135)
ALT SERPL W/O P-5'-P-CCNC: 15 U/L (ref 10–44)
ANION GAP SERPL CALC-SCNC: 9 MMOL/L (ref 8–16)
AST SERPL-CCNC: 20 U/L (ref 10–40)
BASOPHILS # BLD AUTO: 0.09 K/UL (ref 0–0.2)
BASOPHILS NFR BLD: 1.2 % (ref 0–1.9)
BILIRUB SERPL-MCNC: 0.6 MG/DL (ref 0.1–1)
BUN SERPL-MCNC: 13 MG/DL (ref 6–20)
CALCIUM SERPL-MCNC: 9.2 MG/DL (ref 8.7–10.5)
CHLORIDE SERPL-SCNC: 103 MMOL/L (ref 95–110)
CO2 SERPL-SCNC: 24 MMOL/L (ref 23–29)
CREAT SERPL-MCNC: 0.7 MG/DL (ref 0.5–1.4)
DIFFERENTIAL METHOD: ABNORMAL
EOSINOPHIL # BLD AUTO: 0.6 K/UL (ref 0–0.5)
EOSINOPHIL NFR BLD: 7.5 % (ref 0–8)
ERYTHROCYTE [DISTWIDTH] IN BLOOD BY AUTOMATED COUNT: 12.9 % (ref 11.5–14.5)
EST. GFR  (AFRICAN AMERICAN): >60 ML/MIN/1.73 M^2
EST. GFR  (NON AFRICAN AMERICAN): >60 ML/MIN/1.73 M^2
GLUCOSE SERPL-MCNC: 103 MG/DL (ref 70–110)
HCT VFR BLD AUTO: 40.6 % (ref 40–54)
HGB BLD-MCNC: 14.1 G/DL (ref 14–18)
IMM GRANULOCYTES # BLD AUTO: 0.01 K/UL (ref 0–0.04)
IMM GRANULOCYTES NFR BLD AUTO: 0.1 % (ref 0–0.5)
LYMPHOCYTES # BLD AUTO: 2.2 K/UL (ref 1–4.8)
LYMPHOCYTES NFR BLD: 29.4 % (ref 18–48)
MAGNESIUM SERPL-MCNC: 2 MG/DL (ref 1.6–2.6)
MCH RBC QN AUTO: 31 PG (ref 27–31)
MCHC RBC AUTO-ENTMCNC: 34.7 G/DL (ref 32–36)
MCV RBC AUTO: 89 FL (ref 82–98)
MONOCYTES # BLD AUTO: 0.8 K/UL (ref 0.3–1)
MONOCYTES NFR BLD: 10.2 % (ref 4–15)
NEUTROPHILS # BLD AUTO: 3.8 K/UL (ref 1.8–7.7)
NEUTROPHILS NFR BLD: 51.6 % (ref 38–73)
NRBC BLD-RTO: 0 /100 WBC
PHOSPHATE SERPL-MCNC: 3.2 MG/DL (ref 2.7–4.5)
PLATELET # BLD AUTO: 333 K/UL (ref 150–450)
PMV BLD AUTO: 9.3 FL (ref 9.2–12.9)
POTASSIUM SERPL-SCNC: 3.8 MMOL/L (ref 3.5–5.1)
PROT SERPL-MCNC: 6.7 G/DL (ref 6–8.4)
RBC # BLD AUTO: 4.55 M/UL (ref 4.6–6.2)
SARS-COV-2 RDRP RESP QL NAA+PROBE: NEGATIVE
SODIUM SERPL-SCNC: 136 MMOL/L (ref 136–145)
WBC # BLD AUTO: 7.38 K/UL (ref 3.9–12.7)

## 2022-07-24 PROCEDURE — 83735 ASSAY OF MAGNESIUM: CPT

## 2022-07-24 PROCEDURE — 25000003 PHARM REV CODE 250: Performed by: INTERNAL MEDICINE

## 2022-07-24 PROCEDURE — 63600175 PHARM REV CODE 636 W HCPCS: Performed by: STUDENT IN AN ORGANIZED HEALTH CARE EDUCATION/TRAINING PROGRAM

## 2022-07-24 PROCEDURE — 84100 ASSAY OF PHOSPHORUS: CPT

## 2022-07-24 PROCEDURE — U0002 COVID-19 LAB TEST NON-CDC: HCPCS | Performed by: STUDENT IN AN ORGANIZED HEALTH CARE EDUCATION/TRAINING PROGRAM

## 2022-07-24 PROCEDURE — 99233 PR SUBSEQUENT HOSPITAL CARE,LEVL III: ICD-10-PCS | Mod: ,,, | Performed by: SURGERY

## 2022-07-24 PROCEDURE — 63600175 PHARM REV CODE 636 W HCPCS

## 2022-07-24 PROCEDURE — 80053 COMPREHEN METABOLIC PANEL: CPT

## 2022-07-24 PROCEDURE — 63600175 PHARM REV CODE 636 W HCPCS: Performed by: INTERNAL MEDICINE

## 2022-07-24 PROCEDURE — 99233 SBSQ HOSP IP/OBS HIGH 50: CPT | Mod: ,,, | Performed by: SURGERY

## 2022-07-24 PROCEDURE — 25000242 PHARM REV CODE 250 ALT 637 W/ HCPCS

## 2022-07-24 PROCEDURE — 36415 COLL VENOUS BLD VENIPUNCTURE: CPT

## 2022-07-24 PROCEDURE — 85025 COMPLETE CBC W/AUTO DIFF WBC: CPT

## 2022-07-24 PROCEDURE — 25000003 PHARM REV CODE 250

## 2022-07-24 PROCEDURE — 20600001 HC STEP DOWN PRIVATE ROOM

## 2022-07-24 RX ORDER — ENOXAPARIN SODIUM 100 MG/ML
40 INJECTION SUBCUTANEOUS EVERY 12 HOURS
Status: DISCONTINUED | OUTPATIENT
Start: 2022-07-24 | End: 2022-07-24

## 2022-07-24 RX ORDER — FUROSEMIDE 10 MG/ML
40 INJECTION INTRAMUSCULAR; INTRAVENOUS
Status: DISCONTINUED | OUTPATIENT
Start: 2022-07-24 | End: 2022-07-25

## 2022-07-24 RX ORDER — ACETAMINOPHEN 325 MG/1
650 TABLET ORAL EVERY 8 HOURS
Status: DISCONTINUED | OUTPATIENT
Start: 2022-07-24 | End: 2022-07-25

## 2022-07-24 RX ORDER — ENOXAPARIN SODIUM 100 MG/ML
1 INJECTION SUBCUTANEOUS
Status: DISCONTINUED | OUTPATIENT
Start: 2022-07-24 | End: 2022-07-24

## 2022-07-24 RX ORDER — OXYCODONE HCL 5 MG/5 ML
2.5 SOLUTION, ORAL ORAL EVERY 4 HOURS PRN
Status: DISCONTINUED | OUTPATIENT
Start: 2022-07-24 | End: 2022-07-25

## 2022-07-24 RX ORDER — MUPIROCIN 20 MG/G
OINTMENT TOPICAL
Status: CANCELLED | OUTPATIENT
Start: 2022-07-24

## 2022-07-24 RX ORDER — CEFAZOLIN SODIUM/WATER 2 G/20 ML
2 SYRINGE (ML) INTRAVENOUS
Status: CANCELLED | OUTPATIENT
Start: 2022-07-24

## 2022-07-24 RX ADMIN — FUROSEMIDE 40 MG: 10 INJECTION, SOLUTION INTRAMUSCULAR; INTRAVENOUS at 09:07

## 2022-07-24 RX ADMIN — OXYCODONE HYDROCHLORIDE 2.5 MG: 5 SOLUTION ORAL at 07:07

## 2022-07-24 RX ADMIN — ENOXAPARIN SODIUM 80 MG: 100 INJECTION SUBCUTANEOUS at 09:07

## 2022-07-24 RX ADMIN — INSULIN ASPART 2 UNITS: 100 INJECTION, SOLUTION INTRAVENOUS; SUBCUTANEOUS at 06:07

## 2022-07-24 RX ADMIN — ACETAMINOPHEN 650 MG: 325 TABLET ORAL at 04:07

## 2022-07-24 RX ADMIN — ACETAMINOPHEN 650 MG: 325 TABLET ORAL at 11:07

## 2022-07-24 RX ADMIN — ATORVASTATIN CALCIUM 80 MG: 40 TABLET, FILM COATED ORAL at 09:07

## 2022-07-24 RX ADMIN — ASPIRIN 81 MG CHEWABLE TABLET 81 MG: 81 TABLET CHEWABLE at 09:07

## 2022-07-24 RX ADMIN — OXYCODONE HYDROCHLORIDE 2.5 MG: 5 SOLUTION ORAL at 10:07

## 2022-07-24 RX ADMIN — METOPROLOL SUCCINATE 25 MG: 25 TABLET, EXTENDED RELEASE ORAL at 09:07

## 2022-07-24 RX ADMIN — ACETAMINOPHEN 650 MG: 325 TABLET ORAL at 06:07

## 2022-07-24 RX ADMIN — OXYCODONE HYDROCHLORIDE 2.5 MG: 5 SOLUTION ORAL at 06:07

## 2022-07-24 RX ADMIN — OXYCODONE HYDROCHLORIDE 2.5 MG: 5 SOLUTION ORAL at 11:07

## 2022-07-24 RX ADMIN — HYDROCODONE BITARTRATE AND ACETAMINOPHEN 1 TABLET: 10; 325 TABLET ORAL at 01:07

## 2022-07-24 NOTE — H&P
[facility] - Cardiology Stepdown  Critical Care - Surgery  History & Physical    Patient Name: Yo Pink  MRN: [id number]  Admission Date: 7/15/2022  Code Status: Full Code  Attending Physician: Juan Alberto Alas MD   Primary Care Provider: [facility]   Principal Problem: ACS (acute coronary syndrome)    Subjective:     HPI:  Yo Pink is a 57 y.o. male with medical problems including PAD s/p LLE PTA and stenting on 7/5/22 in the letting of left 3rd toe gangrene who presents as a transfer from the [facility] for CTS evaluation in the setting of NSTEMI. He was admitted to the [facility] on 7/15/22 in the setting of worsening LLE toe pain and was initiated on antibiotics. Shortly after this he became diaphoretic and short of breath. Workup demonstrated elevated troponin and Echo demonstrating EF of 30% with wall motion abnormalities. He underwent coronary angiogram which demonstrated  of the proximal LAD and mid RCA. He was transferred to [facility] for consideration of CABG.     On arrival he reports controlled left toe pain which he describes as throbbing in nature. He reports he is otherwise feeling well.       Hospital/ICU Course:  No notes on file    Follow-up For: Procedure(s) (LRB):  Left heart cath (Left)  Instantaneous Wave-Free Ratio (IFR) (N/A)    Post-Operative Day: 6 Days Post-Op     Past Medical History:   Diagnosis Date    PAD (peripheral artery disease)        Past Surgical History:   Procedure Laterality Date    ANGIOGRAPHY OF LOWER EXTREMITY Left 7/5/2022    Procedure: Angiogram Extremity Unilateral;  Surgeon: Mehul Rich MD;  Location: [facility] OR;  Service: Vascular;  Laterality: Left;  RN PREOP ON 7/1/22. BINAX ON 7/5/22---NEED CONSENT    LEFT HEART CATHETERIZATION Left 7/18/2022    Procedure: Left heart cath;  Surgeon: Noah Huffman MD;  Location: [facility] CATH LAB;  Service: Cardiology;  Laterality: Left;  not before 9am, R rad access       Review of patient's allergies  indicates:  No Known Allergies    Family History    None       Tobacco Use    Smoking status: Former Smoker     Quit date: 2022     Years since quittin.1    Smokeless tobacco: Never Used   Substance and Sexual Activity    Alcohol use: Never    Drug use: Not Currently    Sexual activity: Not on file      Review of Systems   Constitutional:  Negative for chills, diaphoresis, fatigue and fever.   HENT:  Negative for congestion.    Respiratory:  Negative for cough, chest tightness and shortness of breath.    Cardiovascular:  Negative for chest pain and palpitations.   Gastrointestinal:  Negative for abdominal pain, nausea and vomiting.   Genitourinary:  Negative for difficulty urinating and dysuria.   Musculoskeletal:  Positive for gait problem and myalgias (BLE).   Neurological:  Negative for dizziness, syncope, light-headedness and headaches.   Objective:     Vital Signs (Most Recent):  Temp: 97.5 °F (36.4 °C) (22)  Pulse: 69 (22)  Resp: 18 (22)  BP: (!) 146/96 (22)  SpO2: 98 % (22)   Vital Signs (24h Range):  Temp:  [97.5 °F (36.4 °C)-98.5 °F (36.9 °C)] 97.5 °F (36.4 °C)  Pulse:  [63-75] 69  Resp:  [18-21] 18  SpO2:  [97 %-100 %] 98 %  BP: (127-152)/(76-96) 146/96     Weight: 77.1 kg (170 lb)  Body mass index is 24.39 kg/m².      Intake/Output Summary (Last 24 hours) at 2022 0421  Last data filed at 2022 2122  Gross per 24 hour   Intake 480 ml   Output 1000 ml   Net -520 ml       Physical Exam  Vitals and nursing note reviewed.   Constitutional:       General: He is not in acute distress.     Appearance: Normal appearance. He is not ill-appearing.   HENT:      Head: Normocephalic and atraumatic.   Eyes:      General: No scleral icterus.     Extraocular Movements: Extraocular movements intact.      Conjunctiva/sclera: Conjunctivae normal.   Cardiovascular:      Rate and Rhythm: Normal rate and regular rhythm.      Comments: R DP 1+   L DP  dopplerable   Pulmonary:      Effort: Pulmonary effort is normal. No respiratory distress.      Comments: On RA  Abdominal:      General: There is no distension.      Palpations: Abdomen is soft.      Tenderness: There is no abdominal tenderness.   Musculoskeletal:      Right lower leg: No edema.      Left lower leg: No edema.      Comments: LLE 3rd toe with dry gangrene    Skin:     General: Skin is warm and dry.   Neurological:      General: No focal deficit present.      Mental Status: He is alert and oriented to person, place, and time.     Lines/Drains/Airways       Peripheral Intravenous Line  Duration                  Peripheral IV - Single Lumen 07/24/22 0130 22 G Right Hand <1 day                    Significant Labs:    CBC/Anemia Profile:  No results for input(s): WBC, HGB, HCT, PLT, MCV, RDW, IRON, FERRITIN, RETIC, FOLATE, [id number], OCCULTBLOOD in the last 48 hours.     Chemistries:  Recent Labs   Lab 07/22/22  0503      K 3.7      CO2 24   BUN 12   CREATININE 0.7   CALCIUM 9.4       All pertinent labs within the past 24 hours have been reviewed.    Significant Imaging: I have reviewed all pertinent imaging results/findings within the past 24 hours.    TTE 7/16/22  The left ventricle is normal in size with moderate concentric hypertrophy and moderately decreased systolic function.  The estimated ejection fraction is 30%.  Mod-severe global hypokinesis with LAD territory WMA.  Grade II left ventricular diastolic dysfunction.  Normal right ventricular size with normal right ventricular systolic function.  Mild-to-moderate mitral regurgitation.  Mild tricuspid regurgitation.  The estimated PA systolic pressure is 62 mmHg.  There is pulmonary hypertension.    [facility] 7/18/22  LVEDP: 14mmHg  LVEF: 30%  Wall Motion: LAD WMA     Dominance: Right  LM: distal 60%, iFR 0.86  LAD: prox  after S1, distal vessel fills via L-L and R-L george  Ramus: large, MLI  LCx: large, mid 20-30%, no step-up with iFR  pullback  RCA: mid , dist vessel fills via L-L and R-L george    Carotid US 7/19/22  0-19% right and left carotid stenosis    Assessment/Plan:     * ACS (acute coronary syndrome)  Yo Pink is a 57 y.o. male with medical problems including PVD who was admitted to the [facility] for worsening PVD symptoms who developed dyspnea and diaphoresis and was found to have an NSTEMI. Coronary angiogram demonstrated  of the prox LAD and mid RCA. He was transferred to [facility] for consideration of CABG.     -Admit to [facility], Dr. Wynn  -Pain: Scheduled tylenol + low dose oxy for pain   -Diet: Diabetic, Cardiac 1500 cc fluid restriction  -Continue ASA, plavix, metop, statin, entresto  -Start therapeutic lovenox  -SSI, HbA1c 6.2 on admission to [facility]  -Will discuss surgical planning with staff in AM         Obdulio Sheriff MD  Critical Care - Surgery  [facility] - Cardiology Stepdown    I have seen the patient and reviewed the resident's note above. I have personally interviewed and examined the patient at bedside and agree with the findings.     Mr. Pink is a pleasant 57 year old male former smoker (3/4 to 1ppd x about 30-35 years, quit a few months ago) with heart failure reduced ejection fraction (30% ejection fraction) secondary to ischemic cardiomyopathy, pulmonary hypertension, severe peripheral arterial disease s/p lower extremity stenting and with toe gangrene, who presented to the hospital for amputation of his toe and experienced NSTEMI (troponin 0.114), acute heart failure exacerbation (), then underwent coronary angiogram showing left main disease and multivessel coronary artery disease.  Coronary angiogram details: 60% left main disease, totally occluded early mid LAD with a moderate sized mid and distal LAD (fills via left to left collaterals), large ramus intermedius, small to moderate sized low lying OM1 and OM2, and a totally occluded mRCA with a possible small to moderate sized PDA (fills very poorly).   The transthoracic echo shows 30% ejection fraction with LVEDD of 5.1cm, mild to moderate mitral regurgitation, mild tricuspid regurgitation, and estimated systolic PAP of 62mmHg.      CT chest noncontrast shows minimal ascending aortic and mild aortic arch calcifications.  Carotid ultrasound shows nonsignificant disease.    Given the severity of disease and the symptoms, I recommend coronary artery bypass surgery x 2 or 3 or 4 (LIMA-LAD, SVG-ramus, possible SVG-OM1 or OM2, possible SVG-PDA), possible mitral valve repair vs replacement (bioprosthesis).  I had a lengthy discussion with him about the risks vs. benefits of the surgery.  We discussed the risks including the predicted chance of mortality as well as morbidity such as stroke, kidney injury, respiratory failure, limb ischemia, myocardial infarction, sternal wound infection, and bleeding.  The Society of Thoracic Surgery (STS) risk score was also discussed.  With this history, I noted the patient has a higher chance of ischemic leg complications including further amputations due to his current severe peripheral arterial disease.  He has a higher chance of sternal wound infection and mediastinitis due to his currently active foot infection (gangrene).  Additionally, we discussed the likely length of stay in the ICU and in the hospital, as well as the overall recovery period.  Mr. Pink is in agreement and we will proceed with surgery on Monday, July 24, 2022.      Kenton Wynn MD  Cardiothoracic Surgery  [facility]

## 2022-07-24 NOTE — NURSING
Patient transferred via stretcher with ambulance to [facility]. Tele notified, monitor removed, [facility] notified. No distress noted.

## 2022-07-24 NOTE — ASSESSMENT & PLAN NOTE
Yo Pink is a 57 y.o. male with medical problems including PVD who was admitted to the [facility] for worsening PVD symptoms who developed dyspnea and diaphoresis and was found to have an NSTEMI. Coronary angiogram demonstrated  of the prox LAD and mid RCA. [place] [facility] [place].     -Admit to [facility], Dr. Wynn  -Pain: Scheduled tylenol + low dose oxy for pain   -Diet: Diabetic, Cardiac 1500 cc fluid restriction  -Continue ASA, plavix, metop, statin, entresto  -Start therapeutic lovenox  -SSI, HbA1c 6.2 on admission to [facility]  -Will discuss surgical planning with staff in AM

## 2022-07-24 NOTE — ASSESSMENT & PLAN NOTE
Mr. Pink is a 57 year old male with a PMH of HTN, HLD, PAD, and CAD who presented to outside hospital 7/23 with complaints of continued issues with bilateral foot wounds. Followed previously by Vascular Surgery. S/P angio with LLE angioplast 7/05/22. Now at [facility] [place]STEM[place], anticipating CABG on 7/25/22.    - Foot wound stable at this time. No need for urgent surgical intervention.   - As these wounds appear to be dry gangrene, WBC is WNL and he is afebrile, no need for antibiotics.  - Continue to paint with betadine daily.  - Recommend wound care consult.  - Will discuss possible surgical timing with staff.   - OK to continue with more pressing CTS evaluation and surgery at this time.   - Vascular Surgery will continue to follow. If there are any changes or new recommendations from a Vascular Surgery standpoint, we will reach out directly to primary team.

## 2022-07-24 NOTE — NURSING
Transfer center/Lilian/[phone] says the  time will be three hours from now due to transportation being backed up. The patient will be transferred to the [facility] [place] room 340

## 2022-07-24 NOTE — HPI
Yo Pink is a 57 y.o. male with medical problems including PAD s/p LLE PTA and stenting on 7/5/22 in the setting of left 3rd toe gangrene who presents as a transfer from the [facility] for CTS evaluation in the setting of NSTEMI. He was admitted to the [facility] on 7/15/22 in the setting of worsening LLE toe pain and was initiated on antibiotics. Shortly after this he became diaphoretic and short of breath. Workup demonstrated elevated troponin and Echo demonstrating EF of 30% with wall motion abnormalities. He underwent coronary angiogram which demonstrated  of the proximal LAD and mid RCA. He was transferred to [facility] for consideration of CABG.     He is now s/p CABGx3 (LIMA to LAD, SVG to RI SVG to PDA) on 7/25/22. The procedure was performed without apparent complication and he was transferred to the SICU for postoperative care and management. Intraoperatively he received 2L of crystalloid and 250mL of Cell Saver back. His reported urine output during the case was 250mL. His postoperative echocardiogram revealed reduced right ventricular function, EF 35% with no notable valvular abnormalities . He arrives to the SICU intubated, sedated, and hemodynamically stable on 0.08 of epi, and 0.06 of levo.

## 2022-07-24 NOTE — SUBJECTIVE & OBJECTIVE
Medications Prior to Admission   Medication Sig Dispense Refill Last Dose    acetaminophen (TYLENOL) 500 MG tablet Take 1,000 mg by mouth every 6 (six) hours as needed for Pain.       amoxicillin-clavulanate 875-125mg (AUGMENTIN) 875-125 mg per tablet Take 1 tablet by mouth every 12 (twelve) hours. 20 tablet 0     [] amoxicillin-clavulanate 875-125mg (AUGMENTIN) 875-125 mg per tablet Take 1 tablet by mouth every 12 (twelve) hours. for 7 days 14 tablet 0     clopidogreL (PLAVIX) 75 mg tablet Take 1 tablet (75 mg total) by mouth once daily. 30 tablet 11     oxyCODONE-acetaminophen (PERCOCET) 5-325 mg per tablet Take 1 tablet by mouth every 4 (four) hours as needed for Pain. 28 tablet 0        Review of patient's allergies indicates:  No Known Allergies    Past Medical History:   Diagnosis Date    PAD (peripheral artery disease)      Past Surgical History:   Procedure Laterality Date    ANGIOGRAPHY OF LOWER EXTREMITY Left 2022    Procedure: Angiogram Extremity Unilateral;  Surgeon: Mehul Rich MD;  Location: [facility] OR;  Service: Vascular;  Laterality: Left;  RN PREOP ON 22. BINAX ON 22---NEED CONSENT    LEFT HEART CATHETERIZATION Left 2022    Procedure: Left heart cath;  Surgeon: Noah Huffman MD;  Location: [facility] CATH LAB;  Service: Cardiology;  Laterality: Left;  not before 9am, R rad access     Family History    None       Tobacco Use    Smoking status: Former Smoker     Quit date: 2022     Years since quittin.1    Smokeless tobacco: Never Used   Substance and Sexual Activity    Alcohol use: Never    Drug use: Not Currently    Sexual activity: Not on file     Review of Systems   Constitutional: Negative.  Negative for fever.   HENT: Negative.     Respiratory:  Positive for chest tightness and shortness of breath. Negative for apnea and cough.    Cardiovascular: Negative.    Gastrointestinal: Negative.    Endocrine: Negative.    Genitourinary: Negative.    Musculoskeletal:  Negative.    Skin:  Positive for wound.   Neurological: Negative.    Hematological: Negative.    Psychiatric/Behavioral: Negative.     Objective:     Vital Signs (Most Recent):  Temp: 97.8 °F (36.6 °C) (07/24/22 0750)  Pulse: (!) 57 (07/24/22 0750)  Resp: 18 (07/24/22 0957)  BP: (!) 143/79 (07/24/22 0750)  SpO2: 98 % (07/24/22 0750)   Vital Signs (24h Range):  Temp:  [97.5 °F (36.4 °C)-98.5 °F (36.9 °C)] 97.8 °F (36.6 °C)  Pulse:  [57-72] 57  Resp:  [16-19] 18  SpO2:  [97 %-100 %] 98 %  BP: (127-146)/(68-96) 143/79     Weight: 77.1 kg (170 lb)  Body mass index is 24.39 kg/m².    Physical Exam  Vitals and nursing note reviewed.   Constitutional:       Appearance: Normal appearance. He is not ill-appearing.   HENT:      Head: Normocephalic.      Mouth/Throat:      Mouth: Mucous membranes are moist.      Pharynx: Oropharynx is clear.   Eyes:      General: No scleral icterus.     Extraocular Movements: Extraocular movements intact.      Conjunctiva/sclera: Conjunctivae normal.   Cardiovascular:      Rate and Rhythm: Normal rate.      Pulses:           Dorsalis pedis pulses are detected w/ Doppler on the right side and detected w/ Doppler on the left side.        Posterior tibial pulses are detected w/ Doppler on the right side and detected w/ Doppler on the left side.      Comments: Right: Monophasic DP, Biphasic PT  Left: Monophasic DP, Biphasic PT  Pulmonary:      Effort: Pulmonary effort is normal. No respiratory distress.      Breath sounds: No wheezing.   Abdominal:      General: Abdomen is flat. Bowel sounds are normal. There is no distension.      Palpations: Abdomen is soft.   Musculoskeletal:         General: Deformity present. No swelling or tenderness. Normal range of motion.      Cervical back: Normal range of motion.      Comments: Left 3rd toe with dry gangrene. Sensation decreased throughout forefoot.  Right foot with small area of superficial necrosis between 4th and 5th toes.  Feet are warm and dry. No  erythema or signs of acute infection.   Skin:     General: Skin is warm and dry.      Coloration: Skin is not jaundiced or pale.      Findings: Lesion present.   Neurological:      General: No focal deficit present.      Mental Status: He is alert and oriented to person, place, and time.   Psychiatric:         Mood and Affect: Mood normal.       Significant Labs:  CBC:   Recent Labs   Lab 07/24/22  0621   WBC 7.38   RBC 4.55*   HGB 14.1   HCT 40.6      MCV 89   MCH 31.0   MCHC 34.7     CMP:   Recent Labs   Lab 07/24/22  0621      CALCIUM 9.2   ALBUMIN 3.3*   PROT 6.7      K 3.8   CO2 24      BUN 13   CREATININE 0.7   ALKPHOS 82   ALT 15   AST 20   BILITOT 0.6     Coagulation: No results for input(s): LABPROT, INR, APTT in the last 48 hours.    Significant Diagnostics:  I have reviewed all pertinent imaging results/findings within the past 24 hours.

## 2022-07-24 NOTE — NURSING
Called report to [facility]/[phone] for patient who is going to room 340. ETA is 3 hours from now per the Transfer Center; verbalized understanding.

## 2022-07-24 NOTE — NURSING
Received patient from [facility]. Patient is alert and orientedx4. Patient can ambulate but walks on the side of his feet. VSS. RA. CTS called and alerted that patient has arrived. NAD noted. Accucheck Q6. Gangrene on left foot 3-5th. Right foot has it noted to 4-5th toe and the first great toe is purple on right foot. Patient stated he had bowel movement yesterday. Redness noted to bilateral heels. Bed locked in lowest position. Call bell and personal items within reach. Will continue POC

## 2022-07-25 ENCOUNTER — ANESTHESIA (OUTPATIENT)
Dept: SURGERY | Facility: HOSPITAL | Age: 57
DRG: 233 | End: 2022-07-25
Payer: MEDICAID

## 2022-07-25 ENCOUNTER — ANESTHESIA EVENT (OUTPATIENT)
Dept: SURGERY | Facility: HOSPITAL | Age: 57
DRG: 233 | End: 2022-07-25
Payer: MEDICAID

## 2022-07-25 DIAGNOSIS — I25.10 CORONARY ARTERY DISEASE INVOLVING NATIVE CORONARY ARTERY OF NATIVE HEART WITHOUT ANGINA PECTORIS: ICD-10-CM

## 2022-07-25 DIAGNOSIS — I24.9 ACS (ACUTE CORONARY SYNDROME): Primary | ICD-10-CM

## 2022-07-25 LAB
ABO + RH BLD: NORMAL
ALBUMIN SERPL BCP-MCNC: 2.6 G/DL (ref 3.5–5.2)
ALBUMIN SERPL BCP-MCNC: 3.4 G/DL (ref 3.5–5.2)
ALLENS TEST: ABNORMAL
ALP SERPL-CCNC: 68 U/L (ref 55–135)
ALP SERPL-CCNC: 87 U/L (ref 55–135)
ALT SERPL W/O P-5'-P-CCNC: 13 U/L (ref 10–44)
ALT SERPL W/O P-5'-P-CCNC: 16 U/L (ref 10–44)
ANION GAP SERPL CALC-SCNC: 10 MMOL/L (ref 8–16)
ANION GAP SERPL CALC-SCNC: 12 MMOL/L (ref 8–16)
APTT BLDCRRT: 27.6 SEC (ref 21–32)
AST SERPL-CCNC: 19 U/L (ref 10–40)
AST SERPL-CCNC: 23 U/L (ref 10–40)
BASOPHILS # BLD AUTO: 0.07 K/UL (ref 0–0.2)
BASOPHILS # BLD AUTO: 0.08 K/UL (ref 0–0.2)
BASOPHILS NFR BLD: 0.4 % (ref 0–1.9)
BASOPHILS NFR BLD: 1 % (ref 0–1.9)
BILIRUB SERPL-MCNC: 0.7 MG/DL (ref 0.1–1)
BILIRUB SERPL-MCNC: 0.7 MG/DL (ref 0.1–1)
BLD GP AB SCN CELLS X3 SERPL QL: NORMAL
BUN SERPL-MCNC: 13 MG/DL (ref 6–20)
BUN SERPL-MCNC: 13 MG/DL (ref 6–20)
CALCIUM SERPL-MCNC: 8.2 MG/DL (ref 8.7–10.5)
CALCIUM SERPL-MCNC: 9.5 MG/DL (ref 8.7–10.5)
CHLORIDE SERPL-SCNC: 101 MMOL/L (ref 95–110)
CHLORIDE SERPL-SCNC: 98 MMOL/L (ref 95–110)
CO2 SERPL-SCNC: 23 MMOL/L (ref 23–29)
CO2 SERPL-SCNC: 29 MMOL/L (ref 23–29)
CREAT SERPL-MCNC: 0.7 MG/DL (ref 0.5–1.4)
CREAT SERPL-MCNC: 0.8 MG/DL (ref 0.5–1.4)
DELSYS: ABNORMAL
DIFFERENTIAL METHOD: ABNORMAL
DIFFERENTIAL METHOD: ABNORMAL
EOSINOPHIL # BLD AUTO: 0.4 K/UL (ref 0–0.5)
EOSINOPHIL # BLD AUTO: 0.4 K/UL (ref 0–0.5)
EOSINOPHIL NFR BLD: 1.6 % (ref 0–8)
EOSINOPHIL NFR BLD: 6 % (ref 0–8)
ERYTHROCYTE [DISTWIDTH] IN BLOOD BY AUTOMATED COUNT: 13 % (ref 11.5–14.5)
ERYTHROCYTE [DISTWIDTH] IN BLOOD BY AUTOMATED COUNT: 13 % (ref 11.5–14.5)
ERYTHROCYTE [SEDIMENTATION RATE] IN BLOOD BY WESTERGREN METHOD: 18 MM/H
ERYTHROCYTE [SEDIMENTATION RATE] IN BLOOD BY WESTERGREN METHOD: 20 MM/H
EST. GFR  (AFRICAN AMERICAN): >60 ML/MIN/1.73 M^2
EST. GFR  (AFRICAN AMERICAN): >60 ML/MIN/1.73 M^2
EST. GFR  (NON AFRICAN AMERICAN): >60 ML/MIN/1.73 M^2
EST. GFR  (NON AFRICAN AMERICAN): >60 ML/MIN/1.73 M^2
FIBRINOGEN PPP-MCNC: 280 MG/DL (ref 182–400)
FIO2: 0.7
FIO2: 100
FIO2: 100
FIO2: 40
FIO2: 45
FIO2: 50
FIO2: 50
FIO2: 55
FIO2: 60
GLUCOSE SERPL-MCNC: 102 MG/DL (ref 70–110)
GLUCOSE SERPL-MCNC: 152 MG/DL (ref 70–110)
GLUCOSE SERPL-MCNC: 175 MG/DL (ref 70–110)
GLUCOSE SERPL-MCNC: 189 MG/DL (ref 70–110)
GLUCOSE SERPL-MCNC: 200 MG/DL (ref 70–110)
GLUCOSE SERPL-MCNC: 215 MG/DL (ref 70–110)
GLUCOSE SERPL-MCNC: 217 MG/DL (ref 70–110)
HCO3 UR-SCNC: 22.4 MMOL/L (ref 24–28)
HCO3 UR-SCNC: 22.6 MMOL/L (ref 24–28)
HCO3 UR-SCNC: 23.3 MMOL/L (ref 24–28)
HCO3 UR-SCNC: 24.4 MMOL/L (ref 24–28)
HCO3 UR-SCNC: 24.6 MMOL/L (ref 24–28)
HCO3 UR-SCNC: 24.8 MMOL/L (ref 24–28)
HCO3 UR-SCNC: 25.1 MMOL/L (ref 24–28)
HCO3 UR-SCNC: 27.7 MMOL/L (ref 24–28)
HCO3 UR-SCNC: 27.9 MMOL/L (ref 24–28)
HCO3 UR-SCNC: 28.1 MMOL/L (ref 24–28)
HCO3 UR-SCNC: 28.6 MMOL/L (ref 24–28)
HCO3 UR-SCNC: 30.8 MMOL/L (ref 24–28)
HCT VFR BLD AUTO: 34 % (ref 40–54)
HCT VFR BLD AUTO: 42 % (ref 40–54)
HCT VFR BLD CALC: 26 %PCV (ref 36–54)
HCT VFR BLD CALC: 28 %PCV (ref 36–54)
HCT VFR BLD CALC: 30 %PCV (ref 36–54)
HCT VFR BLD CALC: 32 %PCV (ref 36–54)
HCT VFR BLD CALC: 33 %PCV (ref 36–54)
HCT VFR BLD CALC: 34 %PCV (ref 36–54)
HCT VFR BLD CALC: 39 %PCV (ref 36–54)
HGB BLD-MCNC: 11.8 G/DL (ref 14–18)
HGB BLD-MCNC: 14.3 G/DL (ref 14–18)
IMM GRANULOCYTES # BLD AUTO: 0.03 K/UL (ref 0–0.04)
IMM GRANULOCYTES # BLD AUTO: 0.46 K/UL (ref 0–0.04)
IMM GRANULOCYTES NFR BLD AUTO: 0.4 % (ref 0–0.5)
IMM GRANULOCYTES NFR BLD AUTO: 2 % (ref 0–0.5)
INR PPP: 1.3 (ref 0.8–1.2)
LDH SERPL L TO P-CCNC: 0.99 MMOL/L (ref 0.36–1.25)
LDH SERPL L TO P-CCNC: 2.5 MMOL/L (ref 0.36–1.25)
LDH SERPL L TO P-CCNC: 3.38 MMOL/L (ref 0.36–1.25)
LDH SERPL L TO P-CCNC: 4.22 MMOL/L (ref 0.36–1.25)
LDH SERPL L TO P-CCNC: 4.86 MMOL/L (ref 0.36–1.25)
LDH SERPL L TO P-CCNC: 5.01 MMOL/L (ref 0.36–1.25)
LDH SERPL L TO P-CCNC: 5.38 MMOL/L (ref 0.36–1.25)
LDH SERPL L TO P-CCNC: 5.49 MMOL/L (ref 0.36–1.25)
LDH SERPL L TO P-CCNC: 5.87 MMOL/L (ref 0.36–1.25)
LYMPHOCYTES # BLD AUTO: 2 K/UL (ref 1–4.8)
LYMPHOCYTES # BLD AUTO: 2.8 K/UL (ref 1–4.8)
LYMPHOCYTES NFR BLD: 12.5 % (ref 18–48)
LYMPHOCYTES NFR BLD: 29.8 % (ref 18–48)
MAGNESIUM SERPL-MCNC: 2.1 MG/DL (ref 1.6–2.6)
MAGNESIUM SERPL-MCNC: 2.8 MG/DL (ref 1.6–2.6)
MCH RBC QN AUTO: 31.1 PG (ref 27–31)
MCH RBC QN AUTO: 31.5 PG (ref 27–31)
MCHC RBC AUTO-ENTMCNC: 34 G/DL (ref 32–36)
MCHC RBC AUTO-ENTMCNC: 34.7 G/DL (ref 32–36)
MCV RBC AUTO: 90 FL (ref 82–98)
MCV RBC AUTO: 93 FL (ref 82–98)
MODE: ABNORMAL
MONOCYTES # BLD AUTO: 0.5 K/UL (ref 0.3–1)
MONOCYTES # BLD AUTO: 0.7 K/UL (ref 0.3–1)
MONOCYTES NFR BLD: 10.1 % (ref 4–15)
MONOCYTES NFR BLD: 2.2 % (ref 4–15)
NEUTROPHILS # BLD AUTO: 18.3 K/UL (ref 1.8–7.7)
NEUTROPHILS # BLD AUTO: 3.6 K/UL (ref 1.8–7.7)
NEUTROPHILS NFR BLD: 52.7 % (ref 38–73)
NEUTROPHILS NFR BLD: 81.3 % (ref 38–73)
NRBC BLD-RTO: 0 /100 WBC
NRBC BLD-RTO: 0 /100 WBC
PCO2 BLDA: 35.6 MMHG (ref 35–45)
PCO2 BLDA: 35.7 MMHG (ref 35–45)
PCO2 BLDA: 37.6 MMHG (ref 35–45)
PCO2 BLDA: 38.8 MMHG (ref 35–45)
PCO2 BLDA: 40.6 MMHG (ref 35–45)
PCO2 BLDA: 40.7 MMHG (ref 35–45)
PCO2 BLDA: 43.5 MMHG (ref 35–45)
PCO2 BLDA: 44.9 MMHG (ref 35–45)
PCO2 BLDA: 45.1 MMHG (ref 35–45)
PCO2 BLDA: 47.2 MMHG (ref 35–45)
PCO2 BLDA: 48.2 MMHG (ref 35–45)
PCO2 BLDA: 50.2 MMHG (ref 35–45)
PEEP: 5
PH SMN: 7.34 [PH] (ref 7.35–7.45)
PH SMN: 7.34 [PH] (ref 7.35–7.45)
PH SMN: 7.37 [PH] (ref 7.35–7.45)
PH SMN: 7.37 [PH] (ref 7.35–7.45)
PH SMN: 7.38 [PH] (ref 7.35–7.45)
PH SMN: 7.39 [PH] (ref 7.35–7.45)
PH SMN: 7.39 [PH] (ref 7.35–7.45)
PH SMN: 7.4 [PH] (ref 7.35–7.45)
PH SMN: 7.41 [PH] (ref 7.35–7.45)
PH SMN: 7.41 [PH] (ref 7.35–7.45)
PH SMN: 7.46 [PH] (ref 7.35–7.45)
PH SMN: 7.48 [PH] (ref 7.35–7.45)
PHOSPHATE SERPL-MCNC: 2.9 MG/DL (ref 2.7–4.5)
PHOSPHATE SERPL-MCNC: 3.8 MG/DL (ref 2.7–4.5)
PLATELET # BLD AUTO: 323 K/UL (ref 150–450)
PLATELET # BLD AUTO: 325 K/UL (ref 150–450)
PMV BLD AUTO: 9.4 FL (ref 9.2–12.9)
PMV BLD AUTO: 9.8 FL (ref 9.2–12.9)
PO2 BLDA: 126 MMHG (ref 80–100)
PO2 BLDA: 136 MMHG (ref 80–100)
PO2 BLDA: 175 MMHG (ref 80–100)
PO2 BLDA: 177 MMHG (ref 80–100)
PO2 BLDA: 178 MMHG (ref 80–100)
PO2 BLDA: 179 MMHG (ref 80–100)
PO2 BLDA: 186 MMHG (ref 80–100)
PO2 BLDA: 417 MMHG (ref 80–100)
PO2 BLDA: 428 MMHG (ref 80–100)
PO2 BLDA: 44 MMHG (ref 40–60)
PO2 BLDA: 45 MMHG (ref 40–60)
PO2 BLDA: 488 MMHG (ref 80–100)
POC BE: -1 MMOL/L
POC BE: -2 MMOL/L
POC BE: -2 MMOL/L
POC BE: -3 MMOL/L
POC BE: 0 MMOL/L
POC BE: 0 MMOL/L
POC BE: 1 MMOL/L
POC BE: 3 MMOL/L
POC BE: 5 MMOL/L
POC BE: 6 MMOL/L
POC IONIZED CALCIUM: 0.99 MMOL/L (ref 1.06–1.42)
POC IONIZED CALCIUM: 1.01 MMOL/L (ref 1.06–1.42)
POC IONIZED CALCIUM: 1.03 MMOL/L (ref 1.06–1.42)
POC IONIZED CALCIUM: 1.07 MMOL/L (ref 1.06–1.42)
POC IONIZED CALCIUM: 1.09 MMOL/L (ref 1.06–1.42)
POC IONIZED CALCIUM: 1.09 MMOL/L (ref 1.06–1.42)
POC IONIZED CALCIUM: 1.13 MMOL/L (ref 1.06–1.42)
POC IONIZED CALCIUM: 1.14 MMOL/L (ref 1.06–1.42)
POC SATURATED O2: 100 % (ref 95–100)
POC SATURATED O2: 78 % (ref 95–100)
POC SATURATED O2: 79 % (ref 95–100)
POC SATURATED O2: 99 % (ref 95–100)
POC SATURATED O2: 99 % (ref 95–100)
POC TCO2: 23 MMOL/L (ref 23–27)
POC TCO2: 24 MMOL/L (ref 23–27)
POC TCO2: 25 MMOL/L (ref 23–27)
POC TCO2: 26 MMOL/L (ref 23–27)
POC TCO2: 26 MMOL/L (ref 24–29)
POC TCO2: 29 MMOL/L (ref 23–27)
POC TCO2: 29 MMOL/L (ref 23–27)
POC TCO2: 29 MMOL/L (ref 24–29)
POC TCO2: 30 MMOL/L (ref 23–27)
POC TCO2: 32 MMOL/L (ref 23–27)
POCT GLUCOSE: 107 MG/DL (ref 70–110)
POCT GLUCOSE: 108 MG/DL (ref 70–110)
POCT GLUCOSE: 122 MG/DL (ref 70–110)
POCT GLUCOSE: 127 MG/DL (ref 70–110)
POCT GLUCOSE: 130 MG/DL (ref 70–110)
POCT GLUCOSE: 133 MG/DL (ref 70–110)
POCT GLUCOSE: 138 MG/DL (ref 70–110)
POCT GLUCOSE: 138 MG/DL (ref 70–110)
POCT GLUCOSE: 141 MG/DL (ref 70–110)
POCT GLUCOSE: 146 MG/DL (ref 70–110)
POCT GLUCOSE: 159 MG/DL (ref 70–110)
POCT GLUCOSE: 220 MG/DL (ref 70–110)
POTASSIUM BLD-SCNC: 2.9 MMOL/L (ref 3.5–5.1)
POTASSIUM BLD-SCNC: 2.9 MMOL/L (ref 3.5–5.1)
POTASSIUM BLD-SCNC: 3.1 MMOL/L (ref 3.5–5.1)
POTASSIUM BLD-SCNC: 3.2 MMOL/L (ref 3.5–5.1)
POTASSIUM BLD-SCNC: 3.4 MMOL/L (ref 3.5–5.1)
POTASSIUM BLD-SCNC: 3.6 MMOL/L (ref 3.5–5.1)
POTASSIUM BLD-SCNC: 4.3 MMOL/L (ref 3.5–5.1)
POTASSIUM BLD-SCNC: 4.4 MMOL/L (ref 3.5–5.1)
POTASSIUM BLD-SCNC: 5.2 MMOL/L (ref 3.5–5.1)
POTASSIUM SERPL-SCNC: 3.3 MMOL/L (ref 3.5–5.1)
POTASSIUM SERPL-SCNC: 3.3 MMOL/L (ref 3.5–5.1)
POTASSIUM SERPL-SCNC: 3.7 MMOL/L (ref 3.5–5.1)
PROT SERPL-MCNC: 5.1 G/DL (ref 6–8.4)
PROT SERPL-MCNC: 7 G/DL (ref 6–8.4)
PROTHROMBIN TIME: 13.2 SEC (ref 9–12.5)
PS: 5
PS: 5
RBC # BLD AUTO: 3.8 M/UL (ref 4.6–6.2)
RBC # BLD AUTO: 4.54 M/UL (ref 4.6–6.2)
SAMPLE: ABNORMAL
SAMPLE: NORMAL
SITE: ABNORMAL
SODIUM BLD-SCNC: 134 MMOL/L (ref 136–145)
SODIUM BLD-SCNC: 135 MMOL/L (ref 136–145)
SODIUM BLD-SCNC: 136 MMOL/L (ref 136–145)
SODIUM BLD-SCNC: 136 MMOL/L (ref 136–145)
SODIUM BLD-SCNC: 137 MMOL/L (ref 136–145)
SODIUM BLD-SCNC: 138 MMOL/L (ref 136–145)
SODIUM BLD-SCNC: 140 MMOL/L (ref 136–145)
SODIUM BLD-SCNC: 140 MMOL/L (ref 136–145)
SODIUM BLD-SCNC: 141 MMOL/L (ref 136–145)
SODIUM BLD-SCNC: 141 MMOL/L (ref 136–145)
SODIUM BLD-SCNC: 142 MMOL/L (ref 136–145)
SODIUM SERPL-SCNC: 136 MMOL/L (ref 136–145)
SODIUM SERPL-SCNC: 137 MMOL/L (ref 136–145)
SPONT RATE: 22
SPONT RATE: 22
VT: 480
WBC # BLD AUTO: 22.54 K/UL (ref 3.9–12.7)
WBC # BLD AUTO: 6.81 K/UL (ref 3.9–12.7)

## 2022-07-25 PROCEDURE — 33508 PR ENDOSCOPY W/VIDEO-ASST VEIN HARVEST,CABG: ICD-10-PCS | Mod: 59,,, | Performed by: THORACIC SURGERY (CARDIOTHORACIC VASCULAR SURGERY)

## 2022-07-25 PROCEDURE — 83735 ASSAY OF MAGNESIUM: CPT

## 2022-07-25 PROCEDURE — 83735 ASSAY OF MAGNESIUM: CPT | Mod: 91

## 2022-07-25 PROCEDURE — 37000009 HC ANESTHESIA EA ADD 15 MINS: Performed by: THORACIC SURGERY (CARDIOTHORACIC VASCULAR SURGERY)

## 2022-07-25 PROCEDURE — 80053 COMPREHEN METABOLIC PANEL: CPT

## 2022-07-25 PROCEDURE — 20000000 HC ICU ROOM

## 2022-07-25 PROCEDURE — 63600175 PHARM REV CODE 636 W HCPCS: Mod: JG | Performed by: STUDENT IN AN ORGANIZED HEALTH CARE EDUCATION/TRAINING PROGRAM

## 2022-07-25 PROCEDURE — 36620 INSERTION CATHETER ARTERY: CPT | Mod: 59,,, | Performed by: ANESTHESIOLOGY

## 2022-07-25 PROCEDURE — 37000008 HC ANESTHESIA 1ST 15 MINUTES: Performed by: THORACIC SURGERY (CARDIOTHORACIC VASCULAR SURGERY)

## 2022-07-25 PROCEDURE — 25000003 PHARM REV CODE 250: Performed by: STUDENT IN AN ORGANIZED HEALTH CARE EDUCATION/TRAINING PROGRAM

## 2022-07-25 PROCEDURE — 93010 EKG 12-LEAD: ICD-10-PCS | Mod: ,,, | Performed by: INTERNAL MEDICINE

## 2022-07-25 PROCEDURE — 37799 UNLISTED PX VASCULAR SURGERY: CPT

## 2022-07-25 PROCEDURE — 93010 ELECTROCARDIOGRAM REPORT: CPT | Mod: ,,, | Performed by: INTERNAL MEDICINE

## 2022-07-25 PROCEDURE — 85610 PROTHROMBIN TIME: CPT

## 2022-07-25 PROCEDURE — 27000191 HC C-V MONITORING

## 2022-07-25 PROCEDURE — 85384 FIBRINOGEN ACTIVITY: CPT

## 2022-07-25 PROCEDURE — 63600175 PHARM REV CODE 636 W HCPCS: Performed by: STUDENT IN AN ORGANIZED HEALTH CARE EDUCATION/TRAINING PROGRAM

## 2022-07-25 PROCEDURE — 33518 PR CABG, ARTERY-VEIN, TWO: ICD-10-PCS | Mod: ,,, | Performed by: THORACIC SURGERY (CARDIOTHORACIC VASCULAR SURGERY)

## 2022-07-25 PROCEDURE — 63600175 PHARM REV CODE 636 W HCPCS: Performed by: THORACIC SURGERY (CARDIOTHORACIC VASCULAR SURGERY)

## 2022-07-25 PROCEDURE — 27201037 HC PRESSURE MONITORING SET UP

## 2022-07-25 PROCEDURE — 99900026 HC AIRWAY MAINTENANCE (STAT)

## 2022-07-25 PROCEDURE — 25000003 PHARM REV CODE 250: Performed by: INTERNAL MEDICINE

## 2022-07-25 PROCEDURE — 85025 COMPLETE CBC W/AUTO DIFF WBC: CPT | Mod: 91

## 2022-07-25 PROCEDURE — 33518 CABG ARTERY-VEIN TWO: CPT | Mod: ,,, | Performed by: THORACIC SURGERY (CARDIOTHORACIC VASCULAR SURGERY)

## 2022-07-25 PROCEDURE — 33533 CABG ARTERIAL SINGLE: CPT | Mod: ,,, | Performed by: THORACIC SURGERY (CARDIOTHORACIC VASCULAR SURGERY)

## 2022-07-25 PROCEDURE — 33508 ENDOSCOPIC VEIN HARVEST: CPT | Mod: 59,,, | Performed by: THORACIC SURGERY (CARDIOTHORACIC VASCULAR SURGERY)

## 2022-07-25 PROCEDURE — 27000175 HC ADULT BYPASS PUMP

## 2022-07-25 PROCEDURE — 27200953 HC CARDIOPLEGIA SYSTEM

## 2022-07-25 PROCEDURE — 86920 COMPATIBILITY TEST SPIN: CPT | Performed by: STUDENT IN AN ORGANIZED HEALTH CARE EDUCATION/TRAINING PROGRAM

## 2022-07-25 PROCEDURE — C9248 INJ, CLEVIDIPINE BUTYRATE: HCPCS | Mod: JG | Performed by: STUDENT IN AN ORGANIZED HEALTH CARE EDUCATION/TRAINING PROGRAM

## 2022-07-25 PROCEDURE — 33533 PR CABG, ARTERIAL, SINGLE: ICD-10-PCS | Mod: ,,, | Performed by: THORACIC SURGERY (CARDIOTHORACIC VASCULAR SURGERY)

## 2022-07-25 PROCEDURE — 36592 COLLECT BLOOD FROM PICC: CPT

## 2022-07-25 PROCEDURE — C1729 CATH, DRAINAGE: HCPCS | Performed by: THORACIC SURGERY (CARDIOTHORACIC VASCULAR SURGERY)

## 2022-07-25 PROCEDURE — 25000003 PHARM REV CODE 250: Performed by: THORACIC SURGERY (CARDIOTHORACIC VASCULAR SURGERY)

## 2022-07-25 PROCEDURE — 36000713 HC OR TIME LEV V EA ADD 15 MIN: Performed by: THORACIC SURGERY (CARDIOTHORACIC VASCULAR SURGERY)

## 2022-07-25 PROCEDURE — P9045 ALBUMIN (HUMAN), 5%, 250 ML: HCPCS | Mod: JG

## 2022-07-25 PROCEDURE — 36415 COLL VENOUS BLD VENIPUNCTURE: CPT

## 2022-07-25 PROCEDURE — 27000221 HC OXYGEN, UP TO 24 HOURS

## 2022-07-25 PROCEDURE — 99900035 HC TECH TIME PER 15 MIN (STAT)

## 2022-07-25 PROCEDURE — 36000712 HC OR TIME LEV V 1ST 15 MIN: Performed by: THORACIC SURGERY (CARDIOTHORACIC VASCULAR SURGERY)

## 2022-07-25 PROCEDURE — 84100 ASSAY OF PHOSPHORUS: CPT | Mod: 91

## 2022-07-25 PROCEDURE — 82330 ASSAY OF CALCIUM: CPT

## 2022-07-25 PROCEDURE — 36415 COLL VENOUS BLD VENIPUNCTURE: CPT | Performed by: THORACIC SURGERY (CARDIOTHORACIC VASCULAR SURGERY)

## 2022-07-25 PROCEDURE — 36620 ARTERIAL: ICD-10-PCS | Mod: 59,,, | Performed by: ANESTHESIOLOGY

## 2022-07-25 PROCEDURE — 85730 THROMBOPLASTIN TIME PARTIAL: CPT

## 2022-07-25 PROCEDURE — 94761 N-INVAS EAR/PLS OXIMETRY MLT: CPT

## 2022-07-25 PROCEDURE — 84295 ASSAY OF SERUM SODIUM: CPT

## 2022-07-25 PROCEDURE — D9220A PRA ANESTHESIA: ICD-10-PCS | Mod: ,,, | Performed by: ANESTHESIOLOGY

## 2022-07-25 PROCEDURE — 84100 ASSAY OF PHOSPHORUS: CPT

## 2022-07-25 PROCEDURE — 83605 ASSAY OF LACTIC ACID: CPT

## 2022-07-25 PROCEDURE — 86901 BLOOD TYPING SEROLOGIC RH(D): CPT | Performed by: STUDENT IN AN ORGANIZED HEALTH CARE EDUCATION/TRAINING PROGRAM

## 2022-07-25 PROCEDURE — 63600175 PHARM REV CODE 636 W HCPCS

## 2022-07-25 PROCEDURE — 36556 INSERT NON-TUNNEL CV CATH: CPT | Mod: 59,,, | Performed by: ANESTHESIOLOGY

## 2022-07-25 PROCEDURE — 27000445 HC TEMPORARY PACEMAKER LEADS

## 2022-07-25 PROCEDURE — 27200966 HC CLOSED SUCTION SYSTEM

## 2022-07-25 PROCEDURE — 36620 INSERTION CATHETER ARTERY: CPT | Performed by: STUDENT IN AN ORGANIZED HEALTH CARE EDUCATION/TRAINING PROGRAM

## 2022-07-25 PROCEDURE — 36415 COLL VENOUS BLD VENIPUNCTURE: CPT | Performed by: STUDENT IN AN ORGANIZED HEALTH CARE EDUCATION/TRAINING PROGRAM

## 2022-07-25 PROCEDURE — 36556 PR INSERT NON-TUNNEL CV CATH 5+ YRS OLD: ICD-10-PCS | Mod: 59,,, | Performed by: ANESTHESIOLOGY

## 2022-07-25 PROCEDURE — 82800 BLOOD PH: CPT

## 2022-07-25 PROCEDURE — 80053 COMPREHEN METABOLIC PANEL: CPT | Mod: 91

## 2022-07-25 PROCEDURE — 25000003 PHARM REV CODE 250

## 2022-07-25 PROCEDURE — 27100088 HC CELL SAVER

## 2022-07-25 PROCEDURE — 82803 BLOOD GASES ANY COMBINATION: CPT

## 2022-07-25 PROCEDURE — 27202608 HC CANNULA, MISC

## 2022-07-25 PROCEDURE — 27100026 HC SHUNT SENSOR, TERUMO

## 2022-07-25 PROCEDURE — P9045 ALBUMIN (HUMAN), 5%, 250 ML: HCPCS | Mod: JG | Performed by: STUDENT IN AN ORGANIZED HEALTH CARE EDUCATION/TRAINING PROGRAM

## 2022-07-25 PROCEDURE — 25000242 PHARM REV CODE 250 ALT 637 W/ HCPCS

## 2022-07-25 PROCEDURE — 85025 COMPLETE CBC W/AUTO DIFF WBC: CPT

## 2022-07-25 PROCEDURE — 93005 ELECTROCARDIOGRAM TRACING: CPT

## 2022-07-25 PROCEDURE — 85520 HEPARIN ASSAY: CPT

## 2022-07-25 PROCEDURE — 94002 VENT MGMT INPAT INIT DAY: CPT

## 2022-07-25 PROCEDURE — D9220A PRA ANESTHESIA: Mod: ,,, | Performed by: ANESTHESIOLOGY

## 2022-07-25 PROCEDURE — 27800903 OPTIME MED/SURG SUP & DEVICES OTHER IMPLANTS: Performed by: THORACIC SURGERY (CARDIOTHORACIC VASCULAR SURGERY)

## 2022-07-25 PROCEDURE — 85014 HEMATOCRIT: CPT

## 2022-07-25 PROCEDURE — 27201423 OPTIME MED/SURG SUP & DEVICES STERILE SUPPLY: Performed by: THORACIC SURGERY (CARDIOTHORACIC VASCULAR SURGERY)

## 2022-07-25 PROCEDURE — 84132 ASSAY OF SERUM POTASSIUM: CPT

## 2022-07-25 DEVICE — PUTTY HEMASORB BONE RESORBABLE: Type: IMPLANTABLE DEVICE | Site: CHEST | Status: FUNCTIONAL

## 2022-07-25 RX ORDER — PROPOFOL 10 MG/ML
0-50 INJECTION, EMULSION INTRAVENOUS CONTINUOUS
Status: DISCONTINUED | OUTPATIENT
Start: 2022-07-25 | End: 2022-07-26

## 2022-07-25 RX ORDER — SUCCINYLCHOLINE CHLORIDE 20 MG/ML
INJECTION INTRAMUSCULAR; INTRAVENOUS
Status: DISCONTINUED | OUTPATIENT
Start: 2022-07-25 | End: 2022-07-25

## 2022-07-25 RX ORDER — ASPIRIN 325 MG
325 TABLET ORAL DAILY
Status: DISCONTINUED | OUTPATIENT
Start: 2022-07-26 | End: 2022-07-26

## 2022-07-25 RX ORDER — POTASSIUM CHLORIDE 14.9 MG/ML
20 INJECTION INTRAVENOUS
Status: DISCONTINUED | OUTPATIENT
Start: 2022-07-25 | End: 2022-07-27

## 2022-07-25 RX ORDER — POTASSIUM CHLORIDE 29.8 MG/ML
40 INJECTION INTRAVENOUS
Status: DISCONTINUED | OUTPATIENT
Start: 2022-07-25 | End: 2022-07-27

## 2022-07-25 RX ORDER — HYDROMORPHONE HYDROCHLORIDE 1 MG/ML
2 INJECTION, SOLUTION INTRAMUSCULAR; INTRAVENOUS; SUBCUTANEOUS
Status: DISCONTINUED | OUTPATIENT
Start: 2022-07-25 | End: 2022-07-26

## 2022-07-25 RX ORDER — PHENYLEPHRINE HYDROCHLORIDE 10 MG/ML
INJECTION INTRAVENOUS
Status: DISCONTINUED | OUTPATIENT
Start: 2022-07-25 | End: 2022-07-25

## 2022-07-25 RX ORDER — DOCUSATE SODIUM 100 MG/1
100 CAPSULE, LIQUID FILLED ORAL 2 TIMES DAILY
Status: DISCONTINUED | OUTPATIENT
Start: 2022-07-25 | End: 2022-07-30 | Stop reason: HOSPADM

## 2022-07-25 RX ORDER — OXYCODONE HYDROCHLORIDE 5 MG/1
10 TABLET ORAL EVERY 4 HOURS PRN
Status: DISCONTINUED | OUTPATIENT
Start: 2022-07-25 | End: 2022-07-26

## 2022-07-25 RX ORDER — NEOSTIGMINE METHYLSULFATE 1 MG/ML
INJECTION, SOLUTION INTRAVENOUS
Status: DISCONTINUED | OUTPATIENT
Start: 2022-07-25 | End: 2022-07-25

## 2022-07-25 RX ORDER — CEFAZOLIN SODIUM 2 G/50ML
2 SOLUTION INTRAVENOUS
Status: DISCONTINUED | OUTPATIENT
Start: 2022-07-25 | End: 2022-07-25

## 2022-07-25 RX ORDER — KETAMINE HCL IN 0.9 % NACL 50 MG/5 ML
SYRINGE (ML) INTRAVENOUS
Status: DISCONTINUED | OUTPATIENT
Start: 2022-07-25 | End: 2022-07-25

## 2022-07-25 RX ORDER — CALCIUM GLUCONATE 20 MG/ML
3 INJECTION, SOLUTION INTRAVENOUS
Status: DISCONTINUED | OUTPATIENT
Start: 2022-07-25 | End: 2022-07-29 | Stop reason: ALTCHOICE

## 2022-07-25 RX ORDER — CEFAZOLIN SODIUM 1 G/3ML
INJECTION, POWDER, FOR SOLUTION INTRAMUSCULAR; INTRAVENOUS
Status: DISCONTINUED | OUTPATIENT
Start: 2022-07-25 | End: 2022-07-25

## 2022-07-25 RX ORDER — MIDAZOLAM HYDROCHLORIDE 1 MG/ML
INJECTION INTRAMUSCULAR; INTRAVENOUS
Status: DISCONTINUED | OUTPATIENT
Start: 2022-07-25 | End: 2022-07-25

## 2022-07-25 RX ORDER — ALBUMIN HUMAN 50 G/1000ML
25 SOLUTION INTRAVENOUS ONCE
Status: COMPLETED | OUTPATIENT
Start: 2022-07-25 | End: 2022-07-25

## 2022-07-25 RX ORDER — MAGNESIUM SULFATE HEPTAHYDRATE 40 MG/ML
2 INJECTION, SOLUTION INTRAVENOUS
Status: DISCONTINUED | OUTPATIENT
Start: 2022-07-25 | End: 2022-07-29 | Stop reason: ALTCHOICE

## 2022-07-25 RX ORDER — ESMOLOL HYDROCHLORIDE 10 MG/ML
INJECTION INTRAVENOUS
Status: DISCONTINUED | OUTPATIENT
Start: 2022-07-25 | End: 2022-07-25

## 2022-07-25 RX ORDER — HYDROMORPHONE HYDROCHLORIDE 1 MG/ML
1 INJECTION, SOLUTION INTRAMUSCULAR; INTRAVENOUS; SUBCUTANEOUS
Status: DISCONTINUED | OUTPATIENT
Start: 2022-07-25 | End: 2022-07-26

## 2022-07-25 RX ORDER — POLYETHYLENE GLYCOL 3350 17 G/17G
17 POWDER, FOR SOLUTION ORAL DAILY
Status: DISCONTINUED | OUTPATIENT
Start: 2022-07-26 | End: 2022-07-30 | Stop reason: HOSPADM

## 2022-07-25 RX ORDER — HEPARIN SOD,PORCINE/0.9 % NACL 1000/500ML
INTRAVENOUS SOLUTION INTRAVENOUS
Status: DISCONTINUED | OUTPATIENT
Start: 2022-07-25 | End: 2022-07-25 | Stop reason: HOSPADM

## 2022-07-25 RX ORDER — ACETAMINOPHEN 500 MG
1000 TABLET ORAL EVERY 8 HOURS
Status: DISCONTINUED | OUTPATIENT
Start: 2022-07-25 | End: 2022-07-30 | Stop reason: HOSPADM

## 2022-07-25 RX ORDER — NITROGLYCERIN 5 MG/ML
INJECTION, SOLUTION INTRAVENOUS
Status: DISCONTINUED | OUTPATIENT
Start: 2022-07-25 | End: 2022-07-25

## 2022-07-25 RX ORDER — HYDROCODONE BITARTRATE AND ACETAMINOPHEN 500; 5 MG/1; MG/1
TABLET ORAL
Status: DISCONTINUED | OUTPATIENT
Start: 2022-07-25 | End: 2022-07-30 | Stop reason: HOSPADM

## 2022-07-25 RX ORDER — PAPAVERINE HYDROCHLORIDE 30 MG/ML
INJECTION INTRAMUSCULAR; INTRAVENOUS
Status: DISCONTINUED | OUTPATIENT
Start: 2022-07-25 | End: 2022-07-25 | Stop reason: HOSPADM

## 2022-07-25 RX ORDER — PROPOFOL 10 MG/ML
VIAL (ML) INTRAVENOUS
Status: DISCONTINUED | OUTPATIENT
Start: 2022-07-25 | End: 2022-07-25

## 2022-07-25 RX ORDER — ETOMIDATE 2 MG/ML
INJECTION INTRAVENOUS
Status: DISCONTINUED | OUTPATIENT
Start: 2022-07-25 | End: 2022-07-25

## 2022-07-25 RX ORDER — TRANEXAMIC ACID 100 MG/ML
INJECTION, SOLUTION INTRAVENOUS CONTINUOUS PRN
Status: DISCONTINUED | OUTPATIENT
Start: 2022-07-25 | End: 2022-07-25

## 2022-07-25 RX ORDER — MAGNESIUM SULFATE HEPTAHYDRATE 40 MG/ML
4 INJECTION, SOLUTION INTRAVENOUS
Status: DISCONTINUED | OUTPATIENT
Start: 2022-07-25 | End: 2022-07-29 | Stop reason: ALTCHOICE

## 2022-07-25 RX ORDER — PROPOFOL 10 MG/ML
VIAL (ML) INTRAVENOUS CONTINUOUS PRN
Status: DISCONTINUED | OUTPATIENT
Start: 2022-07-25 | End: 2022-07-25

## 2022-07-25 RX ORDER — FENTANYL CITRATE 50 UG/ML
INJECTION, SOLUTION INTRAMUSCULAR; INTRAVENOUS
Status: DISCONTINUED | OUTPATIENT
Start: 2022-07-25 | End: 2022-07-25

## 2022-07-25 RX ORDER — FUROSEMIDE 10 MG/ML
40 INJECTION INTRAMUSCULAR; INTRAVENOUS ONCE
Status: COMPLETED | OUTPATIENT
Start: 2022-07-25 | End: 2022-07-25

## 2022-07-25 RX ORDER — PROTAMINE SULFATE 10 MG/ML
INJECTION, SOLUTION INTRAVENOUS
Status: DISCONTINUED | OUTPATIENT
Start: 2022-07-25 | End: 2022-07-25

## 2022-07-25 RX ORDER — ASPIRIN 325 MG
325 TABLET ORAL ONCE
Status: COMPLETED | OUTPATIENT
Start: 2022-07-25 | End: 2022-07-25

## 2022-07-25 RX ORDER — ONDANSETRON 2 MG/ML
4 INJECTION INTRAMUSCULAR; INTRAVENOUS EVERY 12 HOURS PRN
Status: DISCONTINUED | OUTPATIENT
Start: 2022-07-25 | End: 2022-07-30 | Stop reason: HOSPADM

## 2022-07-25 RX ORDER — VASOPRESSIN 20 [USP'U]/ML
INJECTION, SOLUTION INTRAMUSCULAR; SUBCUTANEOUS
Status: DISCONTINUED | OUTPATIENT
Start: 2022-07-25 | End: 2022-07-25

## 2022-07-25 RX ORDER — MUPIROCIN 20 MG/G
OINTMENT TOPICAL 2 TIMES DAILY
Status: COMPLETED | OUTPATIENT
Start: 2022-07-25 | End: 2022-07-30

## 2022-07-25 RX ORDER — TRANEXAMIC ACID 100 MG/ML
INJECTION, SOLUTION INTRAVENOUS
Status: DISCONTINUED | OUTPATIENT
Start: 2022-07-25 | End: 2022-07-25

## 2022-07-25 RX ORDER — CALCIUM GLUCONATE 20 MG/ML
1 INJECTION, SOLUTION INTRAVENOUS
Status: DISCONTINUED | OUTPATIENT
Start: 2022-07-25 | End: 2022-07-29 | Stop reason: ALTCHOICE

## 2022-07-25 RX ORDER — ROCURONIUM BROMIDE 10 MG/ML
INJECTION, SOLUTION INTRAVENOUS
Status: DISCONTINUED | OUTPATIENT
Start: 2022-07-25 | End: 2022-07-25

## 2022-07-25 RX ORDER — FAMOTIDINE 10 MG/ML
20 INJECTION INTRAVENOUS 2 TIMES DAILY
Status: DISCONTINUED | OUTPATIENT
Start: 2022-07-25 | End: 2022-07-26

## 2022-07-25 RX ORDER — POTASSIUM CHLORIDE 14.9 MG/ML
60 INJECTION INTRAVENOUS
Status: DISCONTINUED | OUTPATIENT
Start: 2022-07-25 | End: 2022-07-27

## 2022-07-25 RX ORDER — NOREPINEPHRINE BITARTRATE/D5W 4MG/250ML
0-3 PLASTIC BAG, INJECTION (ML) INTRAVENOUS CONTINUOUS
Status: DISCONTINUED | OUTPATIENT
Start: 2022-07-25 | End: 2022-07-26

## 2022-07-25 RX ORDER — LIDOCAINE HCL/PF 100 MG/5ML
SYRINGE (ML) INTRAVENOUS
Status: DISCONTINUED | OUTPATIENT
Start: 2022-07-25 | End: 2022-07-25

## 2022-07-25 RX ORDER — ONDANSETRON 2 MG/ML
INJECTION INTRAMUSCULAR; INTRAVENOUS
Status: DISCONTINUED | OUTPATIENT
Start: 2022-07-25 | End: 2022-07-25

## 2022-07-25 RX ORDER — CALCIUM GLUCONATE 20 MG/ML
2 INJECTION, SOLUTION INTRAVENOUS
Status: DISCONTINUED | OUTPATIENT
Start: 2022-07-25 | End: 2022-07-29 | Stop reason: ALTCHOICE

## 2022-07-25 RX ORDER — HEPARIN SODIUM 1000 [USP'U]/ML
INJECTION, SOLUTION INTRAVENOUS; SUBCUTANEOUS
Status: DISCONTINUED | OUTPATIENT
Start: 2022-07-25 | End: 2022-07-25

## 2022-07-25 RX ORDER — ATORVASTATIN CALCIUM 20 MG/1
40 TABLET, FILM COATED ORAL DAILY
Status: DISCONTINUED | OUTPATIENT
Start: 2022-07-26 | End: 2022-07-30 | Stop reason: HOSPADM

## 2022-07-25 RX ORDER — OXYCODONE HYDROCHLORIDE 5 MG/1
5 TABLET ORAL EVERY 4 HOURS PRN
Status: DISCONTINUED | OUTPATIENT
Start: 2022-07-25 | End: 2022-07-26

## 2022-07-25 RX ORDER — NOREPINEPHRINE BITARTRATE 1 MG/ML
INJECTION, SOLUTION INTRAVENOUS
Status: DISCONTINUED | OUTPATIENT
Start: 2022-07-25 | End: 2022-07-25

## 2022-07-25 RX ORDER — CEFAZOLIN SODIUM/D5W 2 G/100 ML
2 PLASTIC BAG, INJECTION (ML) INTRAVENOUS
Status: COMPLETED | OUTPATIENT
Start: 2022-07-25 | End: 2022-07-26

## 2022-07-25 RX ORDER — DEXMEDETOMIDINE HYDROCHLORIDE 4 UG/ML
0-1.4 INJECTION, SOLUTION INTRAVENOUS CONTINUOUS
Status: DISCONTINUED | OUTPATIENT
Start: 2022-07-25 | End: 2022-07-26

## 2022-07-25 RX ADMIN — NOREPINEPHRINE BITARTRATE 0.04 MCG/KG/MIN: 1 INJECTION, SOLUTION, CONCENTRATE INTRAVENOUS at 02:07

## 2022-07-25 RX ADMIN — ROCURONIUM BROMIDE 70 MG: 10 INJECTION, SOLUTION INTRAVENOUS at 01:07

## 2022-07-25 RX ADMIN — MIDAZOLAM HYDROCHLORIDE 1 MG: 1 INJECTION, SOLUTION INTRAMUSCULAR; INTRAVENOUS at 12:07

## 2022-07-25 RX ADMIN — PROTAMINE SULFATE 280 MG: 10 INJECTION, SOLUTION INTRAVENOUS at 04:07

## 2022-07-25 RX ADMIN — ETOMIDATE 10 MG: 2 INJECTION, SOLUTION INTRAVENOUS at 12:07

## 2022-07-25 RX ADMIN — FAMOTIDINE 20 MG: 10 INJECTION, SOLUTION INTRAVENOUS at 09:07

## 2022-07-25 RX ADMIN — IBUPROFEN 400 MG: 400 TABLET, FILM COATED ORAL at 08:07

## 2022-07-25 RX ADMIN — ALBUMIN (HUMAN) 25 G: 12.5 SOLUTION INTRAVENOUS at 09:07

## 2022-07-25 RX ADMIN — CALCIUM CHLORIDE 500 MG: 100 INJECTION, SOLUTION INTRAVENOUS at 04:07

## 2022-07-25 RX ADMIN — EPINEPHRINE 0.08 MCG/KG/MIN: 1 INJECTION INTRAMUSCULAR; INTRAVENOUS; SUBCUTANEOUS at 03:07

## 2022-07-25 RX ADMIN — PROPOFOL 20 MG: 10 INJECTION, EMULSION INTRAVENOUS at 01:07

## 2022-07-25 RX ADMIN — LIDOCAINE HYDROCHLORIDE 100 MG: 20 INJECTION, SOLUTION INTRAVENOUS at 03:07

## 2022-07-25 RX ADMIN — PHENYLEPHRINE HYDROCHLORIDE 100 MCG: 10 INJECTION INTRAVENOUS at 02:07

## 2022-07-25 RX ADMIN — FENTANYL CITRATE 100 MCG: 50 INJECTION, SOLUTION INTRAMUSCULAR; INTRAVENOUS at 12:07

## 2022-07-25 RX ADMIN — DEXTROSE 2 G: 50 INJECTION, SOLUTION INTRAVENOUS at 09:07

## 2022-07-25 RX ADMIN — NITROGLYCERIN 25 MCG: 5 INJECTION, SOLUTION INTRAVENOUS at 01:07

## 2022-07-25 RX ADMIN — HEPARIN SODIUM 24000 UNITS: 1000 INJECTION, SOLUTION INTRAVENOUS; SUBCUTANEOUS at 02:07

## 2022-07-25 RX ADMIN — HYDROMORPHONE HYDROCHLORIDE 1 MG: 1 INJECTION, SOLUTION INTRAMUSCULAR; INTRAVENOUS; SUBCUTANEOUS at 11:07

## 2022-07-25 RX ADMIN — FUROSEMIDE 40 MG: 10 INJECTION, SOLUTION INTRAMUSCULAR; INTRAVENOUS at 05:07

## 2022-07-25 RX ADMIN — SUCCINYLCHOLINE CHLORIDE 180 MG: 20 INJECTION, SOLUTION INTRAMUSCULAR; INTRAVENOUS at 12:07

## 2022-07-25 RX ADMIN — Medication 10 MG: at 04:07

## 2022-07-25 RX ADMIN — DEXMEDETOMIDINE HYDROCHLORIDE 0.2 MCG/KG/HR: 4 INJECTION INTRAVENOUS at 09:07

## 2022-07-25 RX ADMIN — PROPOFOL 35 MCG/KG/MIN: 10 INJECTION, EMULSION INTRAVENOUS at 05:07

## 2022-07-25 RX ADMIN — NITROGLYCERIN 50 MCG: 5 INJECTION, SOLUTION INTRAVENOUS at 02:07

## 2022-07-25 RX ADMIN — ALBUMIN (HUMAN) 25 G: 12.5 SOLUTION INTRAVENOUS at 05:07

## 2022-07-25 RX ADMIN — SODIUM CHLORIDE 1 MG/KG/HR: 9 INJECTION, SOLUTION INTRAVENOUS at 01:07

## 2022-07-25 RX ADMIN — SODIUM CHLORIDE 5 UNITS/HR: 9 INJECTION, SOLUTION INTRAVENOUS at 03:07

## 2022-07-25 RX ADMIN — ROCURONIUM BROMIDE 30 MG: 10 INJECTION, SOLUTION INTRAVENOUS at 02:07

## 2022-07-25 RX ADMIN — OXYCODONE HYDROCHLORIDE 2.5 MG: 5 SOLUTION ORAL at 05:07

## 2022-07-25 RX ADMIN — ACETAMINOPHEN 1000 MG: 500 TABLET ORAL at 09:07

## 2022-07-25 RX ADMIN — TRANEXAMIC ACID 771 MG: 100 INJECTION, SOLUTION INTRAVENOUS at 12:07

## 2022-07-25 RX ADMIN — CEFAZOLIN 2 G: 330 INJECTION, POWDER, FOR SOLUTION INTRAMUSCULAR; INTRAVENOUS at 01:07

## 2022-07-25 RX ADMIN — CLEVIPIDINE 5 MG/HR: 0.5 EMULSION INTRAVENOUS at 02:07

## 2022-07-25 RX ADMIN — Medication 20 MG: at 12:07

## 2022-07-25 RX ADMIN — NITROGLYCERIN 25 MCG: 5 INJECTION, SOLUTION INTRAVENOUS at 02:07

## 2022-07-25 RX ADMIN — HYDROMORPHONE HYDROCHLORIDE 1 MG: 1 INJECTION, SOLUTION INTRAMUSCULAR; INTRAVENOUS; SUBCUTANEOUS at 05:07

## 2022-07-25 RX ADMIN — Medication 10 MG: at 01:07

## 2022-07-25 RX ADMIN — SODIUM CHLORIDE: 0.9 INJECTION, SOLUTION INTRAVENOUS at 12:07

## 2022-07-25 RX ADMIN — NITROGLYCERIN 50 MCG: 5 INJECTION, SOLUTION INTRAVENOUS at 01:07

## 2022-07-25 RX ADMIN — ESMOLOL HYDROCHLORIDE 10 MG: 10 INJECTION INTRAVENOUS at 02:07

## 2022-07-25 RX ADMIN — GLYCOPYRROLATE 0.6 MG: 0.2 INJECTION, SOLUTION INTRAMUSCULAR; INTRAVITREAL at 05:07

## 2022-07-25 RX ADMIN — METOPROLOL SUCCINATE 25 MG: 25 TABLET, EXTENDED RELEASE ORAL at 08:07

## 2022-07-25 RX ADMIN — ESMOLOL HYDROCHLORIDE 10 MG: 10 INJECTION INTRAVENOUS at 01:07

## 2022-07-25 RX ADMIN — VASOPRESSIN 1 UNITS: 20 INJECTION, SOLUTION INTRAMUSCULAR; SUBCUTANEOUS at 02:07

## 2022-07-25 RX ADMIN — NEOSTIGMINE METHYLSULFATE 4 MG: 1 INJECTION INTRAVENOUS at 05:07

## 2022-07-25 RX ADMIN — FENTANYL CITRATE 100 MCG: 50 INJECTION, SOLUTION INTRAMUSCULAR; INTRAVENOUS at 01:07

## 2022-07-25 RX ADMIN — MUPIROCIN: 20 OINTMENT TOPICAL at 09:07

## 2022-07-25 RX ADMIN — LIDOCAINE HYDROCHLORIDE 80 MG: 20 INJECTION, SOLUTION INTRAVENOUS at 12:07

## 2022-07-25 RX ADMIN — NOREPINEPHRINE BITARTRATE 4 MCG: 1 INJECTION, SOLUTION, CONCENTRATE INTRAVENOUS at 03:07

## 2022-07-25 RX ADMIN — POTASSIUM CHLORIDE 40 MEQ: 29.8 INJECTION, SOLUTION INTRAVENOUS at 08:07

## 2022-07-25 RX ADMIN — SODIUM CHLORIDE, SODIUM GLUCONATE, SODIUM ACETATE, POTASSIUM CHLORIDE, MAGNESIUM CHLORIDE, SODIUM PHOSPHATE, DIBASIC, AND POTASSIUM PHOSPHATE: .53; .5; .37; .037; .03; .012; .00082 INJECTION, SOLUTION INTRAVENOUS at 01:07

## 2022-07-25 RX ADMIN — ASPIRIN 325 MG ORAL TABLET 325 MG: 325 PILL ORAL at 09:07

## 2022-07-25 NOTE — ANESTHESIA PROCEDURE NOTES
Central Line    Diagnosis: CAD  Patient location during procedure: done in OR  Timeout: 7/25/2022 12:50 PM  Procedure end time: 7/25/2022 1:00 PM    Staffing  Authorizing Provider: Romaine Blanchard MD  Performing Provider: Osvaldo Montelongo MD    Anesthesiologist was present at the time of the procedure.  Preanesthetic Checklist  Completed: patient identified, IV checked, site marked, risks and benefits discussed, surgical consent, monitors and equipment checked, pre-op evaluation, timeout performed and anesthesia consent given  Indication   Indication: hemodynamic monitoring, vascular access, med administration     Anesthesia   general anesthesia    Central Line   Skin Prep: skin prepped with ChloraPrep, skin prep agent completely dried prior to procedure  Sterile Barriers Followed: Yes    All five maximal barriers used- gloves, gown, cap, mask, and large sterile sheet    hand hygiene performed prior to central venous catheter insertion  Location: right internal jugular.   Catheter type: double lumen  Catheter Size: 9 Fr  Ultrasound: vascular probe with ultrasound   Vessel Caliber: large, patent, compressibility normal  Needle advanced into vessel with real time Ultrasound guidance.  Guidewire confirmed in vessel.     Manometry: Venous cannualation confirmed by visual estimation of blood vessel pressure using manometry.  Insertion Attempts: 1   Securement:line sutured and chlorhexidine patch    Post-Procedure    Adverse Events:none      Guidewire Guidewire removed intact.

## 2022-07-25 NOTE — PLAN OF CARE
Patient resting in bed. NAD noted. CABG scheduled for today. VSS. Bed locked in lowest position. Call bell and personal items within reach. Will continue POC

## 2022-07-25 NOTE — NURSING
Pt presents from OR, escorted by Anesthesia service, to SICU [id number].  Dr. Genie Cespedes and Dr. Wynn at bedside.  Upon arrival, SICU monitoring applied.  Respiratory therapist at bedside, providing care.

## 2022-07-25 NOTE — ANESTHESIA PROCEDURE NOTES
Intubation    Date/Time: 7/25/2022 12:46 PM  Performed by: Romaine Madsen MD  Authorized by: Romaine Blanchard MD     Intubation:     Induction:  Intravenous    Intubated:  Postinduction    Mask Ventilation:  N/a    Attempts:  1    Performed by: Fellow.    Method of Intubation:  Video laryngoscopy    Blade:  Vaughn 3    Laryngeal View Grade: Grade I - full view of cords      Difficult Airway Encountered?: No      Complications:  None    Airway Device:  Oral endotracheal tube    Airway Device Size:  7.5    Style/Cuff Inflation:  Cuffed (inflated to minimal occlusive pressure)    Tube secured:  23    Secured at:  The lips    Placement Verified By:  Capnometry    Complicating Factors:  Anterior larynx    Findings Post-Intubation:  Atraumatic/condition of teeth unchanged

## 2022-07-25 NOTE — PROGRESS NOTES
Autotransfusion/Rapid Infusion Record:      07/25/2022  Autotransfusionist:  Bogdan Greenberg    Surgeon(s) and Role:     * Kenton Wynn MD - Primary  Anesthesiologist:  Manny Blanhcard MD    Past Medical History:   Diagnosis Date    PAD (peripheral artery disease)        Procedure(s) (LRB):  CORONARY ARTERY BYPASS GRAFT (CABG) X 3 (N/A)  SURGICAL PROCUREMENT, VEIN, ENDOSCOPIC (Left)     5:41 PM    Equipment:    Cell Saver     R.I.S.  : [employer] Model: CATSmart or CATSplus : [place]   Model: [id number]     Serial number: [id number]   Serial number:   Disposable lot #: [id number]   Disposable lot #:      Were extra cardiotomies used for cell saver?  no  if yes, #:      Solutions:  Anticoagulant: ACD-A   Expiration date: 10/23 Volume used: 500   Wash solution: 0.9% NaCl   Expiration date: 5/25 Volume used: 5308     Cell saver checklist  Time completed:           [x]   Circuit assembled correctly     [x]   Cell saver powered and operational     [x]   Vacuum connected, functional, adjust to max -150mmHg     [x]   Anticoagulant drip rate adjusted     [x]   Transfer bag properly labeled with patient name, expiration time, volume,       anticoagulant, OR number, and initials     [x]   Cell saver disinfected after use (completed at end of case)       Cell Saver volumes:    Total volume processed:     3521 mL     Total volume pRBCs recovered     589 mL     Volume pRBCs infused     589 mL         RIS checklist   Time completed:  []   RIS circuit assembled correctly     []   RIS power and operational     []   RIS disinfected after use (completed at end of case)       RIS volumes:    Total volume infused:    (see anesthesia record for blood       product information)    mL       Additional comments:

## 2022-07-25 NOTE — ASSESSMENT & PLAN NOTE
Yo Pink is a 57 y.o. male with medical problems including PVD who was admitted to the [facility] for worsening PVD symptoms who developed dyspnea and diaphoresis and was found to have an NSTEMI. Coronary angiogram demonstrated  of the prox LAD and mid RCA. He was transferred to [facility] for consideration of CABG. He is now s/p CABGx3 on 7/25.      Neuro/Psych:   -- Sedation: Propofol. Wean as tolerated with plans to extubate    -- Pain: PRN Oxy + Dilaudid  -- Scheduled Tylenol             Cards:   -- S/P CABG x 3 on 7/25/22  -- HDS on 0.08 of epi, 0.06 of levo, and vaso 0.04; Plan to keep epi at 0.08, wean other pressors as tolerated  -- Atrial and Ventricular pacing wires. Paced at 100 BPM  -- MAP > 65, Syst < 140  -- Cleviprex PRN  -- Aspirin 325mg tonight pending postoperative coags and chest tube output      Pulm:   -- Goal O2 sat > 90%  -- ABG Q1hr x6 then Q3hrs  -- Wean vent as tolerated  -- Plan to extubate tonight if stable  -- Mediastinal chest tubes x2 and pleural chest tube x2 to suction      Renal:  -- Keep aguero for strict I/O  -- Trend BUN/Cr       FEN / GI:   -- Replace lytes as needed  -- Nutrition: NPO  -- GI ppx: famotidine  -- Bowel reg: miralax  --2000mL 5% Albumin in first 12 hours post-op      ID:   -- Tm: afebrile; WBC stable  -- Vanessa-op ancef      Heme/Onc:   -- H/H stable   -- Daily CBC  -- 250mL Cell saver given back  -- Post-op coags pending  -- Aspirin 325mg QD      Endo:   -- BG goal 140-180  -- Insulin gtt      PPx:   Feeding: NPO  Analgesia/Sedation: Propofol / PRN Oxy + Dilaudid  Thromboembolic prevention: SCDs  HOB >30: Yes  Stress Ulcer ppx: famotidine  Glucose control: Critical care goal 140-180 g/dl, ISS     Lines/Drains/Airway: CVC RIJ, Aguero, Chest tubes x 4, atrial and ventricular pacing wires, L radial A-line      Dispo/Code Status/Palliative:   -- SICU / Full Code.

## 2022-07-25 NOTE — H&P
[facility] - Surgical Intensive Care  Critical Care - Surgery  History & Physical    Patient Name: Yo Pink  MRN: [id number]  Admission Date: 7/15/2022  Code Status: Full Code  Attending Physician: Kenton Wynn MD   Primary Care Provider: [facility]   Principal Problem: ACS (acute coronary syndrome)    Subjective:     HPI:  Yo Pink is a 57 y.o. male with medical problems including PAD s/p LLE PTA and stenting on 7/5/22 in the setting of left 3rd toe gangrene who presents as a transfer from the [facility] for CTS evaluation in the setting of NSTEMI. He was admitted to the [facility] on 7/15/22 in the setting of worsening LLE toe pain and was initiated on antibiotics. Shortly after this he became diaphoretic and short of breath. Workup demonstrated elevated troponin and Echo demonstrating EF of 30% with wall motion abnormalities. He underwent coronary angiogram which demonstrated  of the proximal LAD and mid RCA. He was transferred to [facility] for consideration of CABG.     He is now s/p CABGx3 (LIMA to LAD, SVG to RI SVG to PDA) on 7/25/22. The procedure was performed without apparent complication and he was transferred to the SICU for postoperative care and management. Intraoperatively he received 2L of crystalloid and 250mL of Cell Saver back. His reported urine output during the case was 250mL. His postoperative echocardiogram revealed reduced right ventricular function, EF 35% with no notable valvular abnormalities . He arrives to the SICU intubated, sedated, and hemodynamically stable on 0.08 of epi, and 0.06 of levo.      Hospital/ICU Course:  No notes on file    Follow-up For: Procedure(s) (LRB):  CORONARY ARTERY BYPASS GRAFT (CABG) X 3 (N/A)  SURGICAL PROCUREMENT, VEIN, ENDOSCOPIC (Left)    Post-Operative Day: Day of Surgery     Past Medical History:   Diagnosis Date    PAD (peripheral artery disease)        Past Surgical History:   Procedure Laterality Date    ANGIOGRAPHY OF LOWER  EXTREMITY Left 2022    Procedure: Angiogram Extremity Unilateral;  Surgeon: Mehul Rich MD;  Location: [facility] OR;  Service: Vascular;  Laterality: Left;  RN PREOP ON 22. BINAX ON 22---NEED CONSENT    LEFT HEART CATHETERIZATION Left 2022    Procedure: Left heart cath;  Surgeon: Noah Huffman MD;  Location: [facility] CATH LAB;  Service: Cardiology;  Laterality: Left;  not before 9am, R rad access       Review of patient's allergies indicates:  No Known Allergies    Family History    None       Tobacco Use    Smoking status: Former Smoker     Quit date: 2022     Years since quittin.1    Smokeless tobacco: Never Used   Substance and Sexual Activity    Alcohol use: Never    Drug use: Not Currently    Sexual activity: Not on file      Review of Systems   Reason unable to perform ROS: Intubated.   Objective:     Vital Signs (Most Recent):  Temp: 97 °F (36.1 °C) (22 1137)  Pulse: 74 (22 1137)  Resp: 18 (22 1137)  BP: (!) 159/86 (22 1137)  SpO2: 99 % (22 1137)   Vital Signs (24h Range):  Temp:  [96.9 °F (36.1 °C)-98 °F (36.7 °C)] 97 °F (36.1 °C)  Pulse:  [69-83] 74  Resp:  [17-20] 18  SpO2:  [97 %-99 %] 99 %  BP: (127-159)/(69-88) 159/86     Weight: 77.1 kg (169 lb 15.6 oz)  Body mass index is 24.39 kg/m².      Intake/Output Summary (Last 24 hours) at 2022 1733  Last data filed at 2022 1712  Gross per 24 hour   Intake 290 ml   Output 2050 ml   Net -1760 ml       Physical Exam  Constitutional:       Comments: Sedated and intubated, immediately post op   Eyes:      General: No scleral icterus.  Neck:      Comments: R IJ CVC   Cardiovascular:      Rate and Rhythm: Tachycardia present.      Comments: V and A wires; Atrial pacing at 100  2 meds, bilat pleural aniyah drains  Midline sternotomy incision with intact dressing    Pulmonary:      Comments: Intubated  Abdominal:      General: There is no distension.      Palpations: Abdomen is soft.    Musculoskeletal:      Right lower leg: No edema.      Comments: L radial a line  LLE Ace Wrap   Neurological:      Comments: Sedated       Vents:       Lines/Drains/Airways       Central Venous Catheter Line  Duration             Percutaneous Central Line Insertion/Assessment - Double Lumen  07/25/22 1518 right internal jugular <1 day              Drain  Duration                  Urethral Catheter 07/25/22 1255 Temperature probe;Non-latex;Straight-tip 14 Fr. <1 day         Y Chest Tube 1 and 2 07/25/22 1630 1 Anterior Mediastinal 19 Fr. 2 Anterior Mediastinal 19 Fr. <1 day         Y Chest Tube 3 and 4 07/25/22 1630 3 Left Pleural 19 Fr. 4 Right Pleural 19 Fr. <1 day              Airway  Duration                  Airway - Non-Surgical 07/25/22 1246 <1 day              Arterial Line  Duration             Arterial Line 07/25/22 1253 Left Radial <1 day              Line  Duration                  Pacer Wires 07/25/22 1605 <1 day              Peripheral Intravenous Line  Duration                  Peripheral IV - Single Lumen 07/24/22 0130 22 G Right Hand 1 day                    Significant Labs:    CBC/Anemia Profile:  Recent Labs   Lab 07/24/22  0621 07/25/22  0540 07/25/22  1510 07/25/22  1535 07/25/22  1609   WBC 7.38 6.81  --   --   --    HGB 14.1 14.3  --   --   --    HCT 40.6 42.0 28* 30* 33*    323  --   --   --    MCV 89 93  --   --   --    RDW 12.9 13.0  --   --   --         Chemistries:  Recent Labs   Lab 07/24/22  0621 07/25/22  0540    137   K 3.8 3.7    98   CO2 24 29   BUN 13 13   CREATININE 0.7 0.7   CALCIUM 9.2 9.5   ALBUMIN 3.3* 3.4*   PROT 6.7 7.0   BILITOT 0.6 0.7   ALKPHOS 82 87   ALT 15 16   AST 20 19   MG 2.0 2.1   PHOS 3.2 3.8       All pertinent labs within the past 24 hours have been reviewed.    Significant Imaging: I have reviewed all pertinent imaging results/findings within the past 24 hours.    Assessment/Plan:     * ACS (acute coronary syndrome)  Yo Joesph is a 57  y.o. male with medical problems including PVD who was admitted [place] [facility] [place] PVD symptoms who developed dyspnea and diaphoresis and was found to have an NSTEMI. Coronary angiogram demonstrated  of the prox LAD and mid RCA. He was transferred to [facility] for consideration of CABG. He is now s/p CABGx3 on 7/25.      Neuro/Psych:   -- Sedation: Propofol. Wean as tolerated with plans to extubate    -- Pain: PRN Oxy + Dilaudid  -- Scheduled Tylenol             Cards:   -- S/P CABG x 3 on 7/25/22  -- HDS on 0.08 of epi, 0.06 of levo, and vaso 0.04; Plan to keep epi at 0.08, wean other pressors as tolerated  -- Atrial and Ventricular pacing wires. Paced at 100 BPM  -- MAP > 65, Syst < 140  -- Cleviprex PRN  -- Aspirin 325mg tonight pending postoperative coags and chest tube output      Pulm:   -- Goal O2 sat > 90%  -- ABG Q1hr x6 then Q3hrs  -- Wean vent as tolerated  -- Plan to extubate tonight if stable  -- Mediastinal chest tubes x2 and pleural chest tube x2 to suction      Renal:  -- Keep aguero for strict I/O  -- Trend BUN/Cr       FEN / GI:   -- Replace lytes as needed  -- Nutrition: NPO  -- GI ppx: famotidine  -- Bowel reg: miralax  --2000mL 5% Albumin in first 12 hours post-op      ID:   -- Tm: afebrile; WBC stable  -- Vanessa-op ancef      Heme/Onc:   -- H/H stable   -- Daily CBC  -- 250mL Cell saver given back  -- Post-op coags pending  -- Aspirin 325mg QD      Endo:   -- BG goal 140-180  -- Insulin gtt      PPx:   Feeding: NPO  Analgesia/Sedation: Propofol / PRN Oxy + Dilaudid  Thromboembolic prevention: SCDs  HOB >30: Yes  Stress Ulcer ppx: famotidine  Glucose control: Critical care goal 140-180 g/dl, ISS     Lines/Drains/Airway: CVC RIJ, Aguero, Chest tubes x 4, atrial and ventricular pacing wires, L radial A-line      Dispo/Code Status/Palliative:   -- SICU / Full Code.       Obdulio Sheriff MD  Critical Care - Surgery  [facility] - Surgical Intensive Care

## 2022-07-25 NOTE — TRANSFER OF CARE
"Anesthesia Transfer of Care Note    Patient: Yo Pink    Procedure(s) Performed: Procedure(s) (LRB):  CORONARY ARTERY BYPASS GRAFT (CABG) X 3 (N/A)  SURGICAL PROCUREMENT, VEIN, ENDOSCOPIC (Left)    Patient location: ICU    Anesthesia Type: general    Transport from OR: Transported from OR intubated on 100% O2 by AMBU with adequate controlled ventilation    Post pain: adequate analgesia    Post assessment: no apparent anesthetic complications    Post vital signs: stable    Level of consciousness: sedated    Nausea/Vomiting: no nausea/vomiting    Complications: none    Transfer of care protocol was followed      Last vitals:   HR: 100 bpm (atrial paced)  BP: 96/68 mmHg  SpO2: 100%  Visit Vitals  BP (!) 159/86 (BP Location: Left arm, Patient Position: Lying)   Pulse 99   Temp 36.1 °C (97 °F) (Temporal)   Resp (!) 25   Ht 5' 10" (1.778 m)   Wt 77.1 kg (169 lb 15.6 oz)   SpO2 100%   BMI 24.39 kg/m²     "

## 2022-07-25 NOTE — CARE UPDATE
Pt admitted to [address] post-op intubated with a 7.5 ETT secured at 24cm at the lips. Pt connected to ventilator on documented settings.   RN at bedside.

## 2022-07-25 NOTE — SUBJECTIVE & OBJECTIVE
Follow-up For: Procedure(s) (LRB):  CORONARY ARTERY BYPASS GRAFT (CABG) X 3 (N/A)  SURGICAL PROCUREMENT, VEIN, ENDOSCOPIC (Left)    Post-Operative Day: Day of Surgery     Past Medical History:   Diagnosis Date    PAD (peripheral artery disease)        Past Surgical History:   Procedure Laterality Date    ANGIOGRAPHY OF LOWER EXTREMITY Left 2022    Procedure: Angiogram Extremity Unilateral;  Surgeon: Mehul Rich MD;  Location: [facility] OR;  Service: Vascular;  Laterality: Left;  RN PREOP ON 22. BINAX ON 22---NEED CONSENT    LEFT HEART CATHETERIZATION Left 2022    Procedure: Left heart cath;  Surgeon: Noah Huffman MD;  Location: [facility] CATH LAB;  Service: Cardiology;  Laterality: Left;  not before 9am, R rad access       Review of patient's allergies indicates:  No Known Allergies    Family History    None       Tobacco Use    Smoking status: Former Smoker     Quit date: 2022     Years since quittin.1    Smokeless tobacco: Never Used   Substance and Sexual Activity    Alcohol use: Never    Drug use: Not Currently    Sexual activity: Not on file      Review of Systems   Reason unable to perform ROS: Intubated.   Objective:     Vital Signs (Most Recent):  Temp: 97 °F (36.1 °C) (22 1137)  Pulse: 74 (22 1137)  Resp: 18 (22 1137)  BP: (!) 159/86 (22 1137)  SpO2: 99 % (22 1137)   Vital Signs (24h Range):  Temp:  [96.9 °F (36.1 °C)-98 °F (36.7 °C)] 97 °F (36.1 °C)  Pulse:  [69-83] 74  Resp:  [17-20] 18  SpO2:  [97 %-99 %] 99 %  BP: (127-159)/(69-88) 159/86     Weight: 77.1 kg (169 lb 15.6 oz)  Body mass index is 24.39 kg/m².      Intake/Output Summary (Last 24 hours) at 2022 1733  Last data filed at 2022 1712  Gross per 24 hour   Intake 290 ml   Output 2050 ml   Net -1760 ml       Physical Exam  Constitutional:       Comments: Sedated and intubated, immediately post op   Eyes:      General: No scleral icterus.  Neck:      Comments: R IJ CVC    Cardiovascular:      Rate and Rhythm: Tachycardia present.      Comments: V and A wires; Atrial pacing at 100  2 meds, bilat pleural aniyah drains  Midline sternotomy incision with intact dressing    Pulmonary:      Comments: Intubated  Abdominal:      General: There is no distension.      Palpations: Abdomen is soft.   Musculoskeletal:      Right lower leg: No edema.      Comments: L radial a line  LLE Ace Wrap   Neurological:      Comments: Sedated       Vents:       Lines/Drains/Airways       Central Venous Catheter Line  Duration             Percutaneous Central Line Insertion/Assessment - Double Lumen  07/25/22 1518 right internal jugular <1 day              Drain  Duration                  Urethral Catheter 07/25/22 1255 Temperature probe;Non-latex;Straight-tip 14 Fr. <1 day         Y Chest Tube 1 and 2 07/25/22 1630 1 Anterior Mediastinal 19 Fr. 2 Anterior Mediastinal 19 Fr. <1 day         Y Chest Tube 3 and 4 07/25/22 1630 3 Left Pleural 19 Fr. 4 Right Pleural 19 Fr. <1 day              Airway  Duration                  Airway - Non-Surgical 07/25/22 1246 <1 day              Arterial Line  Duration             Arterial Line 07/25/22 1253 Left Radial <1 day              Line  Duration                  Pacer Wires 07/25/22 1605 <1 day              Peripheral Intravenous Line  Duration                  Peripheral IV - Single Lumen 07/24/22 0130 22 G Right Hand 1 day                    Significant Labs:    CBC/Anemia Profile:  Recent Labs   Lab 07/24/22  0621 07/25/22  0540 07/25/22  1510 07/25/22  1535 07/25/22  1609   WBC 7.38 6.81  --   --   --    HGB 14.1 14.3  --   --   --    HCT 40.6 42.0 28* 30* 33*    323  --   --   --    MCV 89 93  --   --   --    RDW 12.9 13.0  --   --   --         Chemistries:  Recent Labs   Lab 07/24/22  0621 07/25/22  0540    137   K 3.8 3.7    98   CO2 24 29   BUN 13 13   CREATININE 0.7 0.7   CALCIUM 9.2 9.5   ALBUMIN 3.3* 3.4*   PROT 6.7 7.0   BILITOT 0.6 0.7    ALKPHOS 82 87   ALT 15 16   AST 20 19   MG 2.0 2.1   PHOS 3.2 3.8       All pertinent labs within the past 24 hours have been reviewed.    Significant Imaging: I have reviewed all pertinent imaging results/findings within the past 24 hours.

## 2022-07-25 NOTE — PROGRESS NOTES
[facility] - Cardiology Stepdown  Cardiothoracic Surgery  Progress Note    Patient Name: Yo Pink  MRN: [id number]  Admission Date: 7/15/2022  Hospital Length of Stay: 8 days  Code Status: Full Code   Attending Physician: Kenton Wynn MD   Referring Provider: Self, Aaareferral  Principal Problem:ACS (acute coronary syndrome)    Subjective:     Post-Op Info:  Procedure(s) (LRB):  Left heart cath (Left)  Instantaneous Wave-Free Ratio (IFR) (N/A)   7 Days Post-Op     Interval History: To OR today    Review of Systems   Constitutional: Negative for malaise/fatigue.   Cardiovascular:  Negative for chest pain, claudication, dyspnea on exertion, irregular heartbeat, leg swelling and palpitations.   Respiratory:  Negative for cough and shortness of breath.    Hematologic/Lymphatic: Negative for bleeding problem.   Gastrointestinal:  Negative for abdominal pain.   Genitourinary:  Negative for dysuria.   Neurological:  Negative for headaches and weakness.   Medications:  Continuous Infusions:  Scheduled Meds:   metoprolol succinate  25 mg Oral Daily     PRN Meds:sodium chloride, dextrose 10%, dextrose 10%, glucagon (human recombinant), glucose, glucose, hydrALAZINE, ibuprofen, insulin aspart U-100, melatonin, naloxone, ondansetron, oxyCODONE, sodium chloride 0.9%     Objective:     Vital Signs (Most Recent):  Temp: 97.6 °F (36.4 °C) (07/25/22 0821)  Pulse: 83 (07/25/22 0822)  Resp: 17 (07/25/22 0821)  BP: 128/83 (07/25/22 0822)  SpO2: 98 % (07/25/22 0821) Vital Signs (24h Range):  Temp:  [96 °F (35.6 °C)-98.5 °F (36.9 °C)] 97.6 °F (36.4 °C)  Pulse:  [62-83] 83  Resp:  [17-20] 17  SpO2:  [97 %-98 %] 98 %  BP: (127-149)/(69-95) 128/83     Weight: 77.1 kg (170 lb)  Body mass index is 24.39 kg/m².    SpO2: 98 %  O2 Device (Oxygen Therapy): room air    Intake/Output - Last 3 Shifts         07/23 0700  07/24 0659 07/24 0700 07/25 0659 07/25 0700 07/26 0659    P.O. 480 120     Total Intake(mL/kg) 480 (6.2) 120 (1.6)      Urine (mL/kg/hr) 1400 (0.8) 1100 (0.6)     Stool 0      Total Output 1400 1100     Net -920 -980            Urine Occurrence 1 x 152 x     Stool Occurrence 1 x              Lines/Drains/Airways       Peripheral Intravenous Line  Duration                  Peripheral IV - Single Lumen 07/24/22 0130 22 G Right Hand 1 day                    Physical Exam  HENT:      Head: Normocephalic and atraumatic.   Eyes:      Extraocular Movements: Extraocular movements intact.   Cardiovascular:      Rate and Rhythm: Normal rate and regular rhythm.   Pulmonary:      Effort: Pulmonary effort is normal.   Abdominal:      General: Abdomen is flat.      Palpations: Abdomen is soft.   Musculoskeletal:         General: Normal range of motion.      Cervical back: Normal range of motion.   Skin:     General: Skin is warm and dry.      Capillary Refill: Capillary refill takes less than 2 seconds.   Neurological:      General: No focal deficit present.       Significant Labs:  BMP:   Recent Labs   Lab 07/25/22  0540         K 3.7   CL 98   CO2 29   BUN 13   CREATININE 0.7   CALCIUM 9.5   MG 2.1     CBC:   Recent Labs   Lab 07/25/22  0540   WBC 6.81   RBC 4.54*   HGB 14.3   HCT 42.0      MCV 93   MCH 31.5*   MCHC 34.0     CMP:   Recent Labs   Lab 07/25/22  0540      CALCIUM 9.5   ALBUMIN 3.4*   PROT 7.0      K 3.7   CO2 29   CL 98   BUN 13   CREATININE 0.7   ALKPHOS 87   ALT 16   AST 19   BILITOT 0.7     Coagulation: No results for input(s): PT, INR, APTT in the last 48 hours.    Significant Diagnostics:  I have reviewed and interpreted all pertinent imaging results/findings within the past 24 hours.    Assessment/Plan:     NSTEMI (non-ST elevated myocardial infarction)  - Plans for OR today with Dr. Wynn  - NPO since midnight  - Will return to SICU postoperatively     Dyslipidemia  - Statin    Hypokalemia  - Daily labs to monitor   - Keep potassium above 4  - Replace as needed        Sudha Garcia,  NP  Cardiothoracic Surgery  [facility] - Cardiology Stepdown

## 2022-07-25 NOTE — ANESTHESIA PROCEDURE NOTES
Arterial    Diagnosis: Coronary artery disease    Patient location during procedure: pre-op    Staffing  Authorizing Provider: Romaine Blanchard MD  Performing Provider: Romaine Madsen MD    Anesthesiologist was present at the time of the procedure.    Preanesthetic Checklist  Completed: patient identified, IV checked, site marked, risks and benefits discussed, surgical consent, monitors and equipment checked, pre-op evaluation, timeout performed and anesthesia consent givenArterial  Skin Prep: chlorhexidine gluconate and isopropyl alcohol  Local Infiltration: lidocaine  Orientation: left  Location: radial    Catheter Size: 20 G  Catheter placement by Ultrasound guidance. Heme positive aspiration all ports.   Vessel Caliber: small, patent  Needle advanced into vessel with real time Ultrasound guidance.  Sterile sheath used.Insertion Attempts: 2  Assessment  Dressing: secured with tape and tegaderm  Patient: Tolerated well

## 2022-07-25 NOTE — PROGRESS NOTES
Pt arrived to [facility] 22 via wheelchair. Pt's chest, bilat groins and legs were clipped, but RUE from elbow down was not. See nursing communication for specific guidelines on what was ordered to be clipped. PIV is in R hand, flushed and infusing; Per nursing communication, no PIV was to be placed distal to elbow in RUE. MD at bedside for art line placement. L radius being prepped for line.

## 2022-07-25 NOTE — OP NOTE
[facility]  Cardiothoracic Surgery Operative Report    Patient Name:  Yo Pink; [id number]    Preoperative Diagnosis: Coronary artery disease, NSTEMI, acute systolic heart failure (150.21)    Postoperative Diagnosis:  Same    Date of Operation:  07/25/2022     Operation:  Triple vessel coronary artery bypass grafting  · Left internal mammary artery to the distal left anterior descending artery  · Saphenous vein graft to the ramus intermedius artery  · Saphenous vein graft to the posterior descending artery  Endoscopic saphenous vein harvest from the left lower extremity    Surgeon:  Kenton Wynn MD    Assistant Surgeon:  none    Fellow:  none    Anesthesiologist:  Romaine Blanchard MD    ---------------------------------------------------------------------------------------------------------------------      Indications for surgery:  Mr. Pink is a pleasant 57 year old male former smoker (3/4 to 1ppd x about 30-35 years, quit a few months ago) with heart failure reduced ejection fraction (30% ejection fraction) secondary to ischemic cardiomyopathy, pulmonary hypertension, severe peripheral arterial disease s/p lower extremity stenting and with toe gangrene, who presented to the hospital for amputation of his toe and experienced NSTEMI (troponin 0.114), acute heart failure exacerbation (), then underwent coronary angiogram showing left main disease and multivessel coronary artery disease.  Coronary angiogram details: 60% left main disease, totally occluded early mid LAD with a moderate sized mid and distal LAD (fills via left to left collaterals), large ramus intermedius, small to moderate sized low lying OM1 and OM2, and a totally occluded mRCA with a possible small to moderate sized PDA (fills very poorly).  The transthoracic echo shows 30% ejection fraction with LVEDD of 5.1cm, mild to moderate mitral regurgitation, mild tricuspid regurgitation, and estimated systolic PAP of 62mmHg.        CT chest noncontrast shows minimal ascending aortic and mild aortic arch calcifications.  Carotid ultrasound shows nonsignificant disease.     Given the severity of disease and the symptoms, I recommend coronary artery bypass surgery x 2 or 3 or 4 (LIMA-LAD, SVG-ramus, possible SVG-OM1 or OM2, possible SVG-PDA), possible mitral valve repair vs replacement (bioprosthesis).   The risks and benefits were explained and informed consent was obtained.     Gross findings: No pericardial adhesions.  Moderate LAD target, large ramus target, very small OM1 and OM2, small to moderate PDA target.  Severe left ventricular dysfunction pre and post bypass.  Radio opaque markers placed on proximal coronary anastomoses.      Procedure:  The patient was brought to the holding area and the indications for surgery were reviewed.  Afterwards, the patient was placed supine on the operating room table and prepped and draped in the usual sterile fashion. A surgical time out was performed.     A midline incision was followed with division of the sternum. The left internal mammary artery was harvested. Simultaneously, two pieces of saphenous vein were harvested endoscopically from the leg. ACT guided heparinization was administered.  The ascending aorta and right atrium were cannulated.  Cardiopulmonary bypass was commenced.  The ascending aorta was cannulated for administration of cardioplegia.  A cross-clamp was applied and 1500cc of antegrade cold blood cardioplegia was administered. Subsequent doses of 500cc of antegrade cardioplegia were administered every 20 minutes.    Attention was turned to the posterior descending artery. The heart was positioned and the vessel was exposed. The artery was identified and arteriotomy performed with an 11 blade scalpel and elongated with scissors.  A segment of vein was prepared for use.  An end to side anastomosis was constructed with continuous 7-0 prolene. The vein was infused with 50cc of  "cardioplegia to confirm patency and hemostasis.  Afterwards, an aortotomy was made and the proximal anastomosis was constructed with continuous 6-0 prolene suture.    Attention was turned to the ramus intermedius artery. The heart was repositioned and elevated from the pericardial well.   The vessel was identified and opened with an 11 blade scalpel.  A second segment of vein was prepared for use.  An end to side anastomosis was constructed with continuous 7-0 prolene.  80cc of cardioplegia was infused to check for patency and hemostasis.  Afterwards, a second aortotomy was made and the proximal anastomosis was again constructed with continuous 6-0 prolene suture.    Attention was next turned to the distal left anterior descending artery. The heart was repositioned and the LAD was identified. The vessel was opened and the arteriotomy elongated with scissors.  The left internal mammary artery was brought to the field and prepared for use.  The left internal mammary artery was sewn end to side to the LAD with continuous 7-0 prolene. The vessel was briefly unclamped to assess for hemostasis.     A dose of warm "hot shot" cardioplegia was given antegrade.  Air was evacuated and the cross-clamp was removed. All anastomoses were checked for hemostasis and the patient was weaned from bypass on a moderate to high amount of inotropic support.  The total cardiopulmonary bypass time was 79 minutes and cross clamp time was 58 minutes.  The cannulae were removed and heparin was reversed.  Temporary atrial and ventricular pacing wires were placed on the heart.  Drainage tubes were placed behind the sternum and in the pleural spaces. The chest was closed with steel wires and the soft tissue was closed with absorbable suture.  A sterile dressing was applied and the patient was brought to the ICU in stable condition.      I was present in the operating room for the entire operation and immediately available thereafter.    "

## 2022-07-25 NOTE — ANESTHESIA PREPROCEDURE EVALUATION
[facility]  Anesthesia Pre-Operative Evaluation         Patient Name: Yo Pink  YOB: 1965  MRN: [id number]    SUBJECTIVE:     Pre-operative evaluation for Procedure(s) (LRB):  CORONARY ARTERY BYPASS GRAFT (CABG) (N/A)  Valvuloplasty, Mitral (N/A)     07/25/2022    Yo Pink is a 57 y.o. male w/ a significant PMHx of CAD and PAD s/p LLE PTA and stenting on 7/5/22 in setting of left 3rd toe gangrene. He was admitted to OSH for worsening LLE toe pain, started on antibiotics and experienced dyspnea and diaphoresis shortly after.  Workup revealed NSTEMI, reduced EF (30%) with wall motion abnormalities.  Coronary angiogram showed  of proximal LAD and mid RCA    presents for the above procedure(s).    TTE(7/16/22)  · The left ventricle is normal in size with moderate concentric hypertrophy and moderately decreased systolic function.  · The estimated ejection fraction is 30%.  · Mod-severe global hypokinesis with LAD territory WMA.  · Grade II left ventricular diastolic dysfunction.  · Normal right ventricular size with normal right ventricular systolic function.  · Mild-to-moderate mitral regurgitation.  · Mild tricuspid regurgitation.  · The estimated PA systolic pressure is 62 mmHg.  · There is pulmonary hypertension.    [facility] (7/18/22)  LVEDP: 14mmHg  LVEF: 30%  Wall Motion: LAD WMA     Dominance: Right  LM: distal 60%, iFR 0.86  LAD: prox  after S1, distal vessel fills via L-L and R-L george  Ramus: large, MLI  LCx: large, mid 20-30%, no step-up with iFR pullback  RCA: mid , dist vessel fills via L-L and R-L george     LDA: None documented.       Peripheral IV - Single Lumen 07/24/22 0130 22 G Right Hand (Active)   Site Assessment Clean;Dry;Intact 07/25/22 0359   Extremity Assessment Distal to IV No warmth;No swelling;No abnormal discoloration;No redness 07/24/22 1921   Line Status Flushed;Saline locked 07/25/22 7379   Dressing Status Clean;Dry;Intact 07/25/22 4567    Dressing Intervention Integrity maintained 22 0359   Dressing Change Due 22 0715   Site Change Due 22 0715   Reason Not Rotated Not due 22 0715   Number of days: 1       Prev airway: None documented.    Patient Active Problem List   Diagnosis    Dry gangrene    Hypertensive urgency    PAD (peripheral artery disease)    ACS (acute coronary syndrome)    Hypokalemia    Dyslipidemia    Ischemic cardiomyopathy    NSTEMI (non-ST elevated myocardial infarction)       Review of patient's allergies indicates:  No Known Allergies    Current Medications:  Current Outpatient Medications   Medication Instructions    acetaminophen (TYLENOL) 1,000 mg, Oral, Every 6 hours PRN    amoxicillin-clavulanate 875-125mg (AUGMENTIN) 875-125 mg per tablet 1 tablet, Oral, Every 12 hours    clopidogreL (PLAVIX) 75 mg, Oral, Daily    oxyCODONE-acetaminophen (PERCOCET) 5-325 mg per tablet 1 tablet, Oral, Every 4 hours PRN       Past Surgical History:   Procedure Laterality Date    ANGIOGRAPHY OF LOWER EXTREMITY Left 2022    Procedure: Angiogram Extremity Unilateral;  Surgeon: Mehul Rich MD;  Location: [facility] OR;  Service: Vascular;  Laterality: Left;  RN PREOP ON 22. BINAX ON 22---NEED CONSENT    LEFT HEART CATHETERIZATION Left 2022    Procedure: Left heart cath;  Surgeon: Noah Huffman MD;  Location: [facility] CATH LAB;  Service: Cardiology;  Laterality: Left;  not before 9am, R rad access       Social History     Socioeconomic History    Marital status: Single   Tobacco Use    Smoking status: Former Smoker     Quit date: 2022     Years since quittin.1    Smokeless tobacco: Never Used   Substance and Sexual Activity    Alcohol use: Never    Drug use: Not Currently       OBJECTIVE:     Vital Signs Range (Last 24H):  Temp:  [35.6 °C (96 °F)-36.9 °C (98.5 °F)]   Pulse:  [62-83]   Resp:  [17-20]   BP: (127-149)/(69-95)   SpO2:  [97 %-98 %]        Significant Labs:  Lab Results   Component Value Date    WBC 6.81 07/25/2022    HGB 14.3 07/25/2022    HCT 42.0 07/25/2022     07/25/2022    CHOL 144 07/16/2022    TRIG 122 07/16/2022    HDL 35 (L) 07/16/2022    ALT 16 07/25/2022    AST 19 07/25/2022     07/25/2022    K 3.7 07/25/2022    CL 98 07/25/2022    CREATININE 0.7 07/25/2022    BUN 13 07/25/2022    CO2 29 07/25/2022    HGBA1C 6.2 (H) 07/15/2022       Diagnostic Studies: No relevant studies.    EKG:   No results found for this or any previous visit.    2D ECHO:  TTE:  Results for orders placed or performed during the hospital encounter of 07/15/22   Echo   Result Value Ref Range    BSA 1.95 m2    TDI SEPTAL 0.04 m/s    LV LATERAL E/E' RATIO 18.67 m/s    LV SEPTAL E/E' RATIO 28.00 m/s    LA WIDTH 4.94 cm    AORTIC VALVE CUSP SEPERATION 2.40 cm    TDI LATERAL 0.06 m/s    PV PEAK VELOCITY 1.00 cm/s    LVIDd 5.05 3.5 - 6.0 cm    IVS 1.64 (A) 0.6 - 1.1 cm    Posterior Wall 1.26 (A) 0.6 - 1.1 cm    Ao root annulus 3.59 cm    LVIDs 3.92 2.1 - 4.0 cm    FS 22 28 - 44 %    LA volume 124.14 cm3    Sinus 3.20 cm    STJ 2.95 cm    Ascending aorta 3.06 cm    LV mass 311.50 g    LA size 4.63 cm    RVDD 3.34 cm    TAPSE 1.72 cm    RV S' 15.01 cm/s    Left Ventricle Relative Wall Thickness 0.50 cm    AV mean gradient 6 mmHg    AV valve area 2.67 cm2    AV Velocity Ratio 0.64     AV index (prosthetic) 0.62     MV valve area p 1/2 method 4.59 cm2    E/A ratio 1.56     Mean e' 0.05 m/s    E wave deceleration time 165.20 msec    IVRT 99.90 msec    Pulm vein S/D ratio 0.58     LVOT diameter 2.35 cm    LVOT area 4.3 cm2    LVOT peak edwin 1.04 m/s    LVOT peak VTI 15.72 cm    Ao peak edwin 1.62 m/s    Ao VTI 25.49 cm    LVOT stroke volume 68.15 cm3    AV peak gradient 10 mmHg    E/E' ratio 22.40 m/s    MV Peak E Edwin 1.12 m/s    TR Max Edwin 3.42 m/s    MV stenosis pressure 1/2 time 47.91 ms    MV Peak A Edwin 0.72 m/s    PV Peak S Edwin 0.63 m/s    PV Peak D Edwin 1.08 m/s     LV Systolic Volume 66.86 mL    LV Systolic Volume Index 34.3 mL/m2    LV Diastolic Volume 120.84 mL    LV Diastolic Volume Index 61.97 mL/m2    LA Volume Index 63.7 mL/m2    LV Mass Index 160 g/m2    RA Major Axis 5.21 cm    Left Atrium Minor Axis 6.22 cm    Left Atrium Major Axis 6.56 cm    Triscuspid Valve Regurgitation Peak Gradient 47 mmHg    RA Width 3.57 cm    Right Atrial Pressure (from IVC) 15 mmHg    EF 30 %    TV rest pulmonary artery pressure 62 mmHg    Narrative    · The left ventricle is normal in size with moderate concentric   hypertrophy and moderately decreased systolic function.  · The estimated ejection fraction is 30%.  · Mod-severe global hypokinesis with LAD territory WMA.  · Grade II left ventricular diastolic dysfunction.  · Normal right ventricular size with normal right ventricular systolic   function.  · Mild-to-moderate mitral regurgitation.  · Mild tricuspid regurgitation.  · The estimated PA systolic pressure is 62 mmHg.  · There is pulmonary hypertension.          JOSÉ ANTONIO:  No results found for this or any previous visit.    ASSESSMENT/PLAN:                                                                                                               07/25/2022  Yo Pink is a 57 y.o., male presenting for CABG in the setting of reduced ejection fraction (30%) and significant PAD.        Pre-op Assessment    I have reviewed the Patient Summary Reports.     I have reviewed the Nursing Notes. I have reviewed the NPO Status.   I have reviewed the Medications.     Review of Systems  Anesthesia Hx:  Denies Hx of Anesthetic complications  Denies Family Hx of Anesthesia complications.   Denies Personal Hx of Anesthesia complications.   Social:  Former Smoker, No Alcohol Use    Hematology/Oncology:  Hematology Normal   Oncology Normal     EENT/Dental:EENT/Dental Normal   Cardiovascular:   Exercise tolerance: poor Hypertension Past MI  PVD    Pulmonary:  Pulmonary Normal    Renal/:  Renal/  Normal     Hepatic/GI:  Hepatic/GI Normal    Musculoskeletal:  Musculoskeletal Normal    Neurological:  Neurology Normal    Endocrine:  Endocrine Normal    Psych:  Psychiatric Normal           Physical Exam  General: Well nourished    Airway:  Mallampati: III / II  Mouth Opening: Small, but > 3cm  TM Distance: 4 - 6 cm  Tongue: Normal  Neck ROM: Normal ROM    Chest/Lungs:  Normal Respiratory Rate    Heart:  Rate: Normal        Anesthesia Plan  Type of Anesthesia, risks & benefits discussed:    Anesthesia Type: Gen ETT  Intra-op Monitoring Plan: Standard ASA Monitors, Art Line, Central Line and JOSÉ ANTONOI  Post Op Pain Control Plan: multimodal analgesia and IV/PO Opioids PRN  Induction:  IV  Airway Plan: Video, Post-Induction  Informed Consent: Informed consent signed with the Patient and all parties understand the risks and agree with anesthesia plan.  All questions answered.   ASA Score: 4  Day of Surgery Review of History & Physical: H&P Update referred to the surgeon/provider.  Anesthesia Plan Notes: Paper consent bedside 340.  NPO confirmed.   Patient very high-risk based upon EF30%, recent MI and PA HTN 60s.  Airway appears anterior on exam.  Plan RSI with etomidate and video laryngoscope.  Anticipate inotropic support needs with EF 30% and MR.  Discussed case and plan in detail with Dr Wynn.    Ready For Surgery From Anesthesia Perspective.     .

## 2022-07-25 NOTE — SUBJECTIVE & OBJECTIVE
Interval History: To OR today    Review of Systems   Constitutional: Negative for malaise/fatigue.   Cardiovascular:  Negative for chest pain, claudication, dyspnea on exertion, irregular heartbeat, leg swelling and palpitations.   Respiratory:  Negative for cough and shortness of breath.    Hematologic/Lymphatic: Negative for bleeding problem.   Gastrointestinal:  Negative for abdominal pain.   Genitourinary:  Negative for dysuria.   Neurological:  Negative for headaches and weakness.   Medications:  Continuous Infusions:  Scheduled Meds:   metoprolol succinate  25 mg Oral Daily     PRN Meds:sodium chloride, dextrose 10%, dextrose 10%, glucagon (human recombinant), glucose, glucose, hydrALAZINE, ibuprofen, insulin aspart U-100, melatonin, naloxone, ondansetron, oxyCODONE, sodium chloride 0.9%     Objective:     Vital Signs (Most Recent):  Temp: 97.6 °F (36.4 °C) (07/25/22 0821)  Pulse: 83 (07/25/22 0822)  Resp: 17 (07/25/22 0821)  BP: 128/83 (07/25/22 0822)  SpO2: 98 % (07/25/22 0821) Vital Signs (24h Range):  Temp:  [96 °F (35.6 °C)-98.5 °F (36.9 °C)] 97.6 °F (36.4 °C)  Pulse:  [62-83] 83  Resp:  [17-20] 17  SpO2:  [97 %-98 %] 98 %  BP: (127-149)/(69-95) 128/83     Weight: 77.1 kg (170 lb)  Body mass index is 24.39 kg/m².    SpO2: 98 %  O2 Device (Oxygen Therapy): room air    Intake/Output - Last 3 Shifts         07/23 0700 07/24 0659 07/24 0700 07/25 0659 07/25 0700 07/26 0659    P.O. 480 120     Total Intake(mL/kg) 480 (6.2) 120 (1.6)     Urine (mL/kg/hr) 1400 (0.8) 1100 (0.6)     Stool 0      Total Output 1400 1100     Net -920 -980            Urine Occurrence 1 x 152 x     Stool Occurrence 1 x              Lines/Drains/Airways       Peripheral Intravenous Line  Duration                  Peripheral IV - Single Lumen 07/24/22 0130 22 G Right Hand 1 day                    Physical Exam  HENT:      Head: Normocephalic and atraumatic.   Eyes:      Extraocular Movements: Extraocular movements intact.    Cardiovascular:      Rate and Rhythm: Normal rate and regular rhythm.   Pulmonary:      Effort: Pulmonary effort is normal.   Abdominal:      General: Abdomen is flat.      Palpations: Abdomen is soft.   Musculoskeletal:         General: Normal range of motion.      Cervical back: Normal range of motion.   Skin:     General: Skin is warm and dry.      Capillary Refill: Capillary refill takes less than 2 seconds.   Neurological:      General: No focal deficit present.       Significant Labs:  BMP:   Recent Labs   Lab 07/25/22  0540         K 3.7   CL 98   CO2 29   BUN 13   CREATININE 0.7   CALCIUM 9.5   MG 2.1     CBC:   Recent Labs   Lab 07/25/22  0540   WBC 6.81   RBC 4.54*   HGB 14.3   HCT 42.0      MCV 93   MCH 31.5*   MCHC 34.0     CMP:   Recent Labs   Lab 07/25/22  0540      CALCIUM 9.5   ALBUMIN 3.4*   PROT 7.0      K 3.7   CO2 29   CL 98   BUN 13   CREATININE 0.7   ALKPHOS 87   ALT 16   AST 19   BILITOT 0.7     Coagulation: No results for input(s): PT, INR, APTT in the last 48 hours.    Significant Diagnostics:  I have reviewed and interpreted all pertinent imaging results/findings within the past 24 hours.

## 2022-07-26 PROBLEM — R73.9 TRANSIENT HYPERGLYCEMIA POST PROCEDURE: Status: ACTIVE | Noted: 2022-07-26

## 2022-07-26 LAB
ALBUMIN SERPL BCP-MCNC: 3.8 G/DL (ref 3.5–5.2)
ALLENS TEST: ABNORMAL
ALLENS TEST: NORMAL
ALP SERPL-CCNC: 47 U/L (ref 55–135)
ALT SERPL W/O P-5'-P-CCNC: 8 U/L (ref 10–44)
ANION GAP SERPL CALC-SCNC: 10 MMOL/L (ref 8–16)
APTT BLDCRRT: 32.4 SEC (ref 21–32)
AST SERPL-CCNC: 26 U/L (ref 10–40)
BASOPHILS # BLD AUTO: 0.04 K/UL (ref 0–0.2)
BASOPHILS NFR BLD: 0.4 % (ref 0–1.9)
BILIRUB SERPL-MCNC: 0.6 MG/DL (ref 0.1–1)
BUN SERPL-MCNC: 12 MG/DL (ref 6–20)
CALCIUM SERPL-MCNC: 8.2 MG/DL (ref 8.7–10.5)
CHLORIDE SERPL-SCNC: 106 MMOL/L (ref 95–110)
CO2 SERPL-SCNC: 21 MMOL/L (ref 23–29)
CREAT SERPL-MCNC: 0.7 MG/DL (ref 0.5–1.4)
DELSYS: ABNORMAL
DIFFERENTIAL METHOD: ABNORMAL
EOSINOPHIL # BLD AUTO: 0 K/UL (ref 0–0.5)
EOSINOPHIL NFR BLD: 0.4 % (ref 0–8)
ERYTHROCYTE [DISTWIDTH] IN BLOOD BY AUTOMATED COUNT: 13.3 % (ref 11.5–14.5)
EST. GFR  (AFRICAN AMERICAN): >60 ML/MIN/1.73 M^2
EST. GFR  (NON AFRICAN AMERICAN): >60 ML/MIN/1.73 M^2
GLUCOSE SERPL-MCNC: 119 MG/DL (ref 70–110)
HCO3 UR-SCNC: 20.1 MMOL/L (ref 24–28)
HCO3 UR-SCNC: 21.8 MMOL/L (ref 24–28)
HCO3 UR-SCNC: 23.4 MMOL/L (ref 24–28)
HCT VFR BLD AUTO: 26.9 % (ref 40–54)
HCT VFR BLD CALC: 23 %PCV (ref 36–54)
HCT VFR BLD CALC: 26 %PCV (ref 36–54)
HCT VFR BLD CALC: 28 %PCV (ref 36–54)
HGB BLD-MCNC: 9.3 G/DL (ref 14–18)
IMM GRANULOCYTES # BLD AUTO: 0.03 K/UL (ref 0–0.04)
IMM GRANULOCYTES NFR BLD AUTO: 0.3 % (ref 0–0.5)
INR PPP: 1.3 (ref 0.8–1.2)
LDH SERPL L TO P-CCNC: 1.06 MMOL/L (ref 0.36–1.25)
LDH SERPL L TO P-CCNC: 1.36 MMOL/L (ref 0.36–1.25)
LDH SERPL L TO P-CCNC: 2.46 MMOL/L (ref 0.36–1.25)
LYMPHOCYTES # BLD AUTO: 1.4 K/UL (ref 1–4.8)
LYMPHOCYTES NFR BLD: 14 % (ref 18–48)
MAGNESIUM SERPL-MCNC: 2 MG/DL (ref 1.6–2.6)
MCH RBC QN AUTO: 32.5 PG (ref 27–31)
MCHC RBC AUTO-ENTMCNC: 34.6 G/DL (ref 32–36)
MCV RBC AUTO: 94 FL (ref 82–98)
MONOCYTES # BLD AUTO: 1.2 K/UL (ref 0.3–1)
MONOCYTES NFR BLD: 11.9 % (ref 4–15)
NEUTROPHILS # BLD AUTO: 7.4 K/UL (ref 1.8–7.7)
NEUTROPHILS NFR BLD: 73 % (ref 38–73)
NRBC BLD-RTO: 0 /100 WBC
PCO2 BLDA: 30 MMHG (ref 35–45)
PCO2 BLDA: 32.5 MMHG (ref 35–45)
PCO2 BLDA: 33.9 MMHG (ref 35–45)
PH SMN: 7.43 [PH] (ref 7.35–7.45)
PH SMN: 7.43 [PH] (ref 7.35–7.45)
PH SMN: 7.45 [PH] (ref 7.35–7.45)
PHOSPHATE SERPL-MCNC: 2 MG/DL (ref 2.7–4.5)
PLATELET # BLD AUTO: 219 K/UL (ref 150–450)
PMV BLD AUTO: 9.9 FL (ref 9.2–12.9)
PO2 BLDA: 116 MMHG (ref 80–100)
PO2 BLDA: 83 MMHG (ref 80–100)
PO2 BLDA: 90 MMHG (ref 80–100)
POC BE: -1 MMOL/L
POC BE: -2 MMOL/L
POC BE: -4 MMOL/L
POC IONIZED CALCIUM: 1.06 MMOL/L (ref 1.06–1.42)
POC IONIZED CALCIUM: 1.1 MMOL/L (ref 1.06–1.42)
POC IONIZED CALCIUM: 1.1 MMOL/L (ref 1.06–1.42)
POC SATURATED O2: 97 % (ref 95–100)
POC SATURATED O2: 97 % (ref 95–100)
POC SATURATED O2: 99 % (ref 95–100)
POC TCO2: 21 MMOL/L (ref 23–27)
POC TCO2: 23 MMOL/L (ref 23–27)
POC TCO2: 24 MMOL/L (ref 23–27)
POCT GLUCOSE: 114 MG/DL (ref 70–110)
POCT GLUCOSE: 125 MG/DL (ref 70–110)
POCT GLUCOSE: 130 MG/DL (ref 70–110)
POCT GLUCOSE: 138 MG/DL (ref 70–110)
POCT GLUCOSE: 140 MG/DL (ref 70–110)
POCT GLUCOSE: 143 MG/DL (ref 70–110)
POCT GLUCOSE: 149 MG/DL (ref 70–110)
POCT GLUCOSE: 152 MG/DL (ref 70–110)
POCT GLUCOSE: 166 MG/DL (ref 70–110)
POCT GLUCOSE: 180 MG/DL (ref 70–110)
POCT GLUCOSE: 192 MG/DL (ref 70–110)
POCT GLUCOSE: 196 MG/DL (ref 70–110)
POCT GLUCOSE: 92 MG/DL (ref 70–110)
POCT GLUCOSE: 93 MG/DL (ref 70–110)
POCT GLUCOSE: 93 MG/DL (ref 70–110)
POTASSIUM BLD-SCNC: 3.2 MMOL/L (ref 3.5–5.1)
POTASSIUM BLD-SCNC: 3.7 MMOL/L (ref 3.5–5.1)
POTASSIUM BLD-SCNC: 4.2 MMOL/L (ref 3.5–5.1)
POTASSIUM SERPL-SCNC: 3.7 MMOL/L (ref 3.5–5.1)
PROT SERPL-MCNC: 5.5 G/DL (ref 6–8.4)
PROTHROMBIN TIME: 13.4 SEC (ref 9–12.5)
RBC # BLD AUTO: 2.86 M/UL (ref 4.6–6.2)
SAMPLE: ABNORMAL
SAMPLE: NORMAL
SITE: ABNORMAL
SITE: NORMAL
SODIUM BLD-SCNC: 137 MMOL/L (ref 136–145)
SODIUM BLD-SCNC: 142 MMOL/L (ref 136–145)
SODIUM BLD-SCNC: 142 MMOL/L (ref 136–145)
SODIUM SERPL-SCNC: 137 MMOL/L (ref 136–145)
WBC # BLD AUTO: 10.15 K/UL (ref 3.9–12.7)

## 2022-07-26 PROCEDURE — 80053 COMPREHEN METABOLIC PANEL: CPT

## 2022-07-26 PROCEDURE — 63600175 PHARM REV CODE 636 W HCPCS: Performed by: THORACIC SURGERY (CARDIOTHORACIC VASCULAR SURGERY)

## 2022-07-26 PROCEDURE — 82330 ASSAY OF CALCIUM: CPT

## 2022-07-26 PROCEDURE — 82565 ASSAY OF CREATININE: CPT

## 2022-07-26 PROCEDURE — 27000221 HC OXYGEN, UP TO 24 HOURS

## 2022-07-26 PROCEDURE — 99223 PR INITIAL HOSPITAL CARE,LEVL III: ICD-10-PCS | Mod: ,,, | Performed by: ANESTHESIOLOGY

## 2022-07-26 PROCEDURE — 85730 THROMBOPLASTIN TIME PARTIAL: CPT

## 2022-07-26 PROCEDURE — 84295 ASSAY OF SERUM SODIUM: CPT

## 2022-07-26 PROCEDURE — 63600175 PHARM REV CODE 636 W HCPCS

## 2022-07-26 PROCEDURE — 99223 1ST HOSP IP/OBS HIGH 75: CPT | Mod: ,,, | Performed by: ANESTHESIOLOGY

## 2022-07-26 PROCEDURE — 63600175 PHARM REV CODE 636 W HCPCS: Mod: JG | Performed by: STUDENT IN AN ORGANIZED HEALTH CARE EDUCATION/TRAINING PROGRAM

## 2022-07-26 PROCEDURE — 85014 HEMATOCRIT: CPT

## 2022-07-26 PROCEDURE — 99223 PR INITIAL HOSPITAL CARE,LEVL III: ICD-10-PCS | Mod: ,,, | Performed by: NURSE PRACTITIONER

## 2022-07-26 PROCEDURE — 25000003 PHARM REV CODE 250

## 2022-07-26 PROCEDURE — 84132 ASSAY OF SERUM POTASSIUM: CPT

## 2022-07-26 PROCEDURE — 25000242 PHARM REV CODE 250 ALT 637 W/ HCPCS

## 2022-07-26 PROCEDURE — 25000003 PHARM REV CODE 250: Performed by: NURSE PRACTITIONER

## 2022-07-26 PROCEDURE — P9045 ALBUMIN (HUMAN), 5%, 250 ML: HCPCS | Mod: JG

## 2022-07-26 PROCEDURE — P9045 ALBUMIN (HUMAN), 5%, 250 ML: HCPCS | Mod: JG | Performed by: STUDENT IN AN ORGANIZED HEALTH CARE EDUCATION/TRAINING PROGRAM

## 2022-07-26 PROCEDURE — 97163 PT EVAL HIGH COMPLEX 45 MIN: CPT

## 2022-07-26 PROCEDURE — 82803 BLOOD GASES ANY COMBINATION: CPT

## 2022-07-26 PROCEDURE — 83735 ASSAY OF MAGNESIUM: CPT

## 2022-07-26 PROCEDURE — 97535 SELF CARE MNGMENT TRAINING: CPT

## 2022-07-26 PROCEDURE — 20000000 HC ICU ROOM

## 2022-07-26 PROCEDURE — C9248 INJ, CLEVIDIPINE BUTYRATE: HCPCS | Mod: JG

## 2022-07-26 PROCEDURE — 63600175 PHARM REV CODE 636 W HCPCS: Mod: JG

## 2022-07-26 PROCEDURE — 85025 COMPLETE CBC W/AUTO DIFF WBC: CPT

## 2022-07-26 PROCEDURE — 83605 ASSAY OF LACTIC ACID: CPT

## 2022-07-26 PROCEDURE — 25000003 PHARM REV CODE 250: Performed by: THORACIC SURGERY (CARDIOTHORACIC VASCULAR SURGERY)

## 2022-07-26 PROCEDURE — 84100 ASSAY OF PHOSPHORUS: CPT

## 2022-07-26 PROCEDURE — 85610 PROTHROMBIN TIME: CPT

## 2022-07-26 PROCEDURE — 97166 OT EVAL MOD COMPLEX 45 MIN: CPT

## 2022-07-26 PROCEDURE — 97116 GAIT TRAINING THERAPY: CPT

## 2022-07-26 PROCEDURE — 37799 UNLISTED PX VASCULAR SURGERY: CPT

## 2022-07-26 PROCEDURE — 94761 N-INVAS EAR/PLS OXIMETRY MLT: CPT

## 2022-07-26 PROCEDURE — 99900035 HC TECH TIME PER 15 MIN (STAT)

## 2022-07-26 PROCEDURE — 82800 BLOOD PH: CPT

## 2022-07-26 PROCEDURE — 99223 1ST HOSP IP/OBS HIGH 75: CPT | Mod: ,,, | Performed by: NURSE PRACTITIONER

## 2022-07-26 RX ORDER — ASPIRIN 81 MG/1
81 TABLET ORAL DAILY
Status: DISCONTINUED | OUTPATIENT
Start: 2022-07-26 | End: 2022-07-30 | Stop reason: HOSPADM

## 2022-07-26 RX ORDER — IBUPROFEN 200 MG
16 TABLET ORAL
Status: DISCONTINUED | OUTPATIENT
Start: 2022-07-26 | End: 2022-07-27

## 2022-07-26 RX ORDER — INSULIN ASPART 100 [IU]/ML
0-5 INJECTION, SOLUTION INTRAVENOUS; SUBCUTANEOUS
Status: DISCONTINUED | OUTPATIENT
Start: 2022-07-26 | End: 2022-07-27

## 2022-07-26 RX ORDER — MILRINONE LACTATE 0.2 MG/ML
0.12 INJECTION, SOLUTION INTRAVENOUS CONTINUOUS
Status: DISCONTINUED | OUTPATIENT
Start: 2022-07-26 | End: 2022-07-29

## 2022-07-26 RX ORDER — ENOXAPARIN SODIUM 100 MG/ML
40 INJECTION SUBCUTANEOUS EVERY 24 HOURS
Status: DISCONTINUED | OUTPATIENT
Start: 2022-07-26 | End: 2022-07-30 | Stop reason: HOSPADM

## 2022-07-26 RX ORDER — ALBUMIN HUMAN 50 G/1000ML
SOLUTION INTRAVENOUS
Status: COMPLETED
Start: 2022-07-26 | End: 2022-07-26

## 2022-07-26 RX ORDER — OXYCODONE HCL 5 MG/5 ML
15 SOLUTION, ORAL ORAL EVERY 4 HOURS PRN
Status: DISCONTINUED | OUTPATIENT
Start: 2022-07-26 | End: 2022-07-30 | Stop reason: HOSPADM

## 2022-07-26 RX ORDER — HYDROMORPHONE HYDROCHLORIDE 1 MG/ML
0.5 INJECTION, SOLUTION INTRAMUSCULAR; INTRAVENOUS; SUBCUTANEOUS ONCE
Status: COMPLETED | OUTPATIENT
Start: 2022-07-26 | End: 2022-07-26

## 2022-07-26 RX ORDER — ALBUMIN HUMAN 50 G/1000ML
25 SOLUTION INTRAVENOUS ONCE
Status: COMPLETED | OUTPATIENT
Start: 2022-07-26 | End: 2022-07-26

## 2022-07-26 RX ORDER — OXYCODONE HCL 5 MG/5 ML
7.5 SOLUTION, ORAL ORAL EVERY 4 HOURS PRN
Status: DISCONTINUED | OUTPATIENT
Start: 2022-07-26 | End: 2022-07-30 | Stop reason: HOSPADM

## 2022-07-26 RX ORDER — IBUPROFEN 200 MG
24 TABLET ORAL
Status: DISCONTINUED | OUTPATIENT
Start: 2022-07-26 | End: 2022-07-27

## 2022-07-26 RX ORDER — FAMOTIDINE 20 MG/1
20 TABLET, FILM COATED ORAL 2 TIMES DAILY
Status: DISCONTINUED | OUTPATIENT
Start: 2022-07-26 | End: 2022-07-26

## 2022-07-26 RX ORDER — GLUCAGON 1 MG
1 KIT INJECTION
Status: DISCONTINUED | OUTPATIENT
Start: 2022-07-26 | End: 2022-07-27

## 2022-07-26 RX ORDER — CLOPIDOGREL BISULFATE 75 MG/1
75 TABLET ORAL DAILY
Status: DISCONTINUED | OUTPATIENT
Start: 2022-07-26 | End: 2022-07-30 | Stop reason: HOSPADM

## 2022-07-26 RX ORDER — FUROSEMIDE 10 MG/ML
20 INJECTION INTRAMUSCULAR; INTRAVENOUS ONCE
Status: COMPLETED | OUTPATIENT
Start: 2022-07-27 | End: 2022-07-27

## 2022-07-26 RX ORDER — HYDROMORPHONE HYDROCHLORIDE 1 MG/ML
1 INJECTION, SOLUTION INTRAMUSCULAR; INTRAVENOUS; SUBCUTANEOUS
Status: DISCONTINUED | OUTPATIENT
Start: 2022-07-26 | End: 2022-07-28

## 2022-07-26 RX ADMIN — ACETAMINOPHEN 1000 MG: 500 TABLET ORAL at 09:07

## 2022-07-26 RX ADMIN — DEXTROSE 2 G: 50 INJECTION, SOLUTION INTRAVENOUS at 04:07

## 2022-07-26 RX ADMIN — MILRINONE LACTATE IN DEXTROSE 0.25 MCG/KG/MIN: 200 INJECTION, SOLUTION INTRAVENOUS at 09:07

## 2022-07-26 RX ADMIN — MUPIROCIN: 20 OINTMENT TOPICAL at 09:07

## 2022-07-26 RX ADMIN — MUPIROCIN: 20 OINTMENT TOPICAL at 08:07

## 2022-07-26 RX ADMIN — OXYCODONE 10 MG: 5 TABLET ORAL at 01:07

## 2022-07-26 RX ADMIN — FAMOTIDINE 20 MG: 10 INJECTION, SOLUTION INTRAVENOUS at 08:07

## 2022-07-26 RX ADMIN — CLOPIDOGREL 75 MG: 75 TABLET, FILM COATED ORAL at 08:07

## 2022-07-26 RX ADMIN — ASPIRIN 81 MG: 81 TABLET, COATED ORAL at 08:07

## 2022-07-26 RX ADMIN — MILRINONE LACTATE IN DEXTROSE 0.25 MCG/KG/MIN: 200 INJECTION, SOLUTION INTRAVENOUS at 10:07

## 2022-07-26 RX ADMIN — ACETAMINOPHEN 1000 MG: 500 TABLET ORAL at 05:07

## 2022-07-26 RX ADMIN — ALBUMIN (HUMAN) 25 G: 12.5 SOLUTION INTRAVENOUS at 02:07

## 2022-07-26 RX ADMIN — HYDROMORPHONE HYDROCHLORIDE 0.5 MG: 1 INJECTION, SOLUTION INTRAMUSCULAR; INTRAVENOUS; SUBCUTANEOUS at 05:07

## 2022-07-26 RX ADMIN — CLEVIPIDINE 1 MG/HR: 0.5 EMULSION INTRAVENOUS at 09:07

## 2022-07-26 RX ADMIN — ATORVASTATIN CALCIUM 40 MG: 20 TABLET, FILM COATED ORAL at 08:07

## 2022-07-26 RX ADMIN — OXYCODONE HYDROCHLORIDE 7.5 MG: 5 SOLUTION ORAL at 02:07

## 2022-07-26 RX ADMIN — INSULIN HUMAN 0.8 UNITS/HR: 1 INJECTION, SOLUTION INTRAVENOUS at 05:07

## 2022-07-26 RX ADMIN — DOCUSATE SODIUM 100 MG: 100 CAPSULE ORAL at 09:07

## 2022-07-26 RX ADMIN — POLYETHYLENE GLYCOL 3350 17 G: 17 POWDER, FOR SOLUTION ORAL at 08:07

## 2022-07-26 RX ADMIN — OXYCODONE HYDROCHLORIDE 7.5 MG: 5 SOLUTION ORAL at 09:07

## 2022-07-26 RX ADMIN — OXYCODONE 10 MG: 5 TABLET ORAL at 05:07

## 2022-07-26 RX ADMIN — OXYCODONE HYDROCHLORIDE 15 MG: 5 SOLUTION ORAL at 08:07

## 2022-07-26 RX ADMIN — POTASSIUM CHLORIDE 40 MEQ: 29.8 INJECTION, SOLUTION INTRAVENOUS at 02:07

## 2022-07-26 RX ADMIN — EPINEPHRINE 0.08 MCG/KG/MIN: 1 INJECTION INTRAMUSCULAR; INTRAVENOUS; SUBCUTANEOUS at 04:07

## 2022-07-26 RX ADMIN — DOCUSATE SODIUM 100 MG: 100 CAPSULE ORAL at 08:07

## 2022-07-26 RX ADMIN — ALBUMIN (HUMAN) 25 G: 12.5 SOLUTION INTRAVENOUS at 08:07

## 2022-07-26 RX ADMIN — CLEVIPIDINE 6 MG/HR: 0.5 EMULSION INTRAVENOUS at 09:07

## 2022-07-26 RX ADMIN — ENOXAPARIN SODIUM 40 MG: 100 INJECTION SUBCUTANEOUS at 04:07

## 2022-07-26 RX ADMIN — DEXTROSE 2 G: 50 INJECTION, SOLUTION INTRAVENOUS at 11:07

## 2022-07-26 RX ADMIN — INSULIN HUMAN 1 UNITS/HR: 1 INJECTION, SOLUTION INTRAVENOUS at 08:07

## 2022-07-26 RX ADMIN — HYDROMORPHONE HYDROCHLORIDE 1 MG: 1 INJECTION, SOLUTION INTRAMUSCULAR; INTRAVENOUS; SUBCUTANEOUS at 10:07

## 2022-07-26 RX ADMIN — ACETAMINOPHEN 1000 MG: 500 TABLET ORAL at 02:07

## 2022-07-26 NOTE — PROGRESS NOTES
[facility] - Surgical Intensive Care  Critical Care - Surgery  Progress Note    Patient Name: Yo Pink  MRN: [id number]  Admission Date: 7/15/2022  Hospital Length of Stay: 9 days  Code Status: Full Code  Attending Provider: Kenton Wynn MD  Primary Care Provider: [facility]   Principal Problem: ACS (acute coronary syndrome)    Subjective:     Hospital/ICU Course:  No notes on file    Interval History/Significant Events: Extubated to RA overnight. Remains on 0.08 of epi this morning, levo weaned off early PM. Endorses pain which he describes as a soreness. Completed 1500 cc of post op albumin overnight.     Follow-up For: Procedure(s) (LRB):  CORONARY ARTERY BYPASS GRAFT (CABG) X 3 (N/A)  SURGICAL PROCUREMENT, VEIN, ENDOSCOPIC (Left)    Post-Operative Day: 1 Day Post-Op    Objective:     Vital Signs (Most Recent):  Temp: 99.8 °F (37.7 °C) (07/26/22 0700)  Pulse: 98 (07/26/22 0800)  Resp: 19 (07/26/22 0800)  BP: 129/67 (07/26/22 0800)  SpO2: 97 % (07/26/22 0800) Vital Signs (24h Range):  Temp:  [96.8 °F (36 °C)-99.86 °F (37.7 °C)] 99.8 °F (37.7 °C)  Pulse:  [] 98  Resp:  [17-40] 19  SpO2:  [94 %-100 %] 97 %  BP: ()/(55-86) 129/67  Arterial Line BP: ()/(52-87) 141/58     Weight: 77.1 kg (169 lb 15.6 oz)  Body mass index is 24.39 kg/m².      Intake/Output Summary (Last 24 hours) at 7/26/2022 0819  Last data filed at 7/26/2022 0800  Gross per 24 hour   Intake 5513.96 ml   Output 2276 ml   Net 3237.96 ml       Physical Exam  Constitutional:       General: He is not in acute distress.     Appearance: He is not ill-appearing.   HENT:      Head: Normocephalic.   Eyes:      General: No scleral icterus.     Extraocular Movements: Extraocular movements intact.      Conjunctiva/sclera: Conjunctivae normal.   Neck:      Comments: R IJ CVC   Cardiovascular:      Rate and Rhythm: Tachycardia present.      Comments: V and A wires; Atrial pacing at 100  2 meds, bilat pleural aniyah  drains  Midline sternotomy incision with intact dressing    Pulmonary:      Effort: Pulmonary effort is normal. No respiratory distress.      Comments: On RA  Abdominal:      General: There is no distension.      Palpations: Abdomen is soft.   Musculoskeletal:      Right lower leg: No edema.      Comments: L radial a line  LLE Ace Wrap   Neurological:      General: No focal deficit present.      Mental Status: He is alert and oriented to person, place, and time.       Vents:  Vent Mode: Spont (07/25/22 2247)  Ventilator Initiated: Yes (07/25/22 1725)  Set Rate: 18 BPM (07/25/22 2207)  Vt Set: 480 mL (07/25/22 2207)  Pressure Support: 5 cmH20 (07/25/22 2247)  PEEP/CPAP: 5 cmH20 (07/25/22 2247)  Oxygen Concentration (%): 40 (07/25/22 2247)  Peak Airway Pressure: 11 cmH2O (07/25/22 2247)  Plateau Pressure: 0 cmH20 (07/25/22 2247)  Total Ve: 10.4 mL (07/25/22 2247)  Negative Inspiratory Force (cm H2O): -24 (07/25/22 2257)  F/VT Ratio<105 (RSBI): (!) 46.91 (07/25/22 2247)    Lines/Drains/Airways       Central Venous Catheter Line  Duration             Percutaneous Central Line Insertion/Assessment - Double Lumen  07/25/22 1518 right internal jugular <1 day    Pulmonary Artery Catheter Assessment  07/25/22 1730 <1 day              Drain  Duration                  Urethral Catheter 07/25/22 1255 Temperature probe;Non-latex;Straight-tip 14 Fr. <1 day         Y Chest Tube 1 and 2 07/25/22 1630 1 Mediastinal 19 Fr. 2 Pleural 19 Fr. <1 day              Arterial Line  Duration             Arterial Line 07/25/22 1253 Left Radial <1 day              Line  Duration                  Pacer Wires 07/25/22 1605 <1 day              Peripheral Intravenous Line  Duration                  Peripheral IV - Single Lumen 07/24/22 0130 22 G Right Hand 2 days                    Significant Labs:    CBC/Anemia Profile:  Recent Labs   Lab 07/25/22  0540 07/25/22  1341 07/25/22  1730 07/25/22  1730 07/26/22  0306 07/26/22  0442 07/26/22  0729    WBC 6.81  --  22.54*  --  10.15  --   --    HGB 14.3  --  11.8*  --  9.3*  --   --    HCT 42.0   < > 34.0*   < > 26.9* 23* 28*     --  325  --  219  --   --    MCV 93  --  90  --  94  --   --    RDW 13.0  --  13.0  --  13.3  --   --     < > = values in this interval not displayed.        Chemistries:  Recent Labs   Lab 07/25/22  0540 07/25/22  1730 07/26/22  0306    136 137   K 3.7 3.3*  3.3* 3.7   CL 98 101 106   CO2 29 23 21*   BUN 13 13 12   CREATININE 0.7 0.8 0.7   CALCIUM 9.5 8.2* 8.2*   ALBUMIN 3.4* 2.6* 3.8   PROT 7.0 5.1* 5.5*   BILITOT 0.7 0.7 0.6   ALKPHOS 87 68 47*   ALT 16 13 8*   AST 19 23 26   MG 2.1 2.8* 2.0   PHOS 3.8 2.9 2.0*       All pertinent labs within the past 24 hours have been reviewed.    Significant Imaging:  I have reviewed all pertinent imaging results/findings within the past 24 hours.    Assessment/Plan:     * ACS (acute coronary syndrome)  Yo Pink is a 57 y.o. male with medical problems including PVD who was admitted to the [facility] for worsening PVD symptoms who developed dyspnea and diaphoresis and was found to have an NSTEMI. Coronary angiogram demonstrated  of the prox LAD and mid RCA. He was transferred to [facility] for consideration of CABG. He is now s/p CABGx3 on 7/25.      Neuro/Psych:   -- Sedation: Not indicated   -- Pain: PRN Oxy + Dilaudid; Consider PCA if continues to have difficult pain control  -- Scheduled Tylenol             Cards:   -- S/P CABG x 3 on 7/25/22  -- HDS on 0.08 of epi, add milrinone today to wean epi  -- Atrial and Ventricular pacing wires. Paced at 100 BPM  -- MAP > 65, Syst < 140  -- Cleviprex PRN  -- DAPT, LVX      Pulm:   -- Goal O2 sat > 90%  -- ABG Q3H  -- Mediastinal chest tubes x2 and pleural chest tube x2 to suction      Renal:  -- Keep aguero for strict I/O  -- Trend BUN/Cr        FEN / GI:   -- Replace lytes as needed  -- Nutrition: NPO  -- GI ppx: Not indicated  -- Bowel reg: miralax  --2000mL 5% Albumin in first 12  hours post-op      ID:   -- Tm: afebrile; WBC stable  -- Vanessa-op ancef      Heme/Onc:   -- H/H stable   -- Daily CBC  -- 250mL Cell saver given back  -- DAPT QD      Endo:   -- BG goal 140-180  -- Insulin gtt      PPx:   Feeding: NPO  Analgesia/Sedation: Propofol / PRN Oxy + Dilaudid  Thromboembolic prevention: SCDs, lvx   HOB >30: Yes  Stress Ulcer ppx: Not indicated  Glucose control: Critical care goal 140-180 g/dl, ISS     Lines/Drains/Airway: CVC RIJ, Figueroa, Chest tubes x 4, atrial and ventricular pacing wires, L radial A-line      Dispo/Code Status/Palliative:   -- SICU / Full Code.              Obdulio Sheriff MD  Critical Care - Surgery  [facility] - Surgical Int[facility]

## 2022-07-26 NOTE — CONSULTS
[facility] - Surgical Intensive Care  Adult Nutrition  Consult Note    SUMMARY     Recommendations    1) ADAT to cardiac diet, fluid per physician. Appropriate education given 7/26.    2) If pt eating <50% of meals, rec'd Boost Plus BID to optimize kcal/protein intake (prefers vanilla).    3) RD to monitor and f/u.    Goals: Meet % of kcal/protein needs by RD f/u date  Nutrition Goal Status: new  Communication of RD Recs:  (POC)    Assessment and Plan  Nutrition Problem  Inadequate energy intake    Related to (etiology):   Decreased ability to consume sufficient energy    Signs and Symptoms (as evidenced by):   Clear liquid diet s/p surgery     Interventions/Recommendations (treatment strategy):  Collaboration of nutrition care with other providers  Diet advancement as medically feasible    Nutrition Diagnosis Status:   New    Reason for Assessment    Reason For Assessment: consult (post op cardiac)  Diagnosis: cardiac disease (ACS s/p CABG 7/25)  Relevant Medical History: NSTEMI, dry gangrene, PAD, dylipidemia  Interdisciplinary Rounds: did not attend    General Information Comments: Pt POD#1 s/p CABG. Diet advanced to clear liquids this morning, pt tolerating well - says he ate everything but the broth for breakfast. Pt is missing some teeth, but says he typically has no difficulty chewing/swallowing. Pt reports fair appetite PTA; #, says he has lost ~16# (8.6%) over the past 5 months - not significant. NFPE - no wasting observed. Pt does not meet criteria for malnutrition at this time. Of note - pt says he tries to limit salt/fat intake at home. Pt amenable to additional cardiac education with me today; diet thoroughly reviewed and handout was provided. Pt verbalized understanding of material and all questions/concerns were addressed.    Nutrition Discharge Planning: adequate nutrition, cardiac diet    Nutrition Risk Screen    Nutrition Risk Screen: large or nonhealing wound, burn or pressure  "injury    Nutrition/Diet History    Spiritual, Cultural Beliefs, Episcopal Practices, Values that Affect Care: no  Food Allergies: NKFA    Anthropometrics    Temp: 98.5 °F (36.9 °C)  Height Method: Stated  Height: 5' 10" (177.8 cm)  Height (inches): 70 in  Weight Method: Stated  Weight: 77.1 kg (169 lb 15.6 oz)  Weight (lb): 169.98 lb  Ideal Body Weight (IBW), Male: 166 lb  % Ideal Body Weight, Male (lb): 102.4 %  BMI (Calculated): 24.4  BMI Grade: 18.5-24.9 - normal    Lab/Procedures/Meds    Pertinent Labs Reviewed: reviewed  Pertinent Labs Comments: Glu 119, Ca 8.2, Phos 2  Pertinent Medications Reviewed: reviewed  Pertinent Medications Comments: epinephrine, docusate sodium, polyethylene glycol, insulin    Estimated/Assessed Needs    Weight Used For Calorie Calculations: 77.1 kg (169 lb 15.6 oz)  Energy Calorie Requirements (kcal): 6582-3258 kcal/day  Energy Need Method: Kcal/kg (30-35 kcal/kg)  Protein Requirements: 77-93g pro/day (1-1.2 g/kg)  Weight Used For Protein Calculations: 77.1 kg (169 lb 15.6 oz)  Estimated Fluid Requirement Method:  (1ml/kcal or fluid per physician)  RDA Method (mL): 2313    Nutrition Prescription Ordered    Current Diet Order: clear liquid    Evaluation of Received Nutrient/Fluid Intake    I/O: -8.6L since admit  Comments: LBM: 7/23  Tolerance: tolerating  % Intake of Estimated Energy Needs: 25 - 50 %  % Meal Intake: 75 - 100 %    Nutrition Risk    Level of Risk/Frequency of Follow-up:  (1x/week)     Monitor and Evaluation    Food and Nutrient Intake: energy intake, food and beverage intake  Food and Nutrient Adminstration: diet order  Knowledge/Beliefs/Attitudes: food and nutrition knowledge/skill  Physical Activity and Function: nutrition-related ADLs and IADLs  Anthropometric Measurements: weight, weight change  Biochemical Data, Medical Tests and Procedures: electrolyte and renal panel, gastrointestinal profile, glucose/endocrine profile, inflammatory profile, lipid " profile  Nutrition-Focused Physical Findings: overall appearance, extremities, muscles and bones     Nutrition Follow-Up    RD Follow-up?: Yes

## 2022-07-26 NOTE — PT/OT/SLP EVAL
"Physical Therapy Co-Evaluation and Co-Treatment    Patient Name:  Yo Pink   MRN:  [id number]    Recommendations:     Discharge Recommendations:  home health PT   Discharge Equipment Recommendations: none   Barriers to discharge: Increased level of assist and Decreased caregiver support    Assessment:     Yo Pink is a 57 y.o. male admitted with a medical diagnosis of ACS (acute coronary syndrome).  He presents with the following impairments/functional limitations:  weakness, impaired endurance, impaired self care skills, impaired functional mobility, gait instability, impaired balance, decreased lower extremity function, decreased coordination, pain, impaired skin, impaired cardiopulmonary response to activity. Patient tolerated session well, gait limited by medical lines.    Rehab Prognosis: Good; patient would benefit from acute skilled PT services 5 x/week to address these deficits and reach maximum level of function.  Recent Surgery: Procedure(s) (LRB):  CORONARY ARTERY BYPASS GRAFT (CABG) X 3 (N/A)  SURGICAL PROCUREMENT, VEIN, ENDOSCOPIC (Left) 1 Day Post-Op    Plan:     During this hospitalization, patient to be seen 5 x/week to address the identified rehab impairments via gait training, therapeutic activities, therapeutic exercises, neuromuscular re-education and progress toward the following goals:    · Plan of Care Expires:  08/26/22    Subjective     Chief Complaint: Chest pain  Patient/Family Comments/Goals: "I just hobble around"  Pain/Comfort:  · Pain Rating 1: 7/10  · Location - Orientation 1: midline  · Location 1: chest  · Pain Addressed 1: Reposition, Distraction  · Pain Rating Post-Intervention 1: 7/10    Patients cultural, spiritual, [employer]ian conflicts given the current situation: no    Living Environment:  Patient lives alone in a single story home, number of outside stairs: threshold, walk-in shower.  Prior to admission, patient was independent with ADLs, driving, not working. Patient " uses DME as follows: shower chair. DME owned (not currently used): none. Upon discharge, patient will have assistance from friend. He reports some family live nearby and some are able to assist during the day.    Objective:     Communicated with nursing prior to session.  Patient found up in chair with arterial line, external pacer, aguero catheter, chest tube, central line, pulse ox (continuous), blood pressure cuff, telemetry, peripheral IV (pulmonary artery catheter) upon PT entry to room.    General Precautions: Standard, fall, sternal   Orthopedic Precautions:N/A   Braces: N/A    Exams:  · Cognitive Exam:  Patient is oriented to Person, Place, Time, Situation, follows commands 100% of the time  · RLE ROM: WFL  · RLE Strength: WFL  · LLE ROM: WFL  · LLE Strength: WFL  · Sensation: Intact light touch to BLEs, reports burning to L foot at baseline    Functional Mobility:  · Bed Mobility:     · Seated in chair at start of session and returned to chair  · Transfers:     · Sit to Stand: minimum assistance with no AD, cues for hand placement to maintain precautions  · Gait: Patient ambulated 2 ft forward/back with hand-held assist and moderate assistance. Patient demonstrates unsteady gait, decreased step length, decreased foot clearance and decreased ernesto. Cuing for increased step size. All lines remained intact throughout ambulation trial.  · Balance:   · Static Sitting: Good, able to maintain for 3 minute(s) with stand by assistance  · Dynamic Sitting: not assessed this visit  · Static Standing: Fair, able to maintain for 10 seconds with minimum assistance  · Dynamic Standing: Poor: Patient unable to accept challenge or move without loss of balance, moderate assistance    Therapeutic Activities and Exercises:  Patient educated on role of acute care PT and PT POC, safety while in hospital including calling nurse for mobility and call light usage  Patient educated on Post-op sternal precautions, including no  lifting > 5 lbs, pulling or pushing with BUEs.   Patient is clear to stand pivot transfer with RN/PCT, assist x1     AM-PAC 6 CLICK MOBILITY  Total Score:13     Patient left up in chair with all lines intact, call button in reach and RN notified.    GOALS:   Multidisciplinary Problems     Physical Therapy Goals        Problem: Physical Therapy    Goal Priority Disciplines Outcome Goal Variances Interventions   Physical Therapy Goal     PT, PT/OT Ongoing, Progressing     Description: Goals to be met by: 2022     Patient will increase functional independence with mobility by performin. Supine to sit with independence  2. Sit to supine with independence  3. Sit to stand transfer with independence  4. Bed to chair transfer with modified independence using platform rolling walker if needed  5. Gait  x 200 feet with modified independence using platform rolling walker if needed  6. Ascend/Descend 6 inch curb step with modified independence using platform rolling walker if needed  7. Lower extremity exercise program x10 reps per handout, with independence                    History:     Past Medical History:   Diagnosis Date    PAD (peripheral artery disease)        Past Surgical History:   Procedure Laterality Date    ANGIOGRAPHY OF LOWER EXTREMITY Left 2022    Procedure: Angiogram Extremity Unilateral;  Surgeon: Mehul Rich MD;  Location: [facility];  Service: Vascular;  Laterality: Left;  RN PREOP ON 22. BINAX ON 22---NEED CONSENT    CORONARY ARTERY BYPASS GRAFT (CABG) N/A 2022    Procedure: CORONARY ARTERY BYPASS GRAFT (CABG) X 3;  Surgeon: Kenton Wynn MD;  Location: [facility] OR [address] [place] [address];  Service: Cardiovascular;  Laterality: N/A;    ENDOSCOPIC HARVEST OF VEIN Left 2022    Procedure: SURGICAL PROCUREMENT, VEIN, ENDOSCOPIC;  Surgeon: Kenton Wynn MD;  Location: [facility] OR [address] [place] [address];  Service: Cardiovascular;  Laterality: Left;  Vein harvest start:   Vein harvest stop:  1427  Vein prep start: 1428  Vein prep stop: 1454    LEFT HEART CATHETERIZATION Left 7/18/2022    Procedure: Left heart cath;  Surgeon: Noah Huffman MD;  [facility]: [facility] CATH LAB;  Service: Cardiology;  Laterality: Left;  not before 9am, R rad access       Time Tracking:     PT Received On: 07/26/22  PT Start Time: 1300     PT Stop Time: 1317  PT Total Time (min): 17 min     Billable Minutes: Evaluation 8 min Gait Training 8 min     07/26/2022    Co-evaluation and co-treatment performed for this visit due to suspected patient need for two skilled therapists to ensure patient and staff safety and to accommodate for patient activity tolerance/pain management

## 2022-07-26 NOTE — RESPIRATORY THERAPY
Pt extubated per MD order. Extubated to room air  Sp02: 98%. No respiratory distress noted, pt was able to state his name. Will continue to monitor patient.     NIF: -24  VC:[id number]

## 2022-07-26 NOTE — PLAN OF CARE
Patient post op CABG X3  [facility] - Surgical Intensive Care  Discharge Reassessment    Primary Care Provider: [facility]    Expected Discharge Date:     Reassessment (most recent)     Discharge Reassessment - 07/26/22 0657        Discharge Reassessment    Assessment Type Discharge Planning Reassessment     Did the patient's condition or plan change since previous assessment? Yes     Discharge Plan A Home;Home with family     Discharge Plan B Rehab     DME Needed Upon Discharge  none     Discharge Barriers Identified Underinsured     Why the patient remains in the hospital Requires continued medical care        Post-Acute Status    Discharge Delays None known at this time

## 2022-07-26 NOTE — ANESTHESIA POSTPROCEDURE EVALUATION
Anesthesia Post Evaluation    Patient: Yo Pink    Procedure(s) Performed: Procedure(s) (LRB):  CORONARY ARTERY BYPASS GRAFT (CABG) X 3 (N/A)  SURGICAL PROCUREMENT, VEIN, ENDOSCOPIC (Left)    Final Anesthesia Type: general      Patient location during evaluation: ICU  Patient participation: Yes- Able to Participate  Level of consciousness: awake and alert and oriented  Post-procedure vital signs: reviewed and stable  Pain management: adequate  Airway patency: patent  CHUCK mitigation strategies: Intraoperative administration of CPAP, nasopharyngeal airway, or oral appliance during sedation, Multimodal analgesia, Extubation while patient is awake, Verification of full reversal of neuromuscular block and Extubation and recovery carried out in lateral, semiupright, or other nonsupine position  PONV status at discharge: No PONV  Anesthetic complications: no      Cardiovascular status: hemodynamically stable  Respiratory status: unassisted  Hydration status: euvolemic  Follow-up not needed.          Vitals Value Taken Time   /66 07/26/22 1501   Temp 37 °C (98.6 °F) 07/26/22 1500   Pulse 100 07/26/22 1504   Resp 28 07/26/22 1504   SpO2 97 % 07/26/22 1504   Vitals shown include unvalidated device data.      No case tracking events are documented in the log.      Pain/Farhad Score: Pain Rating Prior to Med Admin: 5 (7/26/2022  2:50 PM)  Pain Rating Post Med Admin: 3 (7/26/2022  5:34 AM)  Farhad Score: 7 (7/25/2022  7:01 PM)

## 2022-07-26 NOTE — PT/OT/SLP EVAL
"Occupational Therapy   Co-Evaluation/Treatment with PT    Name: Yo Pink  MRN: [id number]  Admitting Diagnosis:  ACS (acute coronary syndrome)  Recent Surgery: Procedure(s) (LRB):  CORONARY ARTERY BYPASS GRAFT (CABG) X 3 (N/A)  SURGICAL PROCUREMENT, VEIN, ENDOSCOPIC (Left) 1 Day Post-Op    Recommendations:     Discharge Recommendations: home health OT  Discharge Equipment Recommendations:  none  Barriers to discharge:  Decreased caregiver support    Assessment:     Yo Pink is a 57 y.o. male with a medical diagnosis of ACS (acute coronary syndrome).  He presents with increased sternal pain/discomfort, limiting session. Pt is s/p CABG x3 7/25/22.He is currently performing functional tasks below baseline. Performance deficits affecting function: weakness, impaired endurance, impaired self care skills, impaired functional mobility, gait instability, impaired balance, decreased lower extremity function, decreased coordination, pain, impaired skin, impaired cardiopulmonary response to activity.  Pt would benefit from skilled OT services in order to maximize independence with ADLs and facilitate safe discharge. Pt would benefit from home health OT once medically stable for discharge.     Rehab Prognosis: Good; patient would benefit from acute skilled OT services to address these deficits and reach maximum level of function.       Plan:     Patient to be seen 5 x/week to address the above listed problems via self-care/home management, therapeutic activities, therapeutic exercises  · Plan of Care Expires: 08/25/22  · Plan of Care Reviewed with: patient    Subjective     Chief Complaint: sternal pain  Patient/Family Comments/goals: to return home    Occupational Profile:  Living Environment: Pt lives alone in a [facility] with 1 YESSICA. He has a walk-in shower with a shower chair  Previous level of function: independent with ADLs. Pt reports "hobbling" around at home, needing L toes amputated  Roles and Routines: drives, not " working  Equipment Used at Home:  shower chair  Assistance upon Discharge: Upon discharge, pt will have assistance from friends    Pain/Comfort:  · Pain Rating 1: 7/10  · Location 1: chest  · Pain Addressed 1: Reposition, Distraction  · Pain Rating Post-Intervention 1: 7/10    Patients cultural, spiritual, Baptism conflicts given the current situation: no    Objective:     Communicated with: RN and PT prior to session.  Patient found up in chair with arterial line, external pacer, aguero catheter, chest tube, central line, pulse ox (continuous), blood pressure cuff, telemetry upon OT entry to room.    General Precautions: Standard, fall, sternal   Orthopedic Precautions:N/A   Braces: N/A  Respiratory Status: Room air    Occupational Performance:    Bed Mobility:    · Did not observe; pt found and returned to bedside chair    Functional Mobility/Transfers:  · Patient completed Sit <> Stand Transfer with minimum assistance  with  hand-held assist   · Functional Mobility: Pt ambulated few steps forward/backwards with Moderate (A) and B HHA in order to maximize functional activity tolerance and standing balance required for engagement in occupations of choice.    · Increased sternal pain   · Instability     Activities of Daily Living:  · Grooming: stand by assistance to perform facial hygiene seated  · Lower Body Dressing: maximal assistance to don B  socks    Cognitive/Visual Perceptual:  Cognitive/Psychosocial Skills:     -       Oriented to: Person, Place, Time and Situation   -       Follows Commands/attention:Follows multistep  commands  -       Communication: clear/fluent  -       Memory: No Deficits noted  -       Safety awareness/insight to disability: impaired   -       Mood/Affect/Coping skills/emotional control: Appropriate to situation    Physical Exam:  Skin integrity: necrotic L toes; purple tint to B feet (pt reports needing L toes amputated)  Upper Extremity Range of Motion:     -       Right Upper  Extremity: WFL  -       Left Upper Extremity: WFL  Upper Extremity Strength:    -       Right Upper Extremity: WFL  -       Left Upper Extremity: WFL   Strength:    -       Right Upper Extremity: WFL  -       Left Upper Extremity: WFL  Fine Motor Coordination:    -       Intact    AMPAC 6 Click ADL:  AMPAC Total Score: 15    Treatment & Education:  -Therapist provided facilitation and instruction of proper body mechanics, energy conservation, and fall prevention strategies during tasks listed above.  -Pt educated on role of OT, POC and goals for therapy  · Pt educated on Post-op sternal precautions; no lifting > 5 lbs, pulling or pushing with BUEs. Able to move arms within a pain free range  -Pt educated on importance of OOB activities with staff member assistance and sitting OOB majority of the day.   -Pt verbalized understanding. Pt expressed no further concerns/questions  -Whiteboard updated  Evaluation completed with PT in the ICU to best establish plan of care for acute setting.   Education:    Patient left up in chair with all lines intact and call button in reach    GOALS:   Multidisciplinary Problems     Occupational Therapy Goals        Problem: Occupational Therapy    Goal Priority Disciplines Outcome Interventions   Occupational Therapy Goal     OT, PT/OT Ongoing, Progressing    Description: Goals to be met by: 8/9/22     Patient will increase functional independence with ADLs by performing:    Feeding with [place].  UE Dressing with [place].  LE Dressing with Stand-by Assistance.  Grooming while standing at sink with Stand-by Assistance.  Toileting from toilet with Stand-by Assistance for hygiene and clothing management.   Toilet transfer to toilet with Stand-by Assistance.                     History:     Past Medical History:   Diagnosis Date    PAD (peripheral artery disease)        Past Surgical History:   Procedure Laterality Date    ANGIOGRAPHY OF LOWER EXTREMITY Left 7/5/2022     Procedure: Angiogram Extremity Unilateral;  Surgeon: Mehul Rich MD;  Location: [facility] OR;  Service: Vascular;  Laterality: Left;  RN PREOP ON 7/1/22. BINAX ON 7/5/22---NEED CONSENT    CORONARY ARTERY BYPASS GRAFT (CABG) N/A 7/25/2022    Procedure: CORONARY ARTERY BYPASS GRAFT (CABG) X 3;  Surgeon: Kenton Wynn MD;  Location: [facility] OR [address] [place] [address];  Service: Cardiovascular;  Laterality: N/A;    ENDOSCOPIC HARVEST OF VEIN Left 7/25/2022    Procedure: SURGICAL PROCUREMENT, VEIN, ENDOSCOPIC;  Surgeon: Kenton Wynn MD;  Location: [facility] OR [address] [place] [address];  Service: Cardiovascular;  Laterality: Left;  Vein harvest start: 1337  Vein harvest stop: 1427  Vein prep start: 1428  Vein prep stop: 1454    LEFT HEART CATHETERIZATION Left 7/18/2022    Procedure: Left heart cath;  Surgeon: Noah Huffman MD;  Location: [facility] CATH LAB;  Service: Cardiology;  Laterality: Left;  not before 9am, R rad access       Time Tracking:     OT Date of Treatment: 07/26/22  OT Start Time: 1300  OT Stop Time: 1317  OT Total Time (min): 17 min    Billable Minutes:Evaluation 9  Self Care/Home Management 8    7/26/2022

## 2022-07-26 NOTE — ASSESSMENT & PLAN NOTE
BG goal 140 -180     - Continue intensive IV insulin infusion protocol.   - BG monitoring every 1 hour.     4:38 PM  BG stable with stable rates. Diet advancing.   - discontinue IIP  - Start  transition IV insulin infusion with stepdown parameters. Initial rate starting at 0.8 units/hr. Dosing based off of current IV insulin infusion trends.   - BG Monitoring /HS/0200  - Low Dose SQ Insulin Correction Scale.      ** Please call Endocrine for any BG related issues **  ** Please notify Endocrine for any change and/or advance in diet**    Lab Results   Component Value Date    HGBA1C 6.2 (H) 07/15/2022       Discharge Planning:   TBD. Please notify endocrinology prior to discharge.

## 2022-07-26 NOTE — PLAN OF CARE
Recommendations    1) ADAT to cardiac diet, fluid per physician. Appropriate education given 7/26.    2) If pt eating <50% of meals, rec'd Boost Plus BID to optimize kcal/protein intake (prefers vanilla).    3) RD to monitor and f/u.    Goals: Meet % of kcal/protein needs by RD f/u date  Nutrition Goal Status: new  Communication of RD Recs:  (POC)

## 2022-07-26 NOTE — CONSULTS
[facility]  Endocrinology  Diabetes Consult Note    Consult Requested by: Kenton Wynn MD   Reason for admit: ACS (acute coronary syndrome)    HISTORY OF PRESENT ILLNESS:  Reason for Consult: Management of Post Surgical Hyperglycemia     Surgical Procedure and Date:CABG 07/25/2022      HPI:   Patient is a 57 y.o. male with a diagnosis of PAD s/p LLE PTA and stenting on 7/5/22 in the setting of left 3rd toe gangrene who presents as a transfer from the [facility] for CTS evaluation in the setting of NSTEMI. He was admitted to the [facility] on 7/15/22 in the setting of worsening LLE toe pain and was initiated on antibiotics. Shortly after this he became diaphoretic and short of breath. Workup demonstrated elevated troponin and Echo demonstrating EF of 30% with wall motion abnormalities. He underwent coronary angiogram which demonstrated  of the proximal LAD and mid RCA. He was transferred to [facility] for consideration of CABG.  He is now s/p CABGx3 (LIMA to LAD, SVG to RI SVG to PDA) on 7/25/22. The procedure was performed without apparent complication and he was transferred to the SICU for postoperative care and management. Endocrinology consulted on 07/26/2022 to manage glycemic control while inpatient.     Lab Results   Component Value Date    HGBA1C 6.2 (H) 07/15/2022           Interval HPI:   Overnight events:  BG slightly above goal at time of consult. Patient. BG stable while on intensive IV insulin infusion protocol. Patient has since been extubated, with advancement in diet. Endocrinology will continue to follow, and manage glycemic control while inpatient.     Diet clear liquid Fluid - 1500mL  1 Day Post-Op    Eating:   <25%  Nausea: No  Hypoglycemia and intervention: No  Fever: No  TPN and/or TF: No  If yes, type of TF/TPN and rate: None    PMH, PSH, FH, SH updated and reviewed     Review of Systems  Constitutional: Negative for weight changes.  Eyes: Negative for visual  disturbance.  Respiratory: Negative for cough.   Cardiovascular: Negative for chest pain.  Gastrointestinal: Negative for nausea.  Endocrine: Negative for polyuria, polydipsia.  Musculoskeletal: Negative for back pain.  Skin: Negative for rash.  Neurological: Negative for syncope.  Psychiatric/Behavioral: Negative for depression.      Current Medications and/or Treatments Impacting Glycemic Control  Immunotherapy:    Immunosuppressants       None          Steroids:   Hormones (From admission, onward)                Start     Stop Route Frequency Ordered    07/15/22 0537  melatonin tablet 6 mg         -- Oral Nightly PRN 07/15/22 0438          Pressors:    Autonomic Drugs (From admission, onward)                Start     Stop Route Frequency Ordered    07/26/22 0530  EPINEPHrine (ADRENALIN) 5 mg in dextrose 5 % 250 mL infusion        Question Answer Comment   Begin at (in mcg/kg/min): 0.02    Titrate by: (in mcg/kg/min) 0.01    Titrate interval: (in minutes) 5    Titrate to maintain: (SBP or MAP or Cardiac Index) CARDIAC INDEX    Cardiac index greater than: (in L/min) 2.2    Maximum dose: (in mcg/kg/min) 1        -- IV Continuous 07/26/22 0429          Hyperglycemia/Diabetes Medications:   Antihyperglycemics (From admission, onward)                Start     Stop Route Frequency Ordered    07/25/22 1830  insulin regular in 0.9 % NaCl 100 unit/100 mL (1 unit/mL) infusion        Question Answer Comment   Insulin rate changes (DO NOT MODIFY ANSWER) \\[email or web address]\epic\Images\Pharmacy\InsulinInfusions\CTS INSULIN [id number].pdf    Enter initial dose (Units/hr): 1        -- IV Continuous 07/25/22 1722    07/15/22 0537  insulin aspart U-100 pen 0-5 Units         -- SubQ Before meals & nightly PRN 07/15/22 0438             PHYSICAL EXAMINATION:  Vitals:    07/26/22 1000   BP: 120/68   Pulse: 98   Resp: 19   Temp:      Body mass index is 24.39 kg/m².    Physical Exam  Constitutional: Well developed, well nourished, NAD.  ENT:  External ears no masses with nose patent; normal hearing.  Neck: Supple; trachea midline.  Cardiovascular: Normal heart sounds, no LE edema. DP +2 bilaterally.  Lungs: Normal effort; lungs anterior bilaterally clear to auscultation.  Abdomen: Soft, no masses, no hernias.  MS: No clubbing or cyanosis of nails noted; unable to assess gait.  Skin: No rashes, lesions, or ulcers; no nodules. Mid sternal chest wound without complications.   Psychiatric: Good judgement and insight; normal mood and affect.  Neurological: Cranial nerves are grossly intact. Normal sensation in the bilateral lower extremities.  Foot: Nails in poor condition, Gangrenous 3rd right toe noted..         Labs Reviewed and Include   Recent Labs   Lab 07/26/22  0306   *   CALCIUM 8.2*   ALBUMIN 3.8   PROT 5.5*      K 3.7   CO2 21*      BUN 12   CREATININE 0.7   ALKPHOS 47*   ALT 8*   AST 26   BILITOT 0.6     Lab Results   Component Value Date    WBC 10.15 07/26/2022    HGB 9.3 (L) 07/26/2022    HCT 28 (L) 07/26/2022    MCV 94 07/26/2022     07/26/2022     No results for input(s): TSH, FREET4 in the last 168 hours.  Lab Results   Component Value Date    HGBA1C 6.2 (H) 07/15/2022       Nutritional status:   Body mass index is 24.39 kg/m².  Lab Results   Component Value Date    ALBUMIN 3.8 07/26/2022    ALBUMIN 2.6 (L) 07/25/2022    ALBUMIN 3.4 (L) 07/25/2022     No results found for: PREALBUMIN    Estimated Creatinine Clearance: 118.8 mL/min (based on SCr of 0.7 mg/dL).    Accu-Checks  Recent Labs     07/26/22  0206 07/26/22  0208 07/26/22  0226 07/26/22  0305 07/26/22  0406 07/26/22  0503 07/26/22  0605 07/26/22  0727 07/26/22  0910 07/26/22  1101   POCTGLUCOSE 93 93 92 114* 130* 152* 180* 192* 196* 166*        ASSESSMENT and PLAN    * ACS (acute coronary syndrome)  Managed per primary team  Avoid hypoglycemia        Transient hyperglycemia post procedure  BG goal 140 -180     - Continue intensive IV insulin infusion protocol.    - BG monitoring every 1 hour.     4:38 PM  BG stable with stable rates. Diet advancing.   - discontinue IIP  - Start  transition IV insulin infusion with stepdown parameters. Initial rate starting at 0.8 units/hr. Dosing based off of current IV insulin infusion trends.   - BG Monitoring AC/HS/0200  - Low Dose SQ Insulin Correction Scale.      ** Please call Endocrine for any BG related issues **  ** Please notify Endocrine for any change and/or advance in diet**    Lab Results   Component Value Date    HGBA1C 6.2 (H) 07/15/2022       Discharge Planning:   TBD. Please notify endocrinology prior to discharge.             Dyslipidemia  Managed per primary team  Condition may cause insulin resistance                   Beni Laguerre NP  Endocrinology  [facility] - Surgical Intensive [facility]

## 2022-07-26 NOTE — HPI
Reason for Consult: Management of Post Surgical Hyperglycemia     Surgical Procedure and Date:CABG 07/25/2022      HPI:   Patient is a 57 y.o. male with a diagnosis of PAD s/p LLE PTA and stenting on 7/5/22 in the setting of left 3rd toe gangrene who presents as a transfer from the [facility] for CTS evaluation in the setting of NSTEMI. He was admitted to the [facility] on 7/15/22 in the setting of worsening LLE toe pain and was initiated on antibiotics. Shortly after this he became diaphoretic and short of breath. Workup demonstrated elevated troponin and Echo demonstrating EF of 30% with wall motion abnormalities. He underwent coronary angiogram which demonstrated  of the proximal LAD and mid RCA. He was transferred to [facility] for consideration of CABG.  He is now s/p CABGx3 (LIMA to LAD, SVG to RI SVG to PDA) on 7/25/22. The procedure was performed without apparent complication and he was transferred to the SICU for postoperative care and management. Endocrinology consulted on 07/26/2022 to manage glycemic control while inpatient.     Lab Results   Component Value Date    HGBA1C 6.2 (H) 07/15/2022

## 2022-07-26 NOTE — PLAN OF CARE
PT eval complete, plan of care established    2022    Problem: Physical Therapy  Goal: Physical Therapy Goal  Description: Goals to be met by: 2022     Patient will increase functional independence with mobility by performin. Supine to sit with independence  2. Sit to supine with independence  3. Sit to stand transfer with independence  4. Bed to chair transfer with modified independence using platform rolling walker if needed  5. Gait  x 200 feet with modified independence using platform rolling walker if needed  6. Ascend/Descend 6 inch curb step with modified independence using platform rolling walker if needed  7. Lower extremity exercise program x10 reps per handout, with independence   Outcome: Ongoing, Progressing

## 2022-07-26 NOTE — SUBJECTIVE & OBJECTIVE
Interval HPI:   Overnight events:  BG slightly above goal at time of consult. Patient. BG stable while on intensive IV insulin infusion protocol. Patient has since been extubated, with advancement in diet. Endocrinology will continue to follow, and manage glycemic control while inpatient.     Diet clear liquid Fluid - 1500mL  1 Day Post-Op    Eating:   <25%  Nausea: No  Hypoglycemia and intervention: No  Fever: No  TPN and/or TF: No  If yes, type of TF/TPN and rate: None    PMH, PSH, FH, SH updated and reviewed     Review of Systems  Constitutional: Negative for weight changes.  Eyes: Negative for visual disturbance.  Respiratory: Negative for cough.   Cardiovascular: Negative for chest pain.  Gastrointestinal: Negative for nausea.  Endocrine: Negative for polyuria, polydipsia.  Musculoskeletal: Negative for back pain.  Skin: Negative for rash.  Neurological: Negative for syncope.  Psychiatric/Behavioral: Negative for depression.      Current Medications and/or Treatments Impacting Glycemic Control  Immunotherapy:    Immunosuppressants       None          Steroids:   Hormones (From admission, onward)                Start     Stop Route Frequency Ordered    07/15/22 0537  melatonin tablet 6 mg         -- Oral Nightly PRN 07/15/22 0438          Pressors:    Autonomic Drugs (From admission, onward)                Start     Stop Route Frequency Ordered    07/26/22 0530  EPINEPHrine (ADRENALIN) 5 mg in dextrose 5 % 250 mL infusion        Question Answer Comment   Begin at (in mcg/kg/min): 0.02    Titrate by: (in mcg/kg/min) 0.01    Titrate interval: (in minutes) 5    Titrate to maintain: (SBP or MAP or Cardiac Index) CARDIAC INDEX    Cardiac index greater than: (in L/min) 2.2    Maximum dose: (in mcg/kg/min) 1        -- IV Continuous 07/26/22 0429          Hyperglycemia/Diabetes Medications:   Antihyperglycemics (From admission, onward)                Start     Stop Route Frequency Ordered    07/25/22 2230  insulin  regular in 0.9 % NaCl 100 unit/100 mL (1 unit/mL) infusion        Question Answer Comment   Insulin rate changes (DO NOT MODIFY ANSWER) \\[email or web address]\epic\Images\Pharmacy\InsulinInfusions\CTS INSULIN [id number].pdf    Enter initial dose (Units/hr): 1        -- IV Continuous 07/25/22 1722    07/15/22 0537  insulin aspart U-100 pen 0-5 Units         -- SubQ Before meals & nightly PRN 07/15/22 0438             PHYSICAL EXAMINATION:  Vitals:    07/26/22 1000   BP: 120/68   Pulse: 98   Resp: 19   Temp:      Body mass index is 24.39 kg/m².    Physical Exam  Constitutional: Well developed, well nourished, NAD.  ENT: External ears no masses with nose patent; normal hearing.  Neck: Supple; trachea midline.  Cardiovascular: Normal heart sounds, no LE edema. DP +2 bilaterally.  Lungs: Normal effort; lungs anterior bilaterally clear to auscultation.  Abdomen: Soft, no masses, no hernias.  MS: No clubbing or cyanosis of nails noted; unable to assess gait.  Skin: No rashes, lesions, or ulcers; no nodules. Mid sternal chest wound without complications.   Psychiatric: Good judgement and insight; normal mood and affect.  Neurological: Cranial nerves are grossly intact. Normal sensation in the bilateral lower extremities.  Foot: Nails in poor condition, Gangrenous 3rd right toe noted..

## 2022-07-26 NOTE — SUBJECTIVE & OBJECTIVE
Interval History/Significant Events: Extubated to  overnight. Remains on 0.08 of epi this morning, levo weaned off early PM. Endorses pain which he describes as a soreness. Completed 1500 cc of post op albumin overnight.     Follow-up For: Procedure(s) (LRB):  CORONARY ARTERY BYPASS GRAFT (CABG) X 3 (N/A)  SURGICAL PROCUREMENT, VEIN, ENDOSCOPIC (Left)    Post-Operative Day: 1 Day Post-Op    Objective:     Vital Signs (Most Recent):  Temp: 99.8 °F (37.7 °C) (07/26/22 0700)  Pulse: 98 (07/26/22 0800)  Resp: 19 (07/26/22 0800)  BP: 129/67 (07/26/22 0800)  SpO2: 97 % (07/26/22 0800) Vital Signs (24h Range):  Temp:  [96.8 °F (36 °C)-99.86 °F (37.7 °C)] 99.8 °F (37.7 °C)  Pulse:  [] 98  Resp:  [17-40] 19  SpO2:  [94 %-100 %] 97 %  BP: ()/(55-86) 129/67  Arterial Line BP: ()/(52-87) 141/58     Weight: 77.1 kg (169 lb 15.6 oz)  Body mass index is 24.39 kg/m².      Intake/Output Summary (Last 24 hours) at 7/26/2022 0819  Last data filed at 7/26/2022 0800  Gross per 24 hour   Intake 5513.96 ml   Output 2276 ml   Net 3237.96 ml       Physical Exam  Constitutional:       General: He is not in acute distress.     Appearance: He is not ill-appearing.   HENT:      Head: Normocephalic.   Eyes:      General: No scleral icterus.     Extraocular Movements: Extraocular movements intact.      Conjunctiva/sclera: Conjunctivae normal.   Neck:      Comments: R IJ CVC   Cardiovascular:      Rate and Rhythm: Tachycardia present.      Comments: V and A wires; Atrial pacing at 100  2 meds, bilat pleural aniyah drains  Midline sternotomy incision with intact dressing    Pulmonary:      Effort: Pulmonary effort is normal. No respiratory distress.      Comments: On RA  Abdominal:      General: There is no distension.      Palpations: Abdomen is soft.   Musculoskeletal:      Right lower leg: No edema.      Comments: L radial a line  LLE Ace Wrap   Neurological:      General: No focal deficit present.      Mental Status: He is  alert and oriented to person, place, and time.       Vents:  Vent Mode: Spont (07/25/22 2247)  Ventilator Initiated: Yes (07/25/22 1725)  Set Rate: 18 BPM (07/25/22 2207)  Vt Set: 480 mL (07/25/22 2207)  Pressure Support: 5 cmH20 (07/25/22 2247)  PEEP/CPAP: 5 cmH20 (07/25/22 2247)  Oxygen Concentration (%): 40 (07/25/22 2247)  Peak Airway Pressure: 11 cmH2O (07/25/22 2247)  Plateau Pressure: 0 cmH20 (07/25/22 2247)  Total Ve: 10.4 mL (07/25/22 2247)  Negative Inspiratory Force (cm H2O): -24 (07/25/22 2257)  F/VT Ratio<105 (RSBI): (!) 46.91 (07/25/22 2247)    Lines/Drains/Airways       Central Venous Catheter Line  Duration             Percutaneous Central Line Insertion/Assessment - Double Lumen  07/25/22 1518 right internal jugular <1 day    Pulmonary Artery Catheter Assessment  07/25/22 1730 <1 day              Drain  Duration                  Urethral Catheter 07/25/22 1255 Temperature probe;Non-latex;Straight-tip 14 Fr. <1 day         Y Chest Tube 1 and 2 07/25/22 1630 1 Mediastinal 19 Fr. 2 Pleural 19 Fr. <1 day              Arterial Line  Duration             Arterial Line 07/25/22 1253 Left Radial <1 day              Line  Duration                  Pacer Wires 07/25/22 1605 <1 day              Peripheral Intravenous Line  Duration                  Peripheral IV - Single Lumen 07/24/22 0130 22 G Right Hand 2 days                    Significant Labs:    CBC/Anemia Profile:  Recent Labs   Lab 07/25/22  0540 07/25/22  1341 07/25/22  1730 07/25/22  1730 07/26/22  0306 07/26/22  0442 07/26/22  0729   WBC 6.81  --  22.54*  --  10.15  --   --    HGB 14.3  --  11.8*  --  9.3*  --   --    HCT 42.0   < > 34.0*   < > 26.9* 23* 28*     --  325  --  219  --   --    MCV 93  --  90  --  94  --   --    RDW 13.0  --  13.0  --  13.3  --   --     < > = values in this interval not displayed.        Chemistries:  Recent Labs   Lab 07/25/22  0540 07/25/22  1730 07/26/22  0306    136 137   K 3.7 3.3*  3.3* 3.7   CL 98  101 106   CO2 29 23 21*   BUN 13 13 12   CREATININE 0.7 0.8 0.7   CALCIUM 9.5 8.2* 8.2*   ALBUMIN 3.4* 2.6* 3.8   PROT 7.0 5.1* 5.5*   BILITOT 0.7 0.7 0.6   ALKPHOS 87 68 47*   ALT 16 13 8*   AST 19 23 26   MG 2.1 2.8* 2.0   PHOS 3.8 2.9 2.0*       All pertinent labs within the past 24 hours have been reviewed.    Significant Imaging:  I have reviewed all pertinent imaging results/findings within the past 24 hours.

## 2022-07-26 NOTE — PROGRESS NOTES
[facility] - Surgical Intensive Care  Vascular Surgery  Progress Note    Patient Name: Yo Pink  MRN: [id number]  Admission Date: 7/15/2022  Primary Care Provider: [facility]    Subjective:     Interval History: NAEO.  Pt seen and examined, no complaints.  Now s/p CABGx3 on 7/26 without complication.  Figueroa in place.      Post-Op Info:  Procedure(s) (LRB):  CORONARY ARTERY BYPASS GRAFT (CABG) X 3 (N/A)  SURGICAL PROCUREMENT, VEIN, ENDOSCOPIC (Left)   1 Day Post-Op       Medications:  Continuous Infusions:   clevidipine      EPINEPHrine 0.07 mcg/kg/min (07/26/22 0900)    insulin regular 1 units/mL infusion orderable (CTS POST-OP) 1 Units/hr (07/26/22 0900)    milrinone 20mg/100ml D5W (200mcg/ml) 0.25 mcg/kg/min (07/26/22 0900)     Scheduled Meds:   acetaminophen  1,000 mg Oral Q8H    aspirin  81 mg Oral Daily    atorvastatin  40 mg Oral Daily    ceFAZolin in dextrose 5 %  2 g Intravenous Q8H    clopidogreL  75 mg Oral Daily    docusate sodium  100 mg Oral BID    enoxaparin  40 mg Subcutaneous Daily    mupirocin   Nasal BID    polyethylene glycol  17 g Oral Daily     PRN Meds:sodium chloride, calcium gluconate IVPB, calcium gluconate IVPB, calcium gluconate IVPB, dextrose 10%, dextrose 10%, glucagon (human recombinant), glucose, glucose, hydrALAZINE, HYDROmorphone, insulin aspart U-100, magnesium sulfate IVPB, magnesium sulfate IVPB, melatonin, naloxone, ondansetron, oxyCODONE, oxyCODONE, potassium chloride in water, potassium chloride in water, potassium chloride in water, sodium chloride 0.9%     Objective:     Vital Signs (Most Recent):  Temp: 99.8 °F (37.7 °C) (07/26/22 0700)  Pulse: 100 (07/26/22 0900)  Resp: (!) 25 (07/26/22 0900)  BP: 129/67 (07/26/22 0800)  SpO2: 99 % (07/26/22 0900)   Vital Signs (24h Range):  Temp:  [96.8 °F (36 °C)-99.86 °F (37.7 °C)] 99.8 °F (37.7 °C)  Pulse:  [] 100  Resp:  [18-40] 25  SpO2:  [94 %-100 %] 99 %  BP: ()/(55-86) 129/67  Arterial Line  BP: ()/(52-87) 159/70     Date 07/26/22 0700 - 07/27/22 0659   Shift 4940-8032 9833-8951 9329-3151 24 Hour Total   INTAKE   I.V.(mL/kg) 54.9(0.7)   54.9(0.7)   Shift Total(mL/kg) 54.9(0.7)   54.9(0.7)   OUTPUT   Urine(mL/kg/hr) 130   130   Chest Tube 50   50   Shift Total(mL/kg) 180(2.3)   180(2.3)   Weight (kg) 77.1 77.1 77.1 77.1       Physical Exam  Vitals and nursing note reviewed.   Constitutional:       Appearance: Normal appearance. He is not ill-appearing.   HENT:      Head: Normocephalic.      Mouth/Throat:      Mouth: Mucous membranes are moist.      Pharynx: Oropharynx is clear.   Eyes:      General: No scleral icterus.     Extraocular Movements: Extraocular movements intact.      Conjunctiva/sclera: Conjunctivae normal.   Cardiovascular:      Rate and Rhythm: Normal rate.      Pulses:           Dorsalis pedis pulses are detected w/ Doppler on the right side and detected w/ Doppler on the left side.        Posterior tibial pulses are detected w/ Doppler on the right side and detected w/ Doppler on the left side.      Comments: Right: Monophasic DP, Biphasic PT  Left: Monophasic DP, Biphasic PT  Pulmonary:      Effort: Pulmonary effort is normal. No respiratory distress.      Breath sounds: No wheezing.   Abdominal:      General: Abdomen is flat. Bowel sounds are normal. There is no distension.      Palpations: Abdomen is soft.      Comments: Some bruising/swelling of L groin and testicle.     Musculoskeletal:         General: Deformity present. No swelling or tenderness. Normal range of motion.      Cervical back: Normal range of motion.      Comments: Left 3rd toe with dry gangrene. Sensation decreased throughout forefoot.  Right foot with small area of superficial necrosis between 4th and 5th toes.  Feet are warm and dry. No erythema or signs of acute infection.   Skin:     General: Skin is warm and dry.      Coloration: Skin is not jaundiced or pale.      Findings: Lesion present.   Neurological:       General: No focal deficit present.      Mental Status: He is alert and oriented to person, place, and time.   Psychiatric:         Mood and Affect: Mood normal.       Significant Labs:  Recent Lab Results  (Last 5 results in the past 24 hours)        07/26/22  0729   07/26/22  0729   07/26/22  0727   07/26/22  0605   07/26/22  0503        Allens Test N/A   N/A             Site [place]   [place]             POC BE   -2             POC HCO3   21.8             POC Hematocrit   28             POC Ionized Calcium   1.10             POC Lactate 1.36               POC PCO2   32.5             POC PH   7.434             POC PO2   83             POC Potassium   4.2             POC SATURATED O2   97             POC Sodium   137             POC TCO2   23             POCT Glucose     192   180   152       Sample ARTERIAL   ARTERIAL                                    Significant Diagnostics:  I have reviewed all pertinent imaging results/findings within the past 24 hours.      Assessment/Plan:     Dry gangrene  Mr. Pink is a 57 year old male with a PMH of HTN, HLD, PAD, and CAD who presented to outside hospital 7/23 with complaints of continued issues with bilateral foot wounds. Followed previously by Vascular Surgery. S/P angio with LLE angioplast 7/05/22. Now at [facility] with NSTEMI, anticipating CABG on 7/25/22.    - Foot wound stable at this time. No need for urgent surgical intervention.   - As these wounds appear to be dry gangrene, WBC is WNL and he is afebrile, no need for antibiotics.  - Continue to paint with betadine daily.  - Recommend wound care consult.  - OK to continue with more pressing CTS evaluation and surgery at this time.   - Will need at least 6 weeks recovery s/p CABG before vascular surgery intervention  - Vascular Surgery will continue to follow. If there are any changes or new recommendations from a Vascular Surgery standpoint, we will reach out directly to primary team.  - f/u in 3 weeks with   Hilario to discuss revasc options for LLE         Caitlin Marquez MD  Vascular Surgery  [facility] - Surgical Int[facility]

## 2022-07-26 NOTE — ASSESSMENT & PLAN NOTE
Yo Pink is a 57 y.o. male with medical problems including PVD who was admitted to the [facility] for worsening PVD symptoms who developed dyspnea and diaphoresis and was found to have an NSTEMI. Coronary angiogram demonstrated  of the prox LAD and mid RCA. He was transferred to [facility] for consideration of CABG. He is now s/p CABGx3 on 7/25.      Neuro/Psych:   -- Sedation: Not indicated   -- Pain: PRN Oxy + Dilaudid; Consider PCA if continues to have difficult pain control  -- Scheduled Tylenol             Cards:   -- S/P CABG x 3 on 7/25/22  -- HDS on 0.08 of epi, add milrinone today to wean epi  -- Atrial and Ventricular pacing wires. Paced at 100 BPM  -- MAP > 65, Syst < 140  -- Cleviprex PRN  -- DAPT, LVX      Pulm:   -- Goal O2 sat > 90%  -- ABG Q3H  -- Mediastinal chest tubes x2 and pleural chest tube x2 to suction      Renal:  -- Keep aguero for strict I/O  -- Trend BUN/Cr        FEN / GI:   -- Replace lytes as needed  -- Nutrition: NPO  -- GI ppx: Not indicated  -- Bowel reg: miralax  --2000mL 5% Albumin in first 12 hours post-op      ID:   -- Tm: afebrile; WBC stable  -- Vanessa-op ancef      Heme/Onc:   -- H/H stable   -- Daily CBC  -- 250mL Cell saver given back  -- DAPT QD      Endo:   -- BG goal 140-180  -- Insulin gtt      PPx:   Feeding: NPO  Analgesia/Sedation: Propofol / PRN Oxy + Dilaudid  Thromboembolic prevention: SCDs, lvx   HOB >30: Yes  Stress Ulcer ppx: Not indicated  Glucose control: Critical care goal 140-180 g/dl, ISS     Lines/Drains/Airway: CVC RIJ, Aguero, Chest tubes x 4, atrial and ventricular pacing wires, L radial A-line      Dispo/Code Status/Palliative:   -- SICU / Full Code.

## 2022-07-26 NOTE — PLAN OF CARE
Problem: Occupational Therapy  Goal: Occupational Therapy Goal  Description: Goals to be met by: 8/9/22     Patient will increase functional independence with ADLs by performing:    Feeding with [place].  UE Dressing with [place].  LE Dressing with Stand-by Assistance.  Grooming while standing at sink with Stand-by Assistance.  Toileting from toilet with Stand-by Assistance for hygiene and clothing management.   Toilet transfer to toilet with Stand-by Assistance.    Outcome: Ongoing, Progressing

## 2022-07-26 NOTE — ASSESSMENT & PLAN NOTE
Mr. Pink is a 57 year old male with a PMH of HTN, HLD, PAD, and CAD who presented to outside hospital 7/23 with complaints of continued issues with bilateral foot wounds. Followed previously by Vascular Surgery. S/P angio with LLE angioplast 7/05/22. Now at [facility] with NSTEMI, anticipating CABG on 7/25/22.    - Foot wound stable at this time. No need for urgent surgical intervention.   - As these wounds appear to be dry gangrene, WBC is WNL and he is afebrile, no need for antibiotics.  - Continue to paint with betadine daily.  - Recommend wound care consult.  - OK to continue with more pressing CTS evaluation and surgery at this time.   - Will need at least 6 weeks recovery s/p CABG before vascular surgery intervention  - Vascular Surgery will continue to follow. If there are any changes or new recommendations from a Vascular Surgery standpoint, we will reach out directly to primary team.  - f/u in 3 weeks with Dr. Rich to discuss revasc options for LLE

## 2022-07-26 NOTE — PLAN OF CARE
Aox4. VSS on RA, sating %. See previous note about extubation. Pain somewhat controlled with PRN medication, Sharita VILA aware. 1 L total of albumin administered overnight. ABG, lytes, and lactate trended. Levo gtt off @ 1930. Mid sternal incision with island border dressing, CDI. Chest tubes x4, dressing CDI, 211 cc/shift. Figueroa, 535 cc/shift. Non blanchable redness to bilateral buttocks. Gangrene to toes, see woundcare orders. Foam dressings to sacrum and heels. OOBTC this AM. Bed plugged in and working. Call light in reach. Answered all questions. See flowsheet for full assessment.

## 2022-07-26 NOTE — SUBJECTIVE & OBJECTIVE
Medications:  Continuous Infusions:   clevidipine      EPINEPHrine 0.07 mcg/kg/min (07/26/22 0900)    insulin regular 1 units/mL infusion orderable (CTS POST-OP) 1 Units/hr (07/26/22 0900)    milrinone 20mg/100ml D5W (200mcg/ml) 0.25 mcg/kg/min (07/26/22 0900)     Scheduled Meds:   acetaminophen  1,000 mg Oral Q8H    aspirin  81 mg Oral Daily    atorvastatin  40 mg Oral Daily    ceFAZolin in dextrose 5 %  2 g Intravenous Q8H    clopidogreL  75 mg Oral Daily    docusate sodium  100 mg Oral BID    enoxaparin  40 mg Subcutaneous Daily    mupirocin   Nasal BID    polyethylene glycol  17 g Oral Daily     PRN Meds:sodium chloride, calcium gluconate IVPB, calcium gluconate IVPB, calcium gluconate IVPB, dextrose 10%, dextrose 10%, glucagon (human recombinant), glucose, glucose, hydrALAZINE, HYDROmorphone, insulin aspart U-100, magnesium sulfate IVPB, magnesium sulfate IVPB, melatonin, naloxone, ondansetron, oxyCODONE, oxyCODONE, potassium chloride in water, potassium chloride in water, potassium chloride in water, sodium chloride 0.9%     Objective:     Vital Signs (Most Recent):  Temp: 99.8 °F (37.7 °C) (07/26/22 0700)  Pulse: 100 (07/26/22 0900)  Resp: (!) 25 (07/26/22 0900)  BP: 129/67 (07/26/22 0800)  SpO2: 99 % (07/26/22 0900)   Vital Signs (24h Range):  Temp:  [96.8 °F (36 °C)-99.86 °F (37.7 °C)] 99.8 °F (37.7 °C)  Pulse:  [] 100  Resp:  [18-40] 25  SpO2:  [94 %-100 %] 99 %  BP: ()/(55-86) 129/67  Arterial Line BP: ()/(52-87) 159/70     Date 07/26/22 0700 - 07/27/22 0659   Shift 7965-9721 1297-0159 4258-5392 24 Hour Total   INTAKE   I.V.(mL/kg) 54.9(0.7)   54.9(0.7)   Shift Total(mL/kg) 54.9(0.7)   54.9(0.7)   OUTPUT   Urine(mL/kg/hr) 130   130   Chest Tube 50   50   Shift Total(mL/kg) 180(2.3)   180(2.3)   Weight (kg) 77.1 77.1 77.1 77.1       Physical Exam  Vitals and nursing note reviewed.   Constitutional:       Appearance: Normal appearance. He is not ill-appearing.   HENT:      Head:  Normocephalic.      Mouth/Throat:      Mouth: Mucous membranes are moist.      Pharynx: Oropharynx is clear.   Eyes:      General: No scleral icterus.     Extraocular Movements: Extraocular movements intact.      Conjunctiva/sclera: Conjunctivae normal.   Cardiovascular:      Rate and Rhythm: Normal rate.      Pulses:           Dorsalis pedis pulses are detected w/ Doppler on the right side and detected w/ Doppler on the left side.        Posterior tibial pulses are detected w/ Doppler on the right side and detected w/ Doppler on the left side.      Comments: Right: Monophasic DP, Biphasic PT  Left: Monophasic DP, Biphasic PT  Pulmonary:      Effort: Pulmonary effort is normal. No respiratory distress.      Breath sounds: No wheezing.   Abdominal:      General: Abdomen is flat. Bowel sounds are normal. There is no distension.      Palpations: Abdomen is soft.      Comments: Some bruising/swelling of L groin and testicle.     Musculoskeletal:         General: Deformity present. No swelling or tenderness. Normal range of motion.      Cervical back: Normal range of motion.      Comments: Left 3rd toe with dry gangrene. Sensation decreased throughout forefoot.  Right foot with small area of superficial necrosis between 4th and 5th toes.  Feet are warm and dry. No erythema or signs of acute infection.   Skin:     General: Skin is warm and dry.      Coloration: Skin is not jaundiced or pale.      Findings: Lesion present.   Neurological:      General: No focal deficit present.      Mental Status: He is alert and oriented to person, place, and time.   Psychiatric:         Mood and Affect: Mood normal.       Significant Labs:  Recent Lab Results  (Last 5 results in the past 24 hours)        07/26/22  0729   07/26/22  0729   07/26/22  0727   07/26/22  0605   07/26/22  0503        Allens Test N/A   N/A             Site [place]   [place]             POC BE   -2             POC HCO3   21.8             POC Hematocrit   28              POC Ionized Calcium   1.10             POC Lactate 1.36               POC PCO2   32.5             POC PH   7.434             POC PO2   83             POC Potassium   4.2             POC SATURATED O2   97             POC Sodium   137             POC TCO2   23             POCT Glucose     192   180   152       Sample ARTERIAL   ARTERIAL                                    Significant Diagnostics:  I have reviewed all pertinent imaging results/findings within the past 24 hours.

## 2022-07-27 LAB
ALBUMIN SERPL BCP-MCNC: 3.6 G/DL (ref 3.5–5.2)
ALBUMIN SERPL BCP-MCNC: 3.8 G/DL (ref 3.5–5.2)
ALP SERPL-CCNC: 54 U/L (ref 55–135)
ALP SERPL-CCNC: 59 U/L (ref 55–135)
ALT SERPL W/O P-5'-P-CCNC: 11 U/L (ref 10–44)
ALT SERPL W/O P-5'-P-CCNC: 9 U/L (ref 10–44)
ANION GAP SERPL CALC-SCNC: 11 MMOL/L (ref 8–16)
ANION GAP SERPL CALC-SCNC: 8 MMOL/L (ref 8–16)
AST SERPL-CCNC: 29 U/L (ref 10–40)
AST SERPL-CCNC: 30 U/L (ref 10–40)
BASOPHILS # BLD AUTO: 0.06 K/UL (ref 0–0.2)
BASOPHILS NFR BLD: 0.7 % (ref 0–1.9)
BILIRUB SERPL-MCNC: 1 MG/DL (ref 0.1–1)
BILIRUB SERPL-MCNC: 1.1 MG/DL (ref 0.1–1)
BUN SERPL-MCNC: 8 MG/DL (ref 6–20)
BUN SERPL-MCNC: 9 MG/DL (ref 6–20)
CALCIUM SERPL-MCNC: 8.4 MG/DL (ref 8.7–10.5)
CALCIUM SERPL-MCNC: 8.5 MG/DL (ref 8.7–10.5)
CHLORIDE SERPL-SCNC: 98 MMOL/L (ref 95–110)
CHLORIDE SERPL-SCNC: 98 MMOL/L (ref 95–110)
CO2 SERPL-SCNC: 21 MMOL/L (ref 23–29)
CO2 SERPL-SCNC: 23 MMOL/L (ref 23–29)
CREAT SERPL-MCNC: 0.6 MG/DL (ref 0.5–1.4)
CREAT SERPL-MCNC: 0.6 MG/DL (ref 0.5–1.4)
DIFFERENTIAL METHOD: ABNORMAL
EOSINOPHIL # BLD AUTO: 0.2 K/UL (ref 0–0.5)
EOSINOPHIL NFR BLD: 1.8 % (ref 0–8)
ERYTHROCYTE [DISTWIDTH] IN BLOOD BY AUTOMATED COUNT: 13.2 % (ref 11.5–14.5)
EST. GFR  (AFRICAN AMERICAN): >60 ML/MIN/1.73 M^2
EST. GFR  (AFRICAN AMERICAN): >60 ML/MIN/1.73 M^2
EST. GFR  (NON AFRICAN AMERICAN): >60 ML/MIN/1.73 M^2
EST. GFR  (NON AFRICAN AMERICAN): >60 ML/MIN/1.73 M^2
GLUCOSE SERPL-MCNC: 132 MG/DL (ref 70–110)
GLUCOSE SERPL-MCNC: 136 MG/DL (ref 70–110)
HCT VFR BLD AUTO: 27.3 % (ref 40–54)
HGB BLD-MCNC: 9.2 G/DL (ref 14–18)
IMM GRANULOCYTES # BLD AUTO: 0.02 K/UL (ref 0–0.04)
IMM GRANULOCYTES NFR BLD AUTO: 0.2 % (ref 0–0.5)
LYMPHOCYTES # BLD AUTO: 1.5 K/UL (ref 1–4.8)
LYMPHOCYTES NFR BLD: 16.6 % (ref 18–48)
MAGNESIUM SERPL-MCNC: 1.8 MG/DL (ref 1.6–2.6)
MCH RBC QN AUTO: 31.5 PG (ref 27–31)
MCHC RBC AUTO-ENTMCNC: 33.7 G/DL (ref 32–36)
MCV RBC AUTO: 94 FL (ref 82–98)
MONOCYTES # BLD AUTO: 1.1 K/UL (ref 0.3–1)
MONOCYTES NFR BLD: 11.8 % (ref 4–15)
NEUTROPHILS # BLD AUTO: 6.3 K/UL (ref 1.8–7.7)
NEUTROPHILS NFR BLD: 68.9 % (ref 38–73)
NRBC BLD-RTO: 0 /100 WBC
PHOSPHATE SERPL-MCNC: 2.4 MG/DL (ref 2.7–4.5)
PLATELET # BLD AUTO: 169 K/UL (ref 150–450)
PMV BLD AUTO: 10.1 FL (ref 9.2–12.9)
POCT GLUCOSE: 108 MG/DL (ref 70–110)
POCT GLUCOSE: 115 MG/DL (ref 70–110)
POCT GLUCOSE: 124 MG/DL (ref 70–110)
POCT GLUCOSE: 125 MG/DL (ref 70–110)
POCT GLUCOSE: 128 MG/DL (ref 70–110)
POCT GLUCOSE: 96 MG/DL (ref 70–110)
POTASSIUM SERPL-SCNC: 3.5 MMOL/L (ref 3.5–5.1)
POTASSIUM SERPL-SCNC: 3.7 MMOL/L (ref 3.5–5.1)
PROT SERPL-MCNC: 6.1 G/DL (ref 6–8.4)
PROT SERPL-MCNC: 6.2 G/DL (ref 6–8.4)
RBC # BLD AUTO: 2.92 M/UL (ref 4.6–6.2)
SODIUM SERPL-SCNC: 129 MMOL/L (ref 136–145)
SODIUM SERPL-SCNC: 130 MMOL/L (ref 136–145)
WBC # BLD AUTO: 9.09 K/UL (ref 3.9–12.7)

## 2022-07-27 PROCEDURE — 97530 THERAPEUTIC ACTIVITIES: CPT

## 2022-07-27 PROCEDURE — 80053 COMPREHEN METABOLIC PANEL: CPT | Mod: 91 | Performed by: THORACIC SURGERY (CARDIOTHORACIC VASCULAR SURGERY)

## 2022-07-27 PROCEDURE — 99232 PR SUBSEQUENT HOSPITAL CARE,LEVL II: ICD-10-PCS | Mod: ,,, | Performed by: NURSE PRACTITIONER

## 2022-07-27 PROCEDURE — 63600175 PHARM REV CODE 636 W HCPCS

## 2022-07-27 PROCEDURE — 25000003 PHARM REV CODE 250

## 2022-07-27 PROCEDURE — 25000242 PHARM REV CODE 250 ALT 637 W/ HCPCS

## 2022-07-27 PROCEDURE — 85025 COMPLETE CBC W/AUTO DIFF WBC: CPT

## 2022-07-27 PROCEDURE — 99232 SBSQ HOSP IP/OBS MODERATE 35: CPT | Mod: ,,, | Performed by: NURSE PRACTITIONER

## 2022-07-27 PROCEDURE — 25000003 PHARM REV CODE 250: Performed by: THORACIC SURGERY (CARDIOTHORACIC VASCULAR SURGERY)

## 2022-07-27 PROCEDURE — 80053 COMPREHEN METABOLIC PANEL: CPT

## 2022-07-27 PROCEDURE — 99232 SBSQ HOSP IP/OBS MODERATE 35: CPT | Mod: ,,, | Performed by: ANESTHESIOLOGY

## 2022-07-27 PROCEDURE — 94761 N-INVAS EAR/PLS OXIMETRY MLT: CPT

## 2022-07-27 PROCEDURE — 63600175 PHARM REV CODE 636 W HCPCS: Performed by: THORACIC SURGERY (CARDIOTHORACIC VASCULAR SURGERY)

## 2022-07-27 PROCEDURE — 20000000 HC ICU ROOM

## 2022-07-27 PROCEDURE — 99232 PR SUBSEQUENT HOSPITAL CARE,LEVL II: ICD-10-PCS | Mod: ,,, | Performed by: ANESTHESIOLOGY

## 2022-07-27 PROCEDURE — C9248 INJ, CLEVIDIPINE BUTYRATE: HCPCS | Mod: JG

## 2022-07-27 PROCEDURE — 84100 ASSAY OF PHOSPHORUS: CPT

## 2022-07-27 PROCEDURE — 97535 SELF CARE MNGMENT TRAINING: CPT

## 2022-07-27 PROCEDURE — 94799 UNLISTED PULMONARY SVC/PX: CPT

## 2022-07-27 PROCEDURE — 83735 ASSAY OF MAGNESIUM: CPT

## 2022-07-27 RX ORDER — POTASSIUM CHLORIDE 20 MEQ/1
20 TABLET, EXTENDED RELEASE ORAL DAILY
Status: DISCONTINUED | OUTPATIENT
Start: 2022-07-27 | End: 2022-07-28

## 2022-07-27 RX ORDER — SODIUM,POTASSIUM PHOSPHATES 280-250MG
2 POWDER IN PACKET (EA) ORAL
Status: DISCONTINUED | OUTPATIENT
Start: 2022-07-27 | End: 2022-07-29 | Stop reason: ALTCHOICE

## 2022-07-27 RX ORDER — IBUPROFEN 200 MG
24 TABLET ORAL
Status: DISCONTINUED | OUTPATIENT
Start: 2022-07-27 | End: 2022-07-30

## 2022-07-27 RX ORDER — FUROSEMIDE 10 MG/ML
20 INJECTION INTRAMUSCULAR; INTRAVENOUS 2 TIMES DAILY
Status: DISCONTINUED | OUTPATIENT
Start: 2022-07-27 | End: 2022-07-30 | Stop reason: HOSPADM

## 2022-07-27 RX ORDER — NIFEDIPINE 30 MG/1
30 TABLET, EXTENDED RELEASE ORAL ONCE
Status: DISCONTINUED | OUTPATIENT
Start: 2022-07-27 | End: 2022-07-27

## 2022-07-27 RX ORDER — GLUCAGON 1 MG
1 KIT INJECTION
Status: DISCONTINUED | OUTPATIENT
Start: 2022-07-27 | End: 2022-07-30

## 2022-07-27 RX ORDER — IBUPROFEN 200 MG
16 TABLET ORAL
Status: DISCONTINUED | OUTPATIENT
Start: 2022-07-27 | End: 2022-07-30

## 2022-07-27 RX ORDER — NIFEDIPINE 30 MG/1
30 TABLET, EXTENDED RELEASE ORAL DAILY
Status: DISCONTINUED | OUTPATIENT
Start: 2022-07-27 | End: 2022-07-28

## 2022-07-27 RX ORDER — AMLODIPINE BESYLATE 5 MG/1
5 TABLET ORAL DAILY
Status: COMPLETED | OUTPATIENT
Start: 2022-07-27 | End: 2022-07-27

## 2022-07-27 RX ORDER — AMOXICILLIN AND CLAVULANATE POTASSIUM 875; 125 MG/1; MG/1
1 TABLET, FILM COATED ORAL EVERY 12 HOURS
Status: DISCONTINUED | OUTPATIENT
Start: 2022-07-27 | End: 2022-07-30 | Stop reason: HOSPADM

## 2022-07-27 RX ORDER — INSULIN ASPART 100 [IU]/ML
0-5 INJECTION, SOLUTION INTRAVENOUS; SUBCUTANEOUS
Status: DISCONTINUED | OUTPATIENT
Start: 2022-07-27 | End: 2022-07-30

## 2022-07-27 RX ADMIN — POTASSIUM & SODIUM PHOSPHATES POWDER PACK 280-160-250 MG 2 PACKET: 280-160-250 PACK at 01:07

## 2022-07-27 RX ADMIN — OXYCODONE HYDROCHLORIDE 7.5 MG: 5 SOLUTION ORAL at 07:07

## 2022-07-27 RX ADMIN — ACETAMINOPHEN 1000 MG: 500 TABLET ORAL at 05:07

## 2022-07-27 RX ADMIN — OXYCODONE HYDROCHLORIDE 15 MG: 5 SOLUTION ORAL at 09:07

## 2022-07-27 RX ADMIN — ASPIRIN 81 MG: 81 TABLET, COATED ORAL at 09:07

## 2022-07-27 RX ADMIN — MUPIROCIN: 20 OINTMENT TOPICAL at 09:07

## 2022-07-27 RX ADMIN — FUROSEMIDE 20 MG: 10 INJECTION, SOLUTION INTRAMUSCULAR; INTRAVENOUS at 09:07

## 2022-07-27 RX ADMIN — FUROSEMIDE 20 MG: 10 INJECTION, SOLUTION INTRAMUSCULAR; INTRAVENOUS at 02:07

## 2022-07-27 RX ADMIN — DOCUSATE SODIUM 100 MG: 100 CAPSULE ORAL at 09:07

## 2022-07-27 RX ADMIN — ENOXAPARIN SODIUM 40 MG: 100 INJECTION SUBCUTANEOUS at 04:07

## 2022-07-27 RX ADMIN — MAGNESIUM SULFATE HEPTAHYDRATE 2 G: 40 INJECTION, SOLUTION INTRAVENOUS at 05:07

## 2022-07-27 RX ADMIN — FUROSEMIDE 20 MG: 10 INJECTION, SOLUTION INTRAMUSCULAR; INTRAVENOUS at 05:07

## 2022-07-27 RX ADMIN — ACETAMINOPHEN 1000 MG: 500 TABLET ORAL at 10:07

## 2022-07-27 RX ADMIN — ACETAMINOPHEN 1000 MG: 500 TABLET ORAL at 01:07

## 2022-07-27 RX ADMIN — OXYCODONE HYDROCHLORIDE 15 MG: 5 SOLUTION ORAL at 11:07

## 2022-07-27 RX ADMIN — AMOXICILLIN AND CLAVULANATE POTASSIUM 1 TABLET: 875; 125 TABLET, FILM COATED ORAL at 09:07

## 2022-07-27 RX ADMIN — ATORVASTATIN CALCIUM 40 MG: 20 TABLET, FILM COATED ORAL at 09:07

## 2022-07-27 RX ADMIN — CLEVIPIDINE 4 MG/HR: 0.5 EMULSION INTRAVENOUS at 01:07

## 2022-07-27 RX ADMIN — NIFEDIPINE 30 MG: 30 TABLET, FILM COATED, EXTENDED RELEASE ORAL at 06:07

## 2022-07-27 RX ADMIN — POTASSIUM CHLORIDE 20 MEQ: 1500 TABLET, EXTENDED RELEASE ORAL at 09:07

## 2022-07-27 RX ADMIN — AMLODIPINE BESYLATE 5 MG: 5 TABLET ORAL at 04:07

## 2022-07-27 RX ADMIN — POLYETHYLENE GLYCOL 3350 17 G: 17 POWDER, FOR SOLUTION ORAL at 09:07

## 2022-07-27 RX ADMIN — CLEVIPIDINE 5 MG/HR: 0.5 EMULSION INTRAVENOUS at 04:07

## 2022-07-27 RX ADMIN — AMOXICILLIN AND CLAVULANATE POTASSIUM 1 TABLET: 875; 125 TABLET, FILM COATED ORAL at 10:07

## 2022-07-27 RX ADMIN — OXYCODONE HYDROCHLORIDE 7.5 MG: 5 SOLUTION ORAL at 05:07

## 2022-07-27 RX ADMIN — POTASSIUM CHLORIDE 20 MEQ: 200 INJECTION, SOLUTION INTRAVENOUS at 04:07

## 2022-07-27 RX ADMIN — POTASSIUM BICARBONATE 50 MEQ: 978 TABLET, EFFERVESCENT ORAL at 01:07

## 2022-07-27 RX ADMIN — CLOPIDOGREL 75 MG: 75 TABLET, FILM COATED ORAL at 09:07

## 2022-07-27 RX ADMIN — MILRINONE LACTATE IN DEXTROSE 0.25 MCG/KG/MIN: 200 INJECTION, SOLUTION INTRAVENOUS at 01:07

## 2022-07-27 RX ADMIN — POTASSIUM & SODIUM PHOSPHATES POWDER PACK 280-160-250 MG 2 PACKET: 280-160-250 PACK at 04:07

## 2022-07-27 NOTE — PT/OT/SLP PROGRESS
Physical Therapy Co- Treatment with OT    Patient Name:  Yo Pink   MRN:  [id number]    Recommendations:     Discharge Recommendations:  home health PT   Discharge Equipment Recommendations:  (will determine DME needs closer to discharge)   Barriers to discharge: Decreased caregiver support pt lives alone    Assessment:     Yo Pink is a 57 y.o. male admitted with a medical diagnosis of ACS (acute coronary syndrome).  He presents with the following impairments/functional limitations:  impaired endurance, impaired functional mobility, gait instability, impaired balance, pain, decreased safety awareness, decreased lower extremity function pt tolerated treatment well but pain in L foot and necrosis of toe on L foot decreased functional level. Pt will benefit from cont skilled PT 3x/wk to progress physically. Pt should be able to discharge home with HHPT and DME to be determined closer to discharge. Pt is s/p CABG 7/23/22.    Rehab Prognosis: Good; patient would benefit from acute skilled PT services to address these deficits and reach maximum level of function.    Recent Surgery: Procedure(s) (LRB):  CORONARY ARTERY BYPASS GRAFT (CABG) X 3 (N/A)  SURGICAL PROCUREMENT, VEIN, ENDOSCOPIC (Left) 2 Days Post-Op    Plan:     During this hospitalization, patient to be seen 3 x/week to address the identified rehab impairments via gait training, therapeutic activities, therapeutic exercises, neuromuscular re-education and progress toward the following goals:    · Plan of Care Expires:  08/26/22    Subjective     Chief Complaint: pt c/o pain in L foot during treatment.   Patient/Family Comments/goals:  To be able to go home and care for himself.   Pain/Comfort:  · Pain Rating 1:  (L foot  -10 and chest -8)  · Pain Addressed 1: Reposition, Distraction  · Pain Rating Post-Intervention 1:  (L foot - 10, chest -8)      Objective:     Communicated with nurse prior to session.  Patient found up in chair with telemetry, arterial  line, pulse ox (continuous), blood pressure cuff, peripheral IV, chest tube, aguero catheter (swan-shiva catheter, CVP) upon PT entry to room.     General Precautions: Standard, fall, sternal   Orthopedic Precautions:N/A   Braces:    Respiratory Status: Room air     Functional Mobility:  · Transfers:   Pt needed verbal cues for functional mobility with sternal precautions.   · Sit to Stand:  contact guard assistance with hand-held assist  · Stand to sit: min assist   ·   · Balance: pt stood with SBA for standing balance while performin ADLS with OT.     Due to pt complex medical condition, the skill of 2 licensed therapists is needed to maximize treatment session and progression towards goals    AM-PAC 6 CLICK MOBILITY  Turning over in bed (including adjusting bedclothes, sheets and blankets)?: 3  Sitting down on and standing up from a chair with arms (e.g., wheelchair, bedside commode, etc.): 3  Moving from lying on back to sitting on the side of the bed?: 3  Moving to and from a bed to a chair (including a wheelchair)?: 3  Need to walk in hospital room?: 2  Climbing 3-5 steps with a railing?: 1  Basic Mobility Total Score: 15       Therapeutic Activities and Exercises:   pt received verbal instructions in PT POC and verbally expressed understanding of such.     Patient left up in chair with all lines intact, call button in reach and RN notified..    GOALS:   Multidisciplinary Problems     Physical Therapy Goals        Problem: Physical Therapy    Goal Priority Disciplines Outcome Goal Variances Interventions   Physical Therapy Goal     PT, PT/OT Ongoing, Progressing     Description: Goals to be met by: 2022     Patient will increase functional independence with mobility by performin. Supine to sit with independence -not met  2. Sit to supine with independence -not met  3. Sit to stand transfer with independence -not met  4. Bed to chair transfer with modified independence using platform rolling walker if  needed -not met  5. Gait  x 200 feet with modified independence using platform rolling walker if needed -not met  6. Ascend/Descend 6 inch curb step with modified independence using platform rolling walker if needed -not met  7. Lower extremity exercise program x10 reps per handout, with independence -not met                   Time Tracking:     PT Received On: 07/27/22  PT Start Time: 0856     PT Stop Time: 0911  PT Total Time (min): 15 min     Billable Minutes: Therapeutic Activity 15 min       PT/PTA: PT     PTA Visit Number: 0     07/27/2022

## 2022-07-27 NOTE — SUBJECTIVE & OBJECTIVE
"Interval HPI:   Overnight events:   BG stable and within goal this Am. Patient has fallen off of IV insulin infusion overnight. Endocrinology will continue to follow, and manage glycemic control while inpatient.     Diet Cardiac Fluid - 1500mL  2 Days Post-Op    Eating:   <25% - 50%  Nausea: No  Hypoglycemia and intervention: No  Fever: No  TPN and/or TF: No  If yes, type of TF/TPN and rate: none    /62   Pulse 83   Temp 98.4 °F (36.9 °C) (Oral)   Resp 20   Ht 5' 10" (1.778 m)   Wt 77.1 kg (169 lb 15.6 oz)   SpO2 96%   BMI 24.39 kg/m²     Labs Reviewed and Include    Recent Labs   Lab 07/27/22  0303   *   CALCIUM 8.5*   ALBUMIN 3.8   PROT 6.1   *   K 3.5   CO2 21*   CL 98   BUN 8   CREATININE 0.6   ALKPHOS 54*   ALT 9*   AST 30   BILITOT 1.0     Lab Results   Component Value Date    WBC 9.09 07/27/2022    HGB 9.2 (L) 07/27/2022    HCT 27.3 (L) 07/27/2022    MCV 94 07/27/2022     07/27/2022     No results for input(s): TSH, FREET4 in the last 168 hours.  Lab Results   Component Value Date    HGBA1C 6.2 (H) 07/15/2022       Nutritional status:   Body mass index is 24.39 kg/m².  Lab Results   Component Value Date    ALBUMIN 3.8 07/27/2022    ALBUMIN 3.8 07/26/2022    ALBUMIN 2.6 (L) 07/25/2022     No results found for: PREALBUMIN    Estimated Creatinine Clearance: 138.6 mL/min (based on SCr of 0.6 mg/dL).    Accu-Checks  Recent Labs     07/26/22  0605 07/26/22  0727 07/26/22  0910 07/26/22  1101 07/26/22  1955 07/26/22  2217 07/27/22  0213 07/27/22  0400 07/27/22  0806 07/27/22  1101   POCTGLUCOSE 180* 192* 196* 166* 138* 149* 108 128* 96 125*       Current Medications and/or Treatments Impacting Glycemic Control  Immunotherapy:    Immunosuppressants       None          Steroids:   Hormones (From admission, onward)                Start     Stop Route Frequency Ordered    07/15/22 0537  melatonin tablet 6 mg         -- Oral Nightly PRN 07/15/22 0438          Pressors:    Autonomic Drugs " (From admission, onward)                None          Hyperglycemia/Diabetes Medications:   Antihyperglycemics (From admission, onward)                Start     Stop Route Frequency Ordered    07/26/22 1745  insulin regular in 0.9 % NaCl 100 unit/100 mL (1 unit/mL) infusion        Question:  Enter initial dose (Units/hr):  Answer:  0.8    -- IV Continuous 07/26/22 1638    07/26/22 1737  insulin aspart U-100 pen 0-5 Units         -- SubQ As needed (PRN) 07/26/22 4918

## 2022-07-27 NOTE — ASSESSMENT & PLAN NOTE
Yo Pink is a 57 y.o. male with medical problems including PVD who was admitted to the [facility] for worsening PVD symptoms who developed dyspnea and diaphoresis and was found to have an NSTEMI. Coronary angiogram demonstrated  of the prox LAD and mid RCA. He was transferred to [facility] for consideration of CABG. He is now s/p CABGx3 on 7/25.      Neuro/Psych:   -- Sedation: Not indicated   -- Pain: PRN Oxy + Dilaudid  -- Scheduled Tylenol             Cards:   -- S/P CABG x 3 on 7/25/22  -- HDS on milrinone 0.25  -- Atrial and Ventricular pacing wires. Discontinue pacing  -- MAP > 65, Syst < 140  -- Cleviprex PRN  -- DAPT, LVX; start nifedipine today      Pulm:   -- Goal O2 sat > 90%  -- ABG Q3H  -- Mediastinal chest tubes x2 and pleural chest tube x2 to suction      Renal:  -- Keep aguero for strict I/O  -- Trend BUN/Cr   -- Lasix 20 IV BID       FEN / GI:   -- Replace lytes as needed  -- Nutrition: Cardiac diet, 1500 cc fluid restriction   -- GI ppx: Not indicated  -- Bowel reg: miralax  --2000mL 5% Albumin in first 12 hours post-op complete      ID:   -- Tm: afebrile; WBC stable  -- Vanessa-op ancef  -- Resume antibiotics in setting of dry gangrene LLE      Heme/Onc:   -- H/H stable   -- Daily CBC  -- 250mL Cell saver given back  -- DAPT QD, LVX      Endo:   -- BG goal 140-180  -- Insulin gtt/SSI; endocrine consulted appreciate recommendations      PPx:   Feeding: Cardiac diet, 1500 cc fluid restriction   Analgesia/Sedation: Propofol / PRN Oxy + Dilaudid  Thromboembolic prevention: SCDs, lvx   HOB >30: Yes  Stress Ulcer ppx: Not indicated  Glucose control: Critical care goal 140-180 g/dl, ISS     Lines/Drains/Airway: CVC RIJ, Aguero, Chest tubes x 4, atrial and ventricular pacing wires, L radial A-line      Dispo/Code Status/Palliative:   -- SICU / Full Code.

## 2022-07-27 NOTE — PROGRESS NOTES
"[facility] - Surgical Intensive Care  Endocrinology  Progress Note    Admit Date: 7/15/2022     Reason for Consult: Management of Post Surgical Hyperglycemia     Surgical Procedure and Date:CABG 07/25/2022      HPI:   Patient is a 57 y.o. male with a diagnosis of PAD s/p LLE PTA and stenting on 7/5/22 in the setting of left 3rd toe gangrene who presents as a transfer from the [facility] for CTS evaluation in the setting of NSTEMI. He was admitted to the [facility] on 7/15/22 in the setting of worsening LLE toe pain and was initiated on antibiotics. Shortly after this he became diaphoretic and short of breath. Workup demonstrated elevated troponin and Echo demonstrating EF of 30% with wall motion abnormalities. He underwent coronary angiogram which demonstrated  of the proximal LAD and mid RCA. He was transferred to [facility] for consideration of CABG.  He is now s/p CABGx3 (LIMA to LAD, SVG to RI SVG to PDA) on 7/25/22. The procedure was performed without apparent complication and he was transferred to the SICU for postoperative care and management. Endocrinology consulted on 07/26/2022 to manage glycemic control while inpatient.     Lab Results   Component Value Date    HGBA1C 6.2 (H) 07/15/2022           Interval HPI:   Overnight events:   BG stable and within goal this Am. Patient has fallen off of IV insulin infusion overnight. Endocrinology will continue to follow, and manage glycemic control while inpatient.     Diet Cardiac Fluid - 1500mL  2 Days Post-Op    Eating:   <25% - 50%  Nausea: No  Hypoglycemia and intervention: No  Fever: No  TPN and/or TF: No  If yes, type of TF/TPN and rate: none    /62   Pulse 83   Temp 98.4 °F (36.9 °C) (Oral)   Resp 20   Ht 5' 10" (1.778 m)   Wt 77.1 kg (169 lb 15.6 oz)   SpO2 96%   BMI 24.39 kg/m²     Labs Reviewed and Include    Recent Labs   Lab 07/27/22  0303   *   CALCIUM 8.5*   ALBUMIN 3.8   PROT 6.1   *   K 3.5   CO2 21*   CL 98   BUN 8   CREATININE " 0.6   ALKPHOS 54*   ALT 9*   AST 30   BILITOT 1.0     Lab Results   Component Value Date    WBC 9.09 07/27/2022    HGB 9.2 (L) 07/27/2022    HCT 27.3 (L) 07/27/2022    MCV 94 07/27/2022     07/27/2022     No results for input(s): TSH, FREET4 in the last 168 hours.  Lab Results   Component Value Date    HGBA1C 6.2 (H) 07/15/2022       Nutritional status:   Body mass index is 24.39 kg/m².  Lab Results   Component Value Date    ALBUMIN 3.8 07/27/2022    ALBUMIN 3.8 07/26/2022    ALBUMIN 2.6 (L) 07/25/2022     No results found for: PREALBUMIN    Estimated Creatinine Clearance: 138.6 mL/min (based on SCr of 0.6 mg/dL).    Accu-Checks  Recent Labs     07/26/22  0605 07/26/22  0727 07/26/22  0910 07/26/22  1101 07/26/22  1955 07/26/22  2217 07/27/22  0213 07/27/22  0400 07/27/22  0806 07/27/22  1101   POCTGLUCOSE 180* 192* 196* 166* 138* 149* 108 128* 96 125*       Current Medications and/or Treatments Impacting Glycemic Control  Immunotherapy:    Immunosuppressants       None          Steroids:   Hormones (From admission, onward)                Start     Stop Route Frequency Ordered    07/15/22 0537  melatonin tablet 6 mg         -- Oral Nightly PRN 07/15/22 0438          Pressors:    Autonomic Drugs (From admission, onward)                None          Hyperglycemia/Diabetes Medications:   Antihyperglycemics (From admission, onward)                Start     Stop Route Frequency Ordered    07/26/22 1745  insulin regular in 0.9 % NaCl 100 unit/100 mL (1 unit/mL) infusion        Question:  Enter initial dose (Units/hr):  Answer:  0.8    -- IV Continuous 07/26/22 1638    07/26/22 1737  insulin aspart U-100 pen 0-5 Units         -- SubQ As needed (PRN) 07/26/22 1638            ASSESSMENT and PLAN    * ACS (acute coronary syndrome)  Managed per primary team  Avoid hypoglycemia        Transient hyperglycemia post procedure  BG goal 140 -180     - Discontinue IV insulin. Patient has fallen off of IV insulin infusion  parameters overnight.    - Start Low Dose SQ Insulin Correction Scale.    ** Please call Endocrine for any BG related issues **  ** Please notify Endocrine for any change and/or advance in diet**    Lab Results   Component Value Date    HGBA1C 6.2 (H) 07/15/2022       Discharge Planning:   TBD. Please notify endocrinology prior to discharge.             Dyslipidemia  Managed per primary team  Condition may cause insulin resistance                Beni Laguerre NP  Endocrinology  [facility] - Surgical Intensive Care

## 2022-07-27 NOTE — PLAN OF CARE
Aox4. VSS on RA. Per [place], pacing @ 80bpm, tolerating well. Epi gtt weaned per orders, off @ 0300. Insulin off @ 0400. Lasix 20mg administered per orders. Pain controlled well with PRN medications. Lytes trended and replaced per MAR. Midsternal incision FIDELIA. Chest tube dressing, CDI 170cc/shift. Figueroa, 1200 cc/shift. Diet advanced to cardiac diet. Non blanchable redness to bilateral buttocks. Gangrene to toes, see woundcare orders. Foam dressings to sacrum. OOBTC this AM. Bed plugged in and working. Call light in reach. Answered all questions. See flowsheet for full assessment.

## 2022-07-27 NOTE — PROGRESS NOTES
[facility] - Surgical Intensive Care  Critical Care - Surgery  Progress Note    Patient Name: Yo Pink  MRN: [id number]  Admission Date: 7/15/2022  Hospital Length of Stay: 10 days  Code Status: Full Code  Attending Provider: Kenton Wynn MD  Primary Care Provider: [facility]   Principal Problem: ACS (acute coronary syndrome)    Subjective:     Hospital/ICU Course:  No notes on file    Interval History/Significant Events: NAEON. Epi weaned off at 0300. Remains on Milrinone 0.25.     Follow-up For: Procedure(s) (LRB):  CORONARY ARTERY BYPASS GRAFT (CABG) X 3 (N/A)  SURGICAL PROCUREMENT, VEIN, ENDOSCOPIC (Left)    Post-Operative Day: 2 Days Post-Op    Objective:     Vital Signs (Most Recent):  Temp: 98.4 °F (36.9 °C) (07/27/22 0715)  Pulse: 80 (07/27/22 0830)  Resp: 18 (07/27/22 0830)  BP: 116/62 (07/27/22 0800)  SpO2: 97 % (07/27/22 0830) Vital Signs (24h Range):  Temp:  [98 °F (36.7 °C)-99.1 °F (37.3 °C)] 98.4 °F (36.9 °C)  Pulse:  [] 80  Resp:  [17-38] 18  SpO2:  [94 %-100 %] 97 %  BP: (113-139)/(62-73) 116/62  Arterial Line BP: ()/(52-83) 124/54     Weight: 77.1 kg (169 lb 15.6 oz)  Body mass index is 24.39 kg/m².      Intake/Output Summary (Last 24 hours) at 7/27/2022 0932  Last data filed at 7/27/2022 0800  Gross per 24 hour   Intake 1179.72 ml   Output 2075 ml   Net -895.28 ml       Physical Exam  Constitutional:       General: He is not in acute distress.     Appearance: He is not ill-appearing.   HENT:      Head: Normocephalic.   Eyes:      General: No scleral icterus.     Extraocular Movements: Extraocular movements intact.      Conjunctiva/sclera: Conjunctivae normal.   Neck:      Comments: R IJ CVC   Cardiovascular:      Rate and Rhythm: Tachycardia present.      Comments: V and A wires; Atrial pacing at 100  2 meds, bilat pleural aniyah drains  Midline sternotomy incision with intact dressing    Pulmonary:      Effort: Pulmonary effort is normal. No respiratory distress.       Comments: On RA  Abdominal:      General: There is no distension.      Palpations: Abdomen is soft.   Musculoskeletal:      Right lower leg: No edema.      Comments: L radial a line  LLE Ace Wrap   Neurological:      General: No focal deficit present.      Mental Status: He is alert and oriented to person, place, and time.       Vents:  Vent Mode: Spont (07/25/22 2247)  Ventilator Initiated: Yes (07/25/22 1725)  Set Rate: 18 BPM (07/25/22 2207)  Vt Set: 480 mL (07/25/22 2207)  Pressure Support: 5 cmH20 (07/25/22 2247)  PEEP/CPAP: 5 cmH20 (07/25/22 2247)  Oxygen Concentration (%): 40 (07/25/22 2247)  Peak Airway Pressure: 11 cmH2O (07/25/22 2247)  Plateau Pressure: 0 cmH20 (07/25/22 2247)  Total Ve: 10.4 mL (07/25/22 2247)  Negative Inspiratory Force (cm H2O): -24 (07/25/22 2257)  F/VT Ratio<105 (RSBI): (!) 46.91 (07/25/22 2247)    Lines/Drains/Airways       Central Venous Catheter Line  Duration             Percutaneous Central Line Insertion/Assessment - Double Lumen  07/25/22 1518 right internal jugular 1 day    Pulmonary Artery Catheter Assessment  07/25/22 1730 1 day              Drain  Duration                  Urethral Catheter 07/25/22 1255 Temperature probe;Non-latex;Straight-tip 14 Fr. 1 day         Y Chest Tube 1 and 2 07/25/22 1630 1 Mediastinal 19 Fr. 2 Pleural 19 Fr. 1 day              Arterial Line  Duration             Arterial Line 07/25/22 1253 Left Radial 1 day              Line  Duration                  Pacer Wires 07/25/22 1605 1 day              Peripheral Intravenous Line  Duration                  Peripheral IV - Single Lumen 07/24/22 0130 22 G Right Hand 3 days         Peripheral IV - Single Lumen 07/26/22 0930 Right Wrist 1 day                    Significant Labs:    CBC/Anemia Profile:  Recent Labs   Lab 07/25/22  1730 07/25/22  1730 07/26/22  0306 07/26/22  0442 07/26/22  0729 07/27/22  0303   WBC 22.54*  --  10.15  --   --  9.09   HGB 11.8*  --  9.3*  --   --  9.2*   HCT 34.0*   < >  26.9* 23* 28* 27.3*     --  219  --   --  169   MCV 90  --  94  --   --  94   RDW 13.0  --  13.3  --   --  13.2    < > = values in this interval not displayed.        Chemistries:  Recent Labs   Lab 07/25/22  1730 07/26/22  0306 07/27/22  0303    137 130*   K 3.3*  3.3* 3.7 3.5    106 98   CO2 23 21* 21*   BUN 13 12 8   CREATININE 0.8 0.7 0.6   CALCIUM 8.2* 8.2* 8.5*   ALBUMIN 2.6* 3.8 3.8   PROT 5.1* 5.5* 6.1   BILITOT 0.7 0.6 1.0   ALKPHOS 68 47* 54*   ALT 13 8* 9*   AST 23 26 30   MG 2.8* 2.0 1.8   PHOS 2.9 2.0* 2.4*       All pertinent labs within the past 24 hours have been reviewed.    Significant Imaging:  I have reviewed all pertinent imaging results/findings within the past 24 hours.    Assessment/Plan:     * ACS (acute coronary syndrome)  Yo Pink is a 57 y.o. male with medical problems including PVD who was admitted to the [facility] [place] worsening PVD symptoms who developed dyspnea and diaphoresis and was found to have an NSTEMI. Coronary angiogram demonstrated  of the prox LAD and mid RCA. [place] [facility] [place]. He is now s/p CABGx3 on 7/25.      Neuro/Psych:   -- Sedation: Not indicated   -- Pain: PRN Oxy + Dilaudid  -- Scheduled Tylenol             Cards:   -- S/P CABG x 3 on 7/25/22  -- HDS on milrinone 0.25  -- Atrial and Ventricular pacing wires. Discontinue pacing  -- MAP > 65, Syst < 140  -- Cleviprex PRN  -- DAPT, LVX; start nifedipine today      Pulm:   -- Goal O2 sat > 90%  -- ABG Q3H  -- Mediastinal chest tubes x2 and pleural chest tube x2 to suction      Renal:  -- Keep aguero for strict I/O  -- Trend BUN/Cr   -- Lasix 20 IV BID       FEN / GI:   -- Replace lytes as needed  -- Nutrition: Cardiac diet, 1500 cc fluid restriction   -- GI ppx: Not indicated  -- Bowel reg: miralax  --2000mL 5% Albumin in first 12 hours post-op complete      ID:   -- Tm: afebrile; WBC stable  -- Vanessa-op ancef  -- Resume antibiotics in setting of dry  gangrene LLE      Heme/Onc:   -- H/H stable   -- Daily CBC  -- 250mL Cell saver given back  -- DAPT QD, LVX      Endo:   -- BG goal 140-180  -- Insulin gtt/SSI; endocrine consulted appreciate recommendations      PPx:   Feeding: Cardiac diet, 1500 cc fluid restriction   Analgesia/Sedation: Propofol / PRN Oxy + Dilaudid  Thromboembolic prevention: SCDs, lvx   HOB >30: Yes  Stress Ulcer ppx: Not indicated  Glucose control: Critical care goal 140-180 g/dl, ISS     Lines/Drains/Airway: CVC RIJ, Figueroa, Chest tubes x 4, atrial and ventricular pacing wires, L radial A-line      Dispo/Code Status/Palliative:   -- SICU / Full Code.         Obdulio Sheriff MD  Critical Care - Surgery  [facility] - [facility]

## 2022-07-27 NOTE — PROGRESS NOTES
07/27/22 1140   WOCN Assessment   WOCN Total Time (mins) 15   Visit Date 07/27/22   Visit Time 1140   Consult Type New   WOCN Speciality Wound   Intervention assessed;applied;chart review   Teaching on-going        Altered Skin Integrity 07/27/22 1145 Right medial Toe, first Blister(s) Purple or maroon localized area of discolored intact skin or non-intact skin or a blood-filled blister.   Date First Assessed/Time First Assessed: 07/27/22 1145   Altered Skin Integrity Present on Admission: yes  Side: Right  Orientation: medial  Location: Toe, first  Primary Wound Type: Blister(s)  Description of Altered Skin Integrity: Purple or maroon ...   Wound Image    Description of Altered Skin Integrity Purple or maroon localized area of discolored intact skin or non-intact skin or a blood-filled blister.   Dressing Appearance Open to air   Drainage Amount None   Periwound Area Ecchymotic   Wound Edges Irregular   Wound Length (cm) 0.5 cm   Wound Width (cm) 1 cm   Wound Depth (cm) 0 cm   Wound Volume (cm^3) 0 cm^3   Wound Surface Area (cm^2) 0.5 cm^2   Care Cleansed with:;Sterile normal saline        Wound 07/15/22 2000 Diabetic Ulcer Left Toe, third   Date First Assessed/Time First Assessed: 07/15/22 2000   Pre-existing: Yes  Primary Wound Type: Diabetic Ulcer  Side: Left  Location: Toe, third   Wound Image     Dressing Appearance Open to air   Drainage Amount None   Appearance Black   Black (%), Wound Tissue Color 100 %   Wound Length (cm) 7 cm   Wound Width (cm) 4 cm   Wound Depth (cm) 0 cm   Wound Volume (cm^3) 0 cm^3   Wound Surface Area (cm^2) 28 cm^2   Care Povidone iodine        Wound 07/18/22 0715 Other (comment) Right plantar Toe, fifth   Date First Assessed/Time First Assessed: 07/18/22 0715   Pre-existing: Yes  Primary Wound Type: (c) Other (comment)  Side: (c) Right  Orientation: plantar  Location: Toe, fifth   Wound Image    Dressing Appearance Open to air   Drainage Amount None   Appearance  Black  (posterior)   Black (%), Wound Tissue Color 25 %   Wound Length (cm) 2 cm   Wound Width (cm) 3 cm   Wound Depth (cm) 0 cm   Wound Volume (cm^3) 0 cm^3   Wound Surface Area (cm^2) 6 cm^2   Care Cleansed with:;Sterile normal saline   Wound consult performed.  No new recommendations.  Orders already placed per MD.  Will sign off. Please re consult if needed.

## 2022-07-27 NOTE — SUBJECTIVE & OBJECTIVE
Interval History/Significant Events: NAEON. Epi weaned off at 0300. Remains on Milrinone 0.25.     Follow-up For: Procedure(s) (LRB):  CORONARY ARTERY BYPASS GRAFT (CABG) X 3 (N/A)  SURGICAL PROCUREMENT, VEIN, ENDOSCOPIC (Left)    Post-Operative Day: 2 Days Post-Op    Objective:     Vital Signs (Most Recent):  Temp: 98.4 °F (36.9 °C) (07/27/22 0715)  Pulse: 80 (07/27/22 0830)  Resp: 18 (07/27/22 0830)  BP: 116/62 (07/27/22 0800)  SpO2: 97 % (07/27/22 0830) Vital Signs (24h Range):  Temp:  [98 °F (36.7 °C)-99.1 °F (37.3 °C)] 98.4 °F (36.9 °C)  Pulse:  [] 80  Resp:  [17-38] 18  SpO2:  [94 %-100 %] 97 %  BP: (113-139)/(62-73) 116/62  Arterial Line BP: ()/(52-83) 124/54     Weight: 77.1 kg (169 lb 15.6 oz)  Body mass index is 24.39 kg/m².      Intake/Output Summary (Last 24 hours) at 7/27/2022 0932  Last data filed at 7/27/2022 0800  Gross per 24 hour   Intake 1179.72 ml   Output 2075 ml   Net -895.28 ml       Physical Exam  Constitutional:       General: He is not in acute distress.     Appearance: He is not ill-appearing.   HENT:      Head: Normocephalic.   Eyes:      General: No scleral icterus.     Extraocular Movements: Extraocular movements intact.      Conjunctiva/sclera: Conjunctivae normal.   Neck:      Comments: R IJ CVC   Cardiovascular:      Rate and Rhythm: Tachycardia present.      Comments: V and A wires; Atrial pacing at 100  2 meds, bilat pleural aniyah drains  Midline sternotomy incision with intact dressing    Pulmonary:      Effort: Pulmonary effort is normal. No respiratory distress.      Comments: On RA  Abdominal:      General: There is no distension.      Palpations: Abdomen is soft.   Musculoskeletal:      Right lower leg: No edema.      Comments: L radial a line  LLE Ace Wrap   Neurological:      General: No focal deficit present.      Mental Status: He is alert and oriented to person, place, and time.       Vents:  Vent Mode: Spont (07/25/22 2247)  Ventilator Initiated: Yes  (07/25/22 1725)  Set Rate: 18 BPM (07/25/22 2207)  Vt Set: 480 mL (07/25/22 2207)  Pressure Support: 5 cmH20 (07/25/22 2247)  PEEP/CPAP: 5 cmH20 (07/25/22 2247)  Oxygen Concentration (%): 40 (07/25/22 2247)  Peak Airway Pressure: 11 cmH2O (07/25/22 2247)  Plateau Pressure: 0 cmH20 (07/25/22 2247)  Total Ve: 10.4 mL (07/25/22 2247)  Negative Inspiratory Force (cm H2O): -24 (07/25/22 2257)  F/VT Ratio<105 (RSBI): (!) 46.91 (07/25/22 2247)    Lines/Drains/Airways       Central Venous Catheter Line  Duration             Percutaneous Central Line Insertion/Assessment - Double Lumen  07/25/22 1518 right internal jugular 1 day    Pulmonary Artery Catheter Assessment  07/25/22 1730 1 day              Drain  Duration                  Urethral Catheter 07/25/22 1255 Temperature probe;Non-latex;Straight-tip 14 Fr. 1 day         Y Chest Tube 1 and 2 07/25/22 1630 1 Mediastinal 19 Fr. 2 Pleural 19 Fr. 1 day              Arterial Line  Duration             Arterial Line 07/25/22 1253 Left Radial 1 day              Line  Duration                  Pacer Wires 07/25/22 1605 1 day              Peripheral Intravenous Line  Duration                  Peripheral IV - Single Lumen 07/24/22 0130 22 G Right Hand 3 days         Peripheral IV - Single Lumen 07/26/22 0930 Right Wrist 1 day                    Significant Labs:    CBC/Anemia Profile:  Recent Labs   Lab 07/25/22  1730 07/25/22  1730 07/26/22  0306 07/26/22  0442 07/26/22  0729 07/27/22  0303   WBC 22.54*  --  10.15  --   --  9.09   HGB 11.8*  --  9.3*  --   --  9.2*   HCT 34.0*   < > 26.9* 23* 28* 27.3*     --  219  --   --  169   MCV 90  --  94  --   --  94   RDW 13.0  --  13.3  --   --  13.2    < > = values in this interval not displayed.        Chemistries:  Recent Labs   Lab 07/25/22  1730 07/26/22  0306 07/27/22  0303    137 130*   K 3.3*  3.3* 3.7 3.5    106 98   CO2 23 21* 21*   BUN 13 12 8   CREATININE 0.8 0.7 0.6   CALCIUM 8.2* 8.2* 8.5*   ALBUMIN  2.6* 3.8 3.8   PROT 5.1* 5.5* 6.1   BILITOT 0.7 0.6 1.0   ALKPHOS 68 47* 54*   ALT 13 8* 9*   AST 23 26 30   MG 2.8* 2.0 1.8   PHOS 2.9 2.0* 2.4*       All pertinent labs within the past 24 hours have been reviewed.    Significant Imaging:  I have reviewed all pertinent imaging results/findings within the past 24 hours.

## 2022-07-27 NOTE — PT/OT/SLP PROGRESS
Occupational Therapy   Treatment    Name: Yo Pink  MRN: [id number]  Admitting Diagnosis:  ACS (acute coronary syndrome)  2 Days Post-Op    Recommendations:     Discharge Recommendations: home health OT  Discharge Equipment Recommendations:  none  Barriers to discharge:       Assessment:     Yo Pink is a 57 y.o. male with a medical diagnosis of ACS (acute coronary syndrome).  He presents s/p CABG x 3 on 7/25. Declined walking this session as he states that he mostly pivots to a w/c at home due to necrotic left 3rd toe which needs to be amputated. Performance deficits affecting function are weakness, impaired endurance, impaired self care skills, impaired functional mobility, gait instability, impaired balance, decreased coordination, decreased safety awareness.     Rehab Prognosis:  Good; patient would benefit from acute skilled OT services to address these deficits and reach maximum level of function.       Plan:     Patient to be seen 4 x/week to address the above listed problems via self-care/home management, therapeutic activities, therapeutic exercises  · Plan of Care Expires: 08/25/22  · Plan of Care Reviewed with: patient    Subjective     Pain/Comfort:  · Pain Rating 1: 0/10    Objective:     Communicated with: rn prior to session.  Patient found up in chair with chest tube, pulse ox (continuous), telemetry, peripheral IV, arterial line (R[place] Silvestre) upon OT entry to room.    General Precautions: Standard, sternal, fall   Orthopedic Precautions:N/A   Braces:    Respiratory Status: Room air     Occupational Performance:     Bed Mobility:    · Up in chair.    Functional Mobility/Transfers:  · Patient completed Sit <> Stand Transfer with minimum assistance  with  no assistive device   · Functional Mobility: Declined.    Activities of Daily Living:  · Grooming: contact guard assistance in standing.    [facility] 6 Click ADL: 21    Treatment & Education:  Discussed OT POC.  Reviewed sternal  precautions.    Patient left up in chair with all lines intact and call button in reachEducation:      GOALS:   Multidisciplinary Problems     Occupational Therapy Goals        Problem: Occupational Therapy    Goal Priority Disciplines Outcome Interventions   Occupational Therapy Goal     OT, PT/OT Ongoing, Progressing    Description: Goals to be met by: 8/9/22     Patient will increase functional independence with ADLs by performing:    Feeding with [place].  UE Dressing with [place].  LE Dressing with Stand-by Assistance.  Grooming while standing at sink with Stand-by Assistance.  Toileting from toilet with Stand-by Assistance for hygiene and clothing management.   Toilet transfer to toilet with Stand-by Assistance.                     Time Tracking:     OT Date of Treatment: 07/27/22  OT Start Time: 0855  OT Stop Time: 0911  OT Total Time (min): 16 min    Billable Minutes:Self Care/Home Management 16    OT/FIDELIA: OT          7/27/2022

## 2022-07-27 NOTE — PLAN OF CARE
Pt VSS. Afebrile. Cleviprex & Milrinone gtts. SBP goal < 140. Pacer wires secured and connected to pacemaker- atrial paced w/ back up rate @ 80. Accu checks ACHS & 0200. Room air. Erskine d/c. Figueroa UOP 1750 cc's/shift. Chest Tubes- 70 cc's/shift of serosanguinous drainage. Mid sternal incision w/ dermabond. Necrotic toes painted w/ betadine. OOBTC majority of day. Plan to wean gtts off as tolerated, control pain, and PT/OT. POC reviewed w/ pt @ bedside. All questions encouraged and answered. Emotional support provided.

## 2022-07-27 NOTE — PLAN OF CARE
Problem: Physical Therapy  Goal: Physical Therapy Goal  Description: Goals to be met by: 2022     Patient will increase functional independence with mobility by performin. Supine to sit with independence -not met  2. Sit to supine with independence -not met  3. Sit to stand transfer with independence -not met  4. Bed to chair transfer with modified independence using platform rolling walker if needed -not met  5. Gait  x 200 feet with modified independence using platform rolling walker if needed -not met  6. Ascend/Descend 6 inch curb step with modified independence using platform rolling walker if needed -not met  7. Lower extremity exercise program x10 reps per handout, with independence -not met  Outcome: Ongoing, Progressing   Goals remain appropriate. 2022

## 2022-07-27 NOTE — ASSESSMENT & PLAN NOTE
BG goal 140 -180     - Discontinue IV insulin. Patient has fallen off of IV insulin infusion parameters overnight.    - Start Low Dose SQ Insulin Correction Scale.    ** Please call Endocrine for any BG related issues **  ** Please notify Endocrine for any change and/or advance in diet**    Lab Results   Component Value Date    HGBA1C 6.2 (H) 07/15/2022       Discharge Planning:   TBD. Please notify endocrinology prior to discharge.            Pt has known covid and BIB daughter with report of extreme fatigue, dyspnea with any exertion, and poor po intake.  Per daughter, pt hx of dementia at baseline. Normal vision: sees adequately in most situations; can see medication labels, newsprint

## 2022-07-28 LAB
ALBUMIN SERPL BCP-MCNC: 3.1 G/DL (ref 3.5–5.2)
ALP SERPL-CCNC: 67 U/L (ref 55–135)
ALT SERPL W/O P-5'-P-CCNC: 11 U/L (ref 10–44)
ANION GAP SERPL CALC-SCNC: 7 MMOL/L (ref 8–16)
AST SERPL-CCNC: 23 U/L (ref 10–40)
BASOPHILS # BLD AUTO: 0.04 K/UL (ref 0–0.2)
BASOPHILS NFR BLD: 0.5 % (ref 0–1.9)
BILIRUB SERPL-MCNC: 0.9 MG/DL (ref 0.1–1)
BUN SERPL-MCNC: 11 MG/DL (ref 6–20)
CALCIUM SERPL-MCNC: 8.5 MG/DL (ref 8.7–10.5)
CHLORIDE SERPL-SCNC: 99 MMOL/L (ref 95–110)
CO2 SERPL-SCNC: 27 MMOL/L (ref 23–29)
CREAT SERPL-MCNC: 0.6 MG/DL (ref 0.5–1.4)
DIFFERENTIAL METHOD: ABNORMAL
EOSINOPHIL # BLD AUTO: 0.2 K/UL (ref 0–0.5)
EOSINOPHIL NFR BLD: 2.2 % (ref 0–8)
ERYTHROCYTE [DISTWIDTH] IN BLOOD BY AUTOMATED COUNT: 12.9 % (ref 11.5–14.5)
EST. GFR  (AFRICAN AMERICAN): >60 ML/MIN/1.73 M^2
EST. GFR  (NON AFRICAN AMERICAN): >60 ML/MIN/1.73 M^2
GLUCOSE SERPL-MCNC: 106 MG/DL (ref 70–110)
HCT VFR BLD AUTO: 25.4 % (ref 40–54)
HGB BLD-MCNC: 8.7 G/DL (ref 14–18)
IMM GRANULOCYTES # BLD AUTO: 0.03 K/UL (ref 0–0.04)
IMM GRANULOCYTES NFR BLD AUTO: 0.3 % (ref 0–0.5)
LYMPHOCYTES # BLD AUTO: 1.6 K/UL (ref 1–4.8)
LYMPHOCYTES NFR BLD: 18 % (ref 18–48)
MAGNESIUM SERPL-MCNC: 1.7 MG/DL (ref 1.6–2.6)
MAGNESIUM SERPL-MCNC: 1.9 MG/DL (ref 1.6–2.6)
MCH RBC QN AUTO: 31.3 PG (ref 27–31)
MCHC RBC AUTO-ENTMCNC: 34.3 G/DL (ref 32–36)
MCV RBC AUTO: 91 FL (ref 82–98)
MONOCYTES # BLD AUTO: 0.9 K/UL (ref 0.3–1)
MONOCYTES NFR BLD: 9.8 % (ref 4–15)
NEUTROPHILS # BLD AUTO: 6.1 K/UL (ref 1.8–7.7)
NEUTROPHILS NFR BLD: 69.2 % (ref 38–73)
NRBC BLD-RTO: 0 /100 WBC
PHOSPHATE SERPL-MCNC: 2.6 MG/DL (ref 2.7–4.5)
PHOSPHATE SERPL-MCNC: 2.7 MG/DL (ref 2.7–4.5)
PLATELET # BLD AUTO: 165 K/UL (ref 150–450)
PMV BLD AUTO: 10.5 FL (ref 9.2–12.9)
POCT GLUCOSE: 108 MG/DL (ref 70–110)
POCT GLUCOSE: 120 MG/DL (ref 70–110)
POCT GLUCOSE: 126 MG/DL (ref 70–110)
POCT GLUCOSE: 150 MG/DL (ref 70–110)
POTASSIUM SERPL-SCNC: 3.4 MMOL/L (ref 3.5–5.1)
POTASSIUM SERPL-SCNC: 3.4 MMOL/L (ref 3.5–5.1)
PROT SERPL-MCNC: 5.8 G/DL (ref 6–8.4)
RBC # BLD AUTO: 2.78 M/UL (ref 4.6–6.2)
SODIUM SERPL-SCNC: 133 MMOL/L (ref 136–145)
WBC # BLD AUTO: 8.78 K/UL (ref 3.9–12.7)

## 2022-07-28 PROCEDURE — 25000003 PHARM REV CODE 250

## 2022-07-28 PROCEDURE — 94761 N-INVAS EAR/PLS OXIMETRY MLT: CPT

## 2022-07-28 PROCEDURE — 63600175 PHARM REV CODE 636 W HCPCS

## 2022-07-28 PROCEDURE — 25000003 PHARM REV CODE 250: Performed by: THORACIC SURGERY (CARDIOTHORACIC VASCULAR SURGERY)

## 2022-07-28 PROCEDURE — 84100 ASSAY OF PHOSPHORUS: CPT

## 2022-07-28 PROCEDURE — 99232 SBSQ HOSP IP/OBS MODERATE 35: CPT | Mod: ,,, | Performed by: ANESTHESIOLOGY

## 2022-07-28 PROCEDURE — 99232 PR SUBSEQUENT HOSPITAL CARE,LEVL II: ICD-10-PCS | Mod: ,,, | Performed by: ANESTHESIOLOGY

## 2022-07-28 PROCEDURE — 85025 COMPLETE CBC W/AUTO DIFF WBC: CPT

## 2022-07-28 PROCEDURE — 63600175 PHARM REV CODE 636 W HCPCS: Performed by: THORACIC SURGERY (CARDIOTHORACIC VASCULAR SURGERY)

## 2022-07-28 PROCEDURE — 83735 ASSAY OF MAGNESIUM: CPT | Mod: 91

## 2022-07-28 PROCEDURE — 80053 COMPREHEN METABOLIC PANEL: CPT

## 2022-07-28 PROCEDURE — 20600001 HC STEP DOWN PRIVATE ROOM

## 2022-07-28 PROCEDURE — 84132 ASSAY OF SERUM POTASSIUM: CPT

## 2022-07-28 RX ORDER — HYDROMORPHONE HYDROCHLORIDE 1 MG/ML
1 INJECTION, SOLUTION INTRAMUSCULAR; INTRAVENOUS; SUBCUTANEOUS EVERY 4 HOURS PRN
Status: DISCONTINUED | OUTPATIENT
Start: 2022-07-28 | End: 2022-07-30 | Stop reason: HOSPADM

## 2022-07-28 RX ORDER — POTASSIUM CHLORIDE 20 MEQ/1
20 TABLET, EXTENDED RELEASE ORAL 2 TIMES DAILY
Status: DISCONTINUED | OUTPATIENT
Start: 2022-07-28 | End: 2022-07-30 | Stop reason: HOSPADM

## 2022-07-28 RX ORDER — METOPROLOL SUCCINATE 25 MG/1
25 TABLET, EXTENDED RELEASE ORAL DAILY
Status: DISCONTINUED | OUTPATIENT
Start: 2022-07-28 | End: 2022-07-29

## 2022-07-28 RX ORDER — GABAPENTIN 100 MG/1
200 CAPSULE ORAL 3 TIMES DAILY
Status: DISCONTINUED | OUTPATIENT
Start: 2022-07-28 | End: 2022-07-30 | Stop reason: HOSPADM

## 2022-07-28 RX ADMIN — METOPROLOL SUCCINATE 25 MG: 25 TABLET, EXTENDED RELEASE ORAL at 05:07

## 2022-07-28 RX ADMIN — ACETAMINOPHEN 1000 MG: 500 TABLET ORAL at 05:07

## 2022-07-28 RX ADMIN — FUROSEMIDE 20 MG: 10 INJECTION, SOLUTION INTRAMUSCULAR; INTRAVENOUS at 09:07

## 2022-07-28 RX ADMIN — MUPIROCIN: 20 OINTMENT TOPICAL at 09:07

## 2022-07-28 RX ADMIN — MILRINONE LACTATE IN DEXTROSE 0.12 MCG/KG/MIN: 200 INJECTION, SOLUTION INTRAVENOUS at 08:07

## 2022-07-28 RX ADMIN — MAGNESIUM SULFATE HEPTAHYDRATE 2 G: 40 INJECTION, SOLUTION INTRAVENOUS at 12:07

## 2022-07-28 RX ADMIN — CLOPIDOGREL 75 MG: 75 TABLET, FILM COATED ORAL at 09:07

## 2022-07-28 RX ADMIN — ACETAMINOPHEN 1000 MG: 500 TABLET ORAL at 01:07

## 2022-07-28 RX ADMIN — POTASSIUM & SODIUM PHOSPHATES POWDER PACK 280-160-250 MG 2 PACKET: 280-160-250 PACK at 01:07

## 2022-07-28 RX ADMIN — POTASSIUM BICARBONATE 35 MEQ: 391 TABLET, EFFERVESCENT ORAL at 05:07

## 2022-07-28 RX ADMIN — ASPIRIN 81 MG: 81 TABLET, COATED ORAL at 09:07

## 2022-07-28 RX ADMIN — ENOXAPARIN SODIUM 40 MG: 100 INJECTION SUBCUTANEOUS at 05:07

## 2022-07-28 RX ADMIN — ACETAMINOPHEN 1000 MG: 500 TABLET ORAL at 09:07

## 2022-07-28 RX ADMIN — GABAPENTIN 200 MG: 100 CAPSULE ORAL at 09:07

## 2022-07-28 RX ADMIN — POTASSIUM BICARBONATE 35 MEQ: 391 TABLET, EFFERVESCENT ORAL at 12:07

## 2022-07-28 RX ADMIN — DOCUSATE SODIUM 100 MG: 100 CAPSULE ORAL at 09:07

## 2022-07-28 RX ADMIN — SACUBITRIL AND VALSARTAN 1 TABLET: 24; 26 TABLET, FILM COATED ORAL at 09:07

## 2022-07-28 RX ADMIN — AMOXICILLIN AND CLAVULANATE POTASSIUM 1 TABLET: 875; 125 TABLET, FILM COATED ORAL at 09:07

## 2022-07-28 RX ADMIN — POTASSIUM CHLORIDE 20 MEQ: 1500 TABLET, EXTENDED RELEASE ORAL at 09:07

## 2022-07-28 RX ADMIN — POTASSIUM & SODIUM PHOSPHATES POWDER PACK 280-160-250 MG 2 PACKET: 280-160-250 PACK at 09:07

## 2022-07-28 RX ADMIN — GABAPENTIN 200 MG: 100 CAPSULE ORAL at 02:07

## 2022-07-28 RX ADMIN — FUROSEMIDE 20 MG: 10 INJECTION, SOLUTION INTRAMUSCULAR; INTRAVENOUS at 05:07

## 2022-07-28 RX ADMIN — POTASSIUM & SODIUM PHOSPHATES POWDER PACK 280-160-250 MG 2 PACKET: 280-160-250 PACK at 12:07

## 2022-07-28 RX ADMIN — POTASSIUM & SODIUM PHOSPHATES POWDER PACK 280-160-250 MG 2 PACKET: 280-160-250 PACK at 05:07

## 2022-07-28 RX ADMIN — POTASSIUM BICARBONATE 35 MEQ: 391 TABLET, EFFERVESCENT ORAL at 06:07

## 2022-07-28 RX ADMIN — ATORVASTATIN CALCIUM 40 MG: 20 TABLET, FILM COATED ORAL at 09:07

## 2022-07-28 NOTE — PLAN OF CARE
"SICU PLAN OF CARE NOTE    Dx: ACS (acute coronary syndrome)    Goals of Care:  MAP >65; SBP <140    Vital Signs:  /66   Pulse 87   Temp 98.7 °F (37.1 °C) (Oral)   Resp 19   Ht 5' 10" (1.778 m)   Wt 77 kg (169 lb 12.1 oz)   SpO2 95%   BMI 24.36 kg/m²     Cardiac:  NSR; Atrial wires connected to pacer with 80 bpm backup rate not being paced at this time.     Resp:  Room Air     Neuro:  AAO x4, Follows Commands, and Moves All Extremities    Gtts:  Milrinone 0.125 mcg/kg/min    Urine Output:  Urinary Catheter 1295 cc/shift; Figueroa removed 0545     Drains:  Chest Tube, total output 20 cc /  shift    Diet:  Cardiac Diet; 1500 cc FR      Labs/Accuchecks:  AC/HS. Labs reviewed. Potassium and phos replaced this AM     Skin:  All skin remains free from new injury.  Patient turned Q2, ICU bed working correctly. Wound care provided per order     Shift Events:  Figueroa removed this AM. Awaiting post void. Cleviprex titrated down to off. Patient tolerating while maintaining SBP <140. Milrinone decreased per order to 0.125 mcg/kg/min. Stepdown orders recieved.  See flowsheet for further assessment/details.  Family updated on current condition/plan of care, questions answered, and emotional support provided.   MD updated on current condition, vitals, labs, and gtts.    "

## 2022-07-28 NOTE — NURSING TRANSFER
Nursing Transfer Note      7/28/2022     Reason patient is being transferred: No longer in need of intensive care    Transfer To: [facility], Room 322    Transfer via wheelchair    Transfer with cardiac monitoring    Transported by RN    Medicines sent: Milrinone gtt, Miralax, Phos-NaK packets, Mupirocin    Any special needs or follow-up needed: Hook up pt leads to tele box    Chart send with patient: Yes    Notified: friend    Patient reassessed at: 07/28/22, 1500 (date, time)    Upon arrival to floor: cardiac monitor applied, patient oriented to room, call bell in reach and bed in lowest position

## 2022-07-28 NOTE — SUBJECTIVE & OBJECTIVE
Interval History/Significant Events: NAEON. Reports ongoing LLE pain, denies pain associated with sternotomy. Tolerating diet.     Follow-up For: Procedure(s) (LRB):  CORONARY ARTERY BYPASS GRAFT (CABG) X 3 (N/A)  SURGICAL PROCUREMENT, VEIN, ENDOSCOPIC (Left)    Post-Operative Day: 3 Days Post-Op    Objective:     Vital Signs (Most Recent):  Temp: 99 °F (37.2 °C) (07/28/22 0715)  Pulse: 81 (07/28/22 0730)  Resp: 16 (07/28/22 0730)  BP: 112/63 (07/28/22 0700)  SpO2: 97 % (07/28/22 0730) Vital Signs (24h Range):  Temp:  [98.2 °F (36.8 °C)-99 °F (37.2 °C)] 99 °F (37.2 °C)  Pulse:  [80-93] 81  Resp:  [14-27] 16  SpO2:  [93 %-99 %] 97 %  BP: (106-129)/(58-80) 112/63  Arterial Line BP: (106-153)/(50-70) 131/55     Weight: 77 kg (169 lb 12.1 oz)  Body mass index is 24.36 kg/m².      Intake/Output Summary (Last 24 hours) at 7/28/2022 0818  Last data filed at 7/28/2022 0700  Gross per 24 hour   Intake 717.2 ml   Output 3115 ml   Net -2397.8 ml       Physical Exam  Constitutional:       General: He is not in acute distress.     Appearance: He is not ill-appearing.   HENT:      Head: Normocephalic.   Eyes:      General: No scleral icterus.     Extraocular Movements: Extraocular movements intact.      Conjunctiva/sclera: Conjunctivae normal.   Neck:      Comments: R IJ CVC   Cardiovascular:      Rate and Rhythm: Normal rate and regular rhythm.      Comments: V and A wires; Back up pacing  2 meds, bilat pleural aniyah drains  Midline sternotomy incision with intact dressing    Pulmonary:      Effort: Pulmonary effort is normal. No respiratory distress.      Comments: On RA  Abdominal:      General: There is no distension.      Palpations: Abdomen is soft.   Musculoskeletal:      Right lower leg: No edema.      Comments: L radial a line  LLE Ace Wrap   Neurological:      General: No focal deficit present.      Mental Status: He is alert and oriented to person, place, and time.       Vents:  Vent Mode: Spont (07/25/22  2247)  Ventilator Initiated: Yes (07/25/22 1725)  Set Rate: 18 BPM (07/25/22 2207)  Vt Set: 480 mL (07/25/22 2207)  Pressure Support: 5 cmH20 (07/25/22 2247)  PEEP/CPAP: 5 cmH20 (07/25/22 2247)  Oxygen Concentration (%): 40 (07/25/22 2247)  Peak Airway Pressure: 11 cmH2O (07/25/22 2247)  Plateau Pressure: 0 cmH20 (07/25/22 2247)  Total Ve: 10.4 mL (07/25/22 2247)  Negative Inspiratory Force (cm H2O): -24 (07/25/22 2257)  F/VT Ratio<105 (RSBI): (!) 46.91 (07/25/22 2247)    Lines/Drains/Airways       Central Venous Catheter Line  Duration             Percutaneous Central Line Insertion/Assessment - Double Lumen  07/25/22 1518 right internal jugular 2 days              Drain  Duration                  Y Chest Tube 1 and 2 07/25/22 1630 1 Mediastinal 19 Fr. 2 Pleural 19 Fr. 2 days              Arterial Line  Duration             Arterial Line 07/25/22 1253 Left Radial 2 days              Line  Duration                  Pacer Wires 07/25/22 1605 2 days              Peripheral Intravenous Line  Duration                  Peripheral IV - Single Lumen 07/24/22 0130 22 G Right Hand 4 days         Peripheral IV - Single Lumen 07/26/22 0930 Right Wrist 1 day                    Significant Labs:    CBC/Anemia Profile:  Recent Labs   Lab 07/27/22  0303 07/28/22  0341   WBC 9.09 8.78   HGB 9.2* 8.7*   HCT 27.3* 25.4*    165   MCV 94 91   RDW 13.2 12.9        Chemistries:  Recent Labs   Lab 07/27/22  0303 07/27/22  1152 07/28/22  0341   * 129* 133*   K 3.5 3.7 3.4*   CL 98 98 99   CO2 21* 23 27   BUN 8 9 11   CREATININE 0.6 0.6 0.6   CALCIUM 8.5* 8.4* 8.5*   ALBUMIN 3.8 3.6 3.1*   PROT 6.1 6.2 5.8*   BILITOT 1.0 1.1* 0.9   ALKPHOS 54* 59 67   ALT 9* 11 11   AST 30 29 23   MG 1.8  --  1.9   PHOS 2.4*  --  2.6*       All pertinent labs within the past 24 hours have been reviewed.    Significant Imaging:  I have reviewed all pertinent imaging results/findings within the past 24 hours.

## 2022-07-28 NOTE — ASSESSMENT & PLAN NOTE
Yo Pink is a 57 y.o. male with medical problems including PVD who was admitted to the [facility] for worsening PVD symptoms who developed dyspnea and diaphoresis and was found to have an NSTEMI. Coronary angiogram demonstrated  of the prox LAD and mid RCA. He was transferred to [facility] for [place] of CABG. He is now s/p CABGx3 on 7/25.      Neuro/Psych:   -- Sedation: Not indicated   -- Pain: PRN Oxy + Dilaudid  -- Scheduled Tylenol; add gabapentin today             Cards:   -- S/P CABG x 3 on 7/25/22  -- HDS on milrinone 0.25; continue today  -- Atrial and Ventricular pacing wires. Back up pacing; Remove today  -- MAP > 65, Syst < 140  -- Cleviprex PRN, Start Entresto will increase dose if needs cleviprex today  -- DAPT, LVX;   -- Start BB      Pulm:   -- Goal O2 sat > 90%  -- ABG Q3H  -- Mediastinal chest tubes x2 and pleural chest tube x2 to suction; to remove today      Renal:  -- Keep aguero for strict I/O  -- Trend BUN/Cr   -- Lasix 20 IV BID       FEN / GI:   -- Replace lytes as needed  -- Nutrition: Cardiac diet, 1500 cc fluid restriction   -- GI ppx: Not indicated  -- Bowel reg: miralax  --2000mL 5% Albumin in first 12 hours post-op complete      ID:   -- Tm: afebrile; WBC stable  -- Vanessa-op ancef  -- Resume antibiotics in setting of dry gangrene LLE      Heme/Onc:   -- H/H stable   -- Daily CBC  -- 250mL Cell saver given back  -- DAPT QD, LVX      Endo:   -- BG goal 140-180  -- Insulin gtt/SSI; endocrine consulted appreciate recommendations      PPx:   Feeding: Cardiac diet, 1500 cc fluid restriction   Analgesia/Sedation: Propofol / PRN Oxy + Dilaudid  Thromboembolic prevention: SCDs, lvx   HOB >30: Yes  Stress Ulcer ppx: Not indicated  Glucose control: Critical care goal 140-180 g/dl, ISS     Lines/Drains/Airway: CVC RIJ, Chest tubes x 4, atrial and ventricular pacing wires, L radial A-line      Dispo/Code Status/Palliative:   -- SD to floor / Full Code.

## 2022-07-28 NOTE — CARE UPDATE
BG goal 140-180    Patient remains in SICU. POD 3 s/p CABG. BG well controlled with no prn SQ insulin requirements overnight. Diet Cardiac Fluid - 1500mL      Plan:  -Continue Low Dose Correction Scale  -BG monitoring ac/hs      ** Please call Endocrine for any BG related issues **      Lab Results   Component Value Date    HGBA1C 6.2 (H) 07/15/2022

## 2022-07-28 NOTE — PROGRESS NOTES
[facility] - Surgical Intensive Care  Critical Care - Surgery  Progress Note    Patient Name: Yo Pink  MRN: [id number]  Admission Date: 7/15/2022  Hospital Length of Stay: 11 days  Code Status: Full Code  Attending Provider: Kenton Wynn MD  Primary Care Provider: [facility]   Principal Problem: ACS (acute coronary syndrome)    Subjective:     Hospital/ICU Course:  No notes on file    Interval History/Significant Events: NAEON. Reports ongoing LLE pain, denies pain associated with sternotomy. Tolerating diet.     Follow-up For: Procedure(s) (LRB):  CORONARY ARTERY BYPASS GRAFT (CABG) X 3 (N/A)  SURGICAL PROCUREMENT, VEIN, ENDOSCOPIC (Left)    Post-Operative Day: 3 Days Post-Op    Objective:     Vital Signs (Most Recent):  Temp: 99 °F (37.2 °C) (07/28/22 0715)  Pulse: 81 (07/28/22 0730)  Resp: 16 (07/28/22 0730)  BP: 112/63 (07/28/22 0700)  SpO2: 97 % (07/28/22 0730) Vital Signs (24h Range):  Temp:  [98.2 °F (36.8 °C)-99 °F (37.2 °C)] 99 °F (37.2 °C)  Pulse:  [80-93] 81  Resp:  [14-27] 16  SpO2:  [93 %-99 %] 97 %  BP: (106-129)/(58-80) 112/63  Arterial Line BP: (106-153)/(50-70) 131/55     Weight: 77 kg (169 lb 12.1 oz)  Body mass index is 24.36 kg/m².      Intake/Output Summary (Last 24 hours) at 7/28/2022 0818  Last data filed at 7/28/2022 0700  Gross per 24 hour   Intake 717.2 ml   Output 3115 ml   Net -2397.8 ml       Physical Exam  Constitutional:       General: He is not in acute distress.     Appearance: He is not ill-appearing.   HENT:      Head: Normocephalic.   Eyes:      General: No scleral icterus.     Extraocular Movements: Extraocular movements intact.      Conjunctiva/sclera: Conjunctivae normal.   Neck:      Comments: R IJ CVC   Cardiovascular:      Rate and Rhythm: Normal rate and regular rhythm.      Comments: V and A wires; Back up pacing  2 meds, bilat pleural aniyah drains  Midline sternotomy incision with intact dressing    Pulmonary:      Effort: Pulmonary effort is  normal. No respiratory distress.      Comments: On RA  Abdominal:      General: There is no distension.      Palpations: Abdomen is soft.   Musculoskeletal:      Right lower leg: No edema.      Comments: L radial a line  LLE Ace Wrap   Neurological:      General: No focal deficit present.      Mental Status: He is alert and oriented to person, place, and time.       Vents:  Vent Mode: Spont (07/25/22 2247)  Ventilator Initiated: Yes (07/25/22 1725)  Set Rate: 18 BPM (07/25/22 2207)  Vt Set: 480 mL (07/25/22 2207)  Pressure Support: 5 cmH20 (07/25/22 2247)  PEEP/CPAP: 5 cmH20 (07/25/22 2247)  Oxygen Concentration (%): 40 (07/25/22 2247)  Peak Airway Pressure: 11 cmH2O (07/25/22 2247)  Plateau Pressure: 0 cmH20 (07/25/22 2247)  Total Ve: 10.4 mL (07/25/22 2247)  Negative Inspiratory Force (cm H2O): -24 (07/25/22 2257)  F/VT Ratio<105 (RSBI): (!) 46.91 (07/25/22 2247)    Lines/Drains/Airways       Central Venous Catheter Line  Duration             Percutaneous Central Line Insertion/Assessment - Double Lumen  07/25/22 1518 right internal jugular 2 days              Drain  Duration                  Y Chest Tube 1 and 2 07/25/22 1630 1 Mediastinal 19 Fr. 2 Pleural 19 Fr. 2 days              Arterial Line  Duration             Arterial Line 07/25/22 1253 Left Radial 2 days              Line  Duration                  Pacer Wires 07/25/22 1605 2 days              Peripheral Intravenous Line  Duration                  Peripheral IV - Single Lumen 07/24/22 0130 22 G Right Hand 4 days         Peripheral IV - Single Lumen 07/26/22 0930 Right Wrist 1 day                    Significant Labs:    CBC/Anemia Profile:  Recent Labs   Lab 07/27/22  0303 07/28/22  0341   WBC 9.09 8.78   HGB 9.2* 8.7*   HCT 27.3* 25.4*    165   MCV 94 91   RDW 13.2 12.9        Chemistries:  Recent Labs   Lab 07/27/22  0303 07/27/22  1152 07/28/22  0341   * 129* 133*   K 3.5 3.7 3.4*   CL 98 98 99   CO2 21* 23 27   BUN 8 9 11   CREATININE  0.6 0.6 0.6   CALCIUM 8.5* 8.4* 8.5*   ALBUMIN 3.8 3.6 3.1*   PROT 6.1 6.2 5.8*   BILITOT 1.0 1.1* 0.9   ALKPHOS 54* 59 67   ALT 9* 11 11   AST 30 29 23   MG 1.8  --  1.9   PHOS 2.4*  --  2.6*       All pertinent labs within the past 24 hours have been reviewed.    Significant Imaging:  I have reviewed all pertinent imaging results/findings within the past 24 hours.    Assessment/Plan:     * ACS (acute coronary syndrome)  Yo Pink is a 57 y.o. male with medical problems including PVD who was admitted to the [facility] for worsening PVD symptoms who developed dyspnea and diaphoresis and was found to have an NSTEMI. Coronary angiogram demonstrated  of the prox LAD and mid RCA. He was transferred to [facility] for consideration of CABG. He is now s/p CABGx3 on 7/25.      Neuro/Psych:   -- Sedation: Not indicated   -- Pain: PRN Oxy + Dilaudid  -- Scheduled Tylenol; add gabapentin today             Cards:   -- S/P CABG x 3 on 7/25/22  -- HDS on milrinone 0.25; continue today  -- Atrial and Ventricular pacing wires. Back up pacing; Remove today  -- MAP > 65, Syst < 140  -- Cleviprex PRN, Start Entresto will increase dose if needs cleviprex today  -- DAPT, LVX;   -- Start BB      Pulm:   -- Goal O2 sat > 90%  -- ABG Q3H  -- Mediastinal chest tubes x2 and pleural chest tube x2 to suction; to remove today      Renal:  -- Keep aguero for strict I/O  -- Trend BUN/Cr   -- Lasix 20 IV BID       FEN / GI:   -- Replace lytes as needed  -- Nutrition: Cardiac diet, 1500 cc fluid restriction   -- GI ppx: Not indicated  -- Bowel reg: miralax  --2000mL 5% Albumin in first 12 hours post-op complete      ID:   -- Tm: afebrile; WBC stable  -- Vanessa-op ancef  -- Resume antibiotics in setting of dry gangrene LLE      Heme/Onc:   -- H/H stable   -- Daily CBC  -- 250mL Cell saver given back  -- DAPT QD, LVX      Endo:   -- BG goal 140-180  -- Insulin gtt/SSI; endocrine consulted appreciate recommendations      PPx:   Feeding: Cardiac  diet, 1500 cc fluid restriction   Analgesia/Sedation: Propofol / PRN Oxy + Dilaudid  Thromboembolic prevention: SCDs, lvx   HOB >30: Yes  Stress Ulcer ppx: Not indicated  Glucose control: Critical care goal 140-180 g/dl, ISS     Lines/Drains/Airway: CVC RIJ, Chest tubes x 4, atrial and ventricular pacing wires, L radial A-line      Dispo/Code Status/Palliative:   -- SD to floor / Full Code.           Obdulio Sheriff MD  Critical Care - Surgery  [facility] - Surgical Intensive Care

## 2022-07-29 PROBLEM — Z95.1 S/P CABG (CORONARY ARTERY BYPASS GRAFT): Status: ACTIVE | Noted: 2022-07-29

## 2022-07-29 LAB
ALBUMIN SERPL BCP-MCNC: 3.2 G/DL (ref 3.5–5.2)
ALP SERPL-CCNC: 80 U/L (ref 55–135)
ALT SERPL W/O P-5'-P-CCNC: 11 U/L (ref 10–44)
ANION GAP SERPL CALC-SCNC: 11 MMOL/L (ref 8–16)
AST SERPL-CCNC: 26 U/L (ref 10–40)
BASOPHILS # BLD AUTO: 0.06 K/UL (ref 0–0.2)
BASOPHILS NFR BLD: 0.6 % (ref 0–1.9)
BILIRUB SERPL-MCNC: 0.9 MG/DL (ref 0.1–1)
BLD PROD TYP BPU: NORMAL
BLD PROD TYP BPU: NORMAL
BLOOD UNIT EXPIRATION DATE: NORMAL
BLOOD UNIT EXPIRATION DATE: NORMAL
BLOOD UNIT TYPE CODE: 9500
BLOOD UNIT TYPE CODE: 9500
BLOOD UNIT TYPE: NORMAL
BLOOD UNIT TYPE: NORMAL
BUN SERPL-MCNC: 12 MG/DL (ref 6–20)
CALCIUM SERPL-MCNC: 8.7 MG/DL (ref 8.7–10.5)
CHLORIDE SERPL-SCNC: 101 MMOL/L (ref 95–110)
CO2 SERPL-SCNC: 22 MMOL/L (ref 23–29)
CODING SYSTEM: NORMAL
CODING SYSTEM: NORMAL
CREAT SERPL-MCNC: 0.6 MG/DL (ref 0.5–1.4)
DIFFERENTIAL METHOD: ABNORMAL
DISPENSE STATUS: NORMAL
DISPENSE STATUS: NORMAL
EOSINOPHIL # BLD AUTO: 0.4 K/UL (ref 0–0.5)
EOSINOPHIL NFR BLD: 4.3 % (ref 0–8)
ERYTHROCYTE [DISTWIDTH] IN BLOOD BY AUTOMATED COUNT: 12.9 % (ref 11.5–14.5)
EST. GFR  (AFRICAN AMERICAN): >60 ML/MIN/1.73 M^2
EST. GFR  (NON AFRICAN AMERICAN): >60 ML/MIN/1.73 M^2
GLUCOSE SERPL-MCNC: 108 MG/DL (ref 70–110)
HCT VFR BLD AUTO: 30.9 % (ref 40–54)
HGB BLD-MCNC: 10.4 G/DL (ref 14–18)
IMM GRANULOCYTES # BLD AUTO: 0.03 K/UL (ref 0–0.04)
IMM GRANULOCYTES NFR BLD AUTO: 0.3 % (ref 0–0.5)
LYMPHOCYTES # BLD AUTO: 1.4 K/UL (ref 1–4.8)
LYMPHOCYTES NFR BLD: 14.3 % (ref 18–48)
MAGNESIUM SERPL-MCNC: 2 MG/DL (ref 1.6–2.6)
MCH RBC QN AUTO: 31.2 PG (ref 27–31)
MCHC RBC AUTO-ENTMCNC: 33.7 G/DL (ref 32–36)
MCV RBC AUTO: 93 FL (ref 82–98)
MONOCYTES # BLD AUTO: 0.8 K/UL (ref 0.3–1)
MONOCYTES NFR BLD: 8.2 % (ref 4–15)
NEUTROPHILS # BLD AUTO: 6.9 K/UL (ref 1.8–7.7)
NEUTROPHILS NFR BLD: 72.3 % (ref 38–73)
NRBC BLD-RTO: 0 /100 WBC
NUM UNITS TRANS PACKED RBC: NORMAL
NUM UNITS TRANS PACKED RBC: NORMAL
PHOSPHATE SERPL-MCNC: 2.7 MG/DL (ref 2.7–4.5)
PLATELET # BLD AUTO: 240 K/UL (ref 150–450)
PMV BLD AUTO: 10.4 FL (ref 9.2–12.9)
POCT GLUCOSE: 101 MG/DL (ref 70–110)
POCT GLUCOSE: 159 MG/DL (ref 70–110)
POCT GLUCOSE: 162 MG/DL (ref 70–110)
POCT GLUCOSE: 169 MG/DL (ref 70–110)
POCT GLUCOSE: 188 MG/DL (ref 70–110)
POTASSIUM SERPL-SCNC: 3.9 MMOL/L (ref 3.5–5.1)
PROT SERPL-MCNC: 6.4 G/DL (ref 6–8.4)
RBC # BLD AUTO: 3.33 M/UL (ref 4.6–6.2)
SODIUM SERPL-SCNC: 134 MMOL/L (ref 136–145)
WBC # BLD AUTO: 9.58 K/UL (ref 3.9–12.7)

## 2022-07-29 PROCEDURE — 80053 COMPREHEN METABOLIC PANEL: CPT

## 2022-07-29 PROCEDURE — 63600175 PHARM REV CODE 636 W HCPCS

## 2022-07-29 PROCEDURE — 94761 N-INVAS EAR/PLS OXIMETRY MLT: CPT

## 2022-07-29 PROCEDURE — 36415 COLL VENOUS BLD VENIPUNCTURE: CPT

## 2022-07-29 PROCEDURE — 84100 ASSAY OF PHOSPHORUS: CPT

## 2022-07-29 PROCEDURE — 83735 ASSAY OF MAGNESIUM: CPT

## 2022-07-29 PROCEDURE — 25000003 PHARM REV CODE 250

## 2022-07-29 PROCEDURE — 25000003 PHARM REV CODE 250: Performed by: THORACIC SURGERY (CARDIOTHORACIC VASCULAR SURGERY)

## 2022-07-29 PROCEDURE — 63600175 PHARM REV CODE 636 W HCPCS: Performed by: THORACIC SURGERY (CARDIOTHORACIC VASCULAR SURGERY)

## 2022-07-29 PROCEDURE — 85025 COMPLETE CBC W/AUTO DIFF WBC: CPT

## 2022-07-29 PROCEDURE — 97116 GAIT TRAINING THERAPY: CPT

## 2022-07-29 PROCEDURE — 25000242 PHARM REV CODE 250 ALT 637 W/ HCPCS

## 2022-07-29 PROCEDURE — 20600001 HC STEP DOWN PRIVATE ROOM

## 2022-07-29 RX ORDER — SPIRONOLACTONE 25 MG/1
25 TABLET ORAL DAILY
Status: DISCONTINUED | OUTPATIENT
Start: 2022-07-29 | End: 2022-07-30 | Stop reason: HOSPADM

## 2022-07-29 RX ORDER — POTASSIUM CHLORIDE 20 MEQ/1
20 TABLET, EXTENDED RELEASE ORAL ONCE
Status: COMPLETED | OUTPATIENT
Start: 2022-07-29 | End: 2022-07-29

## 2022-07-29 RX ORDER — METOPROLOL SUCCINATE 50 MG/1
50 TABLET, EXTENDED RELEASE ORAL DAILY
Status: DISCONTINUED | OUTPATIENT
Start: 2022-07-29 | End: 2022-07-30

## 2022-07-29 RX ADMIN — SPIRONOLACTONE 25 MG: 25 TABLET, FILM COATED ORAL at 08:07

## 2022-07-29 RX ADMIN — GABAPENTIN 200 MG: 100 CAPSULE ORAL at 02:07

## 2022-07-29 RX ADMIN — ACETAMINOPHEN 1000 MG: 500 TABLET ORAL at 05:07

## 2022-07-29 RX ADMIN — AMOXICILLIN AND CLAVULANATE POTASSIUM 1 TABLET: 875; 125 TABLET, FILM COATED ORAL at 08:07

## 2022-07-29 RX ADMIN — ATORVASTATIN CALCIUM 40 MG: 20 TABLET, FILM COATED ORAL at 08:07

## 2022-07-29 RX ADMIN — FUROSEMIDE 20 MG: 10 INJECTION, SOLUTION INTRAMUSCULAR; INTRAVENOUS at 08:07

## 2022-07-29 RX ADMIN — OXYCODONE HYDROCHLORIDE 7.5 MG: 5 SOLUTION ORAL at 10:07

## 2022-07-29 RX ADMIN — ASPIRIN 81 MG: 81 TABLET, COATED ORAL at 08:07

## 2022-07-29 RX ADMIN — SACUBITRIL AND VALSARTAN 1 TABLET: 24; 26 TABLET, FILM COATED ORAL at 08:07

## 2022-07-29 RX ADMIN — GABAPENTIN 200 MG: 100 CAPSULE ORAL at 08:07

## 2022-07-29 RX ADMIN — POTASSIUM CHLORIDE 20 MEQ: 1500 TABLET, EXTENDED RELEASE ORAL at 08:07

## 2022-07-29 RX ADMIN — METOPROLOL SUCCINATE 50 MG: 50 TABLET, EXTENDED RELEASE ORAL at 08:07

## 2022-07-29 RX ADMIN — POTASSIUM CHLORIDE 20 MEQ: 1500 TABLET, EXTENDED RELEASE ORAL at 06:07

## 2022-07-29 RX ADMIN — MUPIROCIN: 20 OINTMENT TOPICAL at 08:07

## 2022-07-29 RX ADMIN — ACETAMINOPHEN 1000 MG: 500 TABLET ORAL at 02:07

## 2022-07-29 RX ADMIN — ENOXAPARIN SODIUM 40 MG: 100 INJECTION SUBCUTANEOUS at 04:07

## 2022-07-29 RX ADMIN — OXYCODONE HYDROCHLORIDE 15 MG: 5 SOLUTION ORAL at 05:07

## 2022-07-29 RX ADMIN — SACUBITRIL AND VALSARTAN 1 TABLET: 24; 26 TABLET, FILM COATED ORAL at 10:07

## 2022-07-29 RX ADMIN — FUROSEMIDE 20 MG: 10 INJECTION, SOLUTION INTRAMUSCULAR; INTRAVENOUS at 06:07

## 2022-07-29 RX ADMIN — CLOPIDOGREL 75 MG: 75 TABLET, FILM COATED ORAL at 08:07

## 2022-07-29 NOTE — PT/OT/SLP PROGRESS
Physical Therapy Treatment    Patient Name:  Yo Pink   MRN:  [id number]  Admit Date: 7/15/2022  Admitting Diagnosis:  ACS (acute coronary syndrome)   Length of Stay: 12 days  Recent Surgery: Procedure(s) (LRB):  CORONARY ARTERY BYPASS GRAFT (CABG) X 3 (N/A)  SURGICAL PROCUREMENT, VEIN, ENDOSCOPIC (Left) 4 Days Post-Op    Recommendations:     Discharge Recommendations:  Home  Discharge Equipment Recommendations: none   Barriers to discharge: None    Plan:     During this hospitalization, patient to be seen 3 x/week to address the listed problems via gait training, therapeutic activities, therapeutic exercises, neuromuscular re-education  · Plan of Care Expires:  22   Plan of Care Reviewed with: patient    Assessment:     Yo Pink is a 57 y.o. male admitted with a medical diagnosis of ACS (acute coronary syndrome). Pt progressing towards goals, but not at PLOF. Pt tolerated session well with no increase in pain/discomfort.  Pt is improving with therapy evidenced by increased gait distance. Pt would benefit from continued PT services to improve overall functional mobility. Recommend d/c to Home to maximize functional independence.        Problem List: impaired endurance, decreased upper extremity function, decreased lower extremity function, pain, impaired cardiopulmonary response to activity.  Rehab Prognosis: Good     GOALS:   Multidisciplinary Problems     Physical Therapy Goals        Problem: Physical Therapy    Goal Priority Disciplines Outcome Goal Variances Interventions   Physical Therapy Goal     PT, PT/OT Ongoing, Progressing     Description: Goals to be met by: 2022     Patient will increase functional independence with mobility by performin. Supine to sit with independence -not met  2. Sit to supine with independence -not met  3. Sit to stand transfer with independence -not met  4. Bed to chair transfer with modified independence using platform rolling walker if needed -not met  5.  "Gait  x 200 feet with modified independence using platform rolling walker if needed -not met  6. Ascend/Descend 6 inch curb step with modified independence using platform rolling walker if needed -not met  7. Lower extremity exercise program x10 reps per handout, with independence -not met                   Subjective   Communicated with RN prior to session.  Patient found up in chair upon PT entry to room, agreeable to evaluation. Yo Pink's alone during session.    Chief Complaint: L toe pain  Patient/Family Comments/goals: to get better and return home   Pain/Comfort:  · Pain Rating 1: 6/10  · Location 1:  (L toe)  · Pain Addressed 1: Reposition, Distraction  · Pain Rating Post-Intervention 1: 6/10    Objective:   Patient found with: telemetry, pulse ox (continuous), blood pressure cuff, peripheral IV   General Precautions: Standard, Cardiac fall, sternal   Orthopedic Precautions:N/A   Braces: N/A   Oxygen Device: Room Air  Vitals: /76 (BP Location: Right arm, Patient Position: Lying)   Pulse 95   Temp 97.8 °F (36.6 °C) (Oral)   Resp 18   Ht 5' 10" (1.778 m)   Wt 71 kg (156 lb 8.4 oz)   SpO2 97%   BMI 22.46 kg/m²     Outcome Measures:  AM-PAC 6 CLICK MOBILITY  Turning over in bed (including adjusting bedclothes, sheets and blankets)?: 3  Sitting down on and standing up from a chair with arms (e.g., wheelchair, bedside commode, etc.): 3  Moving from lying on back to sitting on the side of the bed?: 3  Moving to and from a bed to a chair (including a wheelchair)?: 3  Need to walk in hospital room?: 3  Climbing 3-5 steps with a railing?: 2  Basic Mobility Total Score: 17       Functional Mobility:  Additional staff present: NA  Bed Mobility:   Not performed 2nd to pt found in chair     Transfers:    Sit <> Stand Transfer: supervision with no assistive device from chair       Gait:  · Patient ambulated: 500ft    · Patient required: standy by assistance  · Patient used: Platform RW  · Gait Pattern " observed: reciprocal gait  · Gait Deviation(s): decreased step length, flexed posture and decreased ernesto  · Impairments due to: pain and decreased endurance  · Comments:   · Mask donned  · Pt demo'd steady gait without PRW. Pt does not need a PRW for home but used it during session to improve gait distance.  · No LOB  · Slight SOB        Therapeutic Activities, Exercises, and Education:   Educated pt on PT role/POC  Educated pt on importance of OOB activity and daily ambulation   Educated pt on sternal precautions   Pt verbalized understanding       Patient left up in chair with all lines intact, call button in reach and RN notified..    Time Tracking:     PT Received On: 07/29/22  PT Start Time: 1340     PT Stop Time: 1403  PT Total Time (min): 23 min       Billable Minutes:   · Gait Training 23    Treatment Type: Evaluation  PT/PTA: PT

## 2022-07-29 NOTE — SUBJECTIVE & OBJECTIVE
Interval History: Stepdown from ICU uneventful, continue ambulation.  Adding aldactone and increasing Toprol.     Review of Systems   Constitutional: Negative for malaise/fatigue.   Cardiovascular:  Negative for chest pain, claudication, dyspnea on exertion, irregular heartbeat, leg swelling and palpitations.   Respiratory:  Negative for cough and shortness of breath.    Hematologic/Lymphatic: Negative for bleeding problem.   Gastrointestinal:  Negative for abdominal pain.   Genitourinary:  Negative for dysuria.   Neurological:  Negative for headaches and weakness.   Medications:  Continuous Infusions:   clevidipine Stopped (07/27/22 2330)     Scheduled Meds:   acetaminophen  1,000 mg Oral Q8H    amoxicillin-clavulanate 875-125mg  1 tablet Oral Q12H    aspirin  81 mg Oral Daily    atorvastatin  40 mg Oral Daily    clopidogreL  75 mg Oral Daily    docusate sodium  100 mg Oral BID    enoxaparin  40 mg Subcutaneous Daily    furosemide (LASIX) injection  20 mg Intravenous BID    gabapentin  200 mg Oral TID    metoprolol succinate  50 mg Oral Daily    mupirocin   Nasal BID    polyethylene glycol  17 g Oral Daily    potassium chloride  20 mEq Oral BID    sacubitriL-valsartan  1 tablet Oral BID    spironolactone  25 mg Oral Daily     PRN Meds:sodium chloride, calcium gluconate IVPB, calcium gluconate IVPB, calcium gluconate IVPB, dextrose 10%, dextrose 10%, glucagon (human recombinant), glucose, glucose, hydrALAZINE, HYDROmorphone, insulin aspart U-100, magnesium sulfate IVPB, magnesium sulfate IVPB, melatonin, naloxone, ondansetron, oxyCODONE, oxyCODONE, potassium bicarbonate, potassium bicarbonate, potassium bicarbonate, potassium, sodium phosphates, potassium, sodium phosphates, potassium, sodium phosphates, sodium chloride 0.9%     Objective:     Vital Signs (Most Recent):  Temp: 97.4 °F (36.3 °C) (07/29/22 0806)  Pulse: 97 (07/29/22 0845)  Resp: 16 (07/29/22 0845)  BP: 91/64 (07/29/22 0845)  SpO2: (!) 94 % (07/29/22  0845)   Vital Signs (24h Range):  Temp:  [97.4 °F (36.3 °C)-98.5 °F (36.9 °C)] 97.4 °F (36.3 °C)  Pulse:  [] 97  Resp:  [16-26] 16  SpO2:  [93 %-98 %] 94 %  BP: ()/(64-89) 91/64  Arterial Line BP: (125-138)/(56-78) 133/78     Weight: 71 kg (156 lb 8.4 oz)  Body mass index is 22.46 kg/m².    SpO2: (!) 94 %  O2 Device (Oxygen Therapy): room air    Intake/Output - Last 3 Shifts         07/27 0700 07/28 0659 07/28 0700 07/29 0659 07/29 0700 07/30 0659    P.O. 570 750     I.V. (mL/kg) 159.2 (2.1) 99.8 (1.3)     IV Piggyback 183.2      Total Intake(mL/kg) 912.3 (11.8) 849.8 (11)     Urine (mL/kg/hr) 3105 (1.7) 2600 (1.4)     Chest Tube 140 30     Total Output 3245 2630     Net -2332.7 -1780.2                    Lines/Drains/Airways       Peripheral Intravenous Line  Duration                  Peripheral IV - Single Lumen 07/24/22 0130 22 G Right Hand 5 days         Peripheral IV - Single Lumen 07/26/22 0930 Right Wrist 3 days                    Physical Exam  Constitutional:       Appearance: Normal appearance.   HENT:      Head: Normocephalic.   Eyes:      Pupils: Pupils are equal, round, and reactive to light.   Cardiovascular:      Rate and Rhythm: Normal rate and regular rhythm.      Pulses: Normal pulses.   Pulmonary:      Effort: Pulmonary effort is normal.   Abdominal:      General: Abdomen is flat. Bowel sounds are normal.      Palpations: Abdomen is soft.   Musculoskeletal:         General: Normal range of motion.   Skin:     General: Skin is warm and dry.      Comments: MSI CDI   Neurological:      General: No focal deficit present.      Mental Status: He is alert.       Significant Labs:  BMP:   Recent Labs   Lab 07/29/22  0643      *   K 3.9      CO2 22*   BUN 12   CREATININE 0.6   CALCIUM 8.7   MG 2.0     CBC:   Recent Labs   Lab 07/29/22 0643   WBC 9.58   RBC 3.33*   HGB 10.4*   HCT 30.9*      MCV 93   MCH 31.2*   MCHC 33.7     CMP:   Recent Labs   Lab 07/29/22 0643       CALCIUM 8.7   ALBUMIN 3.2*   PROT 6.4   *   K 3.9   CO2 22*      BUN 12   CREATININE 0.6   ALKPHOS 80   ALT 11   AST 26   BILITOT 0.9     Coagulation: No results for input(s): PT, INR, APTT in the last 48 hours.    Significant Diagnostics:  I have reviewed and interpreted all pertinent imaging results/findings within the past 24 hours.

## 2022-07-29 NOTE — NURSING
Nurse at bedside helping patient back from bathroom into recliner, telemetry called patient's HR>140. Patient asymptomatic, just c/o pain in foot. Metoprolol administered with rest of AM medication.  On review of event on telemetry, rhythm appeared as Vtach. Patient transferred back to bad, educated to perform valsalva maneuver,  Stat EKG ordered and On call CTS notified.   After valsalva, HR back in 90's. EKG d/stephanie by team. Will continue to monitor.

## 2022-07-29 NOTE — ASSESSMENT & PLAN NOTE
- Maintain sternal precautions   - ASA  - BB  - Statin  - Plavix  - Lovenox  - Lasix  with scheduled potassium ordered   - Encourage ambulation  - PT/OT  - Cardiac diet with 1500 cc fluid restriction   - Bowel regimen in place   - Monitor electrolytes to keep Mag above 2 and Potassium above 4  - OOBTO   - Encourage IS use

## 2022-07-29 NOTE — PLAN OF CARE
Pt remains free from falls and injuries. PIVs remain. Milrinone gtt infusing as ordered. Pain controlled with scheduled tylenol and gabapentin. VSS, no acute issues overnight. Plan of care discussed with pt.

## 2022-07-29 NOTE — CARE UPDATE
BG goal 140-180    Patient remains in SICU. POD 4 s/p CABG. BG well controlled with no prn SQ insulin requirements overnight. Diet Cardiac Fluid - 1500mL      Plan:  -Continue Low Dose Correction Scale  -BG monitoring ac/hs      ** Please call Endocrine for any BG related issues **      Lab Results   Component Value Date    HGBA1C 6.2 (H) 07/15/2022       Discharge plans: Likely no needs. Patient with a1c indicating Pre-DM. Would recommend dietary and lifestyle changes and repeat a1c with PCP in 3 months.

## 2022-07-29 NOTE — PLAN OF CARE
[facility] - Cardiology Stepdown  Discharge Reassessment    Primary Care Provider: [facility] [place]    Expected Discharge Date: 7/31/2022    Reassessment (most recent)     Discharge Reassessment - 07/29/22 1220        Discharge Reassessment    Assessment Type Discharge Planning Reassessment     Discharge Plan A Home;Home with family     Discharge Plan B Home     Discharge Barriers Identified None     Why the patient remains in the hospital Requires continued medical care               Per CTS team discharge possibly over the weekend.  Pt does not have HH benefits due to having Medicaid.  Plan to discharge home with outpatient therapy.  Will continue to follow.    Na Bunn LMSW  [facility]  [id number]

## 2022-07-29 NOTE — PROGRESS NOTES
[facility] - Cardiology Stepdown  Cardiothoracic Surgery  Progress Note    Patient Name: Yo Pink  MRN: [id number]  Admission Date: 7/15/2022  Hospital Length of Stay: 12 days  Code Status: Full Code   Attending Physician: Kenton Wynn MD   Referring Provider: Self, Aaareferral  Principal Problem:ACS (acute coronary syndrome)  Subjective:     Post-Op Info:  Procedure(s) (LRB):  CORONARY ARTERY BYPASS GRAFT (CABG) X 3 (N/A)  SURGICAL PROCUREMENT, VEIN, ENDOSCOPIC (Left)   4 Days Post-Op     Interval History: Stepdown from ICU uneventful, continue ambulation.  Adding aldactone and increasing Toprol.     Review of Systems   Constitutional: Negative for malaise/fatigue.   Cardiovascular:  Negative for chest pain, claudication, dyspnea on exertion, irregular heartbeat, leg swelling and palpitations.   Respiratory:  Negative for cough and shortness of breath.    Hematologic/Lymphatic: Negative for bleeding problem.   Gastrointestinal:  Negative for abdominal pain.   Genitourinary:  Negative for dysuria.   Neurological:  Negative for headaches and weakness.   Medications:  Continuous Infusions:   clevidipine Stopped (07/27/22 2330)     Scheduled Meds:   acetaminophen  1,000 mg Oral Q8H    amoxicillin-clavulanate 875-125mg  1 tablet Oral Q12H    aspirin  81 mg Oral Daily    atorvastatin  40 mg Oral Daily    clopidogreL  75 mg Oral Daily    docusate sodium  100 mg Oral BID    enoxaparin  40 mg Subcutaneous Daily    furosemide (LASIX) injection  20 mg Intravenous BID    gabapentin  200 mg Oral TID    metoprolol succinate  50 mg Oral Daily    mupirocin   Nasal BID    polyethylene glycol  17 g Oral Daily    potassium chloride  20 mEq Oral BID    sacubitriL-valsartan  1 tablet Oral BID    spironolactone  25 mg Oral Daily     PRN Meds:sodium chloride, calcium gluconate IVPB, calcium gluconate IVPB, calcium gluconate IVPB, dextrose 10%, dextrose 10%, glucagon (human recombinant), glucose, glucose,  hydrALAZINE, HYDROmorphone, insulin aspart U-100, magnesium sulfate IVPB, magnesium sulfate IVPB, melatonin, naloxone, ondansetron, oxyCODONE, oxyCODONE, potassium bicarbonate, potassium bicarbonate, potassium bicarbonate, potassium, sodium phosphates, potassium, sodium phosphates, potassium, sodium phosphates, sodium chloride 0.9%     Objective:     Vital Signs (Most Recent):  Temp: 97.4 °F (36.3 °C) (07/29/22 0806)  Pulse: 97 (07/29/22 0845)  Resp: 16 (07/29/22 0845)  BP: 91/64 (07/29/22 0845)  SpO2: (!) 94 % (07/29/22 0845)   Vital Signs (24h Range):  Temp:  [97.4 °F (36.3 °C)-98.5 °F (36.9 °C)] 97.4 °F (36.3 °C)  Pulse:  [] 97  Resp:  [16-26] 16  SpO2:  [93 %-98 %] 94 %  BP: ()/(64-89) 91/64  Arterial Line BP: (125-138)/(56-78) 133/78     Weight: 71 kg (156 lb 8.4 oz)  Body mass index is 22.46 kg/m².    SpO2: (!) 94 %  O2 Device (Oxygen Therapy): room air    Intake/Output - Last 3 Shifts         07/27 0700 07/28 0659 07/28 0700 07/29 0659 07/29 0700 07/30 0659    P.O. 570 750     I.V. (mL/kg) 159.2 (2.1) 99.8 (1.3)     IV Piggyback 183.2      Total Intake(mL/kg) 912.3 (11.8) 849.8 (11)     Urine (mL/kg/hr) 3105 (1.7) 2600 (1.4)     Chest Tube 140 30     Total Output 3245 2630     Net -2332.7 -1780.2                    Lines/Drains/Airways       Peripheral Intravenous Line  Duration                  Peripheral IV - Single Lumen 07/24/22 0130 22 G Right Hand 5 days         Peripheral IV - Single Lumen 07/26/22 0930 Right Wrist 3 days                    Physical Exam  Constitutional:       Appearance: Normal appearance.   HENT:      Head: Normocephalic.   Eyes:      Pupils: Pupils are equal, round, and reactive to light.   Cardiovascular:      Rate and Rhythm: Normal rate and regular rhythm.      Pulses: Normal pulses.   Pulmonary:      Effort: Pulmonary effort is normal.   Abdominal:      General: Abdomen is flat. Bowel sounds are normal.      Palpations: Abdomen is soft.   Musculoskeletal:          General: Normal range of motion.   Skin:     General: Skin is warm and dry.      Comments: MSI CDI   Neurological:      General: No focal deficit present.      Mental Status: He is alert.       Significant Labs:  BMP:   Recent Labs   Lab 07/29/22  0643      *   K 3.9      CO2 22*   BUN 12   CREATININE 0.6   CALCIUM 8.7   MG 2.0     CBC:   Recent Labs   Lab 07/29/22  0643   WBC 9.58   RBC 3.33*   HGB 10.4*   HCT 30.9*      MCV 93   MCH 31.2*   MCHC 33.7     CMP:   Recent Labs   Lab 07/29/22  0643      CALCIUM 8.7   ALBUMIN 3.2*   PROT 6.4   *   K 3.9   CO2 22*      BUN 12   CREATININE 0.6   ALKPHOS 80   ALT 11   AST 26   BILITOT 0.9     Coagulation: No results for input(s): PT, INR, APTT in the last 48 hours.    Significant Diagnostics:  I have reviewed and interpreted all pertinent imaging results/findings within the past 24 hours.    Assessment/Plan:     S/P CABG (coronary artery bypass graft)  - Maintain sternal precautions   - ASA  - BB  - Statin  - Plavix  - Lovenox  - Lasix  with scheduled potassium ordered   - Encourage ambulation  - PT/OT  - Cardiac diet with 1500 cc fluid restriction   - Bowel regimen in place   - Monitor electrolytes to keep Mag above 2 and Potassium above 4  - OOBTO   - Encourage IS use      Transient hyperglycemia post procedure  - Endocrine     Ischemic cardiomyopathy  - Entresto  - Aldactone      Dyslipidemia  - Statin    Hypokalemia  - Daily labs to monitor   - Keep potassium above 4  - Replace as needed        Sudha Garcia, OLIVA  Cardiothoracic Surgery  [facility] - Cardiology Stepdown

## 2022-07-29 NOTE — PLAN OF CARE
Plan of care discussed with patient. Patient is free of fall/trauma/injury. Denies CP, or SOB. PRN Norco given for pain/discomfort. All questions addressed. Will continue to monitor

## 2022-07-29 NOTE — PT/OT/SLP PROGRESS
Occupational Therapy      Patient Name:  Yo Pink   MRN:  [id number]    Patient not seen today secondary to out of room working with PT  . Will follow-up 8/1.    7/29/2022

## 2022-07-30 VITALS
DIASTOLIC BLOOD PRESSURE: 77 MMHG | TEMPERATURE: 97 F | SYSTOLIC BLOOD PRESSURE: 117 MMHG | HEART RATE: 92 BPM | WEIGHT: 158.31 LBS | BODY MASS INDEX: 22.66 KG/M2 | OXYGEN SATURATION: 96 % | RESPIRATION RATE: 18 BRPM | HEIGHT: 70 IN

## 2022-07-30 LAB
ALBUMIN SERPL BCP-MCNC: 3.2 G/DL (ref 3.5–5.2)
ALP SERPL-CCNC: 80 U/L (ref 55–135)
ALT SERPL W/O P-5'-P-CCNC: 13 U/L (ref 10–44)
ANION GAP SERPL CALC-SCNC: 10 MMOL/L (ref 8–16)
AST SERPL-CCNC: 26 U/L (ref 10–40)
BASOPHILS # BLD AUTO: 0.09 K/UL (ref 0–0.2)
BASOPHILS NFR BLD: 1 % (ref 0–1.9)
BILIRUB SERPL-MCNC: 0.8 MG/DL (ref 0.1–1)
BUN SERPL-MCNC: 17 MG/DL (ref 6–20)
CALCIUM SERPL-MCNC: 8.9 MG/DL (ref 8.7–10.5)
CHLORIDE SERPL-SCNC: 99 MMOL/L (ref 95–110)
CO2 SERPL-SCNC: 23 MMOL/L (ref 23–29)
CREAT SERPL-MCNC: 0.7 MG/DL (ref 0.5–1.4)
DIFFERENTIAL METHOD: ABNORMAL
EOSINOPHIL # BLD AUTO: 0.9 K/UL (ref 0–0.5)
EOSINOPHIL NFR BLD: 10.2 % (ref 0–8)
ERYTHROCYTE [DISTWIDTH] IN BLOOD BY AUTOMATED COUNT: 13 % (ref 11.5–14.5)
EST. GFR  (AFRICAN AMERICAN): >60 ML/MIN/1.73 M^2
EST. GFR  (NON AFRICAN AMERICAN): >60 ML/MIN/1.73 M^2
GLUCOSE SERPL-MCNC: 82 MG/DL (ref 70–110)
HCT VFR BLD AUTO: 29.4 % (ref 40–54)
HGB BLD-MCNC: 10 G/DL (ref 14–18)
IMM GRANULOCYTES # BLD AUTO: 0.03 K/UL (ref 0–0.04)
IMM GRANULOCYTES NFR BLD AUTO: 0.3 % (ref 0–0.5)
LYMPHOCYTES # BLD AUTO: 2.2 K/UL (ref 1–4.8)
LYMPHOCYTES NFR BLD: 23.6 % (ref 18–48)
MAGNESIUM SERPL-MCNC: 1.8 MG/DL (ref 1.6–2.6)
MCH RBC QN AUTO: 31.6 PG (ref 27–31)
MCHC RBC AUTO-ENTMCNC: 34 G/DL (ref 32–36)
MCV RBC AUTO: 93 FL (ref 82–98)
MONOCYTES # BLD AUTO: 0.9 K/UL (ref 0.3–1)
MONOCYTES NFR BLD: 9.8 % (ref 4–15)
NEUTROPHILS # BLD AUTO: 5.1 K/UL (ref 1.8–7.7)
NEUTROPHILS NFR BLD: 55.1 % (ref 38–73)
NRBC BLD-RTO: 0 /100 WBC
PHOSPHATE SERPL-MCNC: 3.1 MG/DL (ref 2.7–4.5)
PLATELET # BLD AUTO: 282 K/UL (ref 150–450)
PMV BLD AUTO: 10.2 FL (ref 9.2–12.9)
POCT GLUCOSE: 140 MG/DL (ref 70–110)
POTASSIUM SERPL-SCNC: 3.9 MMOL/L (ref 3.5–5.1)
PROT SERPL-MCNC: 6.4 G/DL (ref 6–8.4)
RBC # BLD AUTO: 3.16 M/UL (ref 4.6–6.2)
SODIUM SERPL-SCNC: 132 MMOL/L (ref 136–145)
WBC # BLD AUTO: 9.18 K/UL (ref 3.9–12.7)

## 2022-07-30 PROCEDURE — 25000003 PHARM REV CODE 250

## 2022-07-30 PROCEDURE — 84100 ASSAY OF PHOSPHORUS: CPT

## 2022-07-30 PROCEDURE — 63600175 PHARM REV CODE 636 W HCPCS

## 2022-07-30 PROCEDURE — 83735 ASSAY OF MAGNESIUM: CPT

## 2022-07-30 PROCEDURE — 25000003 PHARM REV CODE 250: Performed by: THORACIC SURGERY (CARDIOTHORACIC VASCULAR SURGERY)

## 2022-07-30 PROCEDURE — 85025 COMPLETE CBC W/AUTO DIFF WBC: CPT

## 2022-07-30 PROCEDURE — 63600175 PHARM REV CODE 636 W HCPCS: Performed by: THORACIC SURGERY (CARDIOTHORACIC VASCULAR SURGERY)

## 2022-07-30 PROCEDURE — 36415 COLL VENOUS BLD VENIPUNCTURE: CPT

## 2022-07-30 PROCEDURE — 80053 COMPREHEN METABOLIC PANEL: CPT

## 2022-07-30 RX ORDER — AMOXICILLIN AND CLAVULANATE POTASSIUM 875; 125 MG/1; MG/1
1 TABLET, FILM COATED ORAL EVERY 12 HOURS
Qty: 14 TABLET | Refills: 0 | Status: SHIPPED | OUTPATIENT
Start: 2022-07-30 | End: 2022-07-30 | Stop reason: SDUPTHER

## 2022-07-30 RX ORDER — ATORVASTATIN CALCIUM 40 MG/1
40 TABLET, FILM COATED ORAL DAILY
Qty: 90 TABLET | Refills: 3 | Status: SHIPPED | OUTPATIENT
Start: 2022-07-31 | End: 2022-09-15 | Stop reason: SDUPTHER

## 2022-07-30 RX ORDER — DOCUSATE SODIUM 100 MG/1
100 CAPSULE, LIQUID FILLED ORAL 2 TIMES DAILY
Qty: 14 CAPSULE | Refills: 0 | Status: SHIPPED | OUTPATIENT
Start: 2022-07-30 | End: 2022-08-06

## 2022-07-30 RX ORDER — OXYCODONE HYDROCHLORIDE 5 MG/1
5 TABLET ORAL EVERY 4 HOURS PRN
Qty: 20 TABLET | Refills: 0 | Status: SHIPPED | OUTPATIENT
Start: 2022-07-30 | End: 2022-09-07 | Stop reason: ALTCHOICE

## 2022-07-30 RX ORDER — METOPROLOL SUCCINATE 100 MG/1
100 TABLET, EXTENDED RELEASE ORAL DAILY
Qty: 30 TABLET | Refills: 11 | Status: SHIPPED | OUTPATIENT
Start: 2022-07-31 | End: 2022-09-15 | Stop reason: SDUPTHER

## 2022-07-30 RX ORDER — GABAPENTIN 100 MG/1
200 CAPSULE ORAL 3 TIMES DAILY
Qty: 120 CAPSULE | Refills: 0 | Status: SHIPPED | OUTPATIENT
Start: 2022-07-30 | End: 2022-09-15

## 2022-07-30 RX ORDER — SPIRONOLACTONE 25 MG/1
25 TABLET ORAL DAILY
Qty: 30 TABLET | Refills: 11 | Status: SHIPPED | OUTPATIENT
Start: 2022-07-31 | End: 2022-09-15 | Stop reason: SDUPTHER

## 2022-07-30 RX ORDER — POTASSIUM CHLORIDE 20 MEQ/1
20 TABLET, EXTENDED RELEASE ORAL 2 TIMES DAILY
Qty: 14 TABLET | Refills: 0 | Status: SHIPPED | OUTPATIENT
Start: 2022-07-30 | End: 2022-08-06

## 2022-07-30 RX ORDER — AMOXICILLIN AND CLAVULANATE POTASSIUM 875; 125 MG/1; MG/1
1 TABLET, FILM COATED ORAL EVERY 12 HOURS
Qty: 28 TABLET | Refills: 0 | Status: SHIPPED | OUTPATIENT
Start: 2022-07-30 | End: 2022-08-13

## 2022-07-30 RX ORDER — ASPIRIN 81 MG/1
81 TABLET ORAL DAILY
Qty: 30 TABLET | Refills: 11 | Status: SHIPPED | OUTPATIENT
Start: 2022-07-31 | End: 2023-09-26

## 2022-07-30 RX ORDER — FUROSEMIDE 40 MG/1
40 TABLET ORAL DAILY
Qty: 7 TABLET | Refills: 0 | Status: SHIPPED | OUTPATIENT
Start: 2022-07-30 | End: 2022-09-07 | Stop reason: ALTCHOICE

## 2022-07-30 RX ORDER — METOPROLOL SUCCINATE 100 MG/1
100 TABLET, EXTENDED RELEASE ORAL DAILY
Status: DISCONTINUED | OUTPATIENT
Start: 2022-07-30 | End: 2022-07-30 | Stop reason: HOSPADM

## 2022-07-30 RX ORDER — POLYETHYLENE GLYCOL 3350 17 G/17G
17 POWDER, FOR SOLUTION ORAL DAILY
Qty: 7 EACH | Refills: 0 | Status: SHIPPED | OUTPATIENT
Start: 2022-07-31 | End: 2022-08-07

## 2022-07-30 RX ADMIN — HYDROMORPHONE HYDROCHLORIDE 1 MG: 1 INJECTION, SOLUTION INTRAMUSCULAR; INTRAVENOUS; SUBCUTANEOUS at 12:07

## 2022-07-30 RX ADMIN — ACETAMINOPHEN 1000 MG: 500 TABLET ORAL at 05:07

## 2022-07-30 RX ADMIN — SACUBITRIL AND VALSARTAN 1 TABLET: 24; 26 TABLET, FILM COATED ORAL at 08:07

## 2022-07-30 RX ADMIN — SPIRONOLACTONE 25 MG: 25 TABLET, FILM COATED ORAL at 08:07

## 2022-07-30 RX ADMIN — ASPIRIN 81 MG: 81 TABLET, COATED ORAL at 08:07

## 2022-07-30 RX ADMIN — GABAPENTIN 200 MG: 100 CAPSULE ORAL at 08:07

## 2022-07-30 RX ADMIN — ATORVASTATIN CALCIUM 40 MG: 20 TABLET, FILM COATED ORAL at 08:07

## 2022-07-30 RX ADMIN — FUROSEMIDE 20 MG: 10 INJECTION, SOLUTION INTRAMUSCULAR; INTRAVENOUS at 08:07

## 2022-07-30 RX ADMIN — CLOPIDOGREL 75 MG: 75 TABLET, FILM COATED ORAL at 08:07

## 2022-07-30 RX ADMIN — METOPROLOL SUCCINATE 100 MG: 100 TABLET, EXTENDED RELEASE ORAL at 08:07

## 2022-07-30 RX ADMIN — MUPIROCIN: 20 OINTMENT TOPICAL at 08:07

## 2022-07-30 RX ADMIN — AMOXICILLIN AND CLAVULANATE POTASSIUM 1 TABLET: 875; 125 TABLET, FILM COATED ORAL at 08:07

## 2022-07-30 RX ADMIN — POTASSIUM CHLORIDE 20 MEQ: 1500 TABLET, EXTENDED RELEASE ORAL at 08:07

## 2022-07-30 NOTE — HOSPITAL COURSE
On 7/25/22, the patient was taken to the Operating Room for the above stated procedure. Please see the previously dictated operative report for complete details. Postoperatively, the patient was taken from the  Operating Room to the ICU where the vital signs were monitored and pain was kept under control. The patient was weaned from the drips and extubated in the ICU per protocol. Once hemodynamically stable, the patient was transferred to the Cardiac Step-Down floor for continued strengthening and ambulation. Post operative course was uncomplicated. On postoperative day 5, the patient was ready for discharge to home. At the time of discharge, the patient was ambulating unassisted. Pain was well controlled with oral analgesics and the patient was tolerating the diet.    MOBILITY AND ACTIVITY:   As tolerated. Patient may shower. No heavy lifting of greater than 5 pounds and no driving.     DIET:   An 1800-calorie ADA with a 1500 mL fluid restriction.     WOUND CARE INSTRUCTIONS:   Check for redness, swelling and drainage around the  incision or wound. Patient is to call for any obvious bleeding, drainage, pus from the wound, unusual problems or difficulties or temperature of greater than 101   degrees.     FOLLOW UP:   Follow up with Dr. Wynn in approximately 5 weeks. Prior to this  appointment, the patient will have a chest x-ray and EKG.        DISCHARGE CONDITION:   At the time of discharge, the patient was in sinus rhythm and afebrile with stable vital signs.

## 2022-07-30 NOTE — PLAN OF CARE
Problem: Adult Inpatient Plan of Care  Goal: Plan of Care Review  Outcome: Ongoing, Progressing  Goal: Patient-Specific Goal (Individualized)  Outcome: Ongoing, Progressing  Goal: Absence of Hospital-Acquired Illness or Injury  Outcome: Ongoing, Progressing  Goal: Optimal Comfort and Wellbeing  Outcome: Ongoing, Progressing  Goal: Readiness for Transition of Care  Outcome: Ongoing, Progressing     Problem: Impaired Wound Healing  Goal: Optimal Wound Healing  Outcome: Ongoing, Progressing     Problem: Fall Injury Risk  Goal: Absence of Fall and Fall-Related Injury  Outcome: Ongoing, Progressing     Problem: Infection  Goal: Absence of Infection Signs and Symptoms  Outcome: Ongoing, Progressing     Problem: Communication Impairment (Mechanical Ventilation, Invasive)  Goal: Effective Communication  Outcome: Ongoing, Progressing     Problem: Device-Related Complication Risk (Mechanical Ventilation, Invasive)  Goal: Optimal Device Function  Outcome: Ongoing, Progressing     Problem: Inability to Wean (Mechanical Ventilation, Invasive)  Goal: Mechanical Ventilation Liberation  Outcome: Ongoing, Progressing     Problem: Nutrition Impairment (Mechanical Ventilation, Invasive)  Goal: Optimal Nutrition Delivery  Outcome: Ongoing, Progressing     Problem: Skin and Tissue Injury (Mechanical Ventilation, Invasive)  Goal: Absence of Device-Related Skin and Tissue Injury  Outcome: Ongoing, Progressing     Problem: Ventilator-Induced Lung Injury (Mechanical Ventilation, Invasive)  Goal: Absence of Ventilator-Induced Lung Injury  Outcome: Ongoing, Progressing     Problem: Communication Impairment (Artificial Airway)  Goal: Effective Communication  Outcome: Ongoing, Progressing     Problem: Device-Related Complication Risk (Artificial Airway)  Goal: Optimal Device Function  Outcome: Ongoing, Progressing     Problem: Skin and Tissue Injury (Artificial Airway)  Goal: Absence of Device-Related Skin or Tissue Injury  Outcome: Ongoing,  Progressing     Problem: Noninvasive Ventilation Acute  Goal: Effective Unassisted Ventilation and Oxygenation  Outcome: Ongoing, Progressing     Problem: Skin Injury Risk Increased  Goal: Skin Health and Integrity  Outcome: Ongoing, Progressing     Problem: Restraint, Nonbehavioral (Nonviolent)  Goal: Absence of Harm or Injury  Outcome: Ongoing, Progressing    Pt AOX4, makes needs known. Pt without c/o pain or discomfort during shift. Pt Progressing. Continue POC.

## 2022-07-30 NOTE — NURSING
Took over this patient mid-shift from France Ruggiero RN. Rounded on the patient and he is laying comfortable in bed. VS stable as charted. Denied pain. Fall precaution reinforced. Bed alarm on. Will continue to monitor.

## 2022-07-30 NOTE — CARE UPDATE
BG goal 140-180    Patient remains in CSU. POD 5 s/p CABG. BG well controlled with no prn SQ insulin requirements overnight. Diet Cardiac Fluid - 1500mL      Plan:  -Discontinue Low Dose Correction Scale and BG monitoring ac/hs  -Recommend continuing to monitor BG with am labs       ** Please call Endocrine for any BG related issues **      Lab Results   Component Value Date    HGBA1C 6.2 (H) 07/15/2022       Discharge plans: Likely no needs. Patient with a1c indicating Pre-DM. Would recommend dietary and lifestyle changes and repeat a1c with PCP in 3 months.       Endocrine will sign off at this time given no insulin needs inpatient. Please reconsult if needed.

## 2022-07-30 NOTE — NURSING
Pt AAO, VSS. Upon discharge, all PIV's were removed and AVS reviewed with pt. Narcotic prescription given to pt to bring to personal pharmacy. All questions were answered. Pt left via wheelchair.

## 2022-07-30 NOTE — PLAN OF CARE
Problem: Adult Inpatient Plan of Care  Goal: Plan of Care Review  Outcome: Ongoing, Progressing  Goal: Patient-Specific Goal (Individualized)  Outcome: Ongoing, Progressing  Goal: Absence of Hospital-Acquired Illness or Injury  Outcome: Ongoing, Progressing  Goal: Optimal Comfort and Wellbeing  Outcome: Ongoing, Progressing  Goal: Readiness for Transition of Care  Outcome: Ongoing, Progressing     Problem: Impaired Wound Healing  Goal: Optimal Wound Healing  Outcome: Ongoing, Progressing     Problem: Fall Injury Risk  Goal: Absence of Fall and Fall-Related Injury  Outcome: Ongoing, Progressing     Problem: Infection  Goal: Absence of Infection Signs and Symptoms  Outcome: Ongoing, Progressing     Problem: Communication Impairment (Mechanical Ventilation, Invasive)  Goal: Effective Communication  Outcome: Ongoing, Progressing     Problem: Device-Related Complication Risk (Mechanical Ventilation, Invasive)  Goal: Optimal Device Function  Outcome: Ongoing, Progressing     Problem: Inability to Wean (Mechanical Ventilation, Invasive)  Goal: Mechanical Ventilation Liberation  Outcome: Ongoing, Progressing     Problem: Nutrition Impairment (Mechanical Ventilation, Invasive)  Goal: Optimal Nutrition Delivery  Outcome: Ongoing, Progressing     Problem: Skin and Tissue Injury (Mechanical Ventilation, Invasive)  Goal: Absence of Device-Related Skin and Tissue Injury  Outcome: Ongoing, Progressing     Problem: Ventilator-Induced Lung Injury (Mechanical Ventilation, Invasive)  Goal: Absence of Ventilator-Induced Lung Injury  Outcome: Ongoing, Progressing     Problem: Communication Impairment (Artificial Airway)  Goal: Effective Communication  Outcome: Ongoing, Progressing     Problem: Device-Related Complication Risk (Artificial Airway)  Goal: Optimal Device Function  Outcome: Ongoing, Progressing     Problem: Skin and Tissue Injury (Artificial Airway)  Goal: Absence of Device-Related Skin or Tissue Injury  Outcome: Ongoing,  Progressing     Problem: Noninvasive Ventilation Acute  Goal: Effective Unassisted Ventilation and Oxygenation  Outcome: Ongoing, Progressing     Problem: Skin Injury Risk Increased  Goal: Skin Health and Integrity  Outcome: Ongoing, Progressing     Problem: Restraint, Nonbehavioral (Nonviolent)  Goal: Absence of Harm or Injury  Outcome: Ongoing, Progressing

## 2022-07-30 NOTE — DISCHARGE INSTRUCTIONS
Discharge Instructions    The hospital and staff members extend their best wishes to you as you are discharged. Because we are most concerned with your health, we suggest you carefully read the following instructions.    Activity Instructions  Ambulate.  No strenuous Exercise   May shower with soap and water.  Do not let shower hit incision directly.  Dry well.       Restrictions:   Sternal precautions.  No heavy lifting more than 5lbs for 6 weeks.  No driving for 4 weeks.     Diet:   Low Salt/Chol/Fat diet    Wound Care Instruction  Keep incision/wound dry-no tub baths.  Clean with soap and water daily. Do not apply ointments or powder to incision sites.   Remove old chest tube dressing tomorrow and leave open to air unless drainage noted then change daily.      When to call the doctors:  For any obvious bleeding, Fever over 101 degrees, Redness or swelling around the incision, Drainage(pus) from the wound, Unusual problems or difficulties, Persistent pain not relieved by the pain medication.    Call [phone] to have the operators page the doctor on call for Cardiothoracic surgery after hours with questions or concerns      Current pain management regimen    IF PAIN INTENSIFIES OR PAIN IS UNRELIEVED WITH MEDICATIONS AS ORDERED, CALL YOUR DOCTOR

## 2022-07-30 NOTE — DISCHARGE SUMMARY
[facility] - Cardiology Psychiatric  Cardiothoracic Surgery  Discharge Summary      Patient Name: Yo Pink  MRN: [id number]  Admission Date: 7/15/2022  Hospital Length of Stay: 13 days  Discharge Date and Time:  07/30/2022 9:54 AM  Attending Physician: Kenton Wynn MD   Discharging Provider: Jane Cox MD  Primary Care Provider: [facility]    HPI:   Yo Pink is a 57 y.o. male with medical problems including PAD s/p LLE PTA and stenting on 7/5/22 in the letting of left 3rd toe gangrene who presents as a transfer from the [facility] for CTS evaluation in the setting of NSTEMI. He was admitted to the [facility] on 7/15/22 in the setting of worsening LLE toe pain and was initiated on antibiotics. Shortly after this he became diaphoretic and short of breath. Workup demonstrated elevated troponin and Echo demonstrating EF of 30% with wall motion abnormalities. He underwent coronary angiogram which demonstrated  of the proximal LAD and mid RCA. He was transferred to [facility] for consideration of CABG.      On arrival he reports controlled left toe pain which he describes as throbbing in nature. He reports he is otherwise feeling well.       Procedure(s) (LRB):  CORONARY ARTERY BYPASS GRAFT (CABG) X 3 (N/A)  SURGICAL PROCUREMENT, VEIN, ENDOSCOPIC (Left)      Indwelling Lines/Drains at time of discharge:   Lines/Drains/Airways     None               Hospital Course: On 7/25/22, the patient was taken to the Operating Room for the above stated procedure. Please see the previously dictated operative report for complete details. Postoperatively, the patient was taken from the  Operating Room to the ICU where the vital signs were monitored and pain was kept under control. The patient was weaned from the drips and extubated in the ICU per protocol. Once hemodynamically stable, the patient was transferred to the Cardiac Step-Down floor for continued strengthening and ambulation. Post operative course was  uncomplicated. On postoperative day 5, the patient was ready for discharge to home. At the time of discharge, the patient was ambulating unassisted. Pain was well controlled with oral analgesics and the patient was tolerating the diet.    MOBILITY AND ACTIVITY:   As tolerated. Patient may shower. No heavy lifting of greater than 5 pounds and no driving.     DIET:   An 1800-calorie ADA with a 1500 mL fluid restriction.     WOUND CARE INSTRUCTIONS:   Check for redness, swelling and drainage around the  incision or wound. Patient is to call for any obvious bleeding, drainage, pus from the wound, unusual problems or difficulties or temperature of greater than 101   degrees.     FOLLOW UP:   Follow up with Dr. Wynn in approximately 5 weeks. Prior to this  appointment, the patient will have a chest x-ray and EKG.        DISCHARGE CONDITION:   At the time of discharge, the patient was in sinus rhythm and afebrile with stable vital signs.        Goals of Care Treatment Preferences:  Code Status: Full Code      Consults (From admission, onward)        Status Ordering Provider     Inpatient consult to Endocrinology  Once        Provider:  (Not yet assigned)    Completed MAYA WYNN     Inpatient consult to Registered Dietitian/Nutritionist  Once        Provider:  (Not yet assigned)    Completed WILIAN VALENTINO     Inpatient consult to Vascular Surgery  Once        Provider:  (Not yet assigned)    Completed VIRGILIO KNOWLES     Inpatient consult to Social Work/Case Management  Once        Provider:  (Not yet assigned)    Completed NAOH SHARIF     Inpatient consult to Social Work  Once        Provider:  (Not yet assigned)    Completed NOAH SHARIF     Inpatient consult to Cardiology  Once        Provider:  Noah Sharif MD    Completed ADELAIDA COTTO     Inpatient consult to Infectious Diseases  Once        Provider:  Estela Mayfield MD    Completed ADELAIDA COTTO     Inpatient consult  to Podiatry  Once        Provider:  Mely Alonzo DPM    Completed JOSE DOUGLASS     Inpatient consult to Vascular Surgery  Once        Provider:  Mehul Rich MD    Completed TARSHA SHARIF          Significant Diagnostic Studies:     Pending Diagnostic Studies:     None          S/P CABG (coronary artery bypass graft)  - Maintain sternal precautions   - ASA  - BB  - Statin  - Plavix  - Lovenox  - Lasix  with scheduled potassium ordered   - Encourage ambulation  - PT/OT  - Cardiac diet with 1500 cc fluid restriction   - Bowel regimen in place   - Monitor electrolytes to keep Mag above 2 and Potassium above 4  - OOBTO   - Encourage IS use      Transient hyperglycemia post procedure  - Endocrine     Ischemic cardiomyopathy  - Entresto  - Aldactone      Dyslipidemia  - Statin    Hypokalemia  - Replace as needed      Final Active Diagnoses:    Diagnosis Date Noted POA    PRINCIPAL PROBLEM:  ACS (acute coronary syndrome) [I24.9] 07/16/2022 No    S/P CABG (coronary artery bypass graft) [Z95.1] 07/29/2022 Not Applicable    Transient hyperglycemia post procedure [R73.9] 07/26/2022 Unknown    Ischemic cardiomyopathy [I25.5] 07/17/2022 Yes    Hypokalemia [E87.6] 07/16/2022 Yes    Dyslipidemia [E78.5] 07/16/2022 Yes    Dry gangrene [I96] 06/18/2022 Yes    Hypertensive urgency [I16.0] 06/18/2022 Yes    PAD (peripheral artery disease) [I73.9] 06/18/2022 Yes      Problems Resolved During this Admission:    Diagnosis Date Noted Date Resolved POA    Cellulitis of foot [L03.119] 07/15/2022 07/16/2022 Yes      Discharged Condition: good    Disposition: Home or Self Care    Follow Up:   Follow-up Information     Amanda Whitlock Follow up.    Why: DME:  to be placed  Contact information:  [address] [place] [address] [phone]           [facility] Alexa Aguilar Follow up.    Contact information:  [address] [place] [address] [phone]             Kenton Wynn MD  Follow up in 5 week(s).    Specialties: Cardiothoracic Surgery, Transplant, Vascular Surgery  Contact information:  [address]  [place] [address]  [phone]             Mehul Rich MD Follow up in 3 week(s).    Specialties: Vascular Surgery, Surgery  Contact information:  [address]  [address]  [place] [address]  [phone]                       Patient Instructions:      Ambulatory referral/consult to Physical/Occupational Therapy   Standing Status: Future   Referral Priority: Routine Referral Type: Physical Medicine   Referral Reason: Specialty Services Required   Requested Specialty: Physical Therapy   Number of Visits Requested: 1     Diet Cardiac     No driving until:   Order Comments: 4 weeks     Lifting restrictions   Order Comments: No lifting more than 5lbs for 6 weeks     Notify your health care provider if you experience any of the following:  temperature >100.4     Notify your health care provider if you experience any of the following:  persistent nausea and vomiting or diarrhea     Notify your health care provider if you experience any of the following:  severe uncontrolled pain     Notify your health care provider if you experience any of the following:  redness, tenderness, or signs of infection (pain, swelling, redness, odor or green/yellow discharge around incision site)     Notify your health care provider if you experience any of the following:  difficulty breathing or increased cough     No dressing needed     Medications:  Reconciled Home Medications:      Medication List      START taking these medications    aspirin 81 MG EC tablet  Commonly known as: ECOTRIN  Take 1 tablet (81 mg total) by mouth once daily.  Start taking on: July 31, 2022     atorvastatin 40 MG tablet  Commonly known as: LIPITOR  Take 1 tablet (40 mg total) by mouth once daily.  Start taking on: July 31, 2022     docusate sodium 100 MG capsule  Commonly known as: COLACE  Take 1 capsule (100  mg total) by mouth 2 (two) times daily. for 7 days     furosemide 40 MG tablet  Commonly known as: LASIX  Take 1 tablet (40 mg total) by mouth once daily. for 7 days     gabapentin 100 MG capsule  Commonly known as: NEURONTIN  Take 2 capsules (200 mg total) by mouth 3 (three) times daily. for 20 days     metoprolol succinate 100 MG 24 hr tablet  Commonly known as: TOPROL-XL  Take 1 tablet (100 mg total) by mouth once daily.  Start taking on: July 31, 2022     oxyCODONE 5 MG immediate release tablet  Commonly known as: ROXICODONE  Take 1 tablet (5 mg total) by mouth every 4 (four) hours as needed for Pain.     polyethylene glycol 17 gram Pwpk  Commonly known as: GLYCOLAX  Take 17 g by mouth once daily. for 7 days  Start taking on: July 31, 2022     potassium chloride SA 20 MEQ tablet  Commonly known as: K-DUR,KLOR-CON  Take 1 tablet (20 mEq total) by mouth 2 (two) times daily. for 7 days     sacubitriL-valsartan 24-26 mg per tablet  Commonly known as: ENTRESTO  Take 1 tablet by mouth 2 (two) times daily.     spironolactone 25 MG tablet  Commonly known as: ALDACTONE  Take 1 tablet (25 mg total) by mouth once daily.  Start taking on: July 31, 2022        CONTINUE taking these medications    acetaminophen 500 MG tablet  Commonly known as: TYLENOL  Take 1,000 mg by mouth every 6 (six) hours as needed for Pain.     clopidogreL 75 mg tablet  Commonly known as: PLAVIX  Take 1 tablet (75 mg total) by mouth once daily.        STOP taking these medications    amoxicillin-clavulanate 875-125mg 875-125 mg per tablet  Commonly known as: AUGMENTIN     oxyCODONE-acetaminophen 5-325 mg per tablet  Commonly known as: PERCOCET          Time spent on the discharge of patient: 30 minutes    Jane Cox MD  Cardiothoracic Surgery  [facility] - Cardiology Stepdown

## 2022-08-01 NOTE — PLAN OF CARE
[facility] - Cardiology Stepdown  Discharge Final Note    Primary Care Provider:  [facility]    Expected Discharge Date: 7/30/2022    Final Discharge Note (most recent)     Final Note - 08/01/22 0924        Final Note    Assessment Type Final Discharge Note     Anticipated Discharge Disposition Home or Self Care               Na Bunn LMSW  [facility]  [phone]      Future Appointments   Date Time Provider Department Center   8/15/2022 11:30 AM Mehul Rich MD [facility]   9/7/2022  8:30 AM EKG, APPT [facility] EKG [facility]   9/7/2022  9:20 AM Kenton Wynn MD [facility] CARDVAS [facility]         Contact Info     [employer]    [facility] [employer] [place] [address]   Phone: [phone]       Next Steps: Follow up    Instructions: DME:  to be placed    [facility]   Relationship: PCP - General    [address]  [place] [address]   Phone: [phone]       Next Steps: Follow up    Kenton Wynn MD   Specialty: Cardiothoracic Surgery, Transplant, Vascular Surgery    [address] [facility] [address]   Phone: [phone]       Next Steps: Follow up in 5 week(s)    Mehul Rich MD   Specialty: Vascular Surgery, Surgery    [address] [address]   Phone: [phone]       Next Steps: Follow up in 3 week(s)

## 2022-08-15 ENCOUNTER — OFFICE VISIT (OUTPATIENT)
Dept: VASCULAR SURGERY | Facility: CLINIC | Age: 57
End: 2022-08-15
Payer: MEDICAID

## 2022-08-15 VITALS
WEIGHT: 159.81 LBS | SYSTOLIC BLOOD PRESSURE: 128 MMHG | BODY MASS INDEX: 22.93 KG/M2 | DIASTOLIC BLOOD PRESSURE: 82 MMHG

## 2022-08-15 DIAGNOSIS — I96 DRY GANGRENE: ICD-10-CM

## 2022-08-15 DIAGNOSIS — I70.249 ATHEROSCLEROSIS OF NATIVE ARTERY OF LEFT LOWER EXTREMITY WITH ULCERATION, UNSPECIFIED ULCERATION SITE: Primary | ICD-10-CM

## 2022-08-15 DIAGNOSIS — I70.229 CRITICAL LOWER LIMB ISCHEMIA: Primary | ICD-10-CM

## 2022-08-15 PROCEDURE — 3044F HG A1C LEVEL LT 7.0%: CPT | Mod: CPTII,,, | Performed by: SURGERY

## 2022-08-15 PROCEDURE — 3074F PR MOST RECENT SYSTOLIC BLOOD PRESSURE < 130 MM HG: ICD-10-PCS | Mod: CPTII,,, | Performed by: SURGERY

## 2022-08-15 PROCEDURE — 3079F DIAST BP 80-89 MM HG: CPT | Mod: CPTII,,, | Performed by: SURGERY

## 2022-08-15 PROCEDURE — 4010F ACE/ARB THERAPY RXD/TAKEN: CPT | Mod: CPTII,,, | Performed by: SURGERY

## 2022-08-15 PROCEDURE — 99999 PR PBB SHADOW E&M-EST. PATIENT-LVL III: ICD-10-PCS | Mod: PBBFAC,,, | Performed by: SURGERY

## 2022-08-15 PROCEDURE — 3044F PR MOST RECENT HEMOGLOBIN A1C LEVEL <7.0%: ICD-10-PCS | Mod: CPTII,,, | Performed by: SURGERY

## 2022-08-15 PROCEDURE — 3074F SYST BP LT 130 MM HG: CPT | Mod: CPTII,,, | Performed by: SURGERY

## 2022-08-15 PROCEDURE — 3079F PR MOST RECENT DIASTOLIC BLOOD PRESSURE 80-89 MM HG: ICD-10-PCS | Mod: CPTII,,, | Performed by: SURGERY

## 2022-08-15 PROCEDURE — 3008F PR BODY MASS INDEX (BMI) DOCUMENTED: ICD-10-PCS | Mod: CPTII,,, | Performed by: SURGERY

## 2022-08-15 PROCEDURE — 1159F PR MEDICATION LIST DOCUMENTED IN MEDICAL RECORD: ICD-10-PCS | Mod: CPTII,,, | Performed by: SURGERY

## 2022-08-15 PROCEDURE — 1159F MED LIST DOCD IN RCRD: CPT | Mod: CPTII,,, | Performed by: SURGERY

## 2022-08-15 PROCEDURE — 99213 OFFICE O/P EST LOW 20 MIN: CPT | Mod: PBBFAC | Performed by: SURGERY

## 2022-08-15 PROCEDURE — 1111F PR DISCHARGE MEDS RECONCILED W/ CURRENT OUTPATIENT MED LIST: ICD-10-PCS | Mod: CPTII,,, | Performed by: SURGERY

## 2022-08-15 PROCEDURE — 99999 PR PBB SHADOW E&M-EST. PATIENT-LVL III: CPT | Mod: PBBFAC,,, | Performed by: SURGERY

## 2022-08-15 PROCEDURE — 99215 OFFICE O/P EST HI 40 MIN: CPT | Mod: S$PBB,,, | Performed by: SURGERY

## 2022-08-15 PROCEDURE — 99215 PR OFFICE/OUTPT VISIT, EST, LEVL V, 40-54 MIN: ICD-10-PCS | Mod: S$PBB,,, | Performed by: SURGERY

## 2022-08-15 PROCEDURE — 4010F PR ACE/ARB THEARPY RXD/TAKEN: ICD-10-PCS | Mod: CPTII,,, | Performed by: SURGERY

## 2022-08-15 PROCEDURE — 1111F DSCHRG MED/CURRENT MED MERGE: CPT | Mod: CPTII,,, | Performed by: SURGERY

## 2022-08-15 PROCEDURE — 3008F BODY MASS INDEX DOCD: CPT | Mod: CPTII,,, | Performed by: SURGERY

## 2022-08-15 NOTE — PROGRESS NOTES
HPI:  Yo Pink is a 57 y.o. male with       Patient Active Problem List   Diagnosis    Dry gangrene    Hypertensive urgency    PAD (peripheral artery disease)    ACS (acute coronary syndrome)    Hypokalemia    Dyslipidemia    Ischemic cardiomyopathy    NSTEMI (non-ST elevated myocardial infarction)    being managed by PCP and specialists who is here today for evaluation of bilateral toe wounds.  Patient states location is bilateral feet occurring for months.  Associated signs and symptoms include discoloration and pain.  Quality is dull and severity is 6/10.  Symptoms began mo ago.  Alleviating factors include wound care.  Worsening factors include pressure.  Cardiology evaluated patient and is planning heart cath 7/18/22.     no MI  no Stroke  Tobacco use: daily    August 2022:  Patient presents for follow-up status post coronary artery bypass surgery.  His wounds remained dry.  He denies fevers or chills.  Followed in hospital and discussed plan with Dr. Chin who states that at least 3 weeks, ideally 6 weeks postoperatively would be best to perform any further surgery on this patient.           Past Medical History:   Diagnosis Date    PAD (peripheral artery disease)              Past Surgical History:   Procedure Laterality Date    ANGIOGRAPHY OF LOWER EXTREMITY Left 7/5/2022     Procedure: Angiogram Extremity Unilateral;  Surgeon: Mehul Rich MD;  Location: [facility] OR;  Service: Vascular;  Laterality: Left;  RN PREOP ON 7/1/22. BINAX ON 7/5/22---NEED CONSENT    LEFT HEART CATHETERIZATION Left 7/18/2022     Procedure: Left heart cath;  Surgeon: Noah Huffman MD;  Location: [facility] CATH LAB;  Service: Cardiology;  Laterality: Left;  not before 9am, R rad access      History reviewed. No pertinent family history.  Social History            Socioeconomic History    Marital status: Single   Tobacco Use    Smoking status: Former Smoker       Quit date: 5/14/2022       Years since  quittin.1    Smokeless tobacco: Never Used   Substance and Sexual Activity    Alcohol use: Never    Drug use: Not Currently         Current Facility-Administered Medications:     acetaminophen tablet 650 mg, 650 mg, Oral, Q4H PRN, Noah Huffman MD, 650 mg at 22 0949    acetaminophen tablet 650 mg, 650 mg, Oral, Q4H PRN, Noah Huffman MD    aspirin chewable tablet 81 mg, 81 mg, Oral, Daily, Noah Huffman MD, 81 mg at 22 0807    atorvastatin tablet 80 mg, 80 mg, Oral, QHS, Noah Huffman MD, 80 mg at 22 204    atropine injection 0.5 mg, 0.5 mg, Intravenous, PRN, Noah Huffman MD    clopidogreL tablet 75 mg, 75 mg, Oral, Daily, Noah Huffman MD, 75 mg at 22 0808    dextrose 10% bolus 125 mL, 12.5 g, Intravenous, PRN, Noah Huffman MD    dextrose 10% bolus 250 mL, 25 g, Intravenous, PRN, Noah Huffman MD    glucagon (human recombinant) injection 1 mg, 1 mg, Intramuscular, PRN, Noah Huffman MD    glucose chewable tablet 16 g, 16 g, Oral, PRN, Noah Huffman MD    glucose chewable tablet 24 g, 24 g, Oral, PRN, Noah Huffman MD    hydrALAZINE injection 10 mg, 10 mg, Intravenous, Q6H PRN, Noah Huffman MD    HYDROcodone-acetaminophen  mg per tablet 1 tablet, 1 tablet, Oral, Q4H PRN, Noah Huffman MD, 1 tablet at 22 0551    HYDROcodone-acetaminophen 5-325 mg per tablet 1 tablet, 1 tablet, Oral, Q6H PRN, Noah Huffman MD, 1 tablet at 22 0241    HYDROcodone-acetaminophen 5-325 mg per tablet 1 tablet, 1 tablet, Oral, Q4H PRN, Noah Huffman MD, 1 tablet at 22 0237    ibuprofen tablet 400 mg, 400 mg, Oral, Q6H PRN, Noah Huffman MD    insulin aspart U-100 pen 0-5 Units, 0-5 Units, Subcutaneous, QID (AC + HS) PRN, Noah Huffman MD    melatonin tablet 6 mg, 6 mg, Oral, Nightly PRN, Noah Huffman MD    metoprolol succinate (TOPROL-XL) 24 hr tablet 25 mg,  25 mg, Oral, Daily, Noah Huffman MD, 25 mg at 07/20/22 0808    morphine injection 2 mg, 2 mg, Intravenous, Q10 Min PRN, Noah Huffman MD    naloxone 0.4 mg/mL injection 0.02 mg, 0.02 mg, Intravenous, PRN, Noah Huffman MD    nitroGLYCERIN SL tablet 0.4 mg, 0.4 mg, Sublingual, Q5 Min PRN, Noah Huffman MD    ondansetron disintegrating tablet 8 mg, 8 mg, Oral, Q8H PRN, Noah Huffman MD    ondansetron injection 4 mg, 4 mg, Intravenous, Q8H PRN, Noah Huffman MD    oxyCODONE immediate release tablet 5 mg, 5 mg, Oral, Q6H PRN, Noah Huffman MD, 5 mg at 07/20/22 0423    sacubitriL-valsartan 24-26 mg per tablet 2 tablet, 2 tablet, Oral, BID, Noah Huffman MD    sodium chloride 0.9% bolus 250 mL, 250 mL, Intravenous, PRN, Noah Huffman MD    sodium chloride 0.9% flush 10 mL, 10 mL, Intravenous, Q12H PRN, Noah Huffman MD                   Medications Prior to Admission   Medication Sig Dispense Refill Last Dose    acetaminophen (TYLENOL) 500 MG tablet Take 1,000 mg by mouth every 6 (six) hours as needed for Pain.          amoxicillin-clavulanate 875-125mg (AUGMENTIN) 875-125 mg per tablet Take 1 tablet by mouth every 12 (twelve) hours. 20 tablet 0      amoxicillin-clavulanate 875-125mg (AUGMENTIN) 875-125 mg per tablet Take 1 tablet by mouth every 12 (twelve) hours. for 7 days 14 tablet 0      clopidogreL (PLAVIX) 75 mg tablet Take 1 tablet (75 mg total) by mouth once daily. 30 tablet 11      oxyCODONE-acetaminophen (PERCOCET) 5-325 mg per tablet Take 1 tablet by mouth every 4 (four) hours as needed for Pain. 28 tablet 0           Review of patient's allergies indicates:  No Known Allergies          Past Medical History:   Diagnosis Date    PAD (peripheral artery disease)              Past Surgical History:   Procedure Laterality Date    ANGIOGRAPHY OF LOWER EXTREMITY Left 7/5/2022     Procedure: Angiogram Extremity Unilateral;  Surgeon: Mehul NIEVES  MD Hilario;  Location: [facility] OR;  Service: Vascular;  Laterality: Left;  RN PREOP ON 22. BINAX ON 22---NEED CONSENT    LEFT HEART CATHETERIZATION Left 2022     Procedure: Left heart cath;  Surgeon: Noah Huffman MD;  Location: [facility] CATH LAB;  Service: Cardiology;  Laterality: Left;  not before 9am, R rad access      Family History    None               Tobacco Use    Smoking status: Former Smoker       Quit date: 2022       Years since quittin.1    Smokeless tobacco: Never Used   Substance and Sexual Activity    Alcohol use: Never    Drug use: Not Currently    Sexual activity: Not on file      Review of Systems   Constitutional:  Negative for chills and fever.   HENT:  Negative for congestion.    Eyes:  Negative for visual disturbance.   Respiratory:  Negative for shortness of breath.    Cardiovascular:  Negative for chest pain.   Gastrointestinal:  Negative for abdominal distention.   Endocrine: Negative for cold intolerance.   Genitourinary:  Negative for flank pain.   Musculoskeletal:  Negative for back pain.   Skin:  Positive for color change and wound. Negative for pallor and rash.   Allergic/Immunologic: Negative for immunocompromised state.   Neurological:  Negative for dizziness.   Hematological:  Does not bruise/bleed easily.   Psychiatric/Behavioral:  Negative for agitation.    Objective:      Vital Signs (Most Recent):  Temp: 98.4 °F (36.9 °C) (22 1132)  Pulse: 73 (22 1132)  Resp: 20 (22 1132)  BP: 123/81 (22 1132)  SpO2: 99 % (22 1132) Vital Signs (24h Range):  Temp:  [97.5 °F (36.4 °C)-98.4 °F (36.9 °C)] 98.4 °F (36.9 °C)  Pulse:  [63-85] 73  Resp:  [16-20] 20  SpO2:  [96 %-99 %] 99 %  BP: (123-155)/(81-94) 123/81      Weight: 77.1 kg (170 lb)  Body mass index is 24.39 kg/m².     Physical Exam  Vitals reviewed.   Constitutional:       General: He is not in acute distress.     Appearance: He is well-developed. He is not diaphoretic.   HENT:       Head: Normocephalic and atraumatic.   Eyes:      Conjunctiva/sclera: Conjunctivae normal.   Cardiovascular:      Rate and Rhythm: Normal rate.      Pulses:           Femoral pulses are 2+ on the right side and 2+ on the left side.       Dorsalis pedis pulses are detected w/ Doppler on the right side and detected w/ Doppler on the left side.        Posterior tibial pulses are detected w/ Doppler on the right side and detected w/ Doppler on the left side.   Pulmonary:      Effort: Pulmonary effort is normal.   Abdominal:      General: There is no distension.      Palpations: Abdomen is soft. There is no mass.      Tenderness: There is no abdominal tenderness. There is no guarding or rebound.      Hernia: No hernia is present.   Musculoskeletal:         General: No deformity. Normal range of motion.      Cervical back: Neck supple.   Skin:     Findings: No rash.   Neurological:      Mental Status: He is alert and oriented to person, place, and time.    R 4th toe dry ulcer and dry ischemic changes to R great toe, L 4th toe dry ischemic changes, no purulence or erythema     Significant Labs:  All pertinent labs from the last 24 hours have been reviewed.     Significant Diagnostics:  I have reviewed all pertinent imaging results/findings within the past 24 hours.     Assessment/Plan:      * Dry gangrene  Will perform L 3rd toe amputation as soon as safely able; RTC at 6 weeks postop from CABG with arterial studies and f/u Dr Tianna valencia.    Subsequent RLE angiogram for critical limb ischemia.  Recommend wound care, continued offloading and optimization of comorbidities

## 2022-08-17 ENCOUNTER — TELEPHONE (OUTPATIENT)
Dept: VASCULAR SURGERY | Facility: CLINIC | Age: 57
End: 2022-08-17
Payer: MEDICAID

## 2022-08-17 NOTE — TELEPHONE ENCOUNTER
Spoke with PT advised to call PCP for  pain medication.          ----- Message from Mehul Rich MD sent at 8/16/2022  5:30 PM CDT -----  Regarding: RE: Call  Contact: Patient  Please have patient obtain pain medication from his PCP as soon as possible.  Thank you  ----- Message -----  From: Geni Griffin MA  Sent: 8/15/2022   2:46 PM CDT  To: Mehul Rich MD  Subject: FW: Call                                           ----- Message -----  From: Francisca Krueger  Sent: 8/15/2022   2:46 PM CDT  To: [facility] Carvre Staff  Subject: Call                                             Type: Patient Call Back    Who called:Patient    What is the request in detail: Patient is requesting a call back. He states pain is excruciating and he needs medication as soon as possible. Please advise.    Can the clinic reply by [facility]? No    Would the patient rather a call back or a response via [facility]? Call    Best call back number: [phone]    Additional Information:   [id number] - [place]D GONZALES - [address] AT [facility] [address]  [address]  [place] [address]  Phone: [phone] Fax: [phone]    Thanks

## 2022-09-06 NOTE — PROGRESS NOTES
Patient seen and examined. Patient is progressively increasing activity. No significant complaints.     Sternum: stable, incision CDI  EKG: NSR     Assessment:  S/P CABG 7/25/22      Plan:  Can begin driving as long as he has power steering  Can begin cardiac rehab in the next couple of weeks  We will refer to cardiology to assume care   DC lasix  DC potassium    No scheduled appointment, RTC prn    I have seen the patient and reviewed the physician assistant's note above. I have personally interviewed and examined the patient at bedside and agree with the findings.     Mr. Pink is doing well after his triple vessel coronary artery bypass surgery on July 25, 2022.  He is walking a little each day (due to ischemic foot issues) but feeling stronger.  He can see me in clinic as needed.      Kenton Wynn MD  Cardiothoracic Surgery  [facility]

## 2022-09-07 ENCOUNTER — HOSPITAL ENCOUNTER (OUTPATIENT)
Dept: CARDIOLOGY | Facility: CLINIC | Age: 57
Discharge: HOME | End: 2022-09-07
Payer: MEDICAID

## 2022-09-07 ENCOUNTER — DOCUMENTATION ONLY (OUTPATIENT)
Dept: CARDIOTHORACIC SURGERY | Facility: CLINIC | Age: 57
End: 2022-09-07

## 2022-09-07 ENCOUNTER — OFFICE VISIT (OUTPATIENT)
Dept: CARDIOTHORACIC SURGERY | Facility: CLINIC | Age: 57
End: 2022-09-07
Payer: MEDICAID

## 2022-09-07 VITALS
SYSTOLIC BLOOD PRESSURE: 138 MMHG | HEIGHT: 70 IN | BODY MASS INDEX: 22.93 KG/M2 | DIASTOLIC BLOOD PRESSURE: 92 MMHG | OXYGEN SATURATION: 100 % | HEART RATE: 95 BPM

## 2022-09-07 DIAGNOSIS — I25.10 CORONARY ARTERY DISEASE INVOLVING NATIVE CORONARY ARTERY OF NATIVE HEART WITHOUT ANGINA PECTORIS: ICD-10-CM

## 2022-09-07 DIAGNOSIS — Z95.1 S/P CABG (CORONARY ARTERY BYPASS GRAFT): Primary | ICD-10-CM

## 2022-09-07 DIAGNOSIS — I24.9 ACS (ACUTE CORONARY SYNDROME): ICD-10-CM

## 2022-09-07 PROCEDURE — 93010 EKG 12-LEAD: ICD-10-PCS | Mod: S$PBB,,, | Performed by: INTERNAL MEDICINE

## 2022-09-07 PROCEDURE — 3008F PR BODY MASS INDEX (BMI) DOCUMENTED: ICD-10-PCS | Mod: CPTII,,, | Performed by: THORACIC SURGERY (CARDIOTHORACIC VASCULAR SURGERY)

## 2022-09-07 PROCEDURE — 3008F BODY MASS INDEX DOCD: CPT | Mod: CPTII,,, | Performed by: THORACIC SURGERY (CARDIOTHORACIC VASCULAR SURGERY)

## 2022-09-07 PROCEDURE — 93010 ELECTROCARDIOGRAM REPORT: CPT | Mod: S$PBB,,, | Performed by: INTERNAL MEDICINE

## 2022-09-07 PROCEDURE — 3080F PR MOST RECENT DIASTOLIC BLOOD PRESSURE >= 90 MM HG: ICD-10-PCS | Mod: CPTII,,, | Performed by: THORACIC SURGERY (CARDIOTHORACIC VASCULAR SURGERY)

## 2022-09-07 PROCEDURE — 93005 ELECTROCARDIOGRAM TRACING: CPT | Mod: PBBFAC | Performed by: INTERNAL MEDICINE

## 2022-09-07 PROCEDURE — 99213 OFFICE O/P EST LOW 20 MIN: CPT | Mod: PBBFAC | Performed by: THORACIC SURGERY (CARDIOTHORACIC VASCULAR SURGERY)

## 2022-09-07 PROCEDURE — 4010F ACE/ARB THERAPY RXD/TAKEN: CPT | Mod: CPTII,,, | Performed by: THORACIC SURGERY (CARDIOTHORACIC VASCULAR SURGERY)

## 2022-09-07 PROCEDURE — 99999 PR PBB SHADOW E&M-EST. PATIENT-LVL III: ICD-10-PCS | Mod: PBBFAC,,, | Performed by: THORACIC SURGERY (CARDIOTHORACIC VASCULAR SURGERY)

## 2022-09-07 PROCEDURE — 99024 PR POST-OP FOLLOW-UP VISIT: ICD-10-PCS | Mod: ,,, | Performed by: THORACIC SURGERY (CARDIOTHORACIC VASCULAR SURGERY)

## 2022-09-07 PROCEDURE — 99024 POSTOP FOLLOW-UP VISIT: CPT | Mod: ,,, | Performed by: THORACIC SURGERY (CARDIOTHORACIC VASCULAR SURGERY)

## 2022-09-07 PROCEDURE — 1159F PR MEDICATION LIST DOCUMENTED IN MEDICAL RECORD: ICD-10-PCS | Mod: CPTII,,, | Performed by: THORACIC SURGERY (CARDIOTHORACIC VASCULAR SURGERY)

## 2022-09-07 PROCEDURE — 3075F SYST BP GE 130 - 139MM HG: CPT | Mod: CPTII,,, | Performed by: THORACIC SURGERY (CARDIOTHORACIC VASCULAR SURGERY)

## 2022-09-07 PROCEDURE — 3044F HG A1C LEVEL LT 7.0%: CPT | Mod: CPTII,,, | Performed by: THORACIC SURGERY (CARDIOTHORACIC VASCULAR SURGERY)

## 2022-09-07 PROCEDURE — 99999 PR PBB SHADOW E&M-EST. PATIENT-LVL III: CPT | Mod: PBBFAC,,, | Performed by: THORACIC SURGERY (CARDIOTHORACIC VASCULAR SURGERY)

## 2022-09-07 PROCEDURE — 3075F PR MOST RECENT SYSTOLIC BLOOD PRESS GE 130-139MM HG: ICD-10-PCS | Mod: CPTII,,, | Performed by: THORACIC SURGERY (CARDIOTHORACIC VASCULAR SURGERY)

## 2022-09-07 PROCEDURE — 1159F MED LIST DOCD IN RCRD: CPT | Mod: CPTII,,, | Performed by: THORACIC SURGERY (CARDIOTHORACIC VASCULAR SURGERY)

## 2022-09-07 PROCEDURE — 4010F PR ACE/ARB THEARPY RXD/TAKEN: ICD-10-PCS | Mod: CPTII,,, | Performed by: THORACIC SURGERY (CARDIOTHORACIC VASCULAR SURGERY)

## 2022-09-07 PROCEDURE — 3044F PR MOST RECENT HEMOGLOBIN A1C LEVEL <7.0%: ICD-10-PCS | Mod: CPTII,,, | Performed by: THORACIC SURGERY (CARDIOTHORACIC VASCULAR SURGERY)

## 2022-09-07 PROCEDURE — 3080F DIAST BP >= 90 MM HG: CPT | Mod: CPTII,,, | Performed by: THORACIC SURGERY (CARDIOTHORACIC VASCULAR SURGERY)

## 2022-09-07 NOTE — LETTER
[facility] - Cardiovasc Surg 2nd Fl  [address]  [facility] [address]  Phone: [phone]               September 7, 2022      Patient: Yo Pink   MR Number: [id number]   YOB: 1965   Date of Visit: 9/7/2022     Noah Huffman MD  [address]  [address]  [facility] [address]    Dear Dr. Huffman,     It was a pleasure seeing your patient, Mr. Pink, in our follow-up clinic today after his triple vessel coronary artery bypass surgery on July 25, 2022.  His postoperative recovery was normal and he is doing excellent.  His most recent echocardiogram showed severe left ventricular dysfunction.  He is a minimally active individual who walks daily and has no respiratory issues.     On examination today, his vital signs are normal and EKG is sinus rhythm.  The incision has healed very well and the sternum is stable.  Lungs are clear bilaterally and there is no significant heart murmur.  There are no signs of heart failure or peripheral edema.  He is now discharged from our clinic but please do not hesitate to contact me if there is any problem.     Thank you for your referral and for your trust and confidence in our [facility].  For convenience, attached below is a copy of the operative report.     Kindest regards,    Kenton Wynn MD  Cardiothoracic Surgery  [facility]  Cardiothoracic Surgery Operative Report     Patient Name:  Yo Pink; [id number]     Preoperative Diagnosis: Coronary artery disease, NSTEMI, acute systolic heart failure (150.21)     Postoperative Diagnosis:  Same     Date of Operation:  07/25/2022      Operation:  Triple vessel coronary artery bypass grafting  Left internal mammary artery to the distal left anterior descending artery  Saphenous vein graft to the ramus intermedius artery  Saphenous vein graft to the posterior descending artery  Endoscopic saphenous vein harvest from the left lower  extremity     Surgeon:  Kenton Wynn MD     Assistant Surgeon:  none     Fellow:  none     Anesthesiologist:  Romaine Blanchard MD     ---------------------------------------------------------------------------------------------------------        Indications for surgery:  Mr. Pink is a pleasant 57 year old male former smoker (3/4 to 1ppd x about 30-35 years, quit a few months ago) with heart failure reduced ejection fraction (30% ejection fraction) secondary to ischemic cardiomyopathy, pulmonary hypertension, severe peripheral arterial disease s/p lower extremity stenting and with toe gangrene, who presented to the hospital for amputation of his toe and experienced NSTEMI (troponin 0.114), acute heart failure exacerbation (), then underwent coronary angiogram showing left main disease and multivessel coronary artery disease.  Coronary angiogram details: 60% left main disease, totally occluded early mid LAD with a moderate sized mid and distal LAD (fills via left to left collaterals), large ramus intermedius, small to moderate sized low lying OM1 and OM2, and a totally occluded mRCA with a possible small to moderate sized PDA (fills very poorly).  The transthoracic echo shows 30% ejection fraction with LVEDD of 5.1cm, mild to moderate mitral regurgitation, mild tricuspid regurgitation, and estimated systolic PAP of 62mmHg.       CT chest noncontrast shows minimal ascending aortic and mild aortic arch calcifications.  Carotid ultrasound shows nonsignificant disease.     Given the severity of disease and the symptoms, I recommend coronary artery bypass surgery x 2 or 3 or 4 (LIMA-LAD, SVG-ramus, possible SVG-OM1 or OM2, possible SVG-PDA), possible mitral valve repair vs replacement (bioprosthesis).   The risks and benefits were explained and informed consent was obtained.      Gross findings: No pericardial adhesions.  Moderate LAD target, large ramus target, very small OM1 and OM2, small to moderate PDA  target.  Severe left ventricular dysfunction pre and post bypass.  Radio opaque markers placed on proximal coronary anastomoses.        Procedure:  The patient was brought to the holding area and the indications for surgery were reviewed.  Afterwards, the patient was placed supine on the operating room table and prepped and draped in the usual sterile fashion. A surgical time out was performed.      A midline incision was followed with division of the sternum. The left internal mammary artery was harvested. Simultaneously, two pieces of saphenous vein were harvested endoscopically from the leg. ACT guided heparinization was administered.  The ascending aorta and right atrium were cannulated.  Cardiopulmonary bypass was commenced.  The ascending aorta was cannulated for administration of cardioplegia.  A cross-clamp was applied and 1500cc of antegrade cold blood cardioplegia was administered. Subsequent doses of 500cc of antegrade cardioplegia were administered every 20 minutes.     Attention was turned to the posterior descending artery. The heart was positioned and the vessel was exposed. The artery was identified and arteriotomy performed with an 11 blade scalpel and elongated with scissors.  A segment of vein was prepared for use.  An end to side anastomosis was constructed with continuous 7-0 prolene. The vein was infused with 50cc of cardioplegia to confirm patency and hemostasis.  Afterwards, an aortotomy was made and the proximal anastomosis was constructed with continuous 6-0 prolene suture.     Attention was turned to the ramus intermedius artery. The heart was repositioned and elevated from the pericardial well.   The vessel was identified and opened with an 11 blade scalpel.  A second segment of vein was prepared for use.  An end to side anastomosis was constructed with continuous 7-0 prolene.  80cc of cardioplegia was infused to check for patency and hemostasis.  Afterwards, a second aortotomy was made  "and the proximal anastomosis was again constructed with continuous 6-0 prolene suture.     Attention was next turned to the distal left anterior descending artery. The heart was repositioned and the LAD was identified. The vessel was opened and the arteriotomy elongated with scissors.  The left internal mammary artery was brought to the field and prepared for use.  The left internal mammary artery was sewn end to side to the LAD with continuous 7-0 prolene. The vessel was briefly unclamped to assess for hemostasis.      A dose of warm "hot shot" cardioplegia was given antegrade.  Air was evacuated and the cross-clamp was removed. All anastomoses were checked for hemostasis and the patient was weaned from bypass on a moderate to high amount of inotropic support.  The total cardiopulmonary bypass time was 79 minutes and cross clamp time was 58 minutes.  The cannulae were removed and heparin was reversed.  Temporary atrial and ventricular pacing wires were placed on the heart.  Drainage tubes were placed behind the sternum and in the pleural spaces. The chest was closed with steel wires and the soft tissue was closed with absorbable suture.  A sterile dressing was applied and the patient was brought to the ICU in stable condition.       I was present in the operating room for the entire operation and immediately available thereafter.         "

## 2022-09-07 NOTE — PROGRESS NOTES
Pt instructed to follow-up with his cardiologist.  Pt may advance to lifting, pushing, and pulling up to 20 pounds for 6 weeks, for a total of 12 weeks of restrictions.  Pt reminded to continue washing his incisions everyday with soap and water.  Pt was provided with a copy of his AVS, and instructed on medication changes.  Pt verbalized understanding of all instructions.      [place]

## 2022-09-13 ENCOUNTER — HOSPITAL ENCOUNTER (OUTPATIENT)
Dept: CARDIOLOGY | Facility: HOSPITAL | Age: 57
Discharge: HOME | End: 2022-09-13
Attending: SURGERY
Payer: MEDICAID

## 2022-09-13 DIAGNOSIS — I70.249 ATHEROSCLEROSIS OF NATIVE ARTERY OF LEFT LOWER EXTREMITY WITH ULCERATION, UNSPECIFIED ULCERATION SITE: ICD-10-CM

## 2022-09-13 PROCEDURE — 93922 ANKLE BRACHIAL INDICES (ABI): ICD-10-PCS | Mod: 26,,, | Performed by: SURGERY

## 2022-09-13 PROCEDURE — 93925 CV US DOPPLER ARTERIAL LEGS BILATERAL (CUPID ONLY): ICD-10-PCS | Mod: 26,,, | Performed by: SURGERY

## 2022-09-13 PROCEDURE — 93925 LOWER EXTREMITY STUDY: CPT

## 2022-09-13 PROCEDURE — 93925 LOWER EXTREMITY STUDY: CPT | Mod: 26,,, | Performed by: SURGERY

## 2022-09-13 PROCEDURE — 93922 UPR/L XTREMITY ART 2 LEVELS: CPT

## 2022-09-13 PROCEDURE — 93922 UPR/L XTREMITY ART 2 LEVELS: CPT | Mod: 26,,, | Performed by: SURGERY

## 2022-09-14 ENCOUNTER — OFFICE VISIT (OUTPATIENT)
Dept: VASCULAR SURGERY | Facility: CLINIC | Age: 57
End: 2022-09-14
Payer: MEDICAID

## 2022-09-14 VITALS
BODY MASS INDEX: 22.93 KG/M2 | WEIGHT: 159.81 LBS | SYSTOLIC BLOOD PRESSURE: 122 MMHG | DIASTOLIC BLOOD PRESSURE: 81 MMHG

## 2022-09-14 DIAGNOSIS — I16.0 HYPERTENSIVE URGENCY: ICD-10-CM

## 2022-09-14 DIAGNOSIS — I96 DRY GANGRENE: ICD-10-CM

## 2022-09-14 DIAGNOSIS — I96 DRY GANGRENE: Primary | ICD-10-CM

## 2022-09-14 DIAGNOSIS — I70.229 CRITICAL LOWER LIMB ISCHEMIA: Primary | ICD-10-CM

## 2022-09-14 LAB
LEFT ABI: 1.19
LEFT ANT TIBIAL SYS PSV: 9 CM/S
LEFT ARM BP: 167 MMHG
LEFT CFA PSV: 97 CM/S
LEFT DORSALIS PEDIS: 108 MMHG
LEFT EXTERNAL ILIAC PSV: 105 CM/S
LEFT PERONEAL SYS PSV: 54 CM/S
LEFT POPLITEAL PSV: 31 CM/S
LEFT POST TIBIAL SYS PSV: 56 CM/S
LEFT POSTERIOR TIBIAL: 198 MMHG
LEFT PROFUNDA SYS PSV: 94 CM/S
LEFT SUPER FEMORAL DIST SYS PSV: 38 CM/S
LEFT SUPER FEMORAL MID SYS PSV: 118 CM/S
LEFT SUPER FEMORAL OSTIAL SYS PSV: 90 CM/S
LEFT SUPER FEMORAL PROX SYS PSV: 71 CM/S
LEFT TBI: 0.83
LEFT TIB/PER TRUNK SYS PSV: 79 CM/S
LEFT TOE PRESSURE: 138 MMHG
Lab: 118
Lab: 50
OHS CV LEFT LOWER EXTREMITY ABI (NO CALC): 1.19
OHS CV RIGHT ABI LOWER EXTREMITY (NO CALC): 1.18
RIGHT ABI: 1.18
RIGHT ANT TIBIAL SYS PSV: 78 CM/S
RIGHT ARM BP: 165 MMHG
RIGHT CFA PSV: 66 CM/S
RIGHT DORSALIS PEDIS: 197 MMHG
RIGHT EXTERNAL ILLIAC PSV: 104 CM/S
RIGHT PERONEAL SYS PSV: 18 CM/S
RIGHT POPLITEAL PSV: 28 CM/S
RIGHT POST TIBIAL SYS PSV: 89 CM/S
RIGHT POSTERIOR TIBIAL: 185 MMHG
RIGHT PROFUNDA SYS PSV: 69 CM/S
RIGHT SUPER FEMORAL DIST SYS PSV: 63 CM/S
RIGHT SUPER FEMORAL MID SYS PSV: 80 CM/S
RIGHT SUPER FEMORAL OSTIAL SYS PSV: 61 CM/S
RIGHT SUPER FEMORAL PROX SYS PSV: 95 CM/S
RIGHT TBI: 1.16
RIGHT TIB/PER TRUNK SYS PSV: 14 CM/S
RIGHT TOE PRESSURE: 194 MMHG

## 2022-09-14 PROCEDURE — 3074F PR MOST RECENT SYSTOLIC BLOOD PRESSURE < 130 MM HG: ICD-10-PCS | Mod: CPTII,,, | Performed by: SURGERY

## 2022-09-14 PROCEDURE — 99215 OFFICE O/P EST HI 40 MIN: CPT | Mod: S$PBB,,, | Performed by: SURGERY

## 2022-09-14 PROCEDURE — 1159F MED LIST DOCD IN RCRD: CPT | Mod: CPTII,,, | Performed by: SURGERY

## 2022-09-14 PROCEDURE — 3044F HG A1C LEVEL LT 7.0%: CPT | Mod: CPTII,,, | Performed by: SURGERY

## 2022-09-14 PROCEDURE — 3044F PR MOST RECENT HEMOGLOBIN A1C LEVEL <7.0%: ICD-10-PCS | Mod: CPTII,,, | Performed by: SURGERY

## 2022-09-14 PROCEDURE — 99999 PR PBB SHADOW E&M-EST. PATIENT-LVL III: CPT | Mod: PBBFAC,,, | Performed by: SURGERY

## 2022-09-14 PROCEDURE — 99215 PR OFFICE/OUTPT VISIT, EST, LEVL V, 40-54 MIN: ICD-10-PCS | Mod: S$PBB,,, | Performed by: SURGERY

## 2022-09-14 PROCEDURE — 3008F PR BODY MASS INDEX (BMI) DOCUMENTED: ICD-10-PCS | Mod: CPTII,,, | Performed by: SURGERY

## 2022-09-14 PROCEDURE — 4010F ACE/ARB THERAPY RXD/TAKEN: CPT | Mod: CPTII,,, | Performed by: SURGERY

## 2022-09-14 PROCEDURE — 3079F PR MOST RECENT DIASTOLIC BLOOD PRESSURE 80-89 MM HG: ICD-10-PCS | Mod: CPTII,,, | Performed by: SURGERY

## 2022-09-14 PROCEDURE — 99213 OFFICE O/P EST LOW 20 MIN: CPT | Mod: PBBFAC | Performed by: SURGERY

## 2022-09-14 PROCEDURE — 1159F PR MEDICATION LIST DOCUMENTED IN MEDICAL RECORD: ICD-10-PCS | Mod: CPTII,,, | Performed by: SURGERY

## 2022-09-14 PROCEDURE — 99999 PR PBB SHADOW E&M-EST. PATIENT-LVL III: ICD-10-PCS | Mod: PBBFAC,,, | Performed by: SURGERY

## 2022-09-14 PROCEDURE — 3074F SYST BP LT 130 MM HG: CPT | Mod: CPTII,,, | Performed by: SURGERY

## 2022-09-14 PROCEDURE — 4010F PR ACE/ARB THEARPY RXD/TAKEN: ICD-10-PCS | Mod: CPTII,,, | Performed by: SURGERY

## 2022-09-14 PROCEDURE — 3008F BODY MASS INDEX DOCD: CPT | Mod: CPTII,,, | Performed by: SURGERY

## 2022-09-14 PROCEDURE — 3079F DIAST BP 80-89 MM HG: CPT | Mod: CPTII,,, | Performed by: SURGERY

## 2022-09-14 NOTE — H&P (VIEW-ONLY)
HPI:  Yo Pink is a 57 y.o. male with       Patient Active Problem List   Diagnosis    Dry gangrene    Hypertensive urgency    PAD (peripheral artery disease)    ACS (acute coronary syndrome)    Hypokalemia    Dyslipidemia    Ischemic cardiomyopathy    NSTEMI (non-ST elevated myocardial infarction)    being managed by PCP and specialists who is here today for evaluation of bilateral toe wounds.  Patient states location is bilateral feet occurring for months.  Associated signs and symptoms include discoloration and pain.  Quality is dull and severity is 6/10.  Symptoms began mo ago.  Alleviating factors include wound care.  Worsening factors include pressure.  Cardiology evaluated patient and is planning heart cath 22.     no MI  no Stroke  Tobacco use: daily    2022:  Patient presents for follow-up status post coronary artery bypass surgery.  His wounds remained dry.  He denies fevers or chills.  Followed in hospital and discussed plan with Dr. Chin who states that at least 3 weeks, ideally 6 weeks postoperatively would be best to perform any further surgery on this patient.           Past Medical History:   Diagnosis Date    PAD (peripheral artery disease)              Past Surgical History:   Procedure Laterality Date    ANGIOGRAPHY OF LOWER EXTREMITY Left 2022     Procedure: Angiogram Extremity Unilateral;  Surgeon: Mehul Rich MD;  Location: [facility] OR;  Service: Vascular;  Laterality: Left;  RN PREOP ON 22. BINAX ON 22---NEED CONSENT    LEFT HEART CATHETERIZATION Left 2022     Procedure: Left heart cath;  Surgeon: Noah Huffman MD;  Location: [facility] CATH LAB;  Service: Cardiology;  Laterality: Left;  not before 9am, R rad access      History reviewed. No pertinent family history.  Social History            Socioeconomic History    Marital status: Single   Tobacco Use    Smoking status: Former Smoker       Quit date: 2022       Years since quittin.1     Smokeless tobacco: Never Used   Substance and Sexual Activity    Alcohol use: Never    Drug use: Not Currently         Current Facility-Administered Medications:     acetaminophen tablet 650 mg, 650 mg, Oral, Q4H PRN, Noah Huffman MD, 650 mg at 07/16/22 0949    acetaminophen tablet 650 mg, 650 mg, Oral, Q4H PRN, Noah Huffman MD    aspirin chewable tablet 81 mg, 81 mg, Oral, Daily, Noah Huffman MD, 81 mg at 07/20/22 0807    atorvastatin tablet 80 mg, 80 mg, Oral, QHS, Noah Huffman MD, 80 mg at 07/20/22 2041    atropine injection 0.5 mg, 0.5 mg, Intravenous, PRN, Noah Huffman MD    clopidogreL tablet 75 mg, 75 mg, Oral, Daily, Noah Huffman MD, 75 mg at 07/20/22 0808    dextrose 10% bolus 125 mL, 12.5 g, Intravenous, PRN, Noah Huffman MD    dextrose 10% bolus 250 mL, 25 g, Intravenous, PRN, Noah Huffman MD    glucagon (human recombinant) injection 1 mg, 1 mg, Intramuscular, PRN, Noah Huffman MD    glucose chewable tablet 16 g, 16 g, Oral, PRN, Noah Huffman MD    glucose chewable tablet 24 g, 24 g, Oral, PRN, Noah Huffman MD    hydrALAZINE injection 10 mg, 10 mg, Intravenous, Q6H PRN, Noah Huffman MD    HYDROcodone-acetaminophen  mg per tablet 1 tablet, 1 tablet, Oral, Q4H PRN, Noah Huffman MD, 1 tablet at 07/21/22 0551    HYDROcodone-acetaminophen 5-325 mg per tablet 1 tablet, 1 tablet, Oral, Q6H PRN, Noah Huffman MD, 1 tablet at 07/19/22 0241    HYDROcodone-acetaminophen 5-325 mg per tablet 1 tablet, 1 tablet, Oral, Q4H PRN, Noah Huffman MD, 1 tablet at 07/20/22 0237    ibuprofen tablet 400 mg, 400 mg, Oral, Q6H PRN, Noah Huffman MD    insulin aspart U-100 pen 0-5 Units, 0-5 Units, Subcutaneous, QID (AC + HS) PRN, Noah Huffman MD    melatonin tablet 6 mg, 6 mg, Oral, Nightly PRN, Noah Huffman MD    metoprolol succinate (TOPROL-XL) 24 hr tablet 25 mg, 25 mg, Oral, Daily, Noah JENKINS  MD Armida, 25 mg at 07/20/22 0808    morphine injection 2 mg, 2 mg, Intravenous, Q10 Min PRN, Noha Huffman MD    naloxone 0.4 mg/mL injection 0.02 mg, 0.02 mg, Intravenous, PRN, Noah Huffman MD    nitroGLYCERIN SL tablet 0.4 mg, 0.4 mg, Sublingual, Q5 Min PRN, Noah Huffman MD    ondansetron disintegrating tablet 8 mg, 8 mg, Oral, Q8H PRN, Noah Huffman MD    ondansetron injection 4 mg, 4 mg, Intravenous, Q8H PRN, oNah Huffman MD    oxyCODONE immediate release tablet 5 mg, 5 mg, Oral, Q6H PRN, Noah Huffman MD, 5 mg at 07/20/22 0423    sacubitriL-valsartan 24-26 mg per tablet 2 tablet, 2 tablet, Oral, BID, Noah Huffman MD    sodium chloride 0.9% bolus 250 mL, 250 mL, Intravenous, PRN, Noah Huffman MD    sodium chloride 0.9% flush 10 mL, 10 mL, Intravenous, Q12H PRN, Noah Huffman MD                   Medications Prior to Admission   Medication Sig Dispense Refill Last Dose    acetaminophen (TYLENOL) 500 MG tablet Take 1,000 mg by mouth every 6 (six) hours as needed for Pain.          amoxicillin-clavulanate 875-125mg (AUGMENTIN) 875-125 mg per tablet Take 1 tablet by mouth every 12 (twelve) hours. 20 tablet 0      amoxicillin-clavulanate 875-125mg (AUGMENTIN) 875-125 mg per tablet Take 1 tablet by mouth every 12 (twelve) hours. for 7 days 14 tablet 0      clopidogreL (PLAVIX) 75 mg tablet Take 1 tablet (75 mg total) by mouth once daily. 30 tablet 11      oxyCODONE-acetaminophen (PERCOCET) 5-325 mg per tablet Take 1 tablet by mouth every 4 (four) hours as needed for Pain. 28 tablet 0           Review of patient's allergies indicates:  No Known Allergies          Past Medical History:   Diagnosis Date    PAD (peripheral artery disease)              Past Surgical History:   Procedure Laterality Date    ANGIOGRAPHY OF LOWER EXTREMITY Left 7/5/2022     Procedure: Angiogram Extremity Unilateral;  Surgeon: Mehul Rich MD;  Location: [facility];  Service:  Vascular;  Laterality: Left;  RN PREOP ON 22. BINAX ON 22---NEED CONSENT    LEFT HEART CATHETERIZATION Left 2022     Procedure: Left heart cath;  Surgeon: Noah Huffman MD;  Location: [facility] CATH LAB;  Service: Cardiology;  Laterality: Left;  not before 9am, R rad access      Family History    None               Tobacco Use    Smoking status: Former Smoker       Quit date: 2022       Years since quittin.1    Smokeless tobacco: Never Used   Substance and Sexual Activity    Alcohol use: Never    Drug use: Not Currently    Sexual activity: Not on file      Review of Systems   Constitutional:  Negative for chills and fever.   HENT:  Negative for congestion.    Eyes:  Negative for visual disturbance.   Respiratory:  Negative for shortness of breath.    Cardiovascular:  Negative for chest pain.   Gastrointestinal:  Negative for abdominal distention.   Endocrine: Negative for cold intolerance.   Genitourinary:  Negative for flank pain.   Musculoskeletal:  Negative for back pain.   Skin:  Positive for color change and wound. Negative for pallor and rash.   Allergic/Immunologic: Negative for immunocompromised state.   Neurological:  Negative for dizziness.   Hematological:  Does not bruise/bleed easily.   Psychiatric/Behavioral:  Negative for agitation.    Objective:      Vital Signs (Most Recent):  Temp: 98.4 °F (36.9 °C) (22 1132)  Pulse: 73 (22 1132)  Resp: 20 (22 1132)  BP: 123/81 (22 1132)  SpO2: 99 % (22 1132) Vital Signs (24h Range):  Temp:  [97.5 °F (36.4 °C)-98.4 °F (36.9 °C)] 98.4 °F (36.9 °C)  Pulse:  [63-85] 73  Resp:  [16-20] 20  SpO2:  [96 %-99 %] 99 %  BP: (123-155)/(81-94) 123/81      Weight: 77.1 kg (170 lb)  Body mass index is 24.39 kg/m².     Physical Exam  Vitals reviewed.   Constitutional:       General: He is not in acute distress.     Appearance: He is well-developed. He is not diaphoretic.   HENT:      Head: Normocephalic and atraumatic.   Eyes:       Conjunctiva/sclera: Conjunctivae normal.   Cardiovascular:      Rate and Rhythm: Normal rate.      Pulses:           Femoral pulses are 2+ on the right side and 2+ on the left side.       Dorsalis pedis pulses are detected w/ Doppler on the right side and detected w/ Doppler on the left side.        Posterior tibial pulses are detected w/ Doppler on the right side and detected w/ Doppler on the left side.   Pulmonary:      Effort: Pulmonary effort is normal.   Abdominal:      General: There is no distension.      Palpations: Abdomen is soft. There is no mass.      Tenderness: There is no abdominal tenderness. There is no guarding or rebound.      Hernia: No hernia is present.   Musculoskeletal:         General: No deformity. Normal range of motion.      Cervical back: Neck supple.   Skin:     Findings: No rash.   Neurological:      Mental Status: He is alert and oriented to person, place, and time.    R 4th toe dry ulcer and dry ischemic changes to R great toe, L 4th toe dry ischemic changes, no purulence or erythema     Significant Labs:  All pertinent labs from the last 24 hours have been reviewed.     Significant Diagnostics:  I have reviewed all pertinent imaging results/findings within the past 24 hours.     Assessment/Plan:      * Dry gangrene  Will perform L 3rd toe amputation as soon as safely able; RTC at 6 weeks postop from CABG with arterial studies and f/u Dr Tianna valencia.    Subsequent RLE angiogram for critical limb ischemia.  Recommend wound care, continued offloading and optimization of comorbidities

## 2022-09-14 NOTE — PROGRESS NOTES
HPI:  Yo Pink is a 57 y.o. male with       Patient Active Problem List   Diagnosis    Dry gangrene    Hypertensive urgency    PAD (peripheral artery disease)    ACS (acute coronary syndrome)    Hypokalemia    Dyslipidemia    Ischemic cardiomyopathy    NSTEMI (non-ST elevated myocardial infarction)    being managed by PCP and specialists who is here today for evaluation of bilateral toe wounds.  Patient states location is bilateral feet occurring for months.  Associated signs and symptoms include discoloration and pain.  Quality is dull and severity is 6/10.  Symptoms began mo ago.  Alleviating factors include wound care.  Worsening factors include pressure.  Cardiology evaluated patient and is planning heart cath 22.     no MI  no Stroke  Tobacco use: daily    2022:  Patient presents for follow-up status post coronary artery bypass surgery.  His wounds remained dry.  He denies fevers or chills.  Followed in hospital and discussed plan with Dr. Chin who states that at least 3 weeks, ideally 6 weeks postoperatively would be best to perform any further surgery on this patient.           Past Medical History:   Diagnosis Date    PAD (peripheral artery disease)              Past Surgical History:   Procedure Laterality Date    ANGIOGRAPHY OF LOWER EXTREMITY Left 2022     Procedure: Angiogram Extremity Unilateral;  Surgeon: Mehul Rich MD;  Location: [facility] OR;  Service: Vascular;  Laterality: Left;  RN PREOP ON 22. BINAX ON 22---NEED CONSENT    LEFT HEART CATHETERIZATION Left 2022     Procedure: Left heart cath;  Surgeon: Noah Huffman MD;  Location: [facility] CATH LAB;  Service: Cardiology;  Laterality: Left;  not before 9am, R rad access      History reviewed. No pertinent family history.  Social History            Socioeconomic History    Marital status: Single   Tobacco Use    Smoking status: Former Smoker       Quit date: 2022       Years since quittin.1     Smokeless tobacco: Never Used   Substance and Sexual Activity    Alcohol use: Never    Drug use: Not Currently         Current Facility-Administered Medications:     acetaminophen tablet 650 mg, 650 mg, Oral, Q4H PRN, Noah Huffman MD, 650 mg at 07/16/22 0949    acetaminophen tablet 650 mg, 650 mg, Oral, Q4H PRN, Noah Huffman MD    aspirin chewable tablet 81 mg, 81 mg, Oral, Daily, Noah Huffman MD, 81 mg at 07/20/22 0807    atorvastatin tablet 80 mg, 80 mg, Oral, QHS, Noah Huffman MD, 80 mg at 07/20/22 2041    atropine injection 0.5 mg, 0.5 mg, Intravenous, PRN, Noah Huffman MD    clopidogreL tablet 75 mg, 75 mg, Oral, Daily, Noah Huffman MD, 75 mg at 07/20/22 0808    dextrose 10% bolus 125 mL, 12.5 g, Intravenous, PRN, Noah Huffman MD    dextrose 10% bolus 250 mL, 25 g, Intravenous, PRN, Noah Huffman MD    glucagon (human recombinant) injection 1 mg, 1 mg, Intramuscular, PRN, Noah Huffman MD    glucose chewable tablet 16 g, 16 g, Oral, PRN, Noah Huffman MD    glucose chewable tablet 24 g, 24 g, Oral, PRN, Noah Huffman MD    hydrALAZINE injection 10 mg, 10 mg, Intravenous, Q6H PRN, Noah Huffman MD    HYDROcodone-acetaminophen  mg per tablet 1 tablet, 1 tablet, Oral, Q4H PRN, Noah Huffman MD, 1 tablet at 07/21/22 0551    HYDROcodone-acetaminophen 5-325 mg per tablet 1 tablet, 1 tablet, Oral, Q6H PRN, Noah Huffman MD, 1 tablet at 07/19/22 0241    HYDROcodone-acetaminophen 5-325 mg per tablet 1 tablet, 1 tablet, Oral, Q4H PRN, Noah Huffman MD, 1 tablet at 07/20/22 0237    ibuprofen tablet 400 mg, 400 mg, Oral, Q6H PRN, Noah Huffman MD    insulin aspart U-100 pen 0-5 Units, 0-5 Units, Subcutaneous, QID (AC + HS) PRN, Noah Huffman MD    melatonin tablet 6 mg, 6 mg, Oral, Nightly PRN, Noah Huffman MD    metoprolol succinate (TOPROL-XL) 24 hr tablet 25 mg, 25 mg, Oral, Daily, Noah JENKINS  MD Armida, 25 mg at 07/20/22 0808    morphine injection 2 mg, 2 mg, Intravenous, Q10 Min PRN, Noah Huffman MD    naloxone 0.4 mg/mL injection 0.02 mg, 0.02 mg, Intravenous, PRN, Noah Huffman MD    nitroGLYCERIN SL tablet 0.4 mg, 0.4 mg, Sublingual, Q5 Min PRN, Noah Huffman MD    ondansetron disintegrating tablet 8 mg, 8 mg, Oral, Q8H PRN, Noah Huffman MD    ondansetron injection 4 mg, 4 mg, Intravenous, Q8H PRN, Noah Huffman MD    oxyCODONE immediate release tablet 5 mg, 5 mg, Oral, Q6H PRN, Noah Huffman MD, 5 mg at 07/20/22 0423    sacubitriL-valsartan 24-26 mg per tablet 2 tablet, 2 tablet, Oral, BID, Noah Huffman MD    sodium chloride 0.9% bolus 250 mL, 250 mL, Intravenous, PRN, Noah Huffman MD    sodium chloride 0.9% flush 10 mL, 10 mL, Intravenous, Q12H PRN, Noah Huffman MD                   Medications Prior to Admission   Medication Sig Dispense Refill Last Dose    acetaminophen (TYLENOL) 500 MG tablet Take 1,000 mg by mouth every 6 (six) hours as needed for Pain.          amoxicillin-clavulanate 875-125mg (AUGMENTIN) 875-125 mg per tablet Take 1 tablet by mouth every 12 (twelve) hours. 20 tablet 0      amoxicillin-clavulanate 875-125mg (AUGMENTIN) 875-125 mg per tablet Take 1 tablet by mouth every 12 (twelve) hours. for 7 days 14 tablet 0      clopidogreL (PLAVIX) 75 mg tablet Take 1 tablet (75 mg total) by mouth once daily. 30 tablet 11      oxyCODONE-acetaminophen (PERCOCET) 5-325 mg per tablet Take 1 tablet by mouth every 4 (four) hours as needed for Pain. 28 tablet 0           Review of patient's allergies indicates:  No Known Allergies          Past Medical History:   Diagnosis Date    PAD (peripheral artery disease)              Past Surgical History:   Procedure Laterality Date    ANGIOGRAPHY OF LOWER EXTREMITY Left 7/5/2022     Procedure: Angiogram Extremity Unilateral;  Surgeon: Mehul Rich MD;  Location: [facility];  Service:  Vascular;  Laterality: Left;  RN PREOP ON 22. BINAX ON 22---NEED CONSENT    LEFT HEART CATHETERIZATION Left 2022     Procedure: Left heart cath;  Surgeon: Noah Huffman MD;  Location: [facility] CATH LAB;  Service: Cardiology;  Laterality: Left;  not before 9am, R rad access      Family History    None               Tobacco Use    Smoking status: Former Smoker       Quit date: 2022       Years since quittin.1    Smokeless tobacco: Never Used   Substance and Sexual Activity    Alcohol use: Never    Drug use: Not Currently    Sexual activity: Not on file      Review of Systems   Constitutional:  Negative for chills and fever.   HENT:  Negative for congestion.    Eyes:  Negative for visual disturbance.   Respiratory:  Negative for shortness of breath.    Cardiovascular:  Negative for chest pain.   Gastrointestinal:  Negative for abdominal distention.   Endocrine: Negative for cold intolerance.   Genitourinary:  Negative for flank pain.   Musculoskeletal:  Negative for back pain.   Skin:  Positive for color change and wound. Negative for pallor and rash.   Allergic/Immunologic: Negative for immunocompromised state.   Neurological:  Negative for dizziness.   Hematological:  Does not bruise/bleed easily.   Psychiatric/Behavioral:  Negative for agitation.    Objective:      Vital Signs (Most Recent):  Temp: 98.4 °F (36.9 °C) (22 1132)  Pulse: 73 (22 1132)  Resp: 20 (22 1132)  BP: 123/81 (22 1132)  SpO2: 99 % (22 1132) Vital Signs (24h Range):  Temp:  [97.5 °F (36.4 °C)-98.4 °F (36.9 °C)] 98.4 °F (36.9 °C)  Pulse:  [63-85] 73  Resp:  [16-20] 20  SpO2:  [96 %-99 %] 99 %  BP: (123-155)/(81-94) 123/81      Weight: 77.1 kg (170 lb)  Body mass index is 24.39 kg/m².     Physical Exam  Vitals reviewed.   Constitutional:       General: He is not in acute distress.     Appearance: He is well-developed. He is not diaphoretic.   HENT:      Head: Normocephalic and atraumatic.   Eyes:       Conjunctiva/sclera: Conjunctivae normal.   Cardiovascular:      Rate and Rhythm: Normal rate.      Pulses:           Femoral pulses are 2+ on the right side and 2+ on the left side.       Dorsalis pedis pulses are detected w/ Doppler on the right side and detected w/ Doppler on the left side.        Posterior tibial pulses are detected w/ Doppler on the right side and detected w/ Doppler on the left side.   Pulmonary:      Effort: Pulmonary effort is normal.   Abdominal:      General: There is no distension.      Palpations: Abdomen is soft. There is no mass.      Tenderness: There is no abdominal tenderness. There is no guarding or rebound.      Hernia: No hernia is present.   Musculoskeletal:         General: No deformity. Normal range of motion.      Cervical back: Neck supple.   Skin:     Findings: No rash.   Neurological:      Mental Status: He is alert and oriented to person, place, and time.    R 4th toe dry ulcer and dry ischemic changes to R great toe, L 4th toe dry ischemic changes, no purulence or erythema     Significant Labs:  All pertinent labs from the last 24 hours have been reviewed.     Significant Diagnostics:  I have reviewed all pertinent imaging results/findings within the past 24 hours.     Assessment/Plan:      * Dry gangrene  Will perform L 3rd toe amputation as soon as safely able; RTC at 6 weeks postop from CABG with arterial studies and f/u Dr Tianna valencia.    Subsequent RLE angiogram for critical limb ischemia.  Recommend wound care, continued offloading and optimization of comorbidities

## 2022-09-15 ENCOUNTER — HOSPITAL ENCOUNTER (OUTPATIENT)
Dept: PREADMISSION TESTING | Facility: HOSPITAL | Age: 57
Discharge: HOME | End: 2022-09-15
Payer: MEDICAID

## 2022-09-15 ENCOUNTER — ANESTHESIA EVENT (OUTPATIENT)
Dept: SURGERY | Facility: HOSPITAL | Age: 57
DRG: 256 | End: 2022-09-15
Payer: MEDICAID

## 2022-09-15 ENCOUNTER — OFFICE VISIT (OUTPATIENT)
Dept: CARDIOLOGY | Facility: CLINIC | Age: 57
End: 2022-09-15
Payer: MEDICAID

## 2022-09-15 VITALS
DIASTOLIC BLOOD PRESSURE: 91 MMHG | WEIGHT: 148.94 LBS | OXYGEN SATURATION: 100 % | SYSTOLIC BLOOD PRESSURE: 126 MMHG | HEART RATE: 69 BPM | TEMPERATURE: 97 F | BODY MASS INDEX: 21.32 KG/M2 | HEIGHT: 70 IN | RESPIRATION RATE: 18 BRPM

## 2022-09-15 VITALS
SYSTOLIC BLOOD PRESSURE: 130 MMHG | HEIGHT: 70 IN | BODY MASS INDEX: 21.32 KG/M2 | RESPIRATION RATE: 18 BRPM | DIASTOLIC BLOOD PRESSURE: 80 MMHG | HEART RATE: 71 BPM | OXYGEN SATURATION: 99 % | WEIGHT: 148.94 LBS

## 2022-09-15 DIAGNOSIS — I25.5 ISCHEMIC CARDIOMYOPATHY: ICD-10-CM

## 2022-09-15 DIAGNOSIS — I70.229 CRITICAL LOWER LIMB ISCHEMIA: ICD-10-CM

## 2022-09-15 DIAGNOSIS — I73.9 PAD (PERIPHERAL ARTERY DISEASE): ICD-10-CM

## 2022-09-15 DIAGNOSIS — Z01.812 ENCOUNTER FOR PREOPERATIVE SCREENING LABORATORY TESTING FOR COVID-19 VIRUS: Primary | ICD-10-CM

## 2022-09-15 DIAGNOSIS — E78.5 DYSLIPIDEMIA: ICD-10-CM

## 2022-09-15 DIAGNOSIS — I42.0 ISCHEMIC DILATED CARDIOMYOPATHY: ICD-10-CM

## 2022-09-15 DIAGNOSIS — Z01.818 PREOP TESTING: ICD-10-CM

## 2022-09-15 DIAGNOSIS — I96 DRY GANGRENE: ICD-10-CM

## 2022-09-15 DIAGNOSIS — Z11.52 ENCOUNTER FOR PREOPERATIVE SCREENING LABORATORY TESTING FOR COVID-19 VIRUS: Primary | ICD-10-CM

## 2022-09-15 DIAGNOSIS — Z98.890 STATUS POST CARDIAC SURGERY: ICD-10-CM

## 2022-09-15 DIAGNOSIS — I50.22 CHRONIC SYSTOLIC CONGESTIVE HEART FAILURE: ICD-10-CM

## 2022-09-15 DIAGNOSIS — Z95.1 S/P CABG (CORONARY ARTERY BYPASS GRAFT): ICD-10-CM

## 2022-09-15 DIAGNOSIS — I96 TOE GANGRENE: ICD-10-CM

## 2022-09-15 DIAGNOSIS — I73.9 PERIPHERAL ARTERIAL DISEASE: ICD-10-CM

## 2022-09-15 DIAGNOSIS — I25.810 CORONARY ARTERY DISEASE INVOLVING CORONARY BYPASS GRAFT OF NATIVE HEART WITHOUT ANGINA PECTORIS: Primary | ICD-10-CM

## 2022-09-15 DIAGNOSIS — I25.5 ISCHEMIC DILATED CARDIOMYOPATHY: ICD-10-CM

## 2022-09-15 LAB
APTT BLDCRRT: 28.8 SEC (ref 21–32)
BASOPHILS # BLD AUTO: 0.06 K/UL (ref 0–0.2)
BASOPHILS NFR BLD: 0.9 % (ref 0–1.9)
DIFFERENTIAL METHOD: ABNORMAL
EOSINOPHIL # BLD AUTO: 0.2 K/UL (ref 0–0.5)
EOSINOPHIL NFR BLD: 3.2 % (ref 0–8)
ERYTHROCYTE [DISTWIDTH] IN BLOOD BY AUTOMATED COUNT: 15.6 % (ref 11.5–14.5)
HCT VFR BLD AUTO: 37.5 % (ref 40–54)
HGB BLD-MCNC: 12.4 G/DL (ref 14–18)
IMM GRANULOCYTES # BLD AUTO: 0.02 K/UL (ref 0–0.04)
IMM GRANULOCYTES NFR BLD AUTO: 0.3 % (ref 0–0.5)
INR PPP: 1 (ref 0.8–1.2)
LYMPHOCYTES # BLD AUTO: 1.5 K/UL (ref 1–4.8)
LYMPHOCYTES NFR BLD: 22.5 % (ref 18–48)
MAGNESIUM SERPL-MCNC: 2.3 MG/DL (ref 1.6–2.6)
MCH RBC QN AUTO: 31.9 PG (ref 27–31)
MCHC RBC AUTO-ENTMCNC: 33.1 G/DL (ref 32–36)
MCV RBC AUTO: 96 FL (ref 82–98)
MONOCYTES # BLD AUTO: 0.7 K/UL (ref 0.3–1)
MONOCYTES NFR BLD: 10.1 % (ref 4–15)
NEUTROPHILS # BLD AUTO: 4.1 K/UL (ref 1.8–7.7)
NEUTROPHILS NFR BLD: 63 % (ref 38–73)
NRBC BLD-RTO: 0 /100 WBC
PHOSPHATE SERPL-MCNC: 4.3 MG/DL (ref 2.7–4.5)
PLATELET # BLD AUTO: 315 K/UL (ref 150–450)
PMV BLD AUTO: 9.1 FL (ref 9.2–12.9)
PROTHROMBIN TIME: 11.1 SEC (ref 9–12.5)
RBC # BLD AUTO: 3.89 M/UL (ref 4.6–6.2)
WBC # BLD AUTO: 6.52 K/UL (ref 3.9–12.7)

## 2022-09-15 PROCEDURE — 99999 PR PBB SHADOW E&M-EST. PATIENT-LVL III: ICD-10-PCS | Mod: PBBFAC,,, | Performed by: INTERNAL MEDICINE

## 2022-09-15 PROCEDURE — 3044F HG A1C LEVEL LT 7.0%: CPT | Mod: CPTII,,, | Performed by: INTERNAL MEDICINE

## 2022-09-15 PROCEDURE — 3008F BODY MASS INDEX DOCD: CPT | Mod: CPTII,,, | Performed by: INTERNAL MEDICINE

## 2022-09-15 PROCEDURE — 1160F RVW MEDS BY RX/DR IN RCRD: CPT | Mod: CPTII,,, | Performed by: INTERNAL MEDICINE

## 2022-09-15 PROCEDURE — 1159F MED LIST DOCD IN RCRD: CPT | Mod: CPTII,,, | Performed by: INTERNAL MEDICINE

## 2022-09-15 PROCEDURE — 84100 ASSAY OF PHOSPHORUS: CPT | Performed by: SURGERY

## 2022-09-15 PROCEDURE — 3008F PR BODY MASS INDEX (BMI) DOCUMENTED: ICD-10-PCS | Mod: CPTII,,, | Performed by: INTERNAL MEDICINE

## 2022-09-15 PROCEDURE — 99214 PR OFFICE/OUTPT VISIT, EST, LEVL IV, 30-39 MIN: ICD-10-PCS | Mod: S$PBB,,, | Performed by: INTERNAL MEDICINE

## 2022-09-15 PROCEDURE — 99999 PR PBB SHADOW E&M-EST. PATIENT-LVL III: CPT | Mod: PBBFAC,,, | Performed by: INTERNAL MEDICINE

## 2022-09-15 PROCEDURE — 85730 THROMBOPLASTIN TIME PARTIAL: CPT | Performed by: SURGERY

## 2022-09-15 PROCEDURE — 3075F SYST BP GE 130 - 139MM HG: CPT | Mod: CPTII,,, | Performed by: INTERNAL MEDICINE

## 2022-09-15 PROCEDURE — 36415 COLL VENOUS BLD VENIPUNCTURE: CPT | Performed by: SURGERY

## 2022-09-15 PROCEDURE — 1159F PR MEDICATION LIST DOCUMENTED IN MEDICAL RECORD: ICD-10-PCS | Mod: CPTII,,, | Performed by: INTERNAL MEDICINE

## 2022-09-15 PROCEDURE — 4010F ACE/ARB THERAPY RXD/TAKEN: CPT | Mod: CPTII,,, | Performed by: INTERNAL MEDICINE

## 2022-09-15 PROCEDURE — 99213 OFFICE O/P EST LOW 20 MIN: CPT | Mod: PBBFAC | Performed by: INTERNAL MEDICINE

## 2022-09-15 PROCEDURE — 3079F DIAST BP 80-89 MM HG: CPT | Mod: CPTII,,, | Performed by: INTERNAL MEDICINE

## 2022-09-15 PROCEDURE — 4010F PR ACE/ARB THEARPY RXD/TAKEN: ICD-10-PCS | Mod: CPTII,,, | Performed by: INTERNAL MEDICINE

## 2022-09-15 PROCEDURE — 3079F PR MOST RECENT DIASTOLIC BLOOD PRESSURE 80-89 MM HG: ICD-10-PCS | Mod: CPTII,,, | Performed by: INTERNAL MEDICINE

## 2022-09-15 PROCEDURE — 83735 ASSAY OF MAGNESIUM: CPT | Performed by: SURGERY

## 2022-09-15 PROCEDURE — 85025 COMPLETE CBC W/AUTO DIFF WBC: CPT | Performed by: SURGERY

## 2022-09-15 PROCEDURE — 3075F PR MOST RECENT SYSTOLIC BLOOD PRESS GE 130-139MM HG: ICD-10-PCS | Mod: CPTII,,, | Performed by: INTERNAL MEDICINE

## 2022-09-15 PROCEDURE — 1160F PR REVIEW ALL MEDS BY PRESCRIBER/CLIN PHARMACIST DOCUMENTED: ICD-10-PCS | Mod: CPTII,,, | Performed by: INTERNAL MEDICINE

## 2022-09-15 PROCEDURE — 85610 PROTHROMBIN TIME: CPT | Performed by: SURGERY

## 2022-09-15 PROCEDURE — 99214 OFFICE O/P EST MOD 30 MIN: CPT | Mod: S$PBB,,, | Performed by: INTERNAL MEDICINE

## 2022-09-15 PROCEDURE — 3044F PR MOST RECENT HEMOGLOBIN A1C LEVEL <7.0%: ICD-10-PCS | Mod: CPTII,,, | Performed by: INTERNAL MEDICINE

## 2022-09-15 RX ORDER — SPIRONOLACTONE 25 MG/1
25 TABLET ORAL DAILY
Qty: 90 TABLET | Refills: 3 | Status: SHIPPED | OUTPATIENT
Start: 2022-09-15 | End: 2023-09-26

## 2022-09-15 RX ORDER — METOPROLOL SUCCINATE 100 MG/1
100 TABLET, EXTENDED RELEASE ORAL DAILY
Qty: 90 TABLET | Refills: 3 | Status: SHIPPED | OUTPATIENT
Start: 2022-09-15 | End: 2022-09-15 | Stop reason: CLARIF

## 2022-09-15 RX ORDER — SACUBITRIL AND VALSARTAN 24; 26 MG/1; MG/1
1 TABLET, FILM COATED ORAL 2 TIMES DAILY
Qty: 180 TABLET | Refills: 3 | Status: SHIPPED | OUTPATIENT
Start: 2022-09-15 | End: 2022-09-15 | Stop reason: SDUPTHER

## 2022-09-15 RX ORDER — CLOPIDOGREL BISULFATE 75 MG/1
75 TABLET ORAL DAILY
Qty: 90 TABLET | Refills: 3 | Status: SHIPPED | OUTPATIENT
Start: 2022-09-15 | End: 2022-12-02

## 2022-09-15 RX ORDER — ATORVASTATIN CALCIUM 40 MG/1
40 TABLET, FILM COATED ORAL DAILY
Qty: 90 TABLET | Refills: 3 | Status: SHIPPED | OUTPATIENT
Start: 2022-09-15 | End: 2022-12-02

## 2022-09-15 RX ORDER — GABAPENTIN 600 MG/1
600 TABLET ORAL 2 TIMES DAILY
COMMUNITY
Start: 2022-09-14 | End: 2022-09-18 | Stop reason: SDUPTHER

## 2022-09-15 RX ORDER — SACUBITRIL AND VALSARTAN 24; 26 MG/1; MG/1
1 TABLET, FILM COATED ORAL 2 TIMES DAILY
Qty: 180 TABLET | Refills: 3 | Status: SHIPPED | OUTPATIENT
Start: 2022-09-15 | End: 2022-12-02

## 2022-09-15 NOTE — PROGRESS NOTES
CARDIOVASCULAR PROGRESS NOTE    REASON FOR CONSULT:   Yo Pink is a 57 y.o. male who presents for follow up of CAD/CABG/ICM/PAD.    PCP: [facility]: Hilario  HISTORY OF PRESENT ILLNESS:   The patient returns for follow-up of his recent hospitalization.  He reports generally stable status without angina, dyspnea, palpitations, or syncope.  There has been no PND, orthopnea, or lower extremity edema.  He denies melena, hematuria, or claudicant symptoms.  He does have left middle toe gangrene with plans for amputation tomorrow.  He tells me that he has been tolerating his aspirin and Plavix without any difficulty.  His sternotomy is well healed.  The patient tells me he can climb a flight of stairs if he had to, but has not done so in some time.    CARDIOVASCULAR HISTORY:   CAD ACS/NSTEMI 7/18/22: MV CAD->CABG 7/25/22 (Tianna): LIMA-LAD, SVG-R, SVG-PDA  ICM, EF 30%    PAD, followed by Dr. Rich    PAST MEDICAL HISTORY:     Past Medical History:   Diagnosis Date    PAD (peripheral artery disease)        PAST SURGICAL HISTORY:     Past Surgical History:   Procedure Laterality Date    ANGIOGRAPHY OF LOWER EXTREMITY Left 7/5/2022    Procedure: Angiogram Extremity Unilateral;  Surgeon: Mehul Rich MD;  Location: [facility] OR;  Service: Vascular;  Laterality: Left;  RN PREOP ON 7/1/22. BINAX ON 7/5/22---NEED CONSENT    CORONARY ARTERY BYPASS GRAFT (CABG) N/A 7/25/2022    Procedure: CORONARY ARTERY BYPASS GRAFT (CABG) X 3;  Surgeon: Kenton Wynn MD;  Location: [facility] OR [address] [place] [address];  Service: Cardiovascular;  Laterality: N/A;    ENDOSCOPIC HARVEST OF VEIN Left 7/25/2022    Procedure: SURGICAL PROCUREMENT, VEIN, ENDOSCOPIC;  Surgeon: Kenton Wynn MD;  Location: [facility] OR [address] [place] [address];  Service: Cardiovascular;  Laterality: Left;  Vein harvest start: 1337  Vein harvest stop: 1427  Vein prep start: 1428  Vein prep stop: 1454    LEFT HEART CATHETERIZATION Left 7/18/2022    Procedure: Left heart cath;  Surgeon:  Noah Huffman MD;  Location: [facility] CATH LAB;  Service: Cardiology;  Laterality: Left;  not before 9am, R rad access       ALLERGIES AND MEDICATION:   Review of patient's allergies indicates:  No Known Allergies     Medication List            Accurate as of September 15, 2022  2:29 PM. If you have any questions, ask your nurse or doctor.                CONTINUE taking these medications      acetaminophen 500 MG tablet  Commonly known as: TYLENOL     aspirin 81 MG EC tablet  Commonly known as: ECOTRIN  Take 1 tablet (81 mg total) by mouth once daily.     atorvastatin 40 MG tablet  Commonly known as: LIPITOR  Take 1 tablet (40 mg total) by mouth once daily.     clopidogreL 75 mg tablet  Commonly known as: PLAVIX  Take 1 tablet (75 mg total) by mouth once daily.     gabapentin 100 MG capsule  Commonly known as: NEURONTIN  Take 2 capsules (200 mg total) by mouth 3 (three) times daily. for 20 days     metoprolol succinate 100 MG 24 hr tablet  Commonly known as: TOPROL-XL  Take 1 tablet (100 mg total) by mouth once daily.     spironolactone 25 MG tablet  Commonly known as: ALDACTONE  Take 1 tablet (25 mg total) by mouth once daily.              SOCIAL HISTORY:     Social History     Socioeconomic History    Marital status: Single   Tobacco Use    Smoking status: Former     Types: Cigarettes     Quit date: 2022     Years since quittin.3    Smokeless tobacco: Never   Substance and Sexual Activity    Alcohol use: Never    Drug use: Not Currently       FAMILY HISTORY:   History reviewed. No pertinent family history.    REVIEW OF SYSTEMS:   Review of Systems   Constitutional:  Negative for chills, diaphoresis and fever.   HENT:  Negative for nosebleeds.    Eyes:  Negative for blurred vision, double vision and photophobia.   Respiratory:  Negative for hemoptysis, shortness of breath and wheezing.    Cardiovascular:  Negative for chest pain, palpitations, orthopnea, claudication, leg swelling and PND.  "  Gastrointestinal:  Negative for abdominal pain, blood in stool, heartburn, melena, nausea and vomiting.   Genitourinary:  Negative for flank pain and hematuria.   Musculoskeletal:  Negative for falls, myalgias and neck pain.   Skin:  Negative for rash.   Neurological:  Negative for dizziness, seizures, loss of consciousness, weakness and headaches.   Endo/Heme/Allergies:  Negative for polydipsia. Does not bruise/bleed easily.   Psychiatric/Behavioral:  Negative for depression and memory loss. The patient is not nervous/anxious.      PHYSICAL EXAM:     Vitals:    09/15/22 1421   BP: 130/80   Pulse: 71   Resp: 18    Body mass index is 21.37 kg/m².  Weight: 67.5 kg (148 lb 14.7 oz)   Height: 5' 10" (177.8 cm)     Physical Exam  Vitals reviewed.   Constitutional:       General: He is not in acute distress.     Appearance: Normal appearance. He is well-developed and normal weight. He is not ill-appearing, toxic-appearing or diaphoretic.   HENT:      Head: Normocephalic and atraumatic.   Eyes:      General: No scleral icterus.     Extraocular Movements: Extraocular movements intact.      Conjunctiva/sclera: Conjunctivae normal.      Pupils: Pupils are equal, round, and reactive to light.   Neck:      Thyroid: No thyromegaly.      Vascular: Normal carotid pulses. No carotid bruit or JVD.      Trachea: Trachea normal.   Cardiovascular:      Rate and Rhythm: Normal rate and regular rhythm.      Pulses:           Carotid pulses are 2+ on the right side and 2+ on the left side.     Heart sounds: S1 normal and S2 normal. No murmur heard.    No friction rub. No gallop.      Comments: Sternotomy well healed  Pulmonary:      Effort: Pulmonary effort is normal. No respiratory distress.      Breath sounds: Normal breath sounds. No stridor. No wheezing, rhonchi or rales.   Chest:      Chest wall: No tenderness.   Abdominal:      General: There is no distension.      Palpations: Abdomen is soft.   Musculoskeletal:         General: " No swelling or tenderness. Normal range of motion.      Cervical back: Normal range of motion and neck supple. No edema or rigidity.      Right lower leg: No edema.      Left lower leg: No edema.   Feet:      Right foot:      Skin integrity: No ulcer.      Left foot:      Skin integrity: No ulcer.   Skin:     General: Skin is warm and dry.      Coloration: Skin is not jaundiced.   Neurological:      General: No focal deficit present.      Mental Status: He is alert and oriented to person, place, and time.      Cranial Nerves: No cranial nerve deficit.   Psychiatric:         Mood and Affect: Mood normal.         Speech: Speech normal.         Behavior: Behavior normal. Behavior is cooperative.       DATA:   EKG: (personally reviewed tracing)  9/7/22 SR 91, ASMI-age indet    Laboratory:  CBC:  Recent Labs   Lab 07/28/22  0341 07/29/22 0643 07/30/22  0412   WBC 8.78 9.58 9.18   Hemoglobin 8.7 L 10.4 L 10.0 L   Hematocrit 25.4 L 30.9 L 29.4 L   Platelets 165 240 282       CHEMISTRIES:  Recent Labs   Lab 07/28/22  0341 07/28/22  1118 07/29/22  0643 07/30/22  0412   Glucose 106  --  108 82   Sodium 133 L  --  134 L 132 L   Potassium 3.4 L 3.4 L 3.9 3.9   BUN 11  --  12 17   Creatinine 0.6  --  0.6 0.7   eGFR if African American >60.0  --  >60.0 >60.0   Calcium 8.5 L  --  8.7 8.9   Magnesium 1.9 1.7 2.0 1.8       CARDIAC BIOMARKERS:  Recent Labs   Lab 07/16/22  0101 07/16/22  0644   Troponin I 0.073 H 0.114 H       COAGS:  Recent Labs   Lab 07/25/22  1730 07/26/22  0306   INR 1.3 H 1.3 H       LIPIDS/LFTS:  Recent Labs   Lab 07/16/22  0644 07/24/22  0621 07/28/22  0341 07/29/22  0643 07/30/22  0412   Cholesterol 144  --   --   --   --    Triglycerides 122  --   --   --   --    HDL 35 L  --   --   --   --    LDL Cholesterol 84.6  --   --   --   --    Non-HDL Cholesterol 109  --   --   --   --    AST  --    < > 23 26 26   ALT  --    < > 11 11 13    < > = values in this interval not displayed.       Cardiovascular  Testing:  CAREY 9/13/22  Right lower extremity pressures and waveforms indicate no hemodynamically significant arterial occlusive disease.  CAREY 1.18.  Left lower extremity pressures and waveforms indicate no hemodynamically significant arterial occlusive disease.  CAREY 1.19    LE art US 9/13/22  Right lower extremity arterial ultrasound shows decreased popliteal velocity without focal hemodynamically significant stenosis.  Pressures indicate no arterial occlusive disease.  Left lower extremity arterial ultrasound shows a patent popliteal stent with no hemodynamically significant stenosis.  There is decreased velocity in the popliteal artery and AT artery.  Pressures indicate no arterial occlusive disease.    Carotid US 7/19/22  There is 0-19% right Internal Carotid Stenosis.  There is 0-19% left Internal Carotid Stenosis.     Cath: 7/18/22 (->CABG 7/25/22: LIMA-LAD, SVG-R, SVG-PDA)  LVEDP: 14mmHg  LVEF: 30% by echo  Wall Motion: LAD WMA  Dominance: Right  LM: distal 60%, iFR 0.86  LAD: prox  after S1, distal vessel fills via L-L and R-L george  Ramus: large, MLI  LCx: large, mid 20-30%, no step-up with iFR pullback  RCA: mid , dist vessel fills via L-L and R-L george  Hemostasis:  R Radial band  Impression:  NSTEMI  LM/2V CAD  Severe LV dysfxn  Severe PAD s/p recent L SFA PTAS, needing L 3rd toe amputation +/- RLE angio  R rad vasband for hemostasis  Plan:  Cont med rx.  Cont ASA/Plavix/Statin.  Start entresto/toprol, titrate as BP/HR will allow.  Case d/w Dr. Wynn, will transfer to [facility] for CABG vs MV PCI eval.  Lifevest ordered.  Repeat echo in 3 months (mid Oct 2022) for reassessment of LVEF.     Echo: 7/16/22   The left ventricle is normal in size with moderate concentric hypertrophy and moderately decreased systolic function.  The estimated ejection fraction is 30%.  Mod-severe global hypokinesis with LAD territory WMA.  Grade II left ventricular diastolic dysfunction.  Normal right ventricular size with  normal right ventricular systolic function.  Mild-to-moderate mitral regurgitation.  Mild tricuspid regurgitation.  The estimated PA systolic pressure is 62 mmHg.  There is pulmonary hypertension.     LE art US 7/15/22  Interval placement of left lower extremity arterial vascular stent, the vascular stent appears patent.  The dorsalis pedis artery bilaterally demonstrates evidence for reversal of flow.  Otherwise the arterial vascular structures demonstrate evidence for arterial atherosclerotic disease, without evidence for occlusion.      ASSESSMENT:   # CAD/ACS s/p CABG x3V 7/2022  # ICM, EF 30%  # PAD, followed by Dr. Rich  # Preop: Left middle toe gangrene for amputation.  Pt reports reasonable exercise capacity  # dyslipidemia on atorva 40mg    PLAN:   Cont med rx  Cont ASA 81mg qd  Cont Plavix 75mg qd for 1 year (thru 7/023), consider Xarelto 2.5mg bid thereafter  Add entresto 24/26mg bid  Check BMP 2 weeks  Card Rehab  Lifevest ordered  The pt is at moderate (but not prohibitive risk) for the planned left middle toe amputation and associated anesthesia.  No further preop cardiac testing is required.  RTC 1 month  Repeat echo in 3 months (Dec 2022) for reassessment of LVEF      Noah Huffman MD, FACC

## 2022-09-15 NOTE — DISCHARGE INSTRUCTIONS
Before 7 AM, enter through the Emergency Entrance..   After 7 AM enter through the Main Entrance.      Your procedure  is scheduled for ___9/19/2022_______.    Call [phone] between 2pm and 5pm on __9/16/2022_____to find out your arrival time for the day of surgery.    You may use the main entrance to the hospital on the [facility] side, or the entrance that is next to the [facility].    You may have one visitor.  No children allowed.       You will be going to the Same Day Surgery Unit on the 2nd floor of the hospital.    Important instructions:  Do not eat anything after midnight.  You may have plain water, non carbonated.  You may also have Gatorade or Powerade after midnight.    Stop all fluids 2 hours before your surgery.    It is okay to brush your teeth.  Do not have gum, candy or mints.    SEE MEDICATION SHEET.   TAKE MEDICATIONS AS DIRECTED WITH SIPS OF WATER.    STOP taking Aspirin, Ibuprofen,  Advil, Motrin, Mobic(meloxicam), Aleve (naproxen), Fish oil, and Vitamin E for at least 7 days before your surgery.     You may take Tylenol if needed which is not a blood thinner.    Please shower the night before and the morning of your surgery.      No shaving of procedural area at least 4-5 days before surgery due to increased risk of skin irritation and/or possible infection.    Contact lenses and removable denture work may not be worn during your procedure.    You may wear deodorant only. If you are having breast surgery, do not wear deodorant on the operative side.    Do not wear powder, body lotion, perfume/cologne or make-up.    Do not wear any jewelry or have any metal on your body.    You will be asked to remove any dentures or partials for the procedure.    If you are going home on the same day of surgery, you must arrange for a family member or a friend to drive you home.  Public transportation is prohibited.  You will not be able to drive home if you were given anesthesia or  sedation.    Patients who want to have their Post-op prescriptions filled from our in-house [facility], bring a Credit/Debit Card or cash with you. A co-pay may be required.  The pharmacy closes at 5:30 pm.    Wear loose fitting clothes allowing for bandages.    Please leave money and valuables home.      You may bring your cell phone.    Call the doctor if fever or illness should occur before your surgery.    Call [phone] to contact us here if needed.

## 2022-09-15 NOTE — LETTER
September 15, 2022        [facility] [place]  [address]  [place] [address]             [facility] [place] [facility]  [address]  [facility] [place] [address]  Phone: [phone]   Patient: Yo Pink   MR Number: [id number]   YOB: 1965   Date of Visit: 9/15/2022       To Whom It May Concern:    Thank you for referring Yo Pink to me for evaluation. Attached you will find relevant portions of my assessment and plan of care.    If you have questions, please do not hesitate to call me. I look forward to following Yo Pink along with you.    Sincerely,      Noah Huffman MD            CC      Enclosure

## 2022-09-15 NOTE — ANESTHESIA PREPROCEDURE EVALUATION
09/15/2022  Yo Pink is a 57 y.o., male   To undergo Procedure(s) (LRB):  AMPUTATION, TOE THIRD TOE, POSSIBLE LEFT FOURTH TOE WITH BILATERAL FOOT DEBRIDEMENT (Bilateral)     Denies CP/SOB/GERD/CVA/URI symptoms.  Endorses MI s/p CABG.  METS > 4  NPO > 8    Past Medical History:  Past Medical History:   Diagnosis Date    PAD (peripheral artery disease)        Past Surgical History:  Past Surgical History:   Procedure Laterality Date    ANGIOGRAPHY OF LOWER EXTREMITY Left 2022    Procedure: Angiogram Extremity Unilateral;  Surgeon: Mehul Rich MD;  Location: [facility] OR;  Service: Vascular;  Laterality: Left;  RN PREOP ON 22. BINAX ON 22---NEED CONSENT    CORONARY ARTERY BYPASS GRAFT (CABG) N/A 2022    Procedure: CORONARY ARTERY BYPASS GRAFT (CABG) X 3;  Surgeon: Kenton Wynn MD;  Location: [facility] OR [address] [place] [address];  Service: Cardiovascular;  Laterality: N/A;    ENDOSCOPIC HARVEST OF VEIN Left 2022    Procedure: SURGICAL PROCUREMENT, VEIN, ENDOSCOPIC;  Surgeon: Kenton Wynn MD;  Location: [facility] OR [address] [place] [address];  Service: Cardiovascular;  Laterality: Left;  Vein harvest start: 1337  Vein harvest stop: 1427  Vein prep start: 1428  Vein prep stop: 1454    LEFT HEART CATHETERIZATION Left 2022    Procedure: Left heart cath;  Surgeon: Noah Huffman MD;  Location: [facility] CATH LAB;  Service: Cardiology;  Laterality: Left;  not before 9am, R rad access       Social History:  Social History     Socioeconomic History    Marital status: Single   Tobacco Use    Smoking status: Former     Types: Cigarettes     Quit date: 2022     Years since quittin.3    Smokeless tobacco: Never   Substance and Sexual Activity    Alcohol use: Never    Drug use: Not Currently       Medications:  No current facility-administered medications on file prior to encounter.     Current  Outpatient Medications on File Prior to Encounter   Medication Sig Dispense Refill    acetaminophen (TYLENOL) 500 MG tablet Take 1,000 mg by mouth every 6 (six) hours as needed for Pain.      aspirin (ECOTRIN) 81 MG EC tablet Take 1 tablet (81 mg total) by mouth once daily. 30 tablet 11       Allergies:  Review of patient's allergies indicates:  No Known Allergies    Active Problems:  Patient Active Problem List   Diagnosis    Dry gangrene    Hypertensive urgency    PAD (peripheral artery disease)    ACS (acute coronary syndrome)    Hypokalemia    Dyslipidemia    Ischemic cardiomyopathy    NSTEMI (non-ST elevated myocardial infarction)    Transient hyperglycemia post procedure    S/P CABG (coronary artery bypass graft)    Coronary artery disease involving coronary bypass graft of native heart without angina pectoris       Diagnostic Studies:   Latest Reference Range & Units 09/18/22 03:13   WBC 3.90 - 12.70 K/uL 10.49   RBC 4.60 - 6.20 M/uL 3.99 (L)   Hemoglobin 14.0 - 18.0 g/dL 12.8 (L)   Hematocrit 40.0 - 54.0 % 37.7 (L)   MCV 82 - 98 fL 95   MCH 27.0 - 31.0 pg 32.1 (H)   MCHC 32.0 - 36.0 g/dL 34.0   RDW 11.5 - 14.5 % 15.1 (H)   Platelets 150 - 450 K/uL 328   MPV 9.2 - 12.9 fL 8.8 (L)   Gran % 38.0 - 73.0 % 80.2 (H)   Lymph % 18.0 - 48.0 % 13.8 (L)   Mono % 4.0 - 15.0 % 4.1   Eosinophil % 0.0 - 8.0 % 1.0   Basophil % 0.0 - 1.9 % 0.6   Immature Granulocytes 0.0 - 0.5 % 0.3   Gran # (ANC) 1.8 - 7.7 K/uL 8.4 (H)   Lymph # 1.0 - 4.8 K/uL 1.5   Mono # 0.3 - 1.0 K/uL 0.4   Eos # 0.0 - 0.5 K/uL 0.1   Baso # 0.00 - 0.20 K/uL 0.06   Immature Grans (Abs) 0.00 - 0.04 K/uL 0.03   nRBC 0 /100 WBC 0   Differential Method  Automated      Latest Reference Range & Units 09/18/22 03:13   Sodium 136 - 145 mmol/L 130 (L)   Potassium 3.5 - 5.1 mmol/L 3.4 (L)   Chloride 95 - 110 mmol/L 99   CO2 23 - 29 mmol/L 15 (L)   Anion Gap 8 - 16 mmol/L 16   BUN 6 - 20 mg/dL 19   Creatinine 0.5 - 1.4 mg/dL 0.7   eGFR >60 mL/min/1.73  m^2 >60     EKG (9/7/22):  Normal sinus rhythm   Anteroseptal infarct ,age undetermined   Prolonged QT     TTE (7/16/22):   The left ventricle is normal in size with moderate concentric hypertrophy and moderately decreased systolic function.   The estimated ejection fraction is 30%.   Mod-severe global hypokinesis with LAD territory WMA.   Grade II left ventricular diastolic dysfunction.   Normal right ventricular size with normal right ventricular systolic function.   Mild-to-moderate mitral regurgitation.   Mild tricuspid regurgitation.   The estimated PA systolic pressure is 62 mmHg.   There is pulmonary hypertension.    24 Hour Vitals:  Temp:  [36.8 °C (98.3 °F)] 36.8 °C (98.3 °F)  Pulse:  [86] 86  Resp:  [18] 18  SpO2:  [98 %] 98 %  BP: (186-194)/(108-112) 194/112   See Nursing Charting For Additional Vitals    Pre-op Assessment    I have reviewed the Patient Summary Reports.     I have reviewed the Nursing Notes. I have reviewed the NPO Status.   I have reviewed the Medications.     Review of Systems  Anesthesia Hx:  No problems with previous Anesthesia  Denies Family Hx of Anesthesia complications.   Denies Personal Hx of Anesthesia complications.   Social:  Non-Smoker, No Alcohol Use    Hematology/Oncology:  Hematology Normal   Oncology Normal     EENT/Dental:EENT/Dental Normal   Cardiovascular:   Exercise tolerance: good Hypertension Past MI CAD  CABG/stent (x3)  PVD ECG has been reviewed.    Pulmonary:  Pulmonary Normal    Renal/:  Renal/ Normal     Hepatic/GI:  Hepatic/GI Normal    Musculoskeletal:  Musculoskeletal Normal    Neurological:  Neurology Normal    Endocrine:  Endocrine Normal    Dermatological:  Skin Normal    Psych:  Psychiatric Normal           Physical Exam  General: Well nourished    Airway:  Mallampati: II   Mouth Opening: Normal  TM Distance: Normal      Dental:  Intact    Chest/Lungs:  Clear to auscultation, Normal Respiratory Rate    Heart:  Rate: Normal  Rhythm: Regular  "Rhythm        Anesthesia Plan  Type of Anesthesia, risks & benefits discussed:    Anesthesia Type: Gen Supraglottic Airway, Gen ETT  Intra-op Monitoring Plan: Standard ASA Monitors  Post Op Pain Control Plan: multimodal analgesia and IV/PO Opioids PRN  Induction:  IV  Informed Consent: Informed consent signed with the Patient and all parties understand the risks and agree with anesthesia plan.  All questions answered. Patient consented to blood products? Yes  ASA Score: 3  Anesthesia Plan Notes: PMHx significant for CAD  ACS/NSTEMI 7/18/22: MV CAD->CABG 7/25/22 ([facility]), ICM, EF 30%, PAD  Seen by Dr. Huffman on 9/15- "The pt is at moderate (but not prohibitive risk) for the planned left middle toe amputation and associated anesthesia.  No further preop cardiac testing is required."    GA with LMA  Standard ASA monitors  Recovery in PACU  PONV: 2        Ready For Surgery From Anesthesia Perspective.     .      "

## 2022-09-16 DIAGNOSIS — I70.229 CRITICAL LOWER LIMB ISCHEMIA: Primary | ICD-10-CM

## 2022-09-18 ENCOUNTER — NURSE TRIAGE (OUTPATIENT)
Dept: ADMINISTRATIVE | Facility: CLINIC | Age: 57
End: 2022-09-18
Payer: MEDICAID

## 2022-09-18 NOTE — TELEPHONE ENCOUNTER
Triager Called to pharmacist because prescription is in chart and sent to a non 24h pharmacy. Patient was seen in the ED this AM    Reason for Disposition   [1] Caller has medicine question about med NOT prescribed by PCP AND [2] triager unable to answer question (e.g., compatibility with other med, storage)    Additional Information   Negative: [1] Intentional drug overdose AND [2] suicidal thoughts or ideas   Negative: Drug overdose and triager unable to answer question   Negative: Caller requesting information unrelated to medicine   Negative: Caller requesting information about COVID-19 Vaccine   Negative: Caller requesting information about Emergency Contraception   Negative: Caller requesting information about Combined Birth Control Pills   Negative: Caller requesting information about Progestin Birth Control Pills   Negative: Caller requesting information about Post-Op pain or medicines   Negative: Caller requesting a prescription antibiotic (such as Penicillin) for Strep throat and has a positive culture result   Negative: Caller requesting a prescription anti-viral med (such as Tamiflu) and has influenza (flu)  symptoms   Negative: Immunization reaction suspected   Negative: Rash while taking a medicine or within 3 days of stopping it   Negative: [1] Asthma and [2] having symptoms of asthma (cough, wheezing, etc.)   Negative: [1] Symptom of illness (e.g., headache, abdominal pain, earache, vomiting) AND [2] more than mild   Negative: Breastfeeding questions about mother's medicines and diet   Negative: MORE THAN A DOUBLE DOSE of a prescription or over-the-counter (OTC) drug   Negative: [1] DOUBLE DOSE (an extra dose or lesser amount) of prescription drug AND [2] any symptoms (e.g., dizziness, nausea, pain, sleepiness)   Negative: [1] DOUBLE DOSE (an extra dose or lesser amount) of over-the-counter (OTC) drug AND [2] any symptoms (e.g., dizziness, nausea, pain, sleepiness)   Negative: Took another person's  prescription drug   Negative: [1] DOUBLE DOSE (an extra dose or lesser amount) of prescription drug AND [2] NO symptoms (Exception: a double dose of antibiotics)   Negative: Diabetes drug error or overdose (e.g., took wrong type of insulin or took extra dose)   Negative: [1] Prescription refill request for ESSENTIAL medicine (i.e., likelihood of harm to patient if not taken) AND [2] triager unable to refill per department policy   Negative: [1] Prescription not at pharmacy AND [2] was prescribed by PCP recently (Exception: triager has access to EMR and prescription is recorded there. Go to Home Care and confirm for pharmacy.)   Negative: [1] Pharmacy calling with prescription question AND [2] triager unable to answer question   Negative: [1] Caller has URGENT medicine question about med that PCP or specialist prescribed AND [2] triager unable to answer question   Negative: Medicine patch causing local rash or itching    Protocols used: Medication Question Call-A-

## 2022-09-19 ENCOUNTER — ANESTHESIA (OUTPATIENT)
Dept: SURGERY | Facility: HOSPITAL | Age: 57
DRG: 256 | End: 2022-09-19
Payer: MEDICAID

## 2022-09-19 ENCOUNTER — HOSPITAL ENCOUNTER (INPATIENT)
Facility: HOSPITAL | Age: 57
LOS: 5 days | Discharge: HOME | DRG: 256 | End: 2022-09-24
Attending: SURGERY | Admitting: SURGERY
Payer: MEDICAID

## 2022-09-19 DIAGNOSIS — I96 WET GANGRENE: ICD-10-CM

## 2022-09-19 DIAGNOSIS — I96 DRY GANGRENE: Primary | ICD-10-CM

## 2022-09-19 DIAGNOSIS — I70.229 CRITICAL LOWER LIMB ISCHEMIA: ICD-10-CM

## 2022-09-19 DIAGNOSIS — S98.139A AMPUTATED TOE, UNSPECIFIED LATERALITY: ICD-10-CM

## 2022-09-19 DIAGNOSIS — Z11.52 ENCOUNTER FOR PREOPERATIVE SCREENING LABORATORY TESTING FOR COVID-19 VIRUS: ICD-10-CM

## 2022-09-19 DIAGNOSIS — Z01.812 ENCOUNTER FOR PREOPERATIVE SCREENING LABORATORY TESTING FOR COVID-19 VIRUS: ICD-10-CM

## 2022-09-19 LAB
BASOPHILS # BLD AUTO: 0.05 K/UL (ref 0–0.2)
BASOPHILS NFR BLD: 0.5 % (ref 0–1.9)
CTP QC/QA: YES
DIFFERENTIAL METHOD: ABNORMAL
EOSINOPHIL # BLD AUTO: 0.1 K/UL (ref 0–0.5)
EOSINOPHIL NFR BLD: 0.9 % (ref 0–8)
ERYTHROCYTE [DISTWIDTH] IN BLOOD BY AUTOMATED COUNT: 15.7 % (ref 11.5–14.5)
GRAM STN SPEC: NORMAL
HCT VFR BLD AUTO: 37.4 % (ref 40–54)
HGB BLD-MCNC: 12 G/DL (ref 14–18)
IMM GRANULOCYTES # BLD AUTO: 0.03 K/UL (ref 0–0.04)
IMM GRANULOCYTES NFR BLD AUTO: 0.3 % (ref 0–0.5)
LYMPHOCYTES # BLD AUTO: 2 K/UL (ref 1–4.8)
LYMPHOCYTES NFR BLD: 21.2 % (ref 18–48)
MCH RBC QN AUTO: 30.8 PG (ref 27–31)
MCHC RBC AUTO-ENTMCNC: 32.1 G/DL (ref 32–36)
MCV RBC AUTO: 96 FL (ref 82–98)
MONOCYTES # BLD AUTO: 0.7 K/UL (ref 0.3–1)
MONOCYTES NFR BLD: 7.7 % (ref 4–15)
NEUTROPHILS # BLD AUTO: 6.4 K/UL (ref 1.8–7.7)
NEUTROPHILS NFR BLD: 69.4 % (ref 38–73)
NRBC BLD-RTO: 0 /100 WBC
PLATELET # BLD AUTO: 314 K/UL (ref 150–450)
PMV BLD AUTO: 8.6 FL (ref 9.2–12.9)
POCT GLUCOSE: 115 MG/DL (ref 70–110)
RBC # BLD AUTO: 3.89 M/UL (ref 4.6–6.2)
SARS-COV-2 AG RESP QL IA.RAPID: NEGATIVE
WBC # BLD AUTO: 9.28 K/UL (ref 3.9–12.7)

## 2022-09-19 PROCEDURE — 11042 PR DEBRIDEMENT, SKIN, SUB-Q TISSUE,=<20 SQ CM: ICD-10-PCS | Mod: 59,51,, | Performed by: SURGERY

## 2022-09-19 PROCEDURE — 87205 SMEAR GRAM STAIN: CPT | Mod: 59 | Performed by: SURGERY

## 2022-09-19 PROCEDURE — 25000003 PHARM REV CODE 250: Performed by: NURSE ANESTHETIST, CERTIFIED REGISTERED

## 2022-09-19 PROCEDURE — 25000003 PHARM REV CODE 250: Performed by: ANESTHESIOLOGY

## 2022-09-19 PROCEDURE — 28810 PR AMPUTATION METATARSAL+TOE,SINGLE: ICD-10-PCS | Mod: T2,,, | Performed by: SURGERY

## 2022-09-19 PROCEDURE — D9220A PRA ANESTHESIA: ICD-10-PCS | Mod: CRNA,,, | Performed by: NURSE ANESTHETIST, CERTIFIED REGISTERED

## 2022-09-19 PROCEDURE — 87070 CULTURE OTHR SPECIMN AEROBIC: CPT | Mod: 59 | Performed by: SURGERY

## 2022-09-19 PROCEDURE — 87102 FUNGUS ISOLATION CULTURE: CPT | Mod: 59 | Performed by: SURGERY

## 2022-09-19 PROCEDURE — 87206 SMEAR FLUORESCENT/ACID STAI: CPT | Mod: 91 | Performed by: SURGERY

## 2022-09-19 PROCEDURE — 87147 CULTURE TYPE IMMUNOLOGIC: CPT | Performed by: SURGERY

## 2022-09-19 PROCEDURE — 87186 SC STD MICRODIL/AGAR DIL: CPT | Mod: 59 | Performed by: SURGERY

## 2022-09-19 PROCEDURE — 25000003 PHARM REV CODE 250: Performed by: NURSE PRACTITIONER

## 2022-09-19 PROCEDURE — 88311 PR  DECALCIFY TISSUE: ICD-10-PCS | Mod: 26,,, | Performed by: PATHOLOGY

## 2022-09-19 PROCEDURE — 36000706: Performed by: SURGERY

## 2022-09-19 PROCEDURE — 88305 TISSUE EXAM BY PATHOLOGIST: CPT | Performed by: PATHOLOGY

## 2022-09-19 PROCEDURE — 85025 COMPLETE CBC W/AUTO DIFF WBC: CPT | Performed by: ANESTHESIOLOGY

## 2022-09-19 PROCEDURE — 63600175 PHARM REV CODE 636 W HCPCS: Performed by: SURGERY

## 2022-09-19 PROCEDURE — 37000008 HC ANESTHESIA 1ST 15 MINUTES: Performed by: SURGERY

## 2022-09-19 PROCEDURE — 88311 DECALCIFY TISSUE: CPT | Performed by: PATHOLOGY

## 2022-09-19 PROCEDURE — 87075 CULTR BACTERIA EXCEPT BLOOD: CPT | Mod: 59 | Performed by: SURGERY

## 2022-09-19 PROCEDURE — 25000003 PHARM REV CODE 250: Performed by: SURGERY

## 2022-09-19 PROCEDURE — 71000033 HC RECOVERY, INTIAL HOUR: Performed by: SURGERY

## 2022-09-19 PROCEDURE — 63600175 PHARM REV CODE 636 W HCPCS: Performed by: ANESTHESIOLOGY

## 2022-09-19 PROCEDURE — 88305 TISSUE EXAM BY PATHOLOGIST: CPT | Mod: 26,,, | Performed by: PATHOLOGY

## 2022-09-19 PROCEDURE — D9220A PRA ANESTHESIA: Mod: ANES,,, | Performed by: ANESTHESIOLOGY

## 2022-09-19 PROCEDURE — 87116 MYCOBACTERIA CULTURE: CPT | Performed by: SURGERY

## 2022-09-19 PROCEDURE — 36415 COLL VENOUS BLD VENIPUNCTURE: CPT | Performed by: ANESTHESIOLOGY

## 2022-09-19 PROCEDURE — 28810 AMPUTATION TOE & METATARSAL: CPT | Mod: T2,,, | Performed by: SURGERY

## 2022-09-19 PROCEDURE — 87077 CULTURE AEROBIC IDENTIFY: CPT | Mod: 59 | Performed by: SURGERY

## 2022-09-19 PROCEDURE — 88311 DECALCIFY TISSUE: CPT | Mod: 26,,, | Performed by: PATHOLOGY

## 2022-09-19 PROCEDURE — 36000707: Performed by: SURGERY

## 2022-09-19 PROCEDURE — D9220A PRA ANESTHESIA: ICD-10-PCS | Mod: ANES,,, | Performed by: ANESTHESIOLOGY

## 2022-09-19 PROCEDURE — 11042 DBRDMT SUBQ TIS 1ST 20SQCM/<: CPT | Mod: 59,51,, | Performed by: SURGERY

## 2022-09-19 PROCEDURE — 11000001 HC ACUTE MED/SURG PRIVATE ROOM

## 2022-09-19 PROCEDURE — 37000009 HC ANESTHESIA EA ADD 15 MINS: Performed by: SURGERY

## 2022-09-19 PROCEDURE — 88305 TISSUE EXAM BY PATHOLOGIST: ICD-10-PCS | Mod: 26,,, | Performed by: PATHOLOGY

## 2022-09-19 PROCEDURE — D9220A PRA ANESTHESIA: Mod: CRNA,,, | Performed by: NURSE ANESTHETIST, CERTIFIED REGISTERED

## 2022-09-19 PROCEDURE — 63600175 PHARM REV CODE 636 W HCPCS: Performed by: NURSE ANESTHETIST, CERTIFIED REGISTERED

## 2022-09-19 RX ORDER — HYDROCODONE BITARTRATE AND ACETAMINOPHEN 5; 325 MG/1; MG/1
1 TABLET ORAL EVERY 4 HOURS PRN
Status: DISCONTINUED | OUTPATIENT
Start: 2022-09-19 | End: 2022-09-26 | Stop reason: HOSPADM

## 2022-09-19 RX ORDER — ACETAMINOPHEN 325 MG/1
650 TABLET ORAL EVERY 4 HOURS PRN
Status: DISCONTINUED | OUTPATIENT
Start: 2022-09-19 | End: 2022-09-26 | Stop reason: HOSPADM

## 2022-09-19 RX ORDER — ONDANSETRON 2 MG/ML
4 INJECTION INTRAMUSCULAR; INTRAVENOUS EVERY 8 HOURS PRN
Status: DISCONTINUED | OUTPATIENT
Start: 2022-09-19 | End: 2022-09-26 | Stop reason: HOSPADM

## 2022-09-19 RX ORDER — HYDROMORPHONE HYDROCHLORIDE 1 MG/ML
0.2 INJECTION, SOLUTION INTRAMUSCULAR; INTRAVENOUS; SUBCUTANEOUS EVERY 5 MIN PRN
Status: DISCONTINUED | OUTPATIENT
Start: 2022-09-19 | End: 2022-09-19 | Stop reason: HOSPADM

## 2022-09-19 RX ORDER — LIDOCAINE HYDROCHLORIDE 10 MG/ML
1 INJECTION, SOLUTION EPIDURAL; INFILTRATION; INTRACAUDAL; PERINEURAL ONCE
Status: DISCONTINUED | OUTPATIENT
Start: 2022-09-19 | End: 2022-09-19 | Stop reason: HOSPADM

## 2022-09-19 RX ORDER — SODIUM CHLORIDE, SODIUM LACTATE, POTASSIUM CHLORIDE, CALCIUM CHLORIDE 600; 310; 30; 20 MG/100ML; MG/100ML; MG/100ML; MG/100ML
INJECTION, SOLUTION INTRAVENOUS CONTINUOUS
Status: DISCONTINUED | OUTPATIENT
Start: 2022-09-19 | End: 2022-09-20

## 2022-09-19 RX ORDER — SODIUM CHLORIDE 0.9 % (FLUSH) 0.9 %
10 SYRINGE (ML) INJECTION
Status: DISCONTINUED | OUTPATIENT
Start: 2022-09-19 | End: 2022-09-19 | Stop reason: HOSPADM

## 2022-09-19 RX ORDER — HYDRALAZINE HYDROCHLORIDE 20 MG/ML
10 INJECTION INTRAMUSCULAR; INTRAVENOUS ONCE
Status: COMPLETED | OUTPATIENT
Start: 2022-09-19 | End: 2022-09-19

## 2022-09-19 RX ORDER — PROCHLORPERAZINE EDISYLATE 5 MG/ML
5 INJECTION INTRAMUSCULAR; INTRAVENOUS EVERY 30 MIN PRN
Status: DISCONTINUED | OUTPATIENT
Start: 2022-09-19 | End: 2022-09-19 | Stop reason: HOSPADM

## 2022-09-19 RX ORDER — FENTANYL CITRATE 50 UG/ML
INJECTION, SOLUTION INTRAMUSCULAR; INTRAVENOUS
Status: DISCONTINUED | OUTPATIENT
Start: 2022-09-19 | End: 2022-09-19

## 2022-09-19 RX ORDER — TALC
6 POWDER (GRAM) TOPICAL NIGHTLY PRN
Status: DISCONTINUED | OUTPATIENT
Start: 2022-09-19 | End: 2022-09-26 | Stop reason: HOSPADM

## 2022-09-19 RX ORDER — HEPARIN SODIUM 5000 [USP'U]/ML
5000 INJECTION, SOLUTION INTRAVENOUS; SUBCUTANEOUS EVERY 8 HOURS
Status: DISCONTINUED | OUTPATIENT
Start: 2022-09-19 | End: 2022-09-26 | Stop reason: HOSPADM

## 2022-09-19 RX ORDER — HYDRALAZINE HYDROCHLORIDE 20 MG/ML
10 INJECTION INTRAMUSCULAR; INTRAVENOUS EVERY 6 HOURS PRN
Status: DISCONTINUED | OUTPATIENT
Start: 2022-09-19 | End: 2022-09-26 | Stop reason: HOSPADM

## 2022-09-19 RX ORDER — ONDANSETRON 2 MG/ML
4 INJECTION INTRAMUSCULAR; INTRAVENOUS DAILY PRN
Status: DISCONTINUED | OUTPATIENT
Start: 2022-09-19 | End: 2022-09-19 | Stop reason: HOSPADM

## 2022-09-19 RX ORDER — CEFAZOLIN SODIUM 2 G/50ML
2 SOLUTION INTRAVENOUS ONCE
Status: COMPLETED | OUTPATIENT
Start: 2022-09-19 | End: 2022-09-19

## 2022-09-19 RX ORDER — PHENYLEPHRINE HYDROCHLORIDE 10 MG/ML
INJECTION INTRAVENOUS
Status: DISCONTINUED | OUTPATIENT
Start: 2022-09-19 | End: 2022-09-19

## 2022-09-19 RX ORDER — SODIUM CHLORIDE 0.9 % (FLUSH) 0.9 %
10 SYRINGE (ML) INJECTION
Status: DISCONTINUED | OUTPATIENT
Start: 2022-09-19 | End: 2022-09-26 | Stop reason: HOSPADM

## 2022-09-19 RX ORDER — LIDOCAINE HYDROCHLORIDE 20 MG/ML
INJECTION INTRAVENOUS
Status: DISCONTINUED | OUTPATIENT
Start: 2022-09-19 | End: 2022-09-19

## 2022-09-19 RX ORDER — LABETALOL HYDROCHLORIDE 5 MG/ML
10 INJECTION, SOLUTION INTRAVENOUS EVERY 4 HOURS PRN
Status: DISCONTINUED | OUTPATIENT
Start: 2022-09-19 | End: 2022-09-26 | Stop reason: HOSPADM

## 2022-09-19 RX ORDER — ACETAMINOPHEN 500 MG
1000 TABLET ORAL
Status: COMPLETED | OUTPATIENT
Start: 2022-09-19 | End: 2022-09-19

## 2022-09-19 RX ORDER — PROPOFOL 10 MG/ML
VIAL (ML) INTRAVENOUS
Status: DISCONTINUED | OUTPATIENT
Start: 2022-09-19 | End: 2022-09-19

## 2022-09-19 RX ORDER — ONDANSETRON 2 MG/ML
INJECTION INTRAMUSCULAR; INTRAVENOUS
Status: DISCONTINUED | OUTPATIENT
Start: 2022-09-19 | End: 2022-09-19

## 2022-09-19 RX ORDER — MIDAZOLAM HYDROCHLORIDE 1 MG/ML
INJECTION, SOLUTION INTRAMUSCULAR; INTRAVENOUS
Status: DISCONTINUED | OUTPATIENT
Start: 2022-09-19 | End: 2022-09-19

## 2022-09-19 RX ORDER — HYDROMORPHONE HYDROCHLORIDE 2 MG/ML
0.2 INJECTION, SOLUTION INTRAMUSCULAR; INTRAVENOUS; SUBCUTANEOUS
Status: DISCONTINUED | OUTPATIENT
Start: 2022-09-19 | End: 2022-09-26 | Stop reason: HOSPADM

## 2022-09-19 RX ADMIN — HYDROCODONE BITARTRATE AND ACETAMINOPHEN 1 TABLET: 5; 325 TABLET ORAL at 10:09

## 2022-09-19 RX ADMIN — HYDRALAZINE HYDROCHLORIDE 10 MG: 20 INJECTION INTRAMUSCULAR; INTRAVENOUS at 11:09

## 2022-09-19 RX ADMIN — HYDROCODONE BITARTRATE AND ACETAMINOPHEN 1 TABLET: 5; 325 TABLET ORAL at 05:09

## 2022-09-19 RX ADMIN — PHENYLEPHRINE HYDROCHLORIDE 100 MCG: 10 INJECTION INTRAVENOUS at 12:09

## 2022-09-19 RX ADMIN — FENTANYL CITRATE 50 MCG: 50 INJECTION, SOLUTION INTRAMUSCULAR; INTRAVENOUS at 12:09

## 2022-09-19 RX ADMIN — SODIUM CHLORIDE, SODIUM LACTATE, POTASSIUM CHLORIDE, AND CALCIUM CHLORIDE: .6; .31; .03; .02 INJECTION, SOLUTION INTRAVENOUS at 12:09

## 2022-09-19 RX ADMIN — LIDOCAINE HYDROCHLORIDE 80 MG: 20 INJECTION, SOLUTION INTRAVENOUS at 12:09

## 2022-09-19 RX ADMIN — Medication 6 MG: at 10:09

## 2022-09-19 RX ADMIN — HYDROMORPHONE HYDROCHLORIDE 0.2 MG: 2 INJECTION INTRAMUSCULAR; INTRAVENOUS; SUBCUTANEOUS at 09:09

## 2022-09-19 RX ADMIN — LABETALOL HYDROCHLORIDE 10 MG: 5 INJECTION INTRAVENOUS at 02:09

## 2022-09-19 RX ADMIN — FENTANYL CITRATE 25 MCG: 50 INJECTION, SOLUTION INTRAMUSCULAR; INTRAVENOUS at 12:09

## 2022-09-19 RX ADMIN — ACETAMINOPHEN 1000 MG: 500 TABLET ORAL at 10:09

## 2022-09-19 RX ADMIN — MIDAZOLAM HYDROCHLORIDE 2 MG: 1 INJECTION, SOLUTION INTRAMUSCULAR; INTRAVENOUS at 12:09

## 2022-09-19 RX ADMIN — HEPARIN SODIUM 5000 UNITS: 5000 INJECTION INTRAVENOUS; SUBCUTANEOUS at 04:09

## 2022-09-19 RX ADMIN — ONDANSETRON 4 MG: 2 INJECTION, SOLUTION INTRAMUSCULAR; INTRAVENOUS at 01:09

## 2022-09-19 RX ADMIN — HEPARIN SODIUM 5000 UNITS: 5000 INJECTION INTRAVENOUS; SUBCUTANEOUS at 09:09

## 2022-09-19 RX ADMIN — CEFAZOLIN SODIUM 2 G: 2 SOLUTION INTRAVENOUS at 12:09

## 2022-09-19 RX ADMIN — PROPOFOL 70 MG: 10 INJECTION, EMULSION INTRAVENOUS at 12:09

## 2022-09-19 RX ADMIN — FENTANYL CITRATE 50 MCG: 50 INJECTION, SOLUTION INTRAMUSCULAR; INTRAVENOUS at 01:09

## 2022-09-19 NOTE — PLAN OF CARE
Problem: Adult Inpatient Plan of Care  Goal: Plan of Care Review  Outcome: Ongoing, Progressing  Goal: Patient-Specific Goal (Individualized)  Outcome: Ongoing, Progressing  Goal: Absence of Hospital-Acquired Illness or Injury  Outcome: Ongoing, Progressing  Goal: Optimal Comfort and Wellbeing  Outcome: Ongoing, Progressing  Goal: Readiness for Transition of Care  Outcome: Ongoing, Progressing     Problem: Impaired Wound Healing  Goal: Optimal Wound Healing  Outcome: Ongoing, Progressing     Problem: Fall Injury Risk  Goal: Absence of Fall and Fall-Related Injury  Outcome: Ongoing, Progressing     L 3rd toe amputation done today with debridement of bilateral feet. POC reviewed. All questions and concerns answered and addressed. Pt resting comfortably with no complaints at this time. Will continue with POC.

## 2022-09-19 NOTE — PROGRESS NOTES
Contacted by Dr Rich to advise that he will be keeping the pt in extended stay outpatient recovery and would like for the pt to be set up with Home Health at time of discharge. TN to review and get back with Dr Rich.    TN reviewed and advised MD that due to the pt having Medicaid the pt will only be able to have a skilled nurse if even able to find an agency that will accept pts insurance.  TN advised that once orders are written for HH that they can be sent in attempts to secure HH but if not able to do so the pt can go to Outpatient Wound Care here at the hospital just will need an ambulatory referral to wound care and TN can call to schedule appointment once order entered

## 2022-09-19 NOTE — OP NOTE
[facility] - Surgery  Brief Operative Note     Surgery Date: 9/19/2022      Surgeon(s) and Role:     * Mehul Rich MD - Primary     Assistant: Khalida Mullen MS3     Pre-op Diagnosis:  Critical lower limb ischemia [I70.229]     Post-op Diagnosis:  Post-Op Diagnosis Codes:     * Critical lower limb ischemia [I70.229]     Procedure(s) (LRB):  1.AMPUTATION, TOE THIRD TOE INCLUDING METATARSAL HEAD LEFT   2. BILATERAL FOOT DEBRIDEMENT (Bilateral)    PROCEDURE IN DETAIL:  Patient was seen preoperatively by anesthesia was deemed stable for surgery.  Brought to the operating room placed supine on the operating table.  He was intubated without difficulty.  Prepped and draped in sterile fashion.  Time-out performed.  Attention was turned to the left 3rd toe and a scalpel used to make an incision at the base of the toe and this was deepened with scalpel and sharp dissection.  The joint space was entered with scissors and the toe was sent off as a specimen.  The metatarsal head was excised back and sent as a specimen.  The wound was cultured.  Necrotic tissue was debrided with Metzenbaum scissors.  The wound was irrigated.  The wound did extend into the dorsal aspect of the foot subcutaneously although there was no deep infection or bone exposure.  We then held manual pressure with hemostasis achieved.  We then performed debridement of the right great toe down to level of subcutaneous tissue and the right 4th toe.  Purulence was detected subcutaneously in the right great toe.  Manual pressure was used to achieve hemostasis the wound was irrigated well.  There was no evidence of deep infection.  The wounds were dressed with wet to dry dressings sterilely and the patient was transferred to the recovery room in stable condition.     Anesthesia: Regional     Operative Findings: necrotic L 3rd toe, purulence from R great toe, no deep infection     Estimated Blood Loss: * No values recorded between 9/19/2022 12:50 PM and  9/19/2022  1:40 PM *         Specimens:   Specimen (24h ago, onward)          Start     Ordered     09/19/22 1253   Specimen to Pathology, Surgery Cardiovascular  Once        Comments: Pre-op Diagnosis: Critical lower limb ischemia [I70.229]Procedure(s):AMPUTATION, TOE THIRD TOE, POSSIBLE LEFT FOURTH TOE WITH BILATERAL FOOT DEBRIDEMENT Number of specimens: Name of specimens: 1- left third toe      References:    Click here for ordering Quick Tip   Question Answer Comment   Procedure Type: Cardiovascular     Which provider would you like to cc? JARRED MACDONALD     Release to patient Immediate         09/19/22 3521

## 2022-09-19 NOTE — BRIEF OP NOTE
[facility] - Surgery  Brief Operative Note    Surgery Date: 9/19/2022     Surgeon(s) and Role:     * Mehul Rich MD - Primary    Assistant: Khalida Mullen MS3    Pre-op Diagnosis:  Critical lower limb ischemia [I70.229]    Post-op Diagnosis:  Post-Op Diagnosis Codes:     * Critical lower limb ischemia [I70.229]    Procedure(s) (LRB):  AMPUTATION, TOE THIRD TOE WITH BILATERAL FOOT DEBRIDEMENT (Bilateral)    Anesthesia: Regional    Operative Findings: necrotic L 3rd toe, purulence from R great toe, no deep infection    Estimated Blood Loss: * No values recorded between 9/19/2022 12:50 PM and 9/19/2022  1:40 PM *         Specimens:   Specimen (24h ago, onward)       Start     Ordered    09/19/22 1253  Specimen to Pathology, Surgery Cardiovascular  Once        Comments: Pre-op Diagnosis: Critical lower limb ischemia [I70.229]Procedure(s):AMPUTATION, TOE THIRD TOE, POSSIBLE LEFT FOURTH TOE WITH BILATERAL FOOT DEBRIDEMENT Number of specimens: Name of specimens: 1- left third toe     References:    Click here for ordering Quick Tip   Question Answer Comment   Procedure Type: Cardiovascular    Which provider would you like to cc? MEHUL RICH    Release to patient Immediate        09/19/22 5161

## 2022-09-19 NOTE — TRANSFER OF CARE
Anesthesia Transfer of Care Note    Patient: Yo Pink    Procedure(s) Performed: Procedure(s) (LRB):  AMPUTATION, TOE THIRD TOE WITH BILATERAL FOOT DEBRIDEMENT (Bilateral)    Patient location: PACU    Anesthesia Type: general    Transport from OR: Transported from OR on room air with adequate spontaneous ventilation    Post pain: adequate analgesia    Post assessment: no apparent anesthetic complications    Post vital signs: stable    Level of consciousness: sedated    Nausea/Vomiting: no nausea/vomiting    Complications: none    Transfer of care protocol was followed      Last vitals:   Visit Vitals  BP (!) 178/108 (BP Location: Left arm, Patient Position: Lying)   Pulse 76   Temp 36.6 °C (97.8 °F) (Tympanic)   Resp 16   Wt 67.5 kg (148 lb 14 oz)   SpO2 100%   BMI 21.36 kg/m²

## 2022-09-20 DIAGNOSIS — I73.9 PAD (PERIPHERAL ARTERY DISEASE): Primary | ICD-10-CM

## 2022-09-20 LAB
POCT GLUCOSE: 103 MG/DL (ref 70–110)
POCT GLUCOSE: 132 MG/DL (ref 70–110)
POCT GLUCOSE: 148 MG/DL (ref 70–110)

## 2022-09-20 PROCEDURE — 99024 PR POST-OP FOLLOW-UP VISIT: ICD-10-PCS | Mod: ,,, | Performed by: SURGERY

## 2022-09-20 PROCEDURE — 25000003 PHARM REV CODE 250: Performed by: NURSE PRACTITIONER

## 2022-09-20 PROCEDURE — 25000003 PHARM REV CODE 250: Performed by: SURGERY

## 2022-09-20 PROCEDURE — 11000001 HC ACUTE MED/SURG PRIVATE ROOM

## 2022-09-20 PROCEDURE — 99024 POSTOP FOLLOW-UP VISIT: CPT | Mod: ,,, | Performed by: SURGERY

## 2022-09-20 PROCEDURE — 63600175 PHARM REV CODE 636 W HCPCS: Performed by: SURGERY

## 2022-09-20 RX ORDER — ATORVASTATIN CALCIUM 40 MG/1
40 TABLET, FILM COATED ORAL DAILY
Status: DISCONTINUED | OUTPATIENT
Start: 2022-09-20 | End: 2022-09-26 | Stop reason: HOSPADM

## 2022-09-20 RX ORDER — GABAPENTIN 300 MG/1
600 CAPSULE ORAL 2 TIMES DAILY
Status: DISCONTINUED | OUTPATIENT
Start: 2022-09-20 | End: 2022-09-26 | Stop reason: HOSPADM

## 2022-09-20 RX ORDER — ASPIRIN 81 MG/1
81 TABLET ORAL DAILY
Status: DISCONTINUED | OUTPATIENT
Start: 2022-09-20 | End: 2022-09-26 | Stop reason: HOSPADM

## 2022-09-20 RX ORDER — PANTOPRAZOLE SODIUM 40 MG/1
40 TABLET, DELAYED RELEASE ORAL DAILY
Status: DISCONTINUED | OUTPATIENT
Start: 2022-09-20 | End: 2022-09-26 | Stop reason: HOSPADM

## 2022-09-20 RX ORDER — CLOPIDOGREL BISULFATE 75 MG/1
75 TABLET ORAL DAILY
Status: DISCONTINUED | OUTPATIENT
Start: 2022-09-20 | End: 2022-09-26 | Stop reason: HOSPADM

## 2022-09-20 RX ORDER — SPIRONOLACTONE 25 MG/1
25 TABLET ORAL DAILY
Status: DISCONTINUED | OUTPATIENT
Start: 2022-09-20 | End: 2022-09-26 | Stop reason: HOSPADM

## 2022-09-20 RX ADMIN — HEPARIN SODIUM 5000 UNITS: 5000 INJECTION INTRAVENOUS; SUBCUTANEOUS at 02:09

## 2022-09-20 RX ADMIN — Medication 6 MG: at 10:09

## 2022-09-20 RX ADMIN — HYDROMORPHONE HYDROCHLORIDE 0.2 MG: 2 INJECTION INTRAMUSCULAR; INTRAVENOUS; SUBCUTANEOUS at 07:09

## 2022-09-20 RX ADMIN — PANTOPRAZOLE SODIUM 40 MG: 40 TABLET, DELAYED RELEASE ORAL at 08:09

## 2022-09-20 RX ADMIN — HYDROMORPHONE HYDROCHLORIDE 0.2 MG: 2 INJECTION INTRAMUSCULAR; INTRAVENOUS; SUBCUTANEOUS at 01:09

## 2022-09-20 RX ADMIN — HYDROCODONE BITARTRATE AND ACETAMINOPHEN 1 TABLET: 5; 325 TABLET ORAL at 02:09

## 2022-09-20 RX ADMIN — ATORVASTATIN CALCIUM 40 MG: 40 TABLET, FILM COATED ORAL at 08:09

## 2022-09-20 RX ADMIN — HYDROCODONE BITARTRATE AND ACETAMINOPHEN 1 TABLET: 5; 325 TABLET ORAL at 06:09

## 2022-09-20 RX ADMIN — SACUBITRIL AND VALSARTAN 1 TABLET: 24; 26 TABLET, FILM COATED ORAL at 10:09

## 2022-09-20 RX ADMIN — HYDROMORPHONE HYDROCHLORIDE 0.2 MG: 2 INJECTION INTRAMUSCULAR; INTRAVENOUS; SUBCUTANEOUS at 03:09

## 2022-09-20 RX ADMIN — GABAPENTIN 600 MG: 300 CAPSULE ORAL at 08:09

## 2022-09-20 RX ADMIN — ASPIRIN 81 MG: 81 TABLET, COATED ORAL at 08:09

## 2022-09-20 RX ADMIN — HEPARIN SODIUM 5000 UNITS: 5000 INJECTION INTRAVENOUS; SUBCUTANEOUS at 07:09

## 2022-09-20 RX ADMIN — HYDROCODONE BITARTRATE AND ACETAMINOPHEN 1 TABLET: 5; 325 TABLET ORAL at 07:09

## 2022-09-20 RX ADMIN — CLOPIDOGREL BISULFATE 75 MG: 75 TABLET, FILM COATED ORAL at 08:09

## 2022-09-20 RX ADMIN — SACUBITRIL AND VALSARTAN 1 TABLET: 24; 26 TABLET, FILM COATED ORAL at 08:09

## 2022-09-20 RX ADMIN — HEPARIN SODIUM 5000 UNITS: 5000 INJECTION INTRAVENOUS; SUBCUTANEOUS at 10:09

## 2022-09-20 RX ADMIN — SPIRONOLACTONE 25 MG: 25 TABLET ORAL at 08:09

## 2022-09-20 RX ADMIN — GABAPENTIN 600 MG: 300 CAPSULE ORAL at 10:09

## 2022-09-20 RX ADMIN — ACETAMINOPHEN 650 MG: 325 TABLET ORAL at 05:09

## 2022-09-20 NOTE — SUBJECTIVE & OBJECTIVE
Medications:  Continuous Infusions:  Scheduled Meds:   aspirin  81 mg Oral Daily    atorvastatin  40 mg Oral Daily    clopidogreL  75 mg Oral Daily    gabapentin  600 mg Oral BID    heparin (porcine)  5,000 Units Subcutaneous Q8H    pantoprazole  40 mg Oral Daily    sacubitriL-valsartan  1 tablet Oral BID    spironolactone  25 mg Oral Daily     PRN Meds:acetaminophen, hydrALAZINE, HYDROcodone-acetaminophen, HYDROmorphone, labetalol, melatonin, ondansetron, sodium chloride 0.9%     Review of Systems   Constitutional:  Negative for chills and fever.   HENT:  Negative for congestion.    Eyes:  Negative for visual disturbance.   Respiratory:  Negative for shortness of breath.    Cardiovascular:  Negative for chest pain.   Gastrointestinal:  Negative for abdominal distention.   Endocrine: Negative for cold intolerance.   Genitourinary:  Negative for flank pain.   Musculoskeletal:  Negative for back pain.   Skin:  Positive for color change and wound. Negative for pallor and rash.   Allergic/Immunologic: Negative for immunocompromised state.   Neurological:  Negative for dizziness.   Hematological:  Does not bruise/bleed easily.   Psychiatric/Behavioral:  Negative for agitation.  Objective:     Vital Signs (Most Recent):  Temp: 97.9 °F (36.6 °C) (09/20/22 0700)  Pulse: 88 (09/20/22 0700)  Resp: 19 (09/20/22 0733)  BP: (!) 145/73 (09/20/22 0700)  SpO2: 96 % (09/20/22 0700)   Vital Signs (24h Range):  Temp:  [97.6 °F (36.4 °C)-98.6 °F (37 °C)] 97.9 °F (36.6 °C)  Pulse:  [63-92] 88  Resp:  [14-20] 19  SpO2:  [96 %-100 %] 96 %  BP: (126-202)/() 145/73       Physical Exam  Vitals reviewed.   Constitutional:       General: He is not in acute distress.     Appearance: He is well-developed. He is not diaphoretic.   HENT:      Head: Normocephalic and atraumatic.   Eyes:      Conjunctiva/sclera: Conjunctivae normal.   Cardiovascular:      Rate and Rhythm: Normal rate.      Pulses:           Femoral pulses are 2+ on the right  side and 2+ on the left side.       Dorsalis pedis pulses are detected w/ Doppler on the right side and detected w/ Doppler on the left side.        Posterior tibial pulses are detected w/ Doppler on the right side and detected w/ Doppler on the left side.   Pulmonary:      Effort: Pulmonary effort is normal.   Abdominal:      General: There is no distension.      Palpations: Abdomen is soft. There is no mass.      Tenderness: There is no abdominal tenderness. There is no guarding or rebound.      Hernia: No hernia is present.   Musculoskeletal:         General: No deformity. Normal range of motion.      Cervical back: Neck supple.   Skin:     Findings: No rash.   Neurological:      Mental Status: He is alert and oriented to person, place, and time.    L 3rd toe amputation. R great toe debridement.    Significant Labs:  BMP: No results for input(s): GLU, NA, K, CL, CO2, BUN, CREATININE, CALCIUM, MG in the last 48 hours.  CBC:   Recent Labs   Lab 09/19/22  1206   WBC 9.28   RBC 3.89*   HGB 12.0*   HCT 37.4*      MCV 96   MCH 30.8   MCHC 32.1       Significant Diagnostics:  I have reviewed and interpreted all pertinent imaging results/findings within the past 24 hours.  CV US BLE: 09/13/22  Conclusion    Right lower extremity arterial ultrasound shows decreased popliteal velocity without focal hemodynamically significant stenosis.  Pressures indicate no arterial occlusive disease.  Left lower extremity arterial ultrasound shows a patent popliteal stent with no hemodynamically significant stenosis.  There is decreased velocity in the popliteal artery and AT artery.  Pressures indicate no arterial occlusive disease.    CAREY:09/13/22  Conclusion    Right lower extremity pressures and waveforms indicate no hemodynamically significant arterial occlusive disease.  Left lower extremity pressures and waveforms indicate no hemodynamically significant arterial occlusive disease.

## 2022-09-20 NOTE — PROGRESS NOTES
Message sent to Dr Garza office to have an appointment scheduled TN unable to do so due to pts insurance.    Message sent to Dr Rich to advise that have not heard back from [facility] with appointment and if able to schedule appointment as well as to advise that appointment unable to be made for follow up with MD because of insurance.

## 2022-09-20 NOTE — PLAN OF CARE
Problem: Adult Inpatient Plan of Care  Goal: Plan of Care Review  Outcome: Ongoing, Progressing  Goal: Patient-Specific Goal (Individualized)  Outcome: Ongoing, Progressing  Goal: Absence of Hospital-Acquired Illness or Injury  Outcome: Ongoing, Progressing  Goal: Optimal Comfort and Wellbeing  Outcome: Ongoing, Progressing  Goal: Readiness for Transition of Care  Outcome: Ongoing, Progressing     Problem: Impaired Wound Healing  Goal: Optimal Wound Healing  Outcome: Ongoing, Progressing     Problem: Fall Injury Risk  Goal: Absence of Fall and Fall-Related Injury  Outcome: Ongoing, Progressing     No acute events throughout shift. POC reviewed. All questions and concerns answered and addressed. All needs met at this time. Will continue with POC.

## 2022-09-20 NOTE — PROGRESS NOTES
Call placed to [facility] wound [facility] to attempt to schedule pt for wound care services  TN to follow for call back with appointment information

## 2022-09-20 NOTE — PLAN OF CARE
PCP: [facility]    Diagnosis:Critical lower limb ischemia [I70.229]  Dry gangrene [I96]  Critical lower limb ischemia [I70.229]   Activity: as tolerated  Referring Physician: Dr Mehul Rich    Nursing:   SN to complete comprehensive assessment including routine vital signs. Instruct on disease process and s/s of complications to report to MD. Review/verify medication list sent home with the patient at time of discharge  and instruct patient/caregiver as needed. Frequency may be adjusted depending on start of care date.     Wound care: Please have daily dry gauze wrap to R foot and BID L 3rd toe wet to dry saline gauze dressing with dry gauze wrap to L foot.    Notify MD if SBP > 160 or < 90; DBP > 90 or < 50; HR > 120 or < 50; Temp > 101            Patient requires assistance to leave the home and takes taxing effort.

## 2022-09-20 NOTE — PROGRESS NOTES
Referral for home health care services sent to 25 different agencies for review.  TN to follow for response.    [id number]- notified Md as of this time no accepting  agency.

## 2022-09-20 NOTE — PROGRESS NOTES
Vascular Surgery  Progress Note    Patient Name: Yo Pink  MRN: [id number]  Admission Date: 9/19/2022  Primary Care Provider: [facility]    Subjective:     Interval History: Patient in bed and AxOx4. No complaints. No acute events reported overnight. Dressings changed BLE.    Post-Op Info:  Procedure(s) (LRB):  AMPUTATION, TOE THIRD TOE WITH BILATERAL FOOT DEBRIDEMENT (Bilateral)   1 Day Post-Op       Medications:  Continuous Infusions:  Scheduled Meds:   aspirin  81 mg Oral Daily    atorvastatin  40 mg Oral Daily    clopidogreL  75 mg Oral Daily    gabapentin  600 mg Oral BID    heparin (porcine)  5,000 Units Subcutaneous Q8H    pantoprazole  40 mg Oral Daily    sacubitriL-valsartan  1 tablet Oral BID    spironolactone  25 mg Oral Daily     PRN Meds:acetaminophen, hydrALAZINE, HYDROcodone-acetaminophen, HYDROmorphone, labetalol, melatonin, ondansetron, sodium chloride 0.9%     Review of Systems   Constitutional:  Negative for chills and fever.   HENT:  Negative for congestion.    Eyes:  Negative for visual disturbance.   Respiratory:  Negative for shortness of breath.    Cardiovascular:  Negative for chest pain.   Gastrointestinal:  Negative for abdominal distention.   Endocrine: Negative for cold intolerance.   Genitourinary:  Negative for flank pain.   Musculoskeletal:  Negative for back pain.   Skin:  Positive for color change and wound. Negative for pallor and rash.   Allergic/Immunologic: Negative for immunocompromised state.   Neurological:  Negative for dizziness.   Hematological:  Does not bruise/bleed easily.   Psychiatric/Behavioral:  Negative for agitation.  Objective:     Vital Signs (Most Recent):  Temp: 97.9 °F (36.6 °C) (09/20/22 0700)  Pulse: 88 (09/20/22 0700)  Resp: 19 (09/20/22 0733)  BP: (!) 145/73 (09/20/22 0700)  SpO2: 96 % (09/20/22 0700)   Vital Signs (24h Range):  Temp:  [97.6 °F (36.4 °C)-98.6 °F (37 °C)] 97.9 °F (36.6 °C)  Pulse:  [63-92] 88  Resp:  [14-20]  19  SpO2:  [96 %-100 %] 96 %  BP: (126-202)/() 145/73       Physical Exam  Vitals reviewed.   Constitutional:       General: He is not in acute distress.     Appearance: He is well-developed. He is not diaphoretic.   HENT:      Head: Normocephalic and atraumatic.   Eyes:      Conjunctiva/sclera: Conjunctivae normal.   Cardiovascular:      Rate and Rhythm: Normal rate.      Pulses:           Femoral pulses are 2+ on the right side and 2+ on the left side.       Dorsalis pedis pulses are detected w/ Doppler on the right side and detected w/ Doppler on the left side.        Posterior tibial pulses are detected w/ Doppler on the right side and detected w/ Doppler on the left side.   Pulmonary:      Effort: Pulmonary effort is normal.   Abdominal:      General: There is no distension.      Palpations: Abdomen is soft. There is no mass.      Tenderness: There is no abdominal tenderness. There is no guarding or rebound.      Hernia: No hernia is present.   Musculoskeletal:         General: No deformity. Normal range of motion.      Cervical back: Neck supple.   Skin:     Findings: No rash.   Neurological:      Mental Status: He is alert and oriented to person, place, and time.    L 3rd toe amputation. R great toe debridement.    Significant Labs:  BMP: No results for input(s): GLU, NA, K, CL, CO2, BUN, CREATININE, CALCIUM, MG in the last 48 hours.  CBC:   Recent Labs   Lab 09/19/22  1206   WBC 9.28   RBC 3.89*   HGB 12.0*   HCT 37.4*      MCV 96   MCH 30.8   MCHC 32.1       Significant Diagnostics:  I have reviewed and interpreted all pertinent imaging results/findings within the past 24 hours.  CV US BLE: 09/13/22  Conclusion    Right lower extremity arterial ultrasound shows decreased popliteal velocity without focal hemodynamically significant stenosis.  Pressures indicate no arterial occlusive disease.  Left lower extremity arterial ultrasound shows a patent popliteal stent with no hemodynamically  significant stenosis.  There is decreased velocity in the popliteal artery and AT artery.  Pressures indicate no arterial occlusive disease.    CAREY:09/13/22  Conclusion    Right lower extremity pressures and waveforms indicate no hemodynamically significant arterial occlusive disease.  Left lower extremity pressures and waveforms indicate no hemodynamically significant arterial occlusive disease.    Assessment/Plan:     Critical lower limb ischemia  - All imaging reviewed. Dressing changed. Recommend HH.  - RTC in 6 weeks with repeat CAREY and CV US BLE.      Lucy Casarez NP  Vascular Surgery

## 2022-09-20 NOTE — ANESTHESIA POSTPROCEDURE EVALUATION
Anesthesia Post Evaluation    Patient: Yo Pink    Procedure(s) Performed: Procedure(s) (LRB):  AMPUTATION, TOE THIRD TOE WITH BILATERAL FOOT DEBRIDEMENT (Bilateral)  DEBRIDEMENT, FOOT (Bilateral)    Final Anesthesia Type: general      Patient location during evaluation: PACU  Patient participation: Yes- Able to Participate  Level of consciousness: awake and alert and oriented  Post-procedure vital signs: reviewed and stable  Pain management: adequate  Airway patency: patent    PONV status at discharge: No PONV  Anesthetic complications: no      Cardiovascular status: hemodynamically stable and blood pressure returned to baseline  Respiratory status: spontaneous ventilation, room air and unassisted  Hydration status: euvolemic  Follow-up not needed.          Vitals Value Taken Time   /85 09/20/22 1118   Temp 36.2 °C (97.1 °F) 09/20/22 1118   Pulse 89 09/20/22 1118   Resp 17 09/20/22 1409   SpO2 96 % 09/20/22 1118         Event Time   Out of Recovery 15:36:00         Pain/Farhad Score: Pain Rating Prior to Med Admin: 6 (9/20/2022  2:09 PM)  Pain Rating Post Med Admin: 0 (9/20/2022  8:03 AM)  Farhad Score: 10 (9/19/2022  3:12 PM)

## 2022-09-21 LAB
BACTERIA SPEC AEROBE CULT: ABNORMAL
FINAL PATHOLOGIC DIAGNOSIS: NORMAL
GROSS: NORMAL
Lab: NORMAL
POCT GLUCOSE: 126 MG/DL (ref 70–110)
POCT GLUCOSE: 135 MG/DL (ref 70–110)
POCT GLUCOSE: 137 MG/DL (ref 70–110)

## 2022-09-21 PROCEDURE — 63600175 PHARM REV CODE 636 W HCPCS: Performed by: SURGERY

## 2022-09-21 PROCEDURE — 25000003 PHARM REV CODE 250: Performed by: SURGERY

## 2022-09-21 PROCEDURE — 11000001 HC ACUTE MED/SURG PRIVATE ROOM

## 2022-09-21 RX ADMIN — HYDROCODONE BITARTRATE AND ACETAMINOPHEN 1 TABLET: 5; 325 TABLET ORAL at 01:09

## 2022-09-21 RX ADMIN — SPIRONOLACTONE 25 MG: 25 TABLET ORAL at 08:09

## 2022-09-21 RX ADMIN — HEPARIN SODIUM 5000 UNITS: 5000 INJECTION INTRAVENOUS; SUBCUTANEOUS at 06:09

## 2022-09-21 RX ADMIN — HYDROCODONE BITARTRATE AND ACETAMINOPHEN 1 TABLET: 5; 325 TABLET ORAL at 02:09

## 2022-09-21 RX ADMIN — GABAPENTIN 600 MG: 300 CAPSULE ORAL at 08:09

## 2022-09-21 RX ADMIN — HYDROCODONE BITARTRATE AND ACETAMINOPHEN 1 TABLET: 5; 325 TABLET ORAL at 06:09

## 2022-09-21 RX ADMIN — SACUBITRIL AND VALSARTAN 1 TABLET: 24; 26 TABLET, FILM COATED ORAL at 08:09

## 2022-09-21 RX ADMIN — HEPARIN SODIUM 5000 UNITS: 5000 INJECTION INTRAVENOUS; SUBCUTANEOUS at 09:09

## 2022-09-21 RX ADMIN — ASPIRIN 81 MG: 81 TABLET, COATED ORAL at 08:09

## 2022-09-21 RX ADMIN — CLOPIDOGREL BISULFATE 75 MG: 75 TABLET, FILM COATED ORAL at 08:09

## 2022-09-21 RX ADMIN — HEPARIN SODIUM 5000 UNITS: 5000 INJECTION INTRAVENOUS; SUBCUTANEOUS at 01:09

## 2022-09-21 RX ADMIN — PANTOPRAZOLE SODIUM 40 MG: 40 TABLET, DELAYED RELEASE ORAL at 08:09

## 2022-09-21 RX ADMIN — ATORVASTATIN CALCIUM 40 MG: 40 TABLET, FILM COATED ORAL at 08:09

## 2022-09-21 NOTE — PLAN OF CARE
09/20/22 0954   Discharge Planning   Assessment Type Discharge Planning Brief Assessment   Resource/Environmental Concerns none   Support Systems Friends/neighbors   Equipment Currently Used at Home none   Current Living Arrangements home/apartment/condo   Patient/Family Anticipates Transition to home   Patient/Family Anticipated Services at Transition none   DME Needed Upon Discharge  other (see comments)  (TBD)   Discharge Plan A Home  (with [facility] information)     [id number] - [place] GONZAL[place] - [address] AT [facility] [address]  [address]  [place] [address]  Phone: [phone] Fax: [phone]    [facility] [address]  [address]   [place] [address]  Phone: [phone] Fax: [phone]

## 2022-09-21 NOTE — PROGRESS NOTES
Wound care referral sent via Epic to [facility]       Call placed to [facility], spoke to Malina who was able to provide an appointment for hospital follow up as well as be referred to a wound care clinic  Appointment information entered into follow up tab to print on AVS at time of discharge. Updated MD on all information

## 2022-09-21 NOTE — PROGRESS NOTES
All referrals for [facility]CS sent via [employer] have responded and the pt has been denied for services based on insurance.    Message sent to MD to update on HHCS arrangement and inquire on plans for d/c as of today

## 2022-09-21 NOTE — PROGRESS NOTES
Secure chat message sent to Whit Triplett with [facility] to inquire on getting a post hospital discharge wound care clinic appointment arranged.    Message sent to [facility] to get information to submit referral for outpatient wound care. Advised at this time [facility] it would take 3 to 5 business days to review and authorize before they would contact patient for schedule. They are scheduling 2 to 3 weeks out currently.-- this is the message from [facility] so this is not an option either if he needs sooner     MD updated via secure chat regarding above information

## 2022-09-22 VITALS
SYSTOLIC BLOOD PRESSURE: 112 MMHG | DIASTOLIC BLOOD PRESSURE: 59 MMHG | HEART RATE: 97 BPM | HEIGHT: 70 IN | RESPIRATION RATE: 18 BRPM | OXYGEN SATURATION: 94 % | WEIGHT: 149.94 LBS | BODY MASS INDEX: 21.47 KG/M2 | TEMPERATURE: 98 F

## 2022-09-22 PROBLEM — S98.139A AMPUTATION OF TOE: Status: ACTIVE | Noted: 2022-09-22

## 2022-09-22 LAB
POCT GLUCOSE: 133 MG/DL (ref 70–110)
POCT GLUCOSE: 150 MG/DL (ref 70–110)
POCT GLUCOSE: 173 MG/DL (ref 70–110)

## 2022-09-22 PROCEDURE — 63600175 PHARM REV CODE 636 W HCPCS: Performed by: NURSE PRACTITIONER

## 2022-09-22 PROCEDURE — 63600175 PHARM REV CODE 636 W HCPCS: Performed by: SURGERY

## 2022-09-22 PROCEDURE — 25000003 PHARM REV CODE 250: Performed by: INTERNAL MEDICINE

## 2022-09-22 PROCEDURE — 99024 POSTOP FOLLOW-UP VISIT: CPT | Mod: ,,, | Performed by: NURSE PRACTITIONER

## 2022-09-22 PROCEDURE — 11000001 HC ACUTE MED/SURG PRIVATE ROOM

## 2022-09-22 PROCEDURE — 25000003 PHARM REV CODE 250: Performed by: NURSE PRACTITIONER

## 2022-09-22 PROCEDURE — 99024 PR POST-OP FOLLOW-UP VISIT: ICD-10-PCS | Mod: ,,, | Performed by: NURSE PRACTITIONER

## 2022-09-22 PROCEDURE — 99222 PR INITIAL HOSPITAL CARE,LEVL II: ICD-10-PCS | Mod: ,,, | Performed by: INTERNAL MEDICINE

## 2022-09-22 PROCEDURE — 99222 1ST HOSP IP/OBS MODERATE 55: CPT | Mod: ,,, | Performed by: INTERNAL MEDICINE

## 2022-09-22 PROCEDURE — 63600175 PHARM REV CODE 636 W HCPCS: Performed by: INTERNAL MEDICINE

## 2022-09-22 PROCEDURE — 25000003 PHARM REV CODE 250: Performed by: SURGERY

## 2022-09-22 RX ORDER — DIPHENHYDRAMINE HCL 25 MG
25 CAPSULE ORAL ONCE
Status: COMPLETED | OUTPATIENT
Start: 2022-09-22 | End: 2022-09-22

## 2022-09-22 RX ORDER — DOXYCYCLINE 100 MG/1
100 CAPSULE ORAL 2 TIMES DAILY
Qty: 28 CAPSULE | Refills: 0 | Status: SHIPPED | OUTPATIENT
Start: 2022-09-22 | End: 2022-09-27 | Stop reason: CLARIF

## 2022-09-22 RX ORDER — VANCOMYCIN HCL IN 5 % DEXTROSE 1G/250ML
1000 PLASTIC BAG, INJECTION (ML) INTRAVENOUS
Status: DISCONTINUED | OUTPATIENT
Start: 2022-09-22 | End: 2022-09-26 | Stop reason: HOSPADM

## 2022-09-22 RX ORDER — AMOXICILLIN AND CLAVULANATE POTASSIUM 875; 125 MG/1; MG/1
1 TABLET, FILM COATED ORAL EVERY 12 HOURS
Qty: 28 TABLET | Refills: 0 | Status: SHIPPED | OUTPATIENT
Start: 2022-09-22 | End: 2022-10-11 | Stop reason: ALTCHOICE

## 2022-09-22 RX ADMIN — VANCOMYCIN HYDROCHLORIDE 1750 MG: 10 INJECTION, POWDER, LYOPHILIZED, FOR SOLUTION INTRAVENOUS at 10:09

## 2022-09-22 RX ADMIN — CLOPIDOGREL BISULFATE 75 MG: 75 TABLET, FILM COATED ORAL at 09:09

## 2022-09-22 RX ADMIN — Medication 6 MG: at 12:09

## 2022-09-22 RX ADMIN — ASPIRIN 81 MG: 81 TABLET, COATED ORAL at 09:09

## 2022-09-22 RX ADMIN — HEPARIN SODIUM 5000 UNITS: 5000 INJECTION INTRAVENOUS; SUBCUTANEOUS at 03:09

## 2022-09-22 RX ADMIN — ATORVASTATIN CALCIUM 40 MG: 40 TABLET, FILM COATED ORAL at 09:09

## 2022-09-22 RX ADMIN — SPIRONOLACTONE 25 MG: 25 TABLET ORAL at 09:09

## 2022-09-22 RX ADMIN — HYDROCODONE BITARTRATE AND ACETAMINOPHEN 1 TABLET: 5; 325 TABLET ORAL at 06:09

## 2022-09-22 RX ADMIN — DIPHENHYDRAMINE HYDROCHLORIDE 25 MG: 25 CAPSULE ORAL at 10:09

## 2022-09-22 RX ADMIN — HYDROCODONE BITARTRATE AND ACETAMINOPHEN 1 TABLET: 5; 325 TABLET ORAL at 03:09

## 2022-09-22 RX ADMIN — SACUBITRIL AND VALSARTAN 1 TABLET: 24; 26 TABLET, FILM COATED ORAL at 09:09

## 2022-09-22 RX ADMIN — HYDROCODONE BITARTRATE AND ACETAMINOPHEN 1 TABLET: 5; 325 TABLET ORAL at 12:09

## 2022-09-22 RX ADMIN — GABAPENTIN 600 MG: 300 CAPSULE ORAL at 09:09

## 2022-09-22 RX ADMIN — PANTOPRAZOLE SODIUM 40 MG: 40 TABLET, DELAYED RELEASE ORAL at 09:09

## 2022-09-22 RX ADMIN — PIPERACILLIN AND TAZOBACTAM 4.5 G: 4; .5 INJECTION, POWDER, LYOPHILIZED, FOR SOLUTION INTRAVENOUS; PARENTERAL at 02:09

## 2022-09-22 RX ADMIN — CEFTRIAXONE 2 G: 2 INJECTION, POWDER, FOR SOLUTION INTRAMUSCULAR; INTRAVENOUS at 03:09

## 2022-09-22 RX ADMIN — HYDROCODONE BITARTRATE AND ACETAMINOPHEN 1 TABLET: 5; 325 TABLET ORAL at 10:09

## 2022-09-22 RX ADMIN — HEPARIN SODIUM 5000 UNITS: 5000 INJECTION INTRAVENOUS; SUBCUTANEOUS at 05:09

## 2022-09-22 NOTE — PROGRESS NOTES
Message received from NP with Dr Rich stating that wound care education has been completed with pt and daughter at bedside.  Dressing change material has been requested to be provided to pt at time of discharge enough to last until follow up appointment on Monday.  D/C pending ID recs.  TN to follow

## 2022-09-22 NOTE — PROGRESS NOTES
Vascular Surgery  Progress Note    Patient Name: Yo Pink  MRN: [id number]  Admission Date: 9/19/2022  Primary Care Provider: [facility]    Subjective:     Interval History: Patient in bed, AxOx4, without complaints. No acute events reported overnight .     Post-Op Info:  Procedure(s) (LRB):  AMPUTATION, TOE THIRD TOE WITH BILATERAL FOOT DEBRIDEMENT (Bilateral)  DEBRIDEMENT, FOOT (Bilateral)   3 Days Post-Op       Medications:  Continuous Infusions:  Scheduled Meds:   aspirin  81 mg Oral Daily    atorvastatin  40 mg Oral Daily    clopidogreL  75 mg Oral Daily    gabapentin  600 mg Oral BID    heparin (porcine)  5,000 Units Subcutaneous Q8H    pantoprazole  40 mg Oral Daily    piperacillin-tazobactam (ZOSYN) IVPB  4.5 g Intravenous Q8H    sacubitriL-valsartan  1 tablet Oral BID    spironolactone  25 mg Oral Daily     PRN Meds:acetaminophen, hydrALAZINE, HYDROcodone-acetaminophen, HYDROmorphone, labetalol, melatonin, ondansetron, sodium chloride 0.9%, Pharmacy to dose Vancomycin consult **AND** vancomycin - pharmacy to dose     Review of Systems   Constitutional:  Negative for chills and fever.   HENT:  Negative for congestion.    Eyes:  Negative for visual disturbance.   Respiratory:  Negative for shortness of breath.    Cardiovascular:  Negative for chest pain.   Gastrointestinal:  Negative for abdominal distention.   Endocrine: Negative for cold intolerance.   Genitourinary:  Negative for flank pain.   Musculoskeletal:  Negative for back pain.   Skin:  Positive for color change and wound. Negative for pallor and rash.   Allergic/Immunologic: Negative for immunocompromised state.   Neurological:  Negative for dizziness.   Hematological:  Does not bruise/bleed easily.   Psychiatric/Behavioral:  Negative for agitation.  Objective:     Vital Signs (Most Recent):  Temp: 98.5 °F (36.9 °C) (09/22/22 0737)  Pulse: 84 (09/22/22 0737)  Resp: 15 (09/22/22 0737)  BP: 117/89 (09/22/22 0930)  SpO2:  95 % (09/22/22 0737) Vital Signs (24h Range):  Temp:  [97.9 °F (36.6 °C)-98.8 °F (37.1 °C)] 98.5 °F (36.9 °C)  Pulse:  [83-95] 84  Resp:  [12-19] 15  SpO2:  [94 %-98 %] 95 %  BP: ()/(51-89) 117/89     Date 09/22/22 0700 - 09/23/22 0659   Shift 2063-9618 1638-9211 2354-1680 24 Hour Total   INTAKE   P.O. 120   120   Shift Total(mL/kg) 120(1.8)   120(1.8)   OUTPUT   Shift Total(mL/kg)       Weight (kg) 68 68 68 68       Physical Exam  Vitals reviewed.   Constitutional:       General: He is not in acute distress.     Appearance: He is well-developed. He is not diaphoretic.   HENT:      Head: Normocephalic and atraumatic.   Eyes:      Conjunctiva/sclera: Conjunctivae normal.   Cardiovascular:      Rate and Rhythm: Normal rate.      Pulses:           Femoral pulses are 2+ on the right side and 2+ on the left side.       Dorsalis pedis pulses are detected w/ Doppler on the right side and detected w/ Doppler on the left side.        Posterior tibial pulses are detected w/ Doppler on the right side and detected w/ Doppler on the left side.   Pulmonary:      Effort: Pulmonary effort is normal.   Abdominal:      General: There is no distension.      Palpations: Abdomen is soft. There is no mass.      Tenderness: There is no abdominal tenderness. There is no guarding or rebound.      Hernia: No hernia is present.   Musculoskeletal:         General: No deformity. Normal range of motion.      Cervical back: Neck supple.   Skin:     Findings: No rash.   Neurological:      Mental Status: He is alert and oriented to person, place, and time.    L 3rd toe amputation. R great toe debridement.    Significant Labs:  All pertinent labs from the last 24 hours have been reviewed.    Significant Diagnostics:  I have reviewed and interpreted all pertinent imaging results/findings within the past 24 hours.    Assessment/Plan:     * Critical lower limb ischemia  - All imaging reviewed. Dressings changed this morning. RTC on 9/26/22.  Patient daughter to come to bedside for wound care teaching prior to discharge.   - ID consulted.   - Plan for discharge 9/22/22, pending ID and outpatient care planning.         Lucy Casarez NP  Vascular Surgery

## 2022-09-22 NOTE — ASSESSMENT & PLAN NOTE
"57M with h/o prior tobacco abuse (quit) and CAD/PAD with chronic ble wounds admitted 9/19 with foot pain. No systemic symptoms of infection. S/p stent placement and AMPUTATION, TOE THIRD TOE INCLUDING METATARSAL HEAD LEFT and BILATERAL FOOT DEBRIDEMENT. Cultures were sent of amputated part and growing p.mirabilis, Group G strep, staph species. Pathology was sent - surgical margin without osteomyelitis. Has received dose of cefazolin and vancomycin since admission. Now vanc/zosyn. ID consulted for "BLE foot wound s/p toe amp. discharge rec"    R foot wound appears dry and c/w dry gangrene. L foot amputation site is warm and had drainage and worried he has ongoing infection. Fortunately, the bony path margins were clear of infection, but would continue treatment for SSTI    Recommendations:   - continue iv abx while inpatient. Vanc/ceftriaxone. Typically best to avoid vanc/zosyn combo 2/2 risk of ALBINA  - f/u pending staph susceptibilities  - on d/c, anticipate transitioning to po doxycycline 100 bid and augmentin 875/125 bid x 2-3 weeks. Duration TBD outpatient  - needs adequate perfusion to heal wound  - wound care as per vascular     Follow-up appointment will be arranged by the ID clinic and will be found in the patient's appointments tab.     Prior to discharge, please ensure the patient's follow-up has been scheduled.     If there is still no follow-up scheduled prior to discharge, please send an EPIC message to Hanna Jalloh in Infectious Diseases.                "

## 2022-09-22 NOTE — CONSULTS
"[facility] S[facility]  Infectious Disease  Consult Note    Patient Name: Yo Pink  MRN: [id number]  Admission Date: 9/19/2022  Hospital Length of Stay: 0 days  Attending Physician: Mehul Rich MD  Primary Care Provider: [facility]  Jeff     Isolation Status: No active isolations    Patient information was obtained from patient and ER records.      Inpatient consult to Infectious Diseases  Consult performed by: Ranjana Dumont MD  Consult ordered by: Lucy Casarez NP        Assessment/Plan:     Wet gangrene  57M with h/o prior tobacco abuse (quit) and CAD/PAD with chronic ble wounds admitted 9/19 with foot pain. No systemic symptoms of infection. S/p stent placement and AMPUTATION, TOE THIRD TOE INCLUDING METATARSAL HEAD LEFT and BILATERAL FOOT DEBRIDEMENT. Cultures were sent of amputated part and growing p.mirabilis, Group G strep, staph species. Pathology was sent - surgical margin without osteomyelitis. Has received dose of cefazolin and vancomycin since admission. Now vanc/zosyn. ID consulted for "BLE foot wound s/p toe amp. discharge rec"    R foot wound appears dry and c/w dry gangrene. L foot amputation site is warm and had drainage and worried he has ongoing infection. Fortunately, the bony path margins were clear of infection, but would continue treatment for SSTI    Recommendations:   - continue iv abx while inpatient. Vanc/ceftriaxone. Typically best to avoid vanc/zosyn combo 2/2 risk of ALBINA  - f/u pending staph susceptibilities  - on d/c, anticipate transitioning to po doxycycline 100 bid and augmentin 875/125 bid x 2-3 weeks. Duration TBD outpatient  - needs adequate perfusion to heal wound  - wound care as per vascular     Follow-up appointment will be arranged by the ID clinic and will be found in the patient's appointments tab.     Prior to discharge, please ensure the patient's follow-up has been scheduled.     If there is still no follow-up scheduled prior to discharge, " please send an EPIC message to Hanna Jalloh in Infectious Diseases.                      Thank you for your consult. I will follow-up with patient. Please contact us if you have any additional questions.    Ranjana Dumont MD  Infectious Disease  [facility] - Med Surg    Subjective:     Principal Problem: Critical lower limb ischemia    HPI:   57M with h/o prior tobacco abuse (quit) and CAD/PAD with chronic ble wounds admitted 9/19 with foot pain and emesis. Reportedly ran out of gabapentin recently, which he had been taking for foot pain. Denies f/c. Denies any worsening or acute changes to his chronic foot wounds.      On 9/19 underwent  1.AMPUTATION, TOE THIRD TOE INCLUDING METATARSAL HEAD LEFT   2. BILATERAL FOOT DEBRIDEMENT (Bilateral)    Cultures were sent of amputated part and growing p.mirabilis, Group G strep, staph species    Pathology was sent     Left 3rd toe, amputation:   Ulcerated skin and soft tissue with gangrenous necrosis and underlying   devitalized bone   Ulcerated skin and soft tissue at surgical margin   Bony surgical margin with osteonecrosis, no acute osteomyelitis identified     Aerobic culture [[id number]] (Abnormal)  Collected: 09/19/22 1256   Order Status: Completed Specimen: Bone from Foot, Left Updated: 09/22/22 0809    Aerobic Bacterial Culture PROTEUS MIRABILIS   Moderate    Abnormal      STAPHYLOCOCCUS SPECIES   Moderate   Identification and susceptibility pending    Abnormal      STREPTOCOCCUS GROUP G   Moderate   Beta-hemolytic streptococci are routinely susceptible to   penicillins,cephalosporins and carbapenems.    Abnormal    Narrative:     Left third toe for aerobic/ anaerobic, gram stain, AFB,   Fungal   Susceptibility     Proteus mirabilis     CULTURE, AEROBIC  (SPECIFY SOURCE) (Preliminary)     Amox/K Clav'ate <=8/4 mcg/mL Sensitive     Amp/Sulbactam <=8/4 mcg/mL Sensitive     Ampicillin >16 mcg/mL Resistant     Cefazolin 4 mcg/mL Sensitive     Cefepime <=2 mcg/mL Sensitive   "   Ceftriaxone <=1 mcg/mL Sensitive     Ciprofloxacin <=1 mcg/mL Sensitive     Ertapenem <=0.5 mcg/mL Sensitive     Gentamicin <=4 mcg/mL Sensitive     Levofloxacin <=2 mcg/mL Sensitive     Meropenem <=1 mcg/mL Sensitive     Minocycline 8 mcg/mL Intermediate     Piperacillin/Tazo <=16 mcg/mL Sensitive     Tobramycin <=4 mcg/mL Sensitive     Trimeth/Sulfa <=2/38 mcg/mL Sensitive            Reports ongoing BLE foot pain and wet drainage from L foot at amputation site    Has received dose of cefazolin and vancomycin since admission. Now vanc/zosyn    ID consulted for "BLE foot wound s/p toe amp. discharge rec"        Past Medical History:   Diagnosis Date    PAD (peripheral artery disease)      No current facility-administered medications on file prior to encounter.     Current Outpatient Medications on File Prior to Encounter   Medication Sig Dispense Refill    acetaminophen (TYLENOL) 500 MG tablet Take 1,000 mg by mouth every 6 (six) hours as needed for Pain.      aspirin (ECOTRIN) 81 MG EC tablet Take 1 tablet (81 mg total) by mouth once daily. 30 tablet 11     Past Surgical History:   Procedure Laterality Date    ANGIOGRAPHY OF LOWER EXTREMITY Left 7/5/2022    Procedure: Angiogram Extremity Unilateral;  Surgeon: Mehul Rich MD;  Location: [facility] OR;  Service: Vascular;  Laterality: Left;  RN PREOP ON 7/1/22. BINAX ON 7/5/22---NEED CONSENT    CORONARY ARTERY BYPASS GRAFT (CABG) N/A 7/25/2022    Procedure: CORONARY ARTERY BYPASS GRAFT (CABG) X 3;  Surgeon: Kenton Wynn MD;  Location: [facility] OR [address] [place] [address];  Service: Cardiovascular;  Laterality: N/A;    DEBRIDEMENT OF FOOT Bilateral 9/19/2022    Procedure: DEBRIDEMENT, FOOT;  Surgeon: Mehul Rich MD;  Location: [facility] OR;  Service: Vascular;  Laterality: Bilateral;    ENDOSCOPIC HARVEST OF VEIN Left 7/25/2022    Procedure: SURGICAL PROCUREMENT, VEIN, ENDOSCOPIC;  Surgeon: Kenton Wynn MD;  Location: [facility] OR [address] [place] [address];  Service: " Cardiovascular;  Laterality: Left;  Vein harvest start: 1337  Vein harvest stop: 1427  Vein prep start: 1428  Vein prep stop: 145    LEFT HEART CATHETERIZATION Left 2022    Procedure: Left heart cath;  Surgeon: Noah Huffman MD;  Location: [facility] CATH LAB;  Service: Cardiology;  Laterality: Left;  not before 9am, R rad access    TOE AMPUTATION Bilateral 2022    Procedure: AMPUTATION, TOE THIRD TOE WITH BILATERAL FOOT DEBRIDEMENT;  Surgeon: Mehul Rich MD;  Location: [facility] OR;  Service: Vascular;  Laterality: Bilateral;  RN PREOP 9/15/2022   BINAX DAY OF SURGERY----NEED ORDERS--CLEARED BY CARDS     The patient has a family history of  Non contributory    Social History     Socioeconomic History    Marital status: Single   Tobacco Use    Smoking status: Former     Types: Cigarettes     Quit date: 2022     Years since quittin.3    Smokeless tobacco: Never   Substance and Sexual Activity    Alcohol use: Never    Drug use: Not Currently         Medications:  Continuous Infusions:  Scheduled Meds:   aspirin  81 mg Oral Daily    atorvastatin  40 mg Oral Daily    clopidogreL  75 mg Oral Daily    gabapentin  600 mg Oral BID    heparin (porcine)  5,000 Units Subcutaneous Q8H    pantoprazole  40 mg Oral Daily    piperacillin-tazobactam (ZOSYN) IVPB  4.5 g Intravenous Q8H    sacubitriL-valsartan  1 tablet Oral BID    spironolactone  25 mg Oral Daily    vancomycin (VANCOCIN) IVPB  1,000 mg Intravenous Q12H     PRN Meds:acetaminophen, hydrALAZINE, HYDROcodone-acetaminophen, HYDROmorphone, labetalol, melatonin, ondansetron, sodium chloride 0.9%, Pharmacy to dose Vancomycin consult **AND** vancomycin - pharmacy to dose     Review of Systems   Constitutional:  Negative for chills and fever.   HENT:  Negative for congestion.    Eyes:  Negative for visual disturbance.   Respiratory:  Negative for shortness of breath.    Cardiovascular:  Negative for chest pain.   Gastrointestinal:   Negative for abdominal distention.   Endocrine: Negative for cold intolerance.   Genitourinary:  Negative for flank pain.   Musculoskeletal:  Negative for back pain.   Skin:  Positive for color change and wound. Negative for pallor and rash.   Allergic/Immunologic: Negative for immunocompromised state.   Neurological:  Negative for dizziness.   Hematological:  Does not bruise/bleed easily.   Psychiatric/Behavioral:  Negative for agitation.  Objective:     Vital Signs (Most Recent):  Temp: 98.2 °F (36.8 °C) (09/22/22 1222)  Pulse: 76 (09/22/22 1222)  Resp: 17 (09/22/22 1222)  BP: 112/67 (09/22/22 1222)  SpO2: 98 % (09/22/22 1222)   Vital Signs (24h Range):  Temp:  [97.9 °F (36.6 °C)-98.5 °F (36.9 °C)] 98.2 °F (36.8 °C)  Pulse:  [76-95] 76  Resp:  [12-19] 17  SpO2:  [95 %-98 %] 98 %  BP: ()/(51-89) 112/67     Date 09/22/22 0700 - 09/23/22 0659   Shift 1092-4887 7234-2601 2150-8087 24 Hour Total   INTAKE   P.O. 360   360   Shift Total(mL/kg) 360(5.3)   360(5.3)   OUTPUT   Shift Total(mL/kg)       Weight (kg) 68 68 68 68     Physical Exam  Vitals reviewed.   Constitutional:       General: He is not in acute distress.     Appearance: He is well-developed. He is not diaphoretic.   HENT:      Head: Normocephalic and atraumatic.   Eyes:      Conjunctiva/sclera: Conjunctivae normal.   Cardiovascular:      Rate and Rhythm: Normal rate.      Pulses:           Femoral pulses are 2+ on the right side and 2+ on the left side.       Dorsalis pedis pulses are detected w/ Doppler on the right side and detected w/ Doppler on the left side.        Posterior tibial pulses are detected w/ Doppler on the right side and detected w/ Doppler on the left side.   Pulmonary:      Effort: Pulmonary effort is normal.   Abdominal:      General: There is no distension.      Palpations: Abdomen is soft. There is no mass.      Tenderness: There is no abdominal tenderness. There is no guarding or rebound.      Hernia: No hernia is present.    Musculoskeletal:         General: No deformity. Normal range of motion.      Cervical back: Neck supple.   Skin:     Findings: No rash.   Neurological:      Mental Status: He is alert and oriented to person, place, and time.    L 3rd toe amputation. R great toe debridement.    Significant Labs:  BMP: No results for input(s): GLU, NA, K, CL, CO2, BUN, CREATININE, CALCIUM, MG in the last 48 hours.  CBC:   No results for input(s): WBC, RBC, HGB, HCT, PLT, MCV, MCH, MCHC in the last 48 hours.      Significant Diagnostics:  I have reviewed and interpreted all pertinent imaging results/findings within the past 24 hours.  CV US BLE: 09/13/22  Conclusion    Right lower extremity arterial ultrasound shows decreased popliteal velocity without focal hemodynamically significant stenosis.  Pressures indicate no arterial occlusive disease.  Left lower extremity arterial ultrasound shows a patent popliteal stent with no hemodynamically significant stenosis.  There is decreased velocity in the popliteal artery and AT artery.  Pressures indicate no arterial occlusive disease.    CAREY:09/13/22  Conclusion    Right lower extremity pressures and waveforms indicate no hemodynamically significant arterial occlusive disease.  Left lower extremity pressures and waveforms indicate no hemodynamically significant arterial occlusive disease.  Past Surgical History:   Procedure Laterality Date    ANGIOGRAPHY OF LOWER EXTREMITY Left 7/5/2022    Procedure: Angiogram Extremity Unilateral;  Surgeon: Mehul Rich MD;  Location: [facility] OR;  Service: Vascular;  Laterality: Left;  RN PREOP ON 7/1/22. BINAX ON 7/5/22---NEED CONSENT    CORONARY ARTERY BYPASS GRAFT (CABG) N/A 7/25/2022    Procedure: CORONARY ARTERY BYPASS GRAFT (CABG) X 3;  Surgeon: Kenton Wynn MD;  Location: [facility] OR [address] [place] [address];  Service: Cardiovascular;  Laterality: N/A;    DEBRIDEMENT OF FOOT Bilateral 9/19/2022    Procedure: DEBRIDEMENT, FOOT;  Surgeon: Mehul NIEVES  MD Hilario;  Location: [facility] OR;  Service: Vascular;  Laterality: Bilateral;    ENDOSCOPIC HARVEST OF VEIN Left 7/25/2022    Procedure: SURGICAL PROCUREMENT, VEIN, ENDOSCOPIC;  Surgeon: Kenton Wynn MD;  Location: [address];  Service: Cardiovascular;  Laterality: Left;  Vein harvest start: 1337  Vein harvest stop: 1427  Vein prep start: 1428  Vein prep stop: 1454    LEFT HEART CATHETERIZATION Left 7/18/2022    Procedure: Left heart cath;  Surgeon: Noah Huffman MD;  Location: [facility] CATH LAB;  Service: Cardiology;  Laterality: Left;  not before 9am, R rad access    TOE AMPUTATION Bilateral 9/19/2022    Procedure: AMPUTATION, TOE THIRD TOE WITH BILATERAL FOOT DEBRIDEMENT;  Surgeon: Mehul Rich MD;  Location: [facility] OR;  Service: Vascular;  Laterality: Bilateral;  RN PREOP 9/15/2022   BINAX DAY OF SURGERY----NEED ORDERS--CLEARED BY CARDS       Review of patient's allergies indicates:  No Known Allergies    Medications:  Medications Prior to Admission   Medication Sig    acetaminophen (TYLENOL) 500 MG tablet Take 1,000 mg by mouth every 6 (six) hours as needed for Pain.    aspirin (ECOTRIN) 81 MG EC tablet Take 1 tablet (81 mg total) by mouth once daily.    atorvastatin (LIPITOR) 40 MG tablet Take 1 tablet (40 mg total) by mouth once daily.    clopidogreL (PLAVIX) 75 mg tablet Take 1 tablet (75 mg total) by mouth once daily.    gabapentin (NEURONTIN) 600 MG tablet Take 1 tablet (600 mg total) by mouth 2 (two) times a day. for 14 days    pantoprazole (PROTONIX) 20 MG tablet Take 1 tablet (20 mg total) by mouth once daily.    sacubitriL-valsartan (ENTRESTO) 24-26 mg per tablet Take 1 tablet by mouth 2 (two) times daily.    spironolactone (ALDACTONE) 25 MG tablet Take 1 tablet (25 mg total) by mouth once daily.     Antibiotics (From admission, onward)      Start     Stop Route Frequency Ordered    09/22/22 2300  vancomycin in dextrose 5 % 1 gram/250 mL IVPB 1,000 mg         -- IV Every  12 hours (non-standard times) 22 1146    22 1030  piperacillin-tazobactam 4.5 g in dextrose 5 % 100 mL IVPB (ready to mix system)         -- IV Every 8 hours (non-standard times) 22 0929    22 1028  vancomycin - pharmacy to dose  (vancomycin IVPB)        See [facility] for full Linked Orders Report.    -- IV pharmacy to manage frequency 22 0929          Antifungals (From admission, onward)      None          Antivirals (From admission, onward)      None               There is no immunization history on file for this patient.    Family History    None       Social History     Socioeconomic History    Marital status: Single   Tobacco Use    Smoking status: Former     Types: Cigarettes     Quit date: 2022     Years since quittin.3    Smokeless tobacco: Never   Substance and Sexual Activity    Alcohol use: Never    Drug use: Not Currently     Review of Systems  Objective:     Vital Signs (Most Recent):  Temp: 98.2 °F (36.8 °C) (22 1222)  Pulse: 76 (22 1222)  Resp: 17 (22 1222)  BP: 112/67 (22 1222)  SpO2: 98 % (22 1222) Vital Signs (24h Range):  Temp:  [97.9 °F (36.6 °C)-98.5 °F (36.9 °C)] 98.2 °F (36.8 °C)  Pulse:  [76-95] 76  Resp:  [12-19] 17  SpO2:  [95 %-98 %] 98 %  BP: ()/(51-89) 112/67     Weight: 68 kg (149 lb 14.6 oz)  Body mass index is 21.51 kg/m².    Estimated Creatinine Clearance: 110.6 mL/min (based on SCr of 0.7 mg/dL).    Physical Exam    Significant Labs: All pertinent labs within the past 24 hours have been reviewed.    Significant Imaging: I have reviewed all pertinent imaging results/findings within the past 24 hours.

## 2022-09-22 NOTE — PROGRESS NOTES
Spoke with pt to request daughters name and contact information so that she can be contacted to advise that the pt will be discharging on today and she will need to be taught to do dressing changes for pt.  At this time pt advised TN that he will contact his daughter and ask her to come to hospital for wound care education

## 2022-09-22 NOTE — HPI
57M with h/o prior tobacco abuse (quit) and CAD/PAD with chronic ble wounds admitted 9/19 with foot pain and emesis. Reportedly ran out of gabapentin recently, which he had been taking for foot pain. Denies f/c. Denies any worsening or acute changes to his chronic foot wounds.      On 9/19 underwent  1.AMPUTATION, TOE THIRD TOE INCLUDING METATARSAL HEAD LEFT   2. BILATERAL FOOT DEBRIDEMENT (Bilateral)    Cultures were sent of amputated part and growing p.mirabilis, Group G strep, staph species    Pathology was sent     Left 3rd toe, amputation:   Ulcerated skin and soft tissue with gangrenous necrosis and underlying   devitalized bone   Ulcerated skin and soft tissue at surgical margin   Bony surgical margin with osteonecrosis, no acute osteomyelitis identified     Aerobic culture [[id number]] (Abnormal)  Collected: 09/19/22 1256   Order Status: Completed Specimen: Bone from Foot, Left Updated: 09/22/22 0838    Aerobic Bacterial Culture PROTEUS MIRABILIS   Moderate    Abnormal      STAPHYLOCOCCUS SPECIES   Moderate   Identification and susceptibility pending    Abnormal      STREPTOCOCCUS GROUP G   Moderate   Beta-hemolytic streptococci are routinely susceptible to   penicillins,cephalosporins and carbapenems.    Abnormal    Narrative:     Left third toe for aerobic/ anaerobic, gram stain, AFB,   Fungal   Susceptibility     Proteus mirabilis     CULTURE, AEROBIC  (SPECIFY SOURCE) (Preliminary)     Amox/K Clav'ate <=8/4 mcg/mL Sensitive     Amp/Sulbactam <=8/4 mcg/mL Sensitive     Ampicillin >16 mcg/mL Resistant     Cefazolin 4 mcg/mL Sensitive     Cefepime <=2 mcg/mL Sensitive     Ceftriaxone <=1 mcg/mL Sensitive     Ciprofloxacin <=1 mcg/mL Sensitive     Ertapenem <=0.5 mcg/mL Sensitive     Gentamicin <=4 mcg/mL Sensitive     Levofloxacin <=2 mcg/mL Sensitive     Meropenem <=1 mcg/mL Sensitive     Minocycline 8 mcg/mL Intermediate     Piperacillin/Tazo <=16 mcg/mL Sensitive     Tobramycin <=4 mcg/mL Sensitive      "Trimeth/Sulfa <=2/38 mcg/mL Sensitive            Reports ongoing BLE foot pain and wet drainage from L foot at amputation site    Has received dose of cefazolin and vancomycin since admission. Now mitch/zosyn    ID consulted for "BLE foot wound s/p toe amp. discharge rec"    "

## 2022-09-22 NOTE — PLAN OF CARE
09/22/22 1047   Final Note   Assessment Type Final Discharge Note   Anticipated Discharge Disposition Home   Hospital Resources/Appts/Education Provided Appointments scheduled and added to AVS;Community resources provided   Post-Acute Status   Post-Acute Authorization Other   Other Status No Post-Acute Service Needs   Pts nurse Haydee notified that the pt can d/c from CM standpoint once order written

## 2022-09-22 NOTE — SUBJECTIVE & OBJECTIVE
Medications:  Continuous Infusions:  Scheduled Meds:   aspirin  81 mg Oral Daily    atorvastatin  40 mg Oral Daily    clopidogreL  75 mg Oral Daily    gabapentin  600 mg Oral BID    heparin (porcine)  5,000 Units Subcutaneous Q8H    pantoprazole  40 mg Oral Daily    piperacillin-tazobactam (ZOSYN) IVPB  4.5 g Intravenous Q8H    sacubitriL-valsartan  1 tablet Oral BID    spironolactone  25 mg Oral Daily     PRN Meds:acetaminophen, hydrALAZINE, HYDROcodone-acetaminophen, HYDROmorphone, labetalol, melatonin, ondansetron, sodium chloride 0.9%, Pharmacy to dose Vancomycin consult **AND** vancomycin - pharmacy to dose     Review of Systems   Constitutional:  Negative for chills and fever.   HENT:  Negative for congestion.    Eyes:  Negative for visual disturbance.   Respiratory:  Negative for shortness of breath.    Cardiovascular:  Negative for chest pain.   Gastrointestinal:  Negative for abdominal distention.   Endocrine: Negative for cold intolerance.   Genitourinary:  Negative for flank pain.   Musculoskeletal:  Negative for back pain.   Skin:  Positive for color change and wound. Negative for pallor and rash.   Allergic/Immunologic: Negative for immunocompromised state.   Neurological:  Negative for dizziness.   Hematological:  Does not bruise/bleed easily.   Psychiatric/Behavioral:  Negative for agitation.  Objective:     Vital Signs (Most Recent):  Temp: 98.5 °F (36.9 °C) (09/22/22 0737)  Pulse: 84 (09/22/22 0737)  Resp: 15 (09/22/22 0737)  BP: 117/89 (09/22/22 0930)  SpO2: 95 % (09/22/22 0737) Vital Signs (24h Range):  Temp:  [97.9 °F (36.6 °C)-98.8 °F (37.1 °C)] 98.5 °F (36.9 °C)  Pulse:  [83-95] 84  Resp:  [12-19] 15  SpO2:  [94 %-98 %] 95 %  BP: ()/(51-89) 117/89     Date 09/22/22 0700 - 09/23/22 0659   Shift 4501-1432 0071-2743 3682-5004 24 Hour Total   INTAKE   P.O. 120   120   Shift Total(mL/kg) 120(1.8)   120(1.8)   OUTPUT   Shift Total(mL/kg)       Weight (kg) 68 68 68 68       Physical  Exam  Vitals reviewed.   Constitutional:       General: He is not in acute distress.     Appearance: He is well-developed. He is not diaphoretic.   HENT:      Head: Normocephalic and atraumatic.   Eyes:      Conjunctiva/sclera: Conjunctivae normal.   Cardiovascular:      Rate and Rhythm: Normal rate.      Pulses:           Femoral pulses are 2+ on the right side and 2+ on the left side.       Dorsalis pedis pulses are detected w/ Doppler on the right side and detected w/ Doppler on the left side.        Posterior tibial pulses are detected w/ Doppler on the right side and detected w/ Doppler on the left side.   Pulmonary:      Effort: Pulmonary effort is normal.   Abdominal:      General: There is no distension.      Palpations: Abdomen is soft. There is no mass.      Tenderness: There is no abdominal tenderness. There is no guarding or rebound.      Hernia: No hernia is present.   Musculoskeletal:         General: No deformity. Normal range of motion.      Cervical back: Neck supple.   Skin:     Findings: No rash.   Neurological:      Mental Status: He is alert and oriented to person, place, and time.    L 3rd toe amputation. R great toe debridement.    Significant Labs:  All pertinent labs from the last 24 hours have been reviewed.    Significant Diagnostics:  I have reviewed and interpreted all pertinent imaging results/findings within the past 24 hours.

## 2022-09-22 NOTE — ASSESSMENT & PLAN NOTE
- All imaging reviewed. Dressings changed this morning. RTC on 9/26/22. Patient daughter to come to bedside for wound care teaching prior to discharge.   - ID consulted.   - Plan for discharge 9/22/22, pending ID and outpatient care planning.

## 2022-09-22 NOTE — PROGRESS NOTES
Pharmacokinetic Initial Assessment: IV Vancomycin    Assessment/Plan:    Initiate intravenous vancomycin with loading dose of 1750 mg once followed by a maintenance dose of vancomycin 1000 mg IV every 12 hours  Desired empiric serum trough concentration is 10 to 20 mcg/mL  Draw vancomycin trough level 60 min prior to fourth dose on 09/23/2022 at approximately 22:00  Pharmacy will continue to follow and monitor vancomycin.      Please contact pharmacy at extension [phone] with any questions regarding this assessment.     Thank you for the consult,   Jie Merino       Patient brief summary:  Yo Pink is a 57 y.o. male initiated on antimicrobial therapy with IV Vancomycin for treatment of suspected Opportunistic infection prophylaxis    Drug Allergies:   Review of patient's allergies indicates:  No Known Allergies    Actual Body Weight:   68 kg    Renal Function:   Estimated Creatinine Clearance: 110.6 mL/min (based on SCr of 0.7 mg/dL).,     Dialysis Method (if applicable):  N/A    CBC (last 72 hours):  Recent Labs   Lab Result Units 09/19/22  1206   WBC K/uL 9.28   Hemoglobin g/dL 12.0*   Hematocrit % 37.4*   Platelets K/uL 314   Gran % % 69.4   Lymph % % 21.2   Mono % % 7.7   Eosinophil % % 0.9   Basophil % % 0.5   Differential Method  Automated       Metabolic Panel (last 72 hours):  No results for input(s): SODIUM, POTASSIUM, CHLORIDE, CO2, GLUCOSE, BUN BLD, CREATININE, ALBUMIN, BILIRUBIN TOTAL, ALK PHOS, AST, ALT, MAGNESIUM, PHOSPHORUS in the last 72 hours.    Drug levels (last 3 results):  No results for input(s): VANCOMYCINRA, VANCORANDOM, VANCOMYCINPE, VANCOPEAK, VANCOMYCINTR, VANCOTROUGH in the last 72 hours.    Microbiologic Results:  Microbiology Results (last 7 days)       Procedure Component Value Units Date/Time    Aerobic culture [[id number]]  (Abnormal)  (Susceptibility) Collected: 09/19/22 1256    Order Status: Completed Specimen: Bone from Foot, Left Updated: 09/22/22 0809     Aerobic Bacterial Culture  PROTEUS MIRABILIS  Moderate        STAPHYLOCOCCUS SPECIES  Moderate  Identification and susceptibility pending        STREPTOCOCCUS GROUP G  Moderate  Beta-hemolytic streptococci are routinely susceptible to   penicillins,cephalosporins and carbapenems.      Narrative:      Left third toe for aerobic/ anaerobic, gram stain, AFB,  Fungal    AFB Culture & Smear [[id number]] Collected: 09/19/22 1256    Order Status: Completed Specimen: Bone from Foot, Left Updated: 09/21/22 2127     AFB Culture & Smear Culture in progress     AFB CULTURE STAIN No acid fast bacilli seen.    Narrative:      Left third toe for aerobic/ anaerobic, gram stain, AFB,  Fungal    AFB Culture & Smear [[id number]] Collected: 09/19/22 1256    Order Status: Completed Specimen: Wound from Toe, Left Foot Updated: 09/21/22 2127     AFB Culture & Smear Culture in progress     AFB CULTURE STAIN No acid fast bacilli seen.    Narrative:      Left third toe for aerobic, anaerobic, gram stain, afb,  fungal    Aerobic culture [[id number]]  (Abnormal)  (Susceptibility) Collected: 09/19/22 1256    Order Status: Completed Specimen: Wound from Toe, Left Foot Updated: 09/21/22 0927     Aerobic Bacterial Culture PROTEUS MIRABILIS  Moderate      Narrative:      Left third toe for aerobic, anaerobic, gram stain, afb,  fungal    Culture, Anaerobe [[id number]] Collected: 09/19/22 1256    Order Status: Completed Specimen: Bone from Foot, Left Updated: 09/21/22 0524     Anaerobic Culture Culture in progress    Narrative:      Left third toe for aerobic/ anaerobic, gram stain, AFB,  Fungal    Culture, Anaerobe [[id number]] Collected: 09/19/22 1256    Order Status: Completed Specimen: Wound from Toe, Left Foot Updated: 09/21/22 0524     Anaerobic Culture Culture in progress    Narrative:      Left third toe for aerobic, anaerobic, gram stain, afb,  fungal    Gram stain [[id number]] Collected: 09/19/22 1256    Order Status: Completed Specimen: Bone from Foot, Left Updated:  09/19/22 1458     Gram Stain Result Rare WBC's      Rare Gram positive cocci    Gram stain [[id number]] Collected: 09/19/22 1256    Order Status: Completed Specimen: Wound from Toe, Left Foot Updated: 09/19/22 1457     Gram Stain Result Rare WBC's      No organisms seen    Narrative:      Left third toe for aerobic, anaerobic, gram stain, afb,  fungal    Fungus culture [[id number]] Collected: 09/19/22 1256    Order Status: Sent Specimen: Bone from Foot, Left Updated: 09/19/22 1408    Fungus culture [[id number]] Collected: 09/19/22 1256    Order Status: Sent Specimen: Wound from Toe, Left Foot Updated: 09/19/22 1400

## 2022-09-22 NOTE — SUBJECTIVE & OBJECTIVE
Past Medical History:   Diagnosis Date    PAD (peripheral artery disease)      No current facility-administered medications on file prior to encounter.     Current Outpatient Medications on File Prior to Encounter   Medication Sig Dispense Refill    acetaminophen (TYLENOL) 500 MG tablet Take 1,000 mg by mouth every 6 (six) hours as needed for Pain.      aspirin (ECOTRIN) 81 MG EC tablet Take 1 tablet (81 mg total) by mouth once daily. 30 tablet 11     Past Surgical History:   Procedure Laterality Date    ANGIOGRAPHY OF LOWER EXTREMITY Left 7/5/2022    Procedure: Angiogram Extremity Unilateral;  Surgeon: Mehul Rich MD;  Location: [facility];  Service: Vascular;  Laterality: Left;  RN PREOP ON 7/1/22. BINAX ON 7/5/22---NEED CONSENT    CORONARY ARTERY BYPASS GRAFT (CABG) N/A 7/25/2022    Procedure: CORONARY ARTERY BYPASS GRAFT (CABG) X 3;  Surgeon: Kenton Wynn MD;  Location: [facility] OR [address] [place] [address];  Service: Cardiovascular;  Laterality: N/A;    DEBRIDEMENT OF FOOT Bilateral 9/19/2022    Procedure: DEBRIDEMENT, FOOT;  Surgeon: Mehul Rich MD;  Location: [facility];  Service: Vascular;  Laterality: Bilateral;    ENDOSCOPIC HARVEST OF VEIN Left 7/25/2022    Procedure: SURGICAL PROCUREMENT, VEIN, ENDOSCOPIC;  Surgeon: Kenton Wynn MD;  Location: [facility] OR [address] [place] [address];  Service: Cardiovascular;  Laterality: Left;  Vein harvest start: 1337  Vein harvest stop: 1427  Vein prep start: 1428  Vein prep stop: 1454    LEFT HEART CATHETERIZATION Left 7/18/2022    Procedure: Left heart cath;  Surgeon: Noah Huffman MD;  Location: [facility] CATH LAB;  Service: Cardiology;  Laterality: Left;  not before 9am, R rad access    TOE AMPUTATION Bilateral 9/19/2022    Procedure: AMPUTATION, TOE THIRD TOE WITH BILATERAL FOOT DEBRIDEMENT;  Surgeon: Mehul Rich MD;  Location: [facility] OR;  Service: Vascular;  Laterality: Bilateral;  RN PREOP 9/15/2022   BINAX DAY OF SURGERY----NEED ORDERS--CLEARED BY CARDS     The patient  has a family history of  Non contributory    Social History     Socioeconomic History    Marital status: Single   Tobacco Use    Smoking status: Former     Types: Cigarettes     Quit date: 2022     Years since quittin.3    Smokeless tobacco: Never   Substance and Sexual Activity    Alcohol use: Never    Drug use: Not Currently         Medications:  Continuous Infusions:  Scheduled Meds:   aspirin  81 mg Oral Daily    atorvastatin  40 mg Oral Daily    clopidogreL  75 mg Oral Daily    gabapentin  600 mg Oral BID    heparin (porcine)  5,000 Units Subcutaneous Q8H    pantoprazole  40 mg Oral Daily    piperacillin-tazobactam (ZOSYN) IVPB  4.5 g Intravenous Q8H    sacubitriL-valsartan  1 tablet Oral BID    spironolactone  25 mg Oral Daily    vancomycin (VANCOCIN) IVPB  1,000 mg Intravenous Q12H     PRN Meds:acetaminophen, hydrALAZINE, HYDROcodone-acetaminophen, HYDROmorphone, labetalol, melatonin, ondansetron, sodium chloride 0.9%, Pharmacy to dose Vancomycin consult **AND** vancomycin - pharmacy to dose     Review of Systems   Constitutional:  Negative for chills and fever.   HENT:  Negative for congestion.    Eyes:  Negative for visual disturbance.   Respiratory:  Negative for shortness of breath.    Cardiovascular:  Negative for chest pain.   Gastrointestinal:  Negative for abdominal distention.   Endocrine: Negative for cold intolerance.   Genitourinary:  Negative for flank pain.   Musculoskeletal:  Negative for back pain.   Skin:  Positive for color change and wound. Negative for pallor and rash.   Allergic/Immunologic: Negative for immunocompromised state.   Neurological:  Negative for dizziness.   Hematological:  Does not bruise/bleed easily.   Psychiatric/Behavioral:  Negative for agitation.  Objective:     Vital Signs (Most Recent):  Temp: 98.2 °F (36.8 °C) (22 1222)  Pulse: 76 (22 1222)  Resp: 17 (22 1222)  BP: 112/67 (22 1222)  SpO2: 98 % (22 122)   Vital Signs (24h  Range):  Temp:  [97.9 °F (36.6 °C)-98.5 °F (36.9 °C)] 98.2 °F (36.8 °C)  Pulse:  [76-95] 76  Resp:  [12-19] 17  SpO2:  [95 %-98 %] 98 %  BP: ()/(51-89) 112/67     Date 09/22/22 0700 - 09/23/22 0659   Shift 3878-7329 7549-2242 8102-6405 24 Hour Total   INTAKE   P.O. 360   360   Shift Total(mL/kg) 360(5.3)   360(5.3)   OUTPUT   Shift Total(mL/kg)       Weight (kg) 68 68 68 68     Physical Exam  Vitals reviewed.   Constitutional:       General: He is not in acute distress.     Appearance: He is well-developed. He is not diaphoretic.   HENT:      Head: Normocephalic and atraumatic.   Eyes:      Conjunctiva/sclera: Conjunctivae normal.   Cardiovascular:      Rate and Rhythm: Normal rate.      Pulses:           Femoral pulses are 2+ on the right side and 2+ on the left side.       Dorsalis pedis pulses are detected w/ Doppler on the right side and detected w/ Doppler on the left side.        Posterior tibial pulses are detected w/ Doppler on the right side and detected w/ Doppler on the left side.   Pulmonary:      Effort: Pulmonary effort is normal.   Abdominal:      General: There is no distension.      Palpations: Abdomen is soft. There is no mass.      Tenderness: There is no abdominal tenderness. There is no guarding or rebound.      Hernia: No hernia is present.   Musculoskeletal:         General: No deformity. Normal range of motion.      Cervical back: Neck supple.   Skin:     Findings: No rash.   Neurological:      Mental Status: He is alert and oriented to person, place, and time.    L 3rd toe amputation. R great toe debridement.    Significant Labs:  BMP: No results for input(s): GLU, NA, K, CL, CO2, BUN, CREATININE, CALCIUM, MG in the last 48 hours.  CBC:   No results for input(s): WBC, RBC, HGB, HCT, PLT, MCV, MCH, MCHC in the last 48 hours.      Significant Diagnostics:  I have reviewed and interpreted all pertinent imaging results/findings within the past 24 hours.  CV US BLE:  09/13/22  Conclusion    Right lower extremity arterial ultrasound shows decreased popliteal velocity without focal hemodynamically significant stenosis.  Pressures indicate no arterial occlusive disease.  Left lower extremity arterial ultrasound shows a patent popliteal stent with no hemodynamically significant stenosis.  There is decreased velocity in the popliteal artery and AT artery.  Pressures indicate no arterial occlusive disease.    CAREY:09/13/22  Conclusion    Right lower extremity pressures and waveforms indicate no hemodynamically significant arterial occlusive disease.  Left lower extremity pressures and waveforms indicate no hemodynamically significant arterial occlusive disease.  Past Surgical History:   Procedure Laterality Date    ANGIOGRAPHY OF LOWER EXTREMITY Left 7/5/2022    Procedure: Angiogram Extremity Unilateral;  Surgeon: Mehul Rich MD;  Location: [facility] OR;  Service: Vascular;  Laterality: Left;  RN PREOP ON 7/1/22. BINAX ON 7/5/22---NEED CONSENT    CORONARY ARTERY BYPASS GRAFT (CABG) N/A 7/25/2022    Procedure: CORONARY ARTERY BYPASS GRAFT (CABG) X 3;  Surgeon: Kenton Wynn MD;  Location: [facility] OR [address] [place] [address];  Service: Cardiovascular;  Laterality: N/A;    DEBRIDEMENT OF FOOT Bilateral 9/19/2022    Procedure: DEBRIDEMENT, FOOT;  Surgeon: Mehul Rich MD;  Location: [facility] OR;  Service: Vascular;  Laterality: Bilateral;    ENDOSCOPIC HARVEST OF VEIN Left 7/25/2022    Procedure: SURGICAL PROCUREMENT, VEIN, ENDOSCOPIC;  Surgeon: Kenton Wynn MD;  Location: [facility] OR [address] [place] [address];  Service: Cardiovascular;  Laterality: Left;  Vein harvest start: 1337  Vein harvest stop: 1427  Vein prep start: 1428  Vein prep stop: 1454    LEFT HEART CATHETERIZATION Left 7/18/2022    Procedure: Left heart cath;  Surgeon: Noah Huffman MD;  Location: [facility] CATH LAB;  Service: Cardiology;  Laterality: Left;  not before 9am, R rad access    TOE AMPUTATION Bilateral 9/19/2022    Procedure:  AMPUTATION, TOE THIRD TOE WITH BILATERAL FOOT DEBRIDEMENT;  Surgeon: Mehul Rich MD;  Location: [facility];  Service: Vascular;  Laterality: Bilateral;  RN PREOP 9/15/2022   BINAX DAY OF SURGERY----NEED ORDERS--CLEARED BY CARDS       Review of patient's allergies indicates:  No Known Allergies    Medications:  Medications Prior to Admission   Medication Sig    acetaminophen (TYLENOL) 500 MG tablet Take 1,000 mg by mouth every 6 (six) hours as needed for Pain.    aspirin (ECOTRIN) 81 MG EC tablet Take 1 tablet (81 mg total) by mouth once daily.    atorvastatin (LIPITOR) 40 MG tablet Take 1 tablet (40 mg total) by mouth once daily.    clopidogreL (PLAVIX) 75 mg tablet Take 1 tablet (75 mg total) by mouth once daily.    gabapentin (NEURONTIN) 600 MG tablet Take 1 tablet (600 mg total) by mouth 2 (two) times a day. for 14 days    pantoprazole (PROTONIX) 20 MG tablet Take 1 tablet (20 mg total) by mouth once daily.    sacubitriL-valsartan (ENTRESTO) 24-26 mg per tablet Take 1 tablet by mouth 2 (two) times daily.    spironolactone (ALDACTONE) 25 MG tablet Take 1 tablet (25 mg total) by mouth once daily.     Antibiotics (From admission, onward)      Start     Stop Route Frequency Ordered    09/22/22 2300  vancomycin in dextrose 5 % 1 gram/250 mL IVPB 1,000 mg         -- IV Every 12 hours (non-standard times) 09/22/22 1146    09/22/22 1030  piperacillin-tazobactam 4.5 g in dextrose 5 % 100 mL IVPB (ready to mix system)         -- IV Every 8 hours (non-standard times) 09/22/22 0929    09/22/22 1028  vancomycin - pharmacy to dose  (vancomycin IVPB)        See Hyperspace for full Linked Orders Report.    -- IV pharmacy to manage frequency 09/22/22 0929          Antifungals (From admission, onward)      None          Antivirals (From admission, onward)      None               There is no immunization history on file for this patient.    Family History    None       Social History     Socioeconomic History    Marital  status: Single   Tobacco Use    Smoking status: Former     Types: Cigarettes     Quit date: 2022     Years since quittin.3    Smokeless tobacco: Never   Substance and Sexual Activity    Alcohol use: Never    Drug use: Not Currently     Review of Systems  Objective:     Vital Signs (Most Recent):  Temp: 98.2 °F (36.8 °C) (22 1222)  Pulse: 76 (22 1222)  Resp: 17 (22 1222)  BP: 112/67 (22 1222)  SpO2: 98 % (22 1222) Vital Signs (24h Range):  Temp:  [97.9 °F (36.6 °C)-98.5 °F (36.9 °C)] 98.2 °F (36.8 °C)  Pulse:  [76-95] 76  Resp:  [12-19] 17  SpO2:  [95 %-98 %] 98 %  BP: ()/(51-89) 112/67     Weight: 68 kg (149 lb 14.6 oz)  Body mass index is 21.51 kg/m².    Estimated Creatinine Clearance: 110.6 mL/min (based on SCr of 0.7 mg/dL).    Physical Exam    Significant Labs: All pertinent labs within the past 24 hours have been reviewed.    Significant Imaging: I have reviewed all pertinent imaging results/findings within the past 24 hours.

## 2022-09-23 LAB
BACTERIA SPEC AEROBE CULT: ABNORMAL
BACTERIA SPEC ANAEROBE CULT: NORMAL
BACTERIA SPEC ANAEROBE CULT: NORMAL

## 2022-09-23 PROCEDURE — 11000001 HC ACUTE MED/SURG PRIVATE ROOM

## 2022-09-24 PROCEDURE — 11000001 HC ACUTE MED/SURG PRIVATE ROOM

## 2022-09-25 PROCEDURE — 11000001 HC ACUTE MED/SURG PRIVATE ROOM

## 2022-09-26 ENCOUNTER — OFFICE VISIT (OUTPATIENT)
Dept: VASCULAR SURGERY | Facility: CLINIC | Age: 57
End: 2022-09-26
Payer: MEDICAID

## 2022-09-26 VITALS
BODY MASS INDEX: 21.27 KG/M2 | SYSTOLIC BLOOD PRESSURE: 142 MMHG | DIASTOLIC BLOOD PRESSURE: 82 MMHG | WEIGHT: 148.25 LBS

## 2022-09-26 DIAGNOSIS — I70.229 CRITICAL LOWER LIMB ISCHEMIA: Primary | ICD-10-CM

## 2022-09-26 PROBLEM — I70.239 ATHEROSCLEROSIS OF NATIVE ARTERY OF RIGHT LEG WITH ULCERATION: Status: ACTIVE | Noted: 2022-09-26

## 2022-09-26 PROCEDURE — 1159F MED LIST DOCD IN RCRD: CPT | Mod: CPTII,,, | Performed by: SURGERY

## 2022-09-26 PROCEDURE — 3077F PR MOST RECENT SYSTOLIC BLOOD PRESSURE >= 140 MM HG: ICD-10-PCS | Mod: CPTII,,, | Performed by: SURGERY

## 2022-09-26 PROCEDURE — 3079F PR MOST RECENT DIASTOLIC BLOOD PRESSURE 80-89 MM HG: ICD-10-PCS | Mod: CPTII,,, | Performed by: SURGERY

## 2022-09-26 PROCEDURE — 99214 OFFICE O/P EST MOD 30 MIN: CPT | Mod: PBBFAC | Performed by: SURGERY

## 2022-09-26 PROCEDURE — 3079F DIAST BP 80-89 MM HG: CPT | Mod: CPTII,,, | Performed by: SURGERY

## 2022-09-26 PROCEDURE — 1159F PR MEDICATION LIST DOCUMENTED IN MEDICAL RECORD: ICD-10-PCS | Mod: CPTII,,, | Performed by: SURGERY

## 2022-09-26 PROCEDURE — 3044F HG A1C LEVEL LT 7.0%: CPT | Mod: CPTII,,, | Performed by: SURGERY

## 2022-09-26 PROCEDURE — 99024 PR POST-OP FOLLOW-UP VISIT: ICD-10-PCS | Mod: S$PBB,,, | Performed by: SURGERY

## 2022-09-26 PROCEDURE — 3044F PR MOST RECENT HEMOGLOBIN A1C LEVEL <7.0%: ICD-10-PCS | Mod: CPTII,,, | Performed by: SURGERY

## 2022-09-26 PROCEDURE — 3008F PR BODY MASS INDEX (BMI) DOCUMENTED: ICD-10-PCS | Mod: CPTII,,, | Performed by: SURGERY

## 2022-09-26 PROCEDURE — 99024 POSTOP FOLLOW-UP VISIT: CPT | Mod: S$PBB,,, | Performed by: SURGERY

## 2022-09-26 PROCEDURE — 3008F BODY MASS INDEX DOCD: CPT | Mod: CPTII,,, | Performed by: SURGERY

## 2022-09-26 PROCEDURE — 4010F ACE/ARB THERAPY RXD/TAKEN: CPT | Mod: CPTII,,, | Performed by: SURGERY

## 2022-09-26 PROCEDURE — 4010F PR ACE/ARB THEARPY RXD/TAKEN: ICD-10-PCS | Mod: CPTII,,, | Performed by: SURGERY

## 2022-09-26 PROCEDURE — 99999 PR PBB SHADOW E&M-EST. PATIENT-LVL IV: ICD-10-PCS | Mod: PBBFAC,,, | Performed by: SURGERY

## 2022-09-26 PROCEDURE — 99999 PR PBB SHADOW E&M-EST. PATIENT-LVL IV: CPT | Mod: PBBFAC,,, | Performed by: SURGERY

## 2022-09-26 PROCEDURE — 3077F SYST BP >= 140 MM HG: CPT | Mod: CPTII,,, | Performed by: SURGERY

## 2022-09-26 RX ORDER — SODIUM CHLORIDE 9 MG/ML
INJECTION, SOLUTION INTRAVENOUS CONTINUOUS
Status: CANCELLED | OUTPATIENT
Start: 2022-09-26

## 2022-09-26 NOTE — H&P (VIEW-ONLY)
HPI:  Yo Pink is a 57 y.o. male with       Patient Active Problem List   Diagnosis    Dry gangrene    Hypertensive urgency    PAD (peripheral artery disease)    ACS (acute coronary syndrome)    Hypokalemia    Dyslipidemia    Ischemic cardiomyopathy    NSTEMI (non-ST elevated myocardial infarction)    being managed by PCP and specialists who is here today for evaluation of bilateral toe wounds.  Patient states location is bilateral feet occurring for months.  Associated signs and symptoms include discoloration and pain.  Quality is dull and severity is 6/10.  Symptoms began mo ago.  Alleviating factors include wound care.  Worsening factors include pressure.  Cardiology evaluated patient and is planning heart cath 7/18/22.     no MI  no Stroke  Tobacco use: daily    August 2022:  Patient presents for follow-up status post coronary artery bypass surgery.  His wounds remained dry.  He denies fevers or chills.  Followed in hospital and discussed plan with Dr. Chin who states that at least 3 weeks, ideally 6 weeks postoperatively would be best to perform any further surgery on this patient.     9/2022:  s/p L 3rd toe amputation and R toe wound debridements last week.  Doing well with wound care.  PO Abx.          Past Medical History:   Diagnosis Date    PAD (peripheral artery disease)              Past Surgical History:   Procedure Laterality Date    ANGIOGRAPHY OF LOWER EXTREMITY Left 7/5/2022     Procedure: Angiogram Extremity Unilateral;  Surgeon: Mehul Rich MD;  Location: [facility] OR;  Service: Vascular;  Laterality: Left;  RN PREOP ON 7/1/22. BINAX ON 7/5/22---NEED CONSENT    LEFT HEART CATHETERIZATION Left 7/18/2022     Procedure: Left heart cath;  Surgeon: Noah Huffman MD;  Location: [facility] CATH LAB;  Service: Cardiology;  Laterality: Left;  not before 9am, R rad access      History reviewed. No pertinent family history.  Social History            Socioeconomic History    Marital status:  Single   Tobacco Use    Smoking status: Former Smoker       Quit date: 2022       Years since quittin.1    Smokeless tobacco: Never Used   Substance and Sexual Activity    Alcohol use: Never    Drug use: Not Currently         Current Facility-Administered Medications:     acetaminophen tablet 650 mg, 650 mg, Oral, Q4H PRN, Noah Huffman MD, 650 mg at 22 0949    acetaminophen tablet 650 mg, 650 mg, Oral, Q4H PRN, Noah Huffman MD    aspirin chewable tablet 81 mg, 81 mg, Oral, Daily, Noah Huffman MD, 81 mg at 22 0807    atorvastatin tablet 80 mg, 80 mg, Oral, QHS, Noah Huffman MD, 80 mg at 22 204    atropine injection 0.5 mg, 0.5 mg, Intravenous, PRN, Noah Huffman MD    clopidogreL tablet 75 mg, 75 mg, Oral, Daily, Noah Huffman MD, 75 mg at 22 0808    dextrose 10% bolus 125 mL, 12.5 g, Intravenous, PRN, Noah Huffman MD    dextrose 10% bolus 250 mL, 25 g, Intravenous, PRN, Noah Huffman MD    glucagon (human recombinant) injection 1 mg, 1 mg, Intramuscular, PRN, Noah Huffman MD    glucose chewable tablet 16 g, 16 g, Oral, PRN, Noah Huffman MD    glucose chewable tablet 24 g, 24 g, Oral, PRN, Noah Huffman MD    hydrALAZINE injection 10 mg, 10 mg, Intravenous, Q6H PRN, Noah Huffman MD    HYDROcodone-acetaminophen  mg per tablet 1 tablet, 1 tablet, Oral, Q4H PRN, Noah Huffman MD, 1 tablet at 22 0551    HYDROcodone-acetaminophen 5-325 mg per tablet 1 tablet, 1 tablet, Oral, Q6H PRN, Noah Huffman MD, 1 tablet at 22 0241    HYDROcodone-acetaminophen 5-325 mg per tablet 1 tablet, 1 tablet, Oral, Q4H PRN, Noah Huffman MD, 1 tablet at 22 0237    ibuprofen tablet 400 mg, 400 mg, Oral, Q6H PRN, Noah Huffman MD    insulin aspart U-100 pen 0-5 Units, 0-5 Units, Subcutaneous, QID (AC + HS) PRN, Noah Huffman MD    melatonin tablet 6 mg, 6 mg, Oral, Nightly PRN,  Noah Huffman MD    metoprolol succinate (TOPROL-XL) 24 hr tablet 25 mg, 25 mg, Oral, Daily, Noah Huffman MD, 25 mg at 07/20/22 0808    morphine injection 2 mg, 2 mg, Intravenous, Q10 Min PRN, Noah Huffman MD    naloxone 0.4 mg/mL injection 0.02 mg, 0.02 mg, Intravenous, PRN, Noah Huffman MD    nitroGLYCERIN SL tablet 0.4 mg, 0.4 mg, Sublingual, Q5 Min PRN, Noah Huffman MD    ondansetron disintegrating tablet 8 mg, 8 mg, Oral, Q8H PRN, Noah Huffman MD    ondansetron injection 4 mg, 4 mg, Intravenous, Q8H PRN, Noah Huffman MD    oxyCODONE immediate release tablet 5 mg, 5 mg, Oral, Q6H PRN, Noah Huffman MD, 5 mg at 07/20/22 0423    sacubitriL-valsartan 24-26 mg per tablet 2 tablet, 2 tablet, Oral, BID, Noah Huffman MD    sodium chloride 0.9% bolus 250 mL, 250 mL, Intravenous, PRN, Noah Huffman MD    sodium chloride 0.9% flush 10 mL, 10 mL, Intravenous, Q12H PRN, Noah Huffman MD                   Medications Prior to Admission   Medication Sig Dispense Refill Last Dose    acetaminophen (TYLENOL) 500 MG tablet Take 1,000 mg by mouth every 6 (six) hours as needed for Pain.          amoxicillin-clavulanate 875-125mg (AUGMENTIN) 875-125 mg per tablet Take 1 tablet by mouth every 12 (twelve) hours. 20 tablet 0      amoxicillin-clavulanate 875-125mg (AUGMENTIN) 875-125 mg per tablet Take 1 tablet by mouth every 12 (twelve) hours. for 7 days 14 tablet 0      clopidogreL (PLAVIX) 75 mg tablet Take 1 tablet (75 mg total) by mouth once daily. 30 tablet 11      oxyCODONE-acetaminophen (PERCOCET) 5-325 mg per tablet Take 1 tablet by mouth every 4 (four) hours as needed for Pain. 28 tablet 0           Review of patient's allergies indicates:  No Known Allergies          Past Medical History:   Diagnosis Date    PAD (peripheral artery disease)              Past Surgical History:   Procedure Laterality Date    ANGIOGRAPHY OF LOWER EXTREMITY Left 7/5/2022      Procedure: Angiogram Extremity Unilateral;  Surgeon: Mehul Rich MD;  Location: [facility] OR;  Service: Vascular;  Laterality: Left;  RN PREOP ON 22. BINAX ON 22---NEED CONSENT    LEFT HEART CATHETERIZATION Left 2022     Procedure: Left heart cath;  Surgeon: Noah Huffman MD;  Location: [facility] CATH LAB;  Service: Cardiology;  Laterality: Left;  not before 9am, R rad access      Family History    None               Tobacco Use    Smoking status: Former Smoker       Quit date: 2022       Years since quittin.1    Smokeless tobacco: Never Used   Substance and Sexual Activity    Alcohol use: Never    Drug use: Not Currently    Sexual activity: Not on file      Review of Systems   Constitutional:  Negative for chills and fever.   HENT:  Negative for congestion.    Eyes:  Negative for visual disturbance.   Respiratory:  Negative for shortness of breath.    Cardiovascular:  Negative for chest pain.   Gastrointestinal:  Negative for abdominal distention.   Endocrine: Negative for cold intolerance.   Genitourinary:  Negative for flank pain.   Musculoskeletal:  Negative for back pain.   Skin:  Positive for color change and wound. Negative for pallor and rash.   Allergic/Immunologic: Negative for immunocompromised state.   Neurological:  Negative for dizziness.   Hematological:  Does not bruise/bleed easily.   Psychiatric/Behavioral:  Negative for agitation.    Objective:      Vital Signs (Most Recent):  Temp: 98.4 °F (36.9 °C) (22 1132)  Pulse: 73 (22 1132)  Resp: 20 (22 1132)  BP: 123/81 (22 1132)  SpO2: 99 % (22 1132) Vital Signs (24h Range):  Temp:  [97.5 °F (36.4 °C)-98.4 °F (36.9 °C)] 98.4 °F (36.9 °C)  Pulse:  [63-85] 73  Resp:  [16-20] 20  SpO2:  [96 %-99 %] 99 %  BP: (123-155)/(81-94) 123/81      Weight: 77.1 kg (170 lb)  Body mass index is 24.39 kg/m².     Physical Exam  Vitals reviewed.   Constitutional:       General: He is not in acute distress.      Appearance: He is well-developed. He is not diaphoretic.   HENT:      Head: Normocephalic and atraumatic.   Eyes:      Conjunctiva/sclera: Conjunctivae normal.   Cardiovascular:      Rate and Rhythm: Normal rate.      Pulses:           Femoral pulses are 2+ on the right side and 2+ on the left side.       Dorsalis pedis pulses are detected w/ Doppler on the right side and detected w/ Doppler on the left side.        Posterior tibial pulses are detected w/ Doppler on the right side and detected w/ Doppler on the left side.   Pulmonary:      Effort: Pulmonary effort is normal.   Abdominal:      General: There is no distension.      Palpations: Abdomen is soft. There is no mass.      Tenderness: There is no abdominal tenderness. There is no guarding or rebound.      Hernia: No hernia is present.   Musculoskeletal:         General: No deformity. Normal range of motion.      Cervical back: Neck supple.   Skin:     Findings: No rash.   Neurological:      Mental Status: He is alert and oriented to person, place, and time.    R 4th toe dry ulcer and dry ischemic changes to R great toe, L 3rd toe amp site healing well     Significant Labs:  All pertinent labs from the last 24 hours have been reviewed.     Significant Diagnostics:  I have reviewed all pertinent imaging results/findings within the past 24 hours.     Assessment/Plan:      * Dry gangrene  S/p L 3rd toe amputation - healing well  S/p R 1-2 toe wound debridements with slow healing - rec RLE angiogram for critical limb ischemia.  Recommend wound care, continued offloading and optimization of comorbidities

## 2022-09-26 NOTE — PROGRESS NOTES
HPI:  Yo Pink is a 57 y.o. male with       Patient Active Problem List   Diagnosis    Dry gangrene    Hypertensive urgency    PAD (peripheral artery disease)    ACS (acute coronary syndrome)    Hypokalemia    Dyslipidemia    Ischemic cardiomyopathy    NSTEMI (non-ST elevated myocardial infarction)    being managed by PCP and specialists who is here today for evaluation of bilateral toe wounds.  Patient states location is bilateral feet occurring for months.  Associated signs and symptoms include discoloration and pain.  Quality is dull and severity is 6/10.  Symptoms began mo ago.  Alleviating factors include wound care.  Worsening factors include pressure.  Cardiology evaluated patient and is planning heart cath 7/18/22.     no MI  no Stroke  Tobacco use: daily    August 2022:  Patient presents for follow-up status post coronary artery bypass surgery.  His wounds remained dry.  He denies fevers or chills.  Followed in hospital and discussed plan with Dr. Chin who states that at least 3 weeks, ideally 6 weeks postoperatively would be best to perform any further surgery on this patient.     9/2022:  s/p L 3rd toe amputation and R toe wound debridements last week.  Doing well with wound care.  PO Abx.          Past Medical History:   Diagnosis Date    PAD (peripheral artery disease)              Past Surgical History:   Procedure Laterality Date    ANGIOGRAPHY OF LOWER EXTREMITY Left 7/5/2022     Procedure: Angiogram Extremity Unilateral;  Surgeon: Mehul Rich MD;  Location: [facility] OR;  Service: Vascular;  Laterality: Left;  RN PREOP ON 7/1/22. BINAX ON 7/5/22---NEED CONSENT    LEFT HEART CATHETERIZATION Left 7/18/2022     Procedure: Left heart cath;  Surgeon: Noah Huffman MD;  Location: [facility] CATH LAB;  Service: Cardiology;  Laterality: Left;  not before 9am, R rad access      History reviewed. No pertinent family history.  Social History            Socioeconomic History    Marital status:  Single   Tobacco Use    Smoking status: Former Smoker       Quit date: 2022       Years since quittin.1    Smokeless tobacco: Never Used   Substance and Sexual Activity    Alcohol use: Never    Drug use: Not Currently         Current Facility-Administered Medications:     acetaminophen tablet 650 mg, 650 mg, Oral, Q4H PRN, Noah Huffman MD, 650 mg at 22 0949    acetaminophen tablet 650 mg, 650 mg, Oral, Q4H PRN, Noah Huffman MD    aspirin chewable tablet 81 mg, 81 mg, Oral, Daily, Noah Huffman MD, 81 mg at 22 0807    atorvastatin tablet 80 mg, 80 mg, Oral, QHS, Noah Huffman MD, 80 mg at 22 204    atropine injection 0.5 mg, 0.5 mg, Intravenous, PRN, Noah Huffman MD    clopidogreL tablet 75 mg, 75 mg, Oral, Daily, Noah Huffman MD, 75 mg at 22 0808    dextrose 10% bolus 125 mL, 12.5 g, Intravenous, PRN, Noah Huffman MD    dextrose 10% bolus 250 mL, 25 g, Intravenous, PRN, Noah Huffman MD    glucagon (human recombinant) injection 1 mg, 1 mg, Intramuscular, PRN, Noah Huffman MD    glucose chewable tablet 16 g, 16 g, Oral, PRN, Noah Huffman MD    glucose chewable tablet 24 g, 24 g, Oral, PRN, Noah Huffman MD    hydrALAZINE injection 10 mg, 10 mg, Intravenous, Q6H PRN, Noah Huffman MD    HYDROcodone-acetaminophen  mg per tablet 1 tablet, 1 tablet, Oral, Q4H PRN, Noah Huffman MD, 1 tablet at 22 0551    HYDROcodone-acetaminophen 5-325 mg per tablet 1 tablet, 1 tablet, Oral, Q6H PRN, Naoh Huffman MD, 1 tablet at 22 0241    HYDROcodone-acetaminophen 5-325 mg per tablet 1 tablet, 1 tablet, Oral, Q4H PRN, Noah Huffman MD, 1 tablet at 22 0237    ibuprofen tablet 400 mg, 400 mg, Oral, Q6H PRN, Noah Huffman MD    insulin aspart U-100 pen 0-5 Units, 0-5 Units, Subcutaneous, QID (AC + HS) PRN, Noah Huffman MD    melatonin tablet 6 mg, 6 mg, Oral, Nightly PRN,  Noah Huffman MD    metoprolol succinate (TOPROL-XL) 24 hr tablet 25 mg, 25 mg, Oral, Daily, Noah Huffman MD, 25 mg at 07/20/22 0808    morphine injection 2 mg, 2 mg, Intravenous, Q10 Min PRN, Noah Huffman MD    naloxone 0.4 mg/mL injection 0.02 mg, 0.02 mg, Intravenous, PRN, Noah Huffman MD    nitroGLYCERIN SL tablet 0.4 mg, 0.4 mg, Sublingual, Q5 Min PRN, Noah Huffman MD    ondansetron disintegrating tablet 8 mg, 8 mg, Oral, Q8H PRN, Noah Huffman MD    ondansetron injection 4 mg, 4 mg, Intravenous, Q8H PRN, Noah Huffman MD    oxyCODONE immediate release tablet 5 mg, 5 mg, Oral, Q6H PRN, Noah Huffman MD, 5 mg at 07/20/22 0423    sacubitriL-valsartan 24-26 mg per tablet 2 tablet, 2 tablet, Oral, BID, Noah Huffman MD    sodium chloride 0.9% bolus 250 mL, 250 mL, Intravenous, PRN, Noah Huffman MD    sodium chloride 0.9% flush 10 mL, 10 mL, Intravenous, Q12H PRN, Noah Huffman MD                   Medications Prior to Admission   Medication Sig Dispense Refill Last Dose    acetaminophen (TYLENOL) 500 MG tablet Take 1,000 mg by mouth every 6 (six) hours as needed for Pain.          amoxicillin-clavulanate 875-125mg (AUGMENTIN) 875-125 mg per tablet Take 1 tablet by mouth every 12 (twelve) hours. 20 tablet 0      amoxicillin-clavulanate 875-125mg (AUGMENTIN) 875-125 mg per tablet Take 1 tablet by mouth every 12 (twelve) hours. for 7 days 14 tablet 0      clopidogreL (PLAVIX) 75 mg tablet Take 1 tablet (75 mg total) by mouth once daily. 30 tablet 11      oxyCODONE-acetaminophen (PERCOCET) 5-325 mg per tablet Take 1 tablet by mouth every 4 (four) hours as needed for Pain. 28 tablet 0           Review of patient's allergies indicates:  No Known Allergies          Past Medical History:   Diagnosis Date    PAD (peripheral artery disease)              Past Surgical History:   Procedure Laterality Date    ANGIOGRAPHY OF LOWER EXTREMITY Left 7/5/2022      Procedure: Angiogram Extremity Unilateral;  Surgeon: Mehul Rich MD;  Location: [facility] OR;  Service: Vascular;  Laterality: Left;  RN PREOP ON 22. BINAX ON 22---NEED CONSENT    LEFT HEART CATHETERIZATION Left 2022     Procedure: Left heart cath;  Surgeon: Noah Huffman MD;  Location: [facility] CATH LAB;  Service: Cardiology;  Laterality: Left;  not before 9am, R rad access      Family History    None               Tobacco Use    Smoking status: Former Smoker       Quit date: 2022       Years since quittin.1    Smokeless tobacco: Never Used   Substance and Sexual Activity    Alcohol use: Never    Drug use: Not Currently    Sexual activity: Not on file      Review of Systems   Constitutional:  Negative for chills and fever.   HENT:  Negative for congestion.    Eyes:  Negative for visual disturbance.   Respiratory:  Negative for shortness of breath.    Cardiovascular:  Negative for chest pain.   Gastrointestinal:  Negative for abdominal distention.   Endocrine: Negative for cold intolerance.   Genitourinary:  Negative for flank pain.   Musculoskeletal:  Negative for back pain.   Skin:  Positive for color change and wound. Negative for pallor and rash.   Allergic/Immunologic: Negative for immunocompromised state.   Neurological:  Negative for dizziness.   Hematological:  Does not bruise/bleed easily.   Psychiatric/Behavioral:  Negative for agitation.    Objective:      Vital Signs (Most Recent):  Temp: 98.4 °F (36.9 °C) (22 1132)  Pulse: 73 (22 1132)  Resp: 20 (22 1132)  BP: 123/81 (22 1132)  SpO2: 99 % (22 1132) Vital Signs (24h Range):  Temp:  [97.5 °F (36.4 °C)-98.4 °F (36.9 °C)] 98.4 °F (36.9 °C)  Pulse:  [63-85] 73  Resp:  [16-20] 20  SpO2:  [96 %-99 %] 99 %  BP: (123-155)/(81-94) 123/81      Weight: 77.1 kg (170 lb)  Body mass index is 24.39 kg/m².     Physical Exam  Vitals reviewed.   Constitutional:       General: He is not in acute distress.      Appearance: He is well-developed. He is not diaphoretic.   HENT:      Head: Normocephalic and atraumatic.   Eyes:      Conjunctiva/sclera: Conjunctivae normal.   Cardiovascular:      Rate and Rhythm: Normal rate.      Pulses:           Femoral pulses are 2+ on the right side and 2+ on the left side.       Dorsalis pedis pulses are detected w/ Doppler on the right side and detected w/ Doppler on the left side.        Posterior tibial pulses are detected w/ Doppler on the right side and detected w/ Doppler on the left side.   Pulmonary:      Effort: Pulmonary effort is normal.   Abdominal:      General: There is no distension.      Palpations: Abdomen is soft. There is no mass.      Tenderness: There is no abdominal tenderness. There is no guarding or rebound.      Hernia: No hernia is present.   Musculoskeletal:         General: No deformity. Normal range of motion.      Cervical back: Neck supple.   Skin:     Findings: No rash.   Neurological:      Mental Status: He is alert and oriented to person, place, and time.    R 4th toe dry ulcer and dry ischemic changes to R great toe, L 3rd toe amp site healing well     Significant Labs:  All pertinent labs from the last 24 hours have been reviewed.     Significant Diagnostics:  I have reviewed all pertinent imaging results/findings within the past 24 hours.     Assessment/Plan:      * Dry gangrene  S/p L 3rd toe amputation - healing well  S/p R 1-2 toe wound debridements with slow healing - rec RLE angiogram for critical limb ischemia.  Recommend wound care, continued offloading and optimization of comorbidities

## 2022-09-27 ENCOUNTER — PATIENT OUTREACH (OUTPATIENT)
Dept: ADMINISTRATIVE | Facility: CLINIC | Age: 57
End: 2022-09-27
Payer: MEDICAID

## 2022-09-27 NOTE — PROGRESS NOTES
C3 nurse attempted to contact Ram Pink  for a TCC post hospital discharge follow up call. The patient is unable to conduct the call @ this time. The patient requested a callback.    The patient does not have a scheduled HOSFU appointment within 5-7 days post hospital discharge date 9/24/22. Message sent to Physician staff to assist with HOSFU appointment scheduling.

## 2022-09-27 NOTE — PROGRESS NOTES
C3 nurse spoke with Yo Pink  for a TCC post hospital discharge follow up call. The patient has a scheduled [facility] appointment with [facility]  on 9/28/22 @ [phone].

## 2022-09-28 ENCOUNTER — PATIENT OUTREACH (OUTPATIENT)
Dept: ADMINISTRATIVE | Facility: OTHER | Age: 57
End: 2022-09-28
Payer: MEDICAID

## 2022-09-28 NOTE — PROGRESS NOTES
CH[facility] - Initial Contact    Completed OR updated the Social Determinant of Health questionnaire with Yo via telephone today.    Pt identified barriers of most importance are: nothing really, maybe bandages for his wounds  Referrals to community agencies completed with patient/caregiver consent outside of [facility] include: Yes, [facility]  Referrals were put through [facility] - No  Other information discussed the patient needs / wants help with: nothing else at this time  Follow-up Outreach - Due: 10/4/2022

## 2022-09-29 ENCOUNTER — LAB VISIT (OUTPATIENT)
Dept: LAB | Facility: HOSPITAL | Age: 57
End: 2022-09-29
Attending: INTERNAL MEDICINE
Payer: MEDICAID

## 2022-09-29 DIAGNOSIS — I25.5 ISCHEMIC CARDIOMYOPATHY: ICD-10-CM

## 2022-09-29 LAB
ANION GAP SERPL CALC-SCNC: 8 MMOL/L (ref 8–16)
BUN SERPL-MCNC: 16 MG/DL (ref 6–20)
CALCIUM SERPL-MCNC: 9.1 MG/DL (ref 8.7–10.5)
CHLORIDE SERPL-SCNC: 102 MMOL/L (ref 95–110)
CO2 SERPL-SCNC: 28 MMOL/L (ref 23–29)
CREAT SERPL-MCNC: 0.7 MG/DL (ref 0.5–1.4)
EST. GFR  (NO RACE VARIABLE): >60 ML/MIN/1.73 M^2
GLUCOSE SERPL-MCNC: 104 MG/DL (ref 70–110)
POTASSIUM SERPL-SCNC: 4.9 MMOL/L (ref 3.5–5.1)
SODIUM SERPL-SCNC: 138 MMOL/L (ref 136–145)

## 2022-09-29 PROCEDURE — 36415 COLL VENOUS BLD VENIPUNCTURE: CPT | Performed by: INTERNAL MEDICINE

## 2022-09-29 PROCEDURE — 80048 BASIC METABOLIC PNL TOTAL CA: CPT | Performed by: INTERNAL MEDICINE

## 2022-10-04 ENCOUNTER — ANESTHESIA EVENT (OUTPATIENT)
Dept: SURGERY | Facility: HOSPITAL | Age: 57
End: 2022-10-04
Payer: MEDICAID

## 2022-10-04 ENCOUNTER — HOSPITAL ENCOUNTER (OUTPATIENT)
Facility: HOSPITAL | Age: 57
Discharge: HOME | End: 2022-10-04
Attending: SURGERY | Admitting: SURGERY
Payer: MEDICAID

## 2022-10-04 VITALS
WEIGHT: 148 LBS | OXYGEN SATURATION: 98 % | HEIGHT: 70 IN | DIASTOLIC BLOOD PRESSURE: 75 MMHG | TEMPERATURE: 98 F | HEART RATE: 86 BPM | RESPIRATION RATE: 18 BRPM | SYSTOLIC BLOOD PRESSURE: 132 MMHG | BODY MASS INDEX: 21.19 KG/M2

## 2022-10-04 DIAGNOSIS — I70.239 ATHEROSCLEROSIS OF NATIVE ARTERY OF RIGHT LEG WITH ULCERATION: ICD-10-CM

## 2022-10-04 DIAGNOSIS — I70.229 CRITICAL LOWER LIMB ISCHEMIA: ICD-10-CM

## 2022-10-04 DIAGNOSIS — Z11.52 ENCOUNTER FOR PREOPERATIVE SCREENING LABORATORY TESTING FOR COVID-19 VIRUS: Primary | ICD-10-CM

## 2022-10-04 DIAGNOSIS — I73.9 PAD (PERIPHERAL ARTERY DISEASE): Primary | ICD-10-CM

## 2022-10-04 DIAGNOSIS — Z01.812 ENCOUNTER FOR PREOPERATIVE SCREENING LABORATORY TESTING FOR COVID-19 VIRUS: Primary | ICD-10-CM

## 2022-10-04 LAB
CTP QC/QA: YES
SARS-COV-2 AG RESP QL IA.RAPID: NEGATIVE

## 2022-10-04 PROCEDURE — 25500020 PHARM REV CODE 255: Performed by: SURGERY

## 2022-10-04 PROCEDURE — 36200 PR PLACE CATH AORTA: ICD-10-PCS | Mod: 79,,, | Performed by: SURGERY

## 2022-10-04 PROCEDURE — 75710 ARTERY X-RAYS ARM/LEG: CPT | Performed by: SURGERY

## 2022-10-04 PROCEDURE — 25000003 PHARM REV CODE 250: Performed by: SURGERY

## 2022-10-04 PROCEDURE — 99152 PR MOD CONSCIOUS SEDATION, SAME PHYS, 5+ YRS, FIRST 15 MIN: ICD-10-PCS | Mod: ,,, | Performed by: SURGERY

## 2022-10-04 PROCEDURE — 36200 PLACE CATHETER IN AORTA: CPT | Mod: 79,,, | Performed by: SURGERY

## 2022-10-04 PROCEDURE — 75710 ARTERY X-RAYS ARM/LEG: CPT | Mod: 26,,, | Performed by: SURGERY

## 2022-10-04 PROCEDURE — 63600175 PHARM REV CODE 636 W HCPCS: Performed by: SURGERY

## 2022-10-04 PROCEDURE — 36200 PLACE CATHETER IN AORTA: CPT | Performed by: SURGERY

## 2022-10-04 PROCEDURE — 99152 MOD SED SAME PHYS/QHP 5/>YRS: CPT | Mod: ,,, | Performed by: SURGERY

## 2022-10-04 PROCEDURE — 75710 PR  ANGIO EXTREMITY UNILAT: ICD-10-PCS | Mod: 26,,, | Performed by: SURGERY

## 2022-10-04 RX ORDER — HEPARIN SODIUM 1000 [USP'U]/ML
INJECTION, SOLUTION INTRAVENOUS; SUBCUTANEOUS
Status: COMPLETED | OUTPATIENT
Start: 2022-10-04 | End: 2022-10-04

## 2022-10-04 RX ORDER — LABETALOL HYDROCHLORIDE 5 MG/ML
INJECTION, SOLUTION INTRAVENOUS
Status: COMPLETED | OUTPATIENT
Start: 2022-10-04 | End: 2022-10-04

## 2022-10-04 RX ORDER — CEFAZOLIN SODIUM 2 G/50ML
2 SOLUTION INTRAVENOUS ONCE
Status: COMPLETED | OUTPATIENT
Start: 2022-10-04 | End: 2022-10-04

## 2022-10-04 RX ORDER — FENTANYL CITRATE 50 UG/ML
INJECTION, SOLUTION INTRAMUSCULAR; INTRAVENOUS
Status: COMPLETED | OUTPATIENT
Start: 2022-10-04 | End: 2022-10-04

## 2022-10-04 RX ORDER — OXYCODONE AND ACETAMINOPHEN 5; 325 MG/1; MG/1
1 TABLET ORAL EVERY 6 HOURS PRN
Status: DISCONTINUED | OUTPATIENT
Start: 2022-10-04 | End: 2022-10-04 | Stop reason: HOSPADM

## 2022-10-04 RX ORDER — HYDRALAZINE HYDROCHLORIDE 20 MG/ML
INJECTION INTRAMUSCULAR; INTRAVENOUS
Status: COMPLETED | OUTPATIENT
Start: 2022-10-04 | End: 2022-10-04

## 2022-10-04 RX ORDER — LIDOCAINE HYDROCHLORIDE 10 MG/ML
INJECTION INFILTRATION; PERINEURAL
Status: COMPLETED | OUTPATIENT
Start: 2022-10-04 | End: 2022-10-04

## 2022-10-04 RX ORDER — SODIUM CHLORIDE 9 MG/ML
INJECTION, SOLUTION INTRAVENOUS CONTINUOUS
Status: DISCONTINUED | OUTPATIENT
Start: 2022-10-04 | End: 2022-10-04 | Stop reason: HOSPADM

## 2022-10-04 RX ORDER — MIDAZOLAM HYDROCHLORIDE 1 MG/ML
INJECTION INTRAMUSCULAR; INTRAVENOUS
Status: COMPLETED | OUTPATIENT
Start: 2022-10-04 | End: 2022-10-04

## 2022-10-04 RX ADMIN — LABETALOL HYDROCHLORIDE 10 MG: 5 INJECTION, SOLUTION INTRAVENOUS at 08:10

## 2022-10-04 RX ADMIN — SODIUM CHLORIDE: 0.9 INJECTION, SOLUTION INTRAVENOUS at 07:10

## 2022-10-04 RX ADMIN — LIDOCAINE HYDROCHLORIDE 5 ML: 10 INJECTION, SOLUTION INFILTRATION; PERINEURAL at 07:10

## 2022-10-04 RX ADMIN — LABETALOL HYDROCHLORIDE 10 MG: 5 INJECTION, SOLUTION INTRAVENOUS at 07:10

## 2022-10-04 RX ADMIN — HEPARIN SODIUM 2000 UNITS: 1000 INJECTION, SOLUTION INTRAVENOUS; SUBCUTANEOUS at 07:10

## 2022-10-04 RX ADMIN — FENTANYL CITRATE 50 MCG: 50 INJECTION, SOLUTION INTRAMUSCULAR; INTRAVENOUS at 07:10

## 2022-10-04 RX ADMIN — CEFAZOLIN SODIUM 2 G: 2 SOLUTION INTRAVENOUS at 07:10

## 2022-10-04 RX ADMIN — FENTANYL CITRATE 25 MCG: 50 INJECTION, SOLUTION INTRAMUSCULAR; INTRAVENOUS at 07:10

## 2022-10-04 RX ADMIN — HYDRALAZINE HYDROCHLORIDE 10 MG: 20 INJECTION INTRAMUSCULAR; INTRAVENOUS at 08:10

## 2022-10-04 RX ADMIN — IOHEXOL 80 ML: 300 INJECTION, SOLUTION INTRAVENOUS at 08:10

## 2022-10-04 RX ADMIN — MIDAZOLAM HYDROCHLORIDE 1 MG: 1 INJECTION INTRAMUSCULAR; INTRAVENOUS at 07:10

## 2022-10-04 RX ADMIN — OXYCODONE AND ACETAMINOPHEN 1 TABLET: 5; 325 TABLET ORAL at 09:10

## 2022-10-04 NOTE — DISCHARGE INSTRUCTIONS
ANGIOGRAM INSTRUCTIONS                                           Drink plenty of fluids for the next 48 hours and follow your doctor's diet orders.    Rest for the next 72 hours.   Try not to keep the injected leg bent for a long period of time.  Remove the dressing in 24 hours, and you may shower. Clean the area with soap and water, and apply a band aid for the next 5 days.                                                                 No  Lifting over 5-10 lbs., that is, not more than 1 gallon of water, or straining for 72 hours.    No driving, no drinking alcohol, and no signing legal documents for the next 24 hours.    Call your doctor for elevated temperature, shortness of breath, chest pain, or cold discolored foot or leg.    If oozing occurs at the injections site, lie down.  Apply pressure with a clean wash cloth for 20 to 30 minutes and call your doctor.    If severe bleeding occurs, lie down, apply pressure.  Call 911 and request an ambulance to take you to the nearest hospital emergency room.    Continue to take your regular medications as instructed.      Follow the instructions in the handout given to you.       Fall Prevention  Millions of people fall every year and injure themselves. You may have had anesthesia or sedation which may increase your risk of falling. You may have health issues that put you at an increased risk of falling.     Here are ways to reduce your risk of falling.    Make your home safe by keeping walkways clear of objects you may trip over.  Use non-slip pads under rugs. Do not use area rugs or small throw rugs.  Use non-slip mats in bathtubs and showers.  Install handrails and lights on staircases.  Do not walk in poorly lit areas.  Do not stand on chairs or wobbly ladders.  Use caution when reaching overhead or looking upward. This position can cause a loss of balance.  Be sure your shoes fit properly, have non-slip bottoms and are in good condition.   Wear shoes both  inside and out. Avoid going barefoot or wearing slippers.  Be cautious when going up and down stairs, curbs, and when walking on uneven sidewalks.  If your balance is poor, consider using a cane or walker.  If your fall was related to alcohol use, stop or limit alcohol intake.   If your fall was related to use of sleeping medicines, talk to your doctor about this. You may need to reduce your dosage at bedtime if you awaken during the night to go to the bathroom.    To reduce the need for nighttime bathroom trips:  Avoid drinking fluids for several hours before going to bed  Empty your bladder before going to bed  Men can keep a urinal at the bedside  Stay as active as you can. Balance, flexibility, strength, and endurance all come from exercise. They all play a role in preventing falls. Ask your healthcare provider which types of activity are right for you.  Get your vision checked on a regular basis.  If you have pets, know where they are before you stand up or walk so you don't trip over them.  Use night lights.

## 2022-10-04 NOTE — BRIEF OP NOTE
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    DISPOSITION: Home or Self Care    FINAL DIAGNOSIS:  <principal problem not specified>    FOLLOWUP: In clinic    DISCHARGE INSTRUCTIONS:  No discharge procedures on file.

## 2022-10-04 NOTE — SEDATION DOCUMENTATION
Patient arrived from [facility] by [facility] transport.  Bandages on bilateral feet with discolored toes visible on right foot.  Patient describes pain in bilateral feet as tender.  States he had no breakfast and denies severe pain,m HA, chest pain.

## 2022-10-06 NOTE — OP NOTE
Date:10/4/22     Surgeon: Mehul Rich MD RPVI     Assistant: None     Pre-op Diagnosis:   1.   Patient Active Problem List    Diagnosis Date Noted    Atherosclerosis of native artery of right leg with ulceration 09/26/2022    Amputation of toe 09/22/2022    Critical lower limb ischemia 09/19/2022    Coronary artery disease involving coronary bypass graft of native heart without angina pectoris 09/15/2022    S/P CABG (coronary artery bypass graft) 07/29/2022    Transient hyperglycemia post procedure 07/26/2022    NSTEMI (non-ST elevated myocardial infarction) 07/19/2022    Ischemic cardiomyopathy 07/17/2022    ACS (acute coronary syndrome) 07/16/2022    Hypokalemia 07/16/2022    Dyslipidemia 07/16/2022    Wet gangrene 06/18/2022    Hypertensive urgency 06/18/2022    PAD (peripheral artery disease) 06/18/2022          Post-op Diagnosis: Same     Procedures:   1. US guided L CFA percutaneous access  2. L femoral angiogram  3. Aortogram  4. RLE angiogram  5. Moderate sedation     Anesthesia: Local     EBL: Minimal     Findings:   Operative Findings: adequate CFA for access, no flow limiting stenosis in patent vessels, 1v runoff to foot via PT, occluded BK popliteal, AT and peroneal aa     Complications:  None; patient tolerated the procedure well.     Disposition: Recovery- hemodynamically stable in good condition     Attending Attestation: I was present and scrubbed for the entire procedure.  After an informed consent was obtained the patient was brought to the interventional suite and placed in the supine position. Both the patient and procedure were confirmed and identified during timeout process. The patient received perioperative antibiotics. The patient's bilateral groins were prepped and draped in usual sterile fashion. Using ultrasound guidance, the L common femoral artery vessel patency was confirmed. Then direct ultrasound guidance the L CFA was entered with a 21-G needle, Mandril wire and 4-Fr  micropuncture needle. Then under fluoroscopic guidance, an 0.035-in wire was placed into the distal aorta. The micropuncture dilator was then exchanged over the wire for a 6-Pashto sheath. Sheathogram demonstrated access in the CFA. Next a VCF-catheter was placed over the wire and into the distal aorta under fluoroscopy and an aortogram with R leg angiogram was performed which demonstrated the above findings.    Based on the images from this diagnostic angio, a decision was made not to intervene.  We then deployed 5fr Mynx closure device without issue. At the conclusion of the case the patient was noted to have no evidence of a groin hematoma. All instrument and sponge counts were correct at the end of the case. The patient tolerated the procedure well and was transferred to the pacu for further recovery.      MODERATE SEDATION   I was present for moderate sedation and monitored the patients cardio respiratory functions during the procedure. See nurses notes for Intra-service start and end times and for the log of the medications, doseage and route.     Time of sedation:  40 mins.

## 2022-10-11 NOTE — PLAN OF CARE
Telephone screen instructions    Meds and allergies reviewed.   Instructed patient as follows:   Call [phone] from your cell phone once you arrive at the hospital if you need wheelchair assistance.  Call [phone] tomorrow between 2p and 5 p for your arrival time.  Be at the [facility] Unit on second floor in the hospital at arrival time given  Nothing to eat or drink after midnight on the night before surgery.   You may take Entresto before coming to hospital on day of surgery with a small sip of water.   Do not bring anything valuable with you other than what you will need to make your copayment.  Remove all  jewelry.  Make sure you have someone available to drive you home.   Wear comfortable clothing  Shower the night  before and the morning of your surgery.          I gave my name and phone number to patient in case they have any questions

## 2022-10-13 ENCOUNTER — ANESTHESIA (OUTPATIENT)
Dept: SURGERY | Facility: HOSPITAL | Age: 57
End: 2022-10-13
Payer: MEDICAID

## 2022-10-17 ENCOUNTER — PATIENT OUTREACH (OUTPATIENT)
Dept: ADMINISTRATIVE | Facility: OTHER | Age: 57
End: 2022-10-17
Payer: MEDICAID

## 2022-10-17 ENCOUNTER — HOSPITAL ENCOUNTER (OUTPATIENT)
Facility: HOSPITAL | Age: 57
Discharge: HOME | End: 2022-10-17
Attending: SURGERY | Admitting: SURGERY
Payer: MEDICAID

## 2022-10-17 ENCOUNTER — HOSPITAL ENCOUNTER (OUTPATIENT)
Dept: CARDIOLOGY | Facility: HOSPITAL | Age: 57
Discharge: HOME | End: 2022-10-17
Attending: SURGERY | Admitting: SURGERY
Payer: MEDICAID

## 2022-10-17 ENCOUNTER — HOSPITAL ENCOUNTER (OUTPATIENT)
Dept: CARDIOLOGY | Facility: HOSPITAL | Age: 57
Discharge: HOME | End: 2022-10-17
Attending: SURGERY
Payer: MEDICAID

## 2022-10-17 VITALS
RESPIRATION RATE: 18 BRPM | HEIGHT: 70 IN | OXYGEN SATURATION: 100 % | HEART RATE: 72 BPM | BODY MASS INDEX: 21.9 KG/M2 | WEIGHT: 153 LBS | TEMPERATURE: 98 F | SYSTOLIC BLOOD PRESSURE: 176 MMHG | DIASTOLIC BLOOD PRESSURE: 87 MMHG

## 2022-10-17 DIAGNOSIS — Z11.52 ENCOUNTER FOR PREOPERATIVE SCREENING LABORATORY TESTING FOR COVID-19 VIRUS: Primary | ICD-10-CM

## 2022-10-17 DIAGNOSIS — I96 WET GANGRENE: ICD-10-CM

## 2022-10-17 DIAGNOSIS — Z01.810 PREOP CARDIOVASCULAR EXAM: ICD-10-CM

## 2022-10-17 DIAGNOSIS — I73.9 PAD (PERIPHERAL ARTERY DISEASE): ICD-10-CM

## 2022-10-17 DIAGNOSIS — Z01.812 ENCOUNTER FOR PREOPERATIVE SCREENING LABORATORY TESTING FOR COVID-19 VIRUS: Primary | ICD-10-CM

## 2022-10-17 LAB
ANION GAP SERPL CALC-SCNC: 11 MMOL/L (ref 8–16)
BASOPHILS # BLD AUTO: 0.09 K/UL (ref 0–0.2)
BASOPHILS NFR BLD: 1.3 % (ref 0–1.9)
BUN SERPL-MCNC: 14 MG/DL (ref 6–20)
CALCIUM SERPL-MCNC: 9.5 MG/DL (ref 8.7–10.5)
CHLORIDE SERPL-SCNC: 99 MMOL/L (ref 95–110)
CO2 SERPL-SCNC: 23 MMOL/L (ref 23–29)
CREAT SERPL-MCNC: 0.7 MG/DL (ref 0.5–1.4)
CTP QC/QA: YES
DIFFERENTIAL METHOD: ABNORMAL
EOSINOPHIL # BLD AUTO: 0.5 K/UL (ref 0–0.5)
EOSINOPHIL NFR BLD: 7.5 % (ref 0–8)
ERYTHROCYTE [DISTWIDTH] IN BLOOD BY AUTOMATED COUNT: 14.3 % (ref 11.5–14.5)
EST. GFR  (NO RACE VARIABLE): >60 ML/MIN/1.73 M^2
GLUCOSE SERPL-MCNC: 101 MG/DL (ref 70–110)
HCT VFR BLD AUTO: 39.6 % (ref 40–54)
HGB BLD-MCNC: 12.5 G/DL (ref 14–18)
IMM GRANULOCYTES # BLD AUTO: 0.02 K/UL (ref 0–0.04)
IMM GRANULOCYTES NFR BLD AUTO: 0.3 % (ref 0–0.5)
LYMPHOCYTES # BLD AUTO: 2 K/UL (ref 1–4.8)
LYMPHOCYTES NFR BLD: 29.5 % (ref 18–48)
MCH RBC QN AUTO: 30.4 PG (ref 27–31)
MCHC RBC AUTO-ENTMCNC: 31.6 G/DL (ref 32–36)
MCV RBC AUTO: 96 FL (ref 82–98)
MONOCYTES # BLD AUTO: 0.5 K/UL (ref 0.3–1)
MONOCYTES NFR BLD: 7.2 % (ref 4–15)
NEUTROPHILS # BLD AUTO: 3.7 K/UL (ref 1.8–7.7)
NEUTROPHILS NFR BLD: 54.2 % (ref 38–73)
NRBC BLD-RTO: 0 /100 WBC
PLATELET # BLD AUTO: 298 K/UL (ref 150–450)
PMV BLD AUTO: 9.1 FL (ref 9.2–12.9)
POTASSIUM SERPL-SCNC: 3.5 MMOL/L (ref 3.5–5.1)
RBC # BLD AUTO: 4.11 M/UL (ref 4.6–6.2)
SARS-COV-2 AG RESP QL IA.RAPID: NEGATIVE
SODIUM SERPL-SCNC: 133 MMOL/L (ref 136–145)
WBC # BLD AUTO: 6.77 K/UL (ref 3.9–12.7)

## 2022-10-17 PROCEDURE — D9220A PRA ANESTHESIA: ICD-10-PCS | Mod: CRNA,,, | Performed by: NURSE ANESTHETIST, CERTIFIED REGISTERED

## 2022-10-17 PROCEDURE — 87186 SC STD MICRODIL/AGAR DIL: CPT | Performed by: SURGERY

## 2022-10-17 PROCEDURE — 63600175 PHARM REV CODE 636 W HCPCS: Performed by: ANESTHESIOLOGY

## 2022-10-17 PROCEDURE — 93922 UPR/L XTREMITY ART 2 LEVELS: CPT | Mod: 26,,, | Performed by: SURGERY

## 2022-10-17 PROCEDURE — 93922 UPR/L XTREMITY ART 2 LEVELS: CPT

## 2022-10-17 PROCEDURE — 87102 FUNGUS ISOLATION CULTURE: CPT | Mod: 59 | Performed by: SURGERY

## 2022-10-17 PROCEDURE — 36415 COLL VENOUS BLD VENIPUNCTURE: CPT | Performed by: ANESTHESIOLOGY

## 2022-10-17 PROCEDURE — 93925 LOWER EXTREMITY STUDY: CPT

## 2022-10-17 PROCEDURE — 27800903 OPTIME MED/SURG SUP & DEVICES OTHER IMPLANTS: Performed by: SURGERY

## 2022-10-17 PROCEDURE — 00400 ANES INTEGUMENTARY SYS NOS: CPT | Performed by: SURGERY

## 2022-10-17 PROCEDURE — 87070 CULTURE OTHR SPECIMN AEROBIC: CPT | Mod: 59 | Performed by: SURGERY

## 2022-10-17 PROCEDURE — 27201423 OPTIME MED/SURG SUP & DEVICES STERILE SUPPLY: Performed by: SURGERY

## 2022-10-17 PROCEDURE — 71000016 HC POSTOP RECOV ADDL HR: Performed by: SURGERY

## 2022-10-17 PROCEDURE — 93010 ELECTROCARDIOGRAM REPORT: CPT | Mod: ,,, | Performed by: INTERNAL MEDICINE

## 2022-10-17 PROCEDURE — 93925 LOWER EXTREMITY STUDY: CPT | Mod: 26,,, | Performed by: SURGERY

## 2022-10-17 PROCEDURE — 93010 EKG 12-LEAD: ICD-10-PCS | Mod: ,,, | Performed by: INTERNAL MEDICINE

## 2022-10-17 PROCEDURE — D9220A PRA ANESTHESIA: Mod: ANES,,, | Performed by: ANESTHESIOLOGY

## 2022-10-17 PROCEDURE — 87205 SMEAR GRAM STAIN: CPT | Mod: 59 | Performed by: SURGERY

## 2022-10-17 PROCEDURE — 11044 DBRDMT BONE 1ST 20 SQ CM/<: CPT | Mod: ,,, | Performed by: SURGERY

## 2022-10-17 PROCEDURE — D9220A PRA ANESTHESIA: ICD-10-PCS | Mod: ANES,,, | Performed by: ANESTHESIOLOGY

## 2022-10-17 PROCEDURE — 25000003 PHARM REV CODE 250: Performed by: ANESTHESIOLOGY

## 2022-10-17 PROCEDURE — 87176 TISSUE HOMOGENIZATION CULTR: CPT | Mod: 91 | Performed by: SURGERY

## 2022-10-17 PROCEDURE — 71000039 HC RECOVERY, EACH ADD'L HOUR: Performed by: SURGERY

## 2022-10-17 PROCEDURE — 87206 SMEAR FLUORESCENT/ACID STAI: CPT | Performed by: SURGERY

## 2022-10-17 PROCEDURE — D9220A PRA ANESTHESIA: Mod: CRNA,,, | Performed by: NURSE ANESTHETIST, CERTIFIED REGISTERED

## 2022-10-17 PROCEDURE — 76942 ECHO GUIDE FOR BIOPSY: CPT | Performed by: ANESTHESIOLOGY

## 2022-10-17 PROCEDURE — 87075 CULTR BACTERIA EXCEPT BLOOD: CPT | Mod: 59 | Performed by: SURGERY

## 2022-10-17 PROCEDURE — 93925 CV US DOPPLER ARTERIAL LEGS BILATERAL (CUPID ONLY): ICD-10-PCS | Mod: 26,,, | Performed by: SURGERY

## 2022-10-17 PROCEDURE — 87116 MYCOBACTERIA CULTURE: CPT | Performed by: SURGERY

## 2022-10-17 PROCEDURE — 71000015 HC POSTOP RECOV 1ST HR: Performed by: SURGERY

## 2022-10-17 PROCEDURE — 85025 COMPLETE CBC W/AUTO DIFF WBC: CPT | Performed by: ANESTHESIOLOGY

## 2022-10-17 PROCEDURE — 63600175 PHARM REV CODE 636 W HCPCS: Performed by: NURSE ANESTHETIST, CERTIFIED REGISTERED

## 2022-10-17 PROCEDURE — 36000707: Performed by: SURGERY

## 2022-10-17 PROCEDURE — 87077 CULTURE AEROBIC IDENTIFY: CPT | Mod: 59 | Performed by: SURGERY

## 2022-10-17 PROCEDURE — 71000033 HC RECOVERY, INTIAL HOUR: Performed by: SURGERY

## 2022-10-17 PROCEDURE — 11044 PR DEBRIDEMENT, SKIN, SUB-Q TISSUE,MUSCLE,BONE,=<20 SQ CM: ICD-10-PCS | Mod: ,,, | Performed by: SURGERY

## 2022-10-17 PROCEDURE — 36000706: Performed by: SURGERY

## 2022-10-17 PROCEDURE — 37000009 HC ANESTHESIA EA ADD 15 MINS: Performed by: SURGERY

## 2022-10-17 PROCEDURE — 93005 ELECTROCARDIOGRAM TRACING: CPT

## 2022-10-17 PROCEDURE — 37000008 HC ANESTHESIA 1ST 15 MINUTES: Performed by: SURGERY

## 2022-10-17 PROCEDURE — 63600175 PHARM REV CODE 636 W HCPCS: Performed by: SURGERY

## 2022-10-17 PROCEDURE — 87147 CULTURE TYPE IMMUNOLOGIC: CPT | Mod: 59 | Performed by: SURGERY

## 2022-10-17 PROCEDURE — 80048 BASIC METABOLIC PNL TOTAL CA: CPT | Performed by: ANESTHESIOLOGY

## 2022-10-17 PROCEDURE — 93922 ANKLE BRACHIAL INDICES (ABI): ICD-10-PCS | Mod: 26,,, | Performed by: SURGERY

## 2022-10-17 PROCEDURE — 25000003 PHARM REV CODE 250: Performed by: SURGERY

## 2022-10-17 DEVICE — IMPLANTABLE DEVICE: Type: IMPLANTABLE DEVICE | Site: TOE | Status: FUNCTIONAL

## 2022-10-17 RX ORDER — SODIUM CHLORIDE, SODIUM LACTATE, POTASSIUM CHLORIDE, CALCIUM CHLORIDE 600; 310; 30; 20 MG/100ML; MG/100ML; MG/100ML; MG/100ML
INJECTION, SOLUTION INTRAVENOUS CONTINUOUS
Status: DISCONTINUED | OUTPATIENT
Start: 2022-10-17 | End: 2022-10-17 | Stop reason: HOSPADM

## 2022-10-17 RX ORDER — PHENYLEPHRINE HYDROCHLORIDE 10 MG/ML
INJECTION INTRAVENOUS
Status: DISCONTINUED | OUTPATIENT
Start: 2022-10-17 | End: 2022-10-17

## 2022-10-17 RX ORDER — BUPIVACAINE HYDROCHLORIDE 5 MG/ML
INJECTION, SOLUTION EPIDURAL; INTRACAUDAL
Status: COMPLETED | OUTPATIENT
Start: 2022-10-17 | End: 2022-10-17

## 2022-10-17 RX ORDER — PROPOFOL 10 MG/ML
VIAL (ML) INTRAVENOUS CONTINUOUS PRN
Status: DISCONTINUED | OUTPATIENT
Start: 2022-10-17 | End: 2022-10-17

## 2022-10-17 RX ORDER — HYDRALAZINE HYDROCHLORIDE 20 MG/ML
10 INJECTION INTRAMUSCULAR; INTRAVENOUS ONCE
Status: COMPLETED | OUTPATIENT
Start: 2022-10-17 | End: 2022-10-17

## 2022-10-17 RX ORDER — ONDANSETRON 2 MG/ML
INJECTION INTRAMUSCULAR; INTRAVENOUS
Status: DISCONTINUED | OUTPATIENT
Start: 2022-10-17 | End: 2022-10-17

## 2022-10-17 RX ORDER — LIDOCAINE HYDROCHLORIDE 10 MG/ML
1 INJECTION, SOLUTION EPIDURAL; INFILTRATION; INTRACAUDAL; PERINEURAL ONCE
Status: DISCONTINUED | OUTPATIENT
Start: 2022-10-17 | End: 2022-10-17 | Stop reason: HOSPADM

## 2022-10-17 RX ORDER — ACETAMINOPHEN 500 MG
1000 TABLET ORAL ONCE
Status: DISCONTINUED | OUTPATIENT
Start: 2022-10-17 | End: 2022-10-17 | Stop reason: HOSPADM

## 2022-10-17 RX ORDER — FENTANYL CITRATE 50 UG/ML
INJECTION, SOLUTION INTRAMUSCULAR; INTRAVENOUS
Status: DISCONTINUED | OUTPATIENT
Start: 2022-10-17 | End: 2022-10-17

## 2022-10-17 RX ORDER — PROPOFOL 10 MG/ML
VIAL (ML) INTRAVENOUS
Status: DISCONTINUED | OUTPATIENT
Start: 2022-10-17 | End: 2022-10-17

## 2022-10-17 RX ORDER — MIDAZOLAM HYDROCHLORIDE 1 MG/ML
INJECTION, SOLUTION INTRAMUSCULAR; INTRAVENOUS
Status: DISCONTINUED | OUTPATIENT
Start: 2022-10-17 | End: 2022-10-17

## 2022-10-17 RX ORDER — ROPIVACAINE HYDROCHLORIDE 5 MG/ML
INJECTION, SOLUTION EPIDURAL; INFILTRATION; PERINEURAL
Status: COMPLETED | OUTPATIENT
Start: 2022-10-17 | End: 2022-10-17

## 2022-10-17 RX ORDER — OXYCODONE AND ACETAMINOPHEN 5; 325 MG/1; MG/1
1 TABLET ORAL EVERY 6 HOURS PRN
Qty: 30 TABLET | Refills: 0 | Status: SHIPPED | OUTPATIENT
Start: 2022-10-17 | End: 2022-10-31

## 2022-10-17 RX ORDER — SODIUM CHLORIDE 0.9 % (FLUSH) 0.9 %
10 SYRINGE (ML) INJECTION
Status: DISCONTINUED | OUTPATIENT
Start: 2022-10-17 | End: 2022-10-17 | Stop reason: HOSPADM

## 2022-10-17 RX ADMIN — PHENYLEPHRINE HYDROCHLORIDE 100 MCG: 10 INJECTION INTRAVENOUS at 01:10

## 2022-10-17 RX ADMIN — ONDANSETRON 4 MG: 2 INJECTION, SOLUTION INTRAMUSCULAR; INTRAVENOUS at 01:10

## 2022-10-17 RX ADMIN — ROPIVACAINE HYDROCHLORIDE 12 ML: 5 INJECTION, SOLUTION EPIDURAL; INFILTRATION; PERINEURAL at 12:10

## 2022-10-17 RX ADMIN — SODIUM CHLORIDE, SODIUM LACTATE, POTASSIUM CHLORIDE, AND CALCIUM CHLORIDE: .6; .31; .03; .02 INJECTION, SOLUTION INTRAVENOUS at 01:10

## 2022-10-17 RX ADMIN — PROPOFOL 30 MG: 10 INJECTION, EMULSION INTRAVENOUS at 01:10

## 2022-10-17 RX ADMIN — SODIUM CHLORIDE, SODIUM LACTATE, POTASSIUM CHLORIDE, AND CALCIUM CHLORIDE: .6; .31; .03; .02 INJECTION, SOLUTION INTRAVENOUS at 11:10

## 2022-10-17 RX ADMIN — PROPOFOL 130 MCG/KG/MIN: 10 INJECTION, EMULSION INTRAVENOUS at 01:10

## 2022-10-17 RX ADMIN — HYDRALAZINE HYDROCHLORIDE 10 MG: 20 INJECTION INTRAMUSCULAR; INTRAVENOUS at 02:10

## 2022-10-17 RX ADMIN — FENTANYL CITRATE 50 MCG: 50 INJECTION, SOLUTION INTRAMUSCULAR; INTRAVENOUS at 12:10

## 2022-10-17 RX ADMIN — BUPIVACAINE HYDROCHLORIDE 25 ML: 5 INJECTION, SOLUTION EPIDURAL; INTRACAUDAL; PERINEURAL at 12:10

## 2022-10-17 RX ADMIN — MIDAZOLAM HYDROCHLORIDE 2 MG: 1 INJECTION, SOLUTION INTRAMUSCULAR; INTRAVENOUS at 01:10

## 2022-10-17 RX ADMIN — CEFAZOLIN SODIUM 2 G: 1 POWDER, FOR SOLUTION INTRAMUSCULAR; INTRAVENOUS at 01:10

## 2022-10-17 NOTE — PLAN OF CARE
Pt verbalized a readiness to go home. VSS. Written and verbal discharge instructions given. Pt aware of follow-up appt.

## 2022-10-17 NOTE — ANESTHESIA PROCEDURE NOTES
Peripheral Block    Patient location during procedure: holding area    Reason for block: primary anesthetic    Diagnosis: rt foot cellulits and osteomyelitis to rt toe   Start time: 10/17/2022 12:45 PM  Timeout: 10/17/2022 12:45 PM   End time: 10/17/2022 1:05 PM    Staffing  Authorizing Provider: Kenton Mancia MD  Performing Provider: Kenton Mancia MD    Preanesthetic Checklist  Completed: patient identified, IV checked, site marked, risks and benefits discussed, surgical consent, monitors and equipment checked, pre-op evaluation and timeout performed  Peripheral Block  Patient position: left lateral decubitus  Prep: ChloraPrep  Patient monitoring: heart rate, continuous pulse ox, continuous capnometry and frequent blood pressure checks  Block type: sciatic  Laterality: right  Injection technique: single shot  Needle  Needle type: Stimuplex   Needle gauge: 21 G  Needle length: 4 in  Needle localization: ultrasound guidance     Assessment  Injection assessment: negative aspiration, negative parasthesia and local visualized surrounding nerve  Heart rate change: no  Slow fractionated injection: yes  Pain Tolerance: comfortable throughout block and no complaints  Medications:    Medications: bupivacaine (pf) (MARCAINE) injection 0.5% - Perineural   25 mL - 10/17/2022 12:50:00 PM    Additional Notes  Slow injection with mult asp no problem

## 2022-10-17 NOTE — DISCHARGE INSTRUCTIONS
***  ACTIVITY LEVEL: If you have received sedation or an anesthetic, you may feel sleepy for several hours. Rest until you are more awake. Gradually resume your normal activities.                DIET: You may resume your home diet. If nausea is present, increase your diet gradually with fluids and bland foods.    Medications: Pain medication should be taken only if needed and as directed. If antibiotics are prescribed, the medication should be taken until completed. You will be given an updated list of you medications.    No driving, alcoholic beverages or signing legal documents for next 24 hours or while taking pain medication.  Elevate the affected extremity to a level above your heart and ice packs may be applied in intervals of 15-20 minutes on 15-20 minutes off while awake    CALL THE DOCTOR:   For any obvious bleeding (some dried blood over the incision is normal).     Redness, swelling, foul smell around incision or fever over 101.  Shortness of breath, Coughing up Bloody Sputum or Pains or Swelling in your Calves.  Persistent pain or nausea not relieved by medication.  Impaired circulation such as tingling, change in color, numbness or tingling, coldness.    If any unusual problems or difficulties occur contact your doctor. If you cannot contact your doctor but feel your signs and symptoms warrant a physicians attention return to the emergency room.       You have received a type of anesthesia that leaves your arm with numbness and/or limited control.    Always pay attention to the location of your foot.  .    Be cautious around the stove, hot liquids and cigarettes.  It can be burned and you may not be aware of it.    Do not lay on that foot or rest with it pressed against anything. Pressure injuries can occur.    Take your pain medications as directed as soon as you start to regain feeling of your foot.  It may be harder to get the pain under control if you wait until full sensation has returned.            Fall Prevention  Millions of people fall every year and injure themselves. You may have had anesthesia or sedation which may increase your risk of falling. You may have health issues that put you at an increased risk of falling.     Here are ways to reduce your risk of falling.    Make your home safe by keeping walkways clear of objects you may trip over.  Use non-slip pads under rugs. Do not use area rugs or small throw rugs.  Use non-slip mats in bathtubs and showers.  Install handrails and lights on staircases.  Do not walk in poorly lit areas.  Do not stand on chairs or wobbly ladders.  Use caution when reaching overhead or looking upward. This position can cause a loss of balance.  Be sure your shoes fit properly, have non-slip bottoms and are in good condition.   Wear shoes both inside and out. Avoid going barefoot or wearing slippers.  Be cautious when going up and down stairs, curbs, and when walking on uneven sidewalks.  If your balance is poor, consider using a cane or walker.  If your fall was related to alcohol use, stop or limit alcohol intake.   If your fall was related to use of sleeping medicines, talk to your doctor about this. You may need to reduce your dosage at bedtime if you awaken during the night to go to the bathroom.    To reduce the need for nighttime bathroom trips:  Avoid drinking fluids for several hours before going to bed  Empty your bladder before going to bed  Men can keep a urinal at the bedside  Stay as active as you can. Balance, flexibility, strength, and endurance all come from exercise. They all play a role in preventing falls. Ask your healthcare provider which types of activity are right for you.  Get your vision checked on a regular basis.  If you have pets, know where they are before you stand up or walk so you don't trip over them.  Use night lights.

## 2022-10-17 NOTE — TRANSFER OF CARE
"Anesthesia Transfer of Care Note    Patient: Yo Pink    Procedure(s) Performed: Procedure(s) (LRB):  DEBRIDEMENT, WOUND, RIGHT FOOT (Right)    Patient location: PACU    Anesthesia Type: MAC    Transport from OR: Transported from OR on 2-3 L/min O2 by NC with adequate spontaneous ventilation    Post pain: adequate analgesia    Post assessment: no apparent anesthetic complications and tolerated procedure well    Post vital signs: stable    Level of consciousness: lethargic, awake and responds to stimulation    Nausea/Vomiting: no nausea/vomiting    Complications: none    Transfer of care protocol was followed      Last vitals:   Visit Vitals  BP (!) 140/74 (BP Location: Left arm, Patient Position: Lying)   Pulse (!) 57   Temp 36.6 °C (97.8 °F)   Resp 19   Ht 5' 10" (1.778 m)   Wt 69.4 kg (153 lb)   SpO2 100%   BMI 21.95 kg/m²     "

## 2022-10-17 NOTE — BRIEF OP NOTE
[facility] - [place] - Surgery  Brief Operative Note    Surgery Date: 10/17/2022     Surgeon(s) and Role:     * Mehul Rich MD - Primary    Assisting Surgeon: None    Pre-op Diagnosis:  Critical lower limb ischemia [I70.229]    Post-op Diagnosis:  Post-Op Diagnosis Codes:     * Critical lower limb ischemia [I70.229]    Procedure(s) (LRB):  DEBRIDEMENT, WOUND, RIGHT FOOT (Right)    Anesthesia: Regional    Operative Findings: minimal bleeding, no deep infection    Estimated Blood Loss: 3 mL         Specimens:   Specimen (24h ago, onward)      None              Discharge Note    OUTCOME: Patient tolerated treatment/procedure well without complication and is now ready for discharge.    DISPOSITION: Home or Self Care    FINAL DIAGNOSIS:  <principal problem not specified>    FOLLOWUP: In clinic in 2 weeks    DISCHARGE INSTRUCTIONS:  No discharge procedures on file.

## 2022-10-17 NOTE — OR NURSING
1250 - time out done for block x2 per Dr Mancia  Pt on monitors  1304 - block x2 complete - pt tolerated well

## 2022-10-17 NOTE — OP NOTE
[facility] - [place] - Surgery  Operative Note     Surgery Date: 10/17/2022      Surgeon(s) and Role:     * Mehul Rich MD - Primary     Assistant: TANNER Arita     Pre-op Diagnosis:  Critical lower limb ischemia [I70.229]     Post-op Diagnosis:  Post-Op Diagnosis Codes:     * Critical lower limb ischemia [I70.229]     Procedure(s) (LRB):  DEBRIDEMENT, WOUND, RIGHT FOOT (Right)  Wound measurement right first toe 4x2x0.5 cm  Wound measurement right fourth toe 1x1x0.25 cm  Washout wound right foot  Graft placement Puraply R 1st and 4th toes    Procedure in detail:  Patient was seen preoperatively by anesthesia was deemed stable for surgery.  A right ankle block was performed.  Patient was brought to the operating room placed supine on the operating table.  Time-out performed the patient was prepped and draped in sterile fashion.  Block was tested noted be adequate.  Scalpel used to remove the eschar of the right 1st and 4th toes.  Rongeur was used to remove extruding bone to the right 1st toe.  Patient's right 1st toenail had auto amputated.  Tendon of the medial aspect of the right great toe was removed with scissors.  The wound was debrided and irrigated with misonix.  Debridement was performed down to level of bone.  PuraPly grafts were placed to the right 1st and 4th toes and a sterile dressing was applied.  The patient was transferred recovery room for in stable condition for further care and monitoring.     Anesthesia: Regional     Operative Findings: minimal bleeding, no deep infection     Estimated Blood Loss: 3 mL         Specimens:   Specimen (24h ago, onward)

## 2022-10-17 NOTE — ANESTHESIA PREPROCEDURE EVALUATION
10/17/2022  Yo Pink is a 57 y.o., male.  To undergo Procedure(s) (LRB):  DEBRIDEMENT, WOUND, RIGHT FOOT, POSSIBLE FIRST TOE AMPUTATION (Right)       Past Medical History:  Past Medical History:   Diagnosis Date    Anticoagulant long-term use     Cardiomyopathy     Coronary artery disease     Myocardial infarction     PAD (peripheral artery disease)        Past Surgical History:  Past Surgical History:   Procedure Laterality Date    ANGIOGRAPHY OF LOWER EXTREMITY Left 7/5/2022    Procedure: Angiogram Extremity Unilateral;  Surgeon: Mehul Rich MD;  Location: [facility] OR;  Service: Vascular;  Laterality: Left;  RN PREOP ON 7/1/22. BINAX ON 7/5/22---NEED CONSENT    ANGIOGRAPHY OF LOWER EXTREMITY Right 10/4/2022    Procedure: Angiogram Extremity Unilateral;  Surgeon: Mehul Rich MD;  Location: [facility] OR;  Service: Vascular;  Laterality: Right;  RN PHONE PREOP 9/27/2022    BINAX DAY OF PROCEDURE    CORONARY ARTERY BYPASS GRAFT (CABG) N/A 7/25/2022    Procedure: CORONARY ARTERY BYPASS GRAFT (CABG) X 3;  Surgeon: Kenton Wynn MD;  Location: [facility] OR [address] [place] [address];  Service: Cardiovascular;  Laterality: N/A;    DEBRIDEMENT OF FOOT Bilateral 9/19/2022    Procedure: DEBRIDEMENT, FOOT;  Surgeon: Mehul Rich MD;  Location: [facility] OR;  Service: Vascular;  Laterality: Bilateral;    ENDOSCOPIC HARVEST OF VEIN Left 7/25/2022    Procedure: SURGICAL PROCUREMENT, VEIN, ENDOSCOPIC;  Surgeon: Kenton Wynn MD;  Location: [facility] OR 2ND FLR;  Service: Cardiovascular;  Laterality: Left;  Vein harvest start: 1337  Vein harvest stop: 1427  Vein prep start: 1428  Vein prep stop: 1454    LEFT HEART CATHETERIZATION Left 7/18/2022    Procedure: Left heart cath;  Surgeon: Noah Huffman MD;  Location: [facility] CATH LAB;  Service: Cardiology;  Laterality: Left;  not before 9am, R rad access    TOE  AMPUTATION Bilateral 2022    Procedure: AMPUTATION, TOE THIRD TOE WITH BILATERAL FOOT DEBRIDEMENT;  Surgeon: Mehul Rich MD;  Location: [facility];  Service: Vascular;  Laterality: Bilateral;  RN PREOP 9/15/2022   BINAX DAY OF SURGERY----NEED ORDERS--CLEARED BY CARDS       Social History:  Social History     Socioeconomic History    Marital status: Single   Tobacco Use    Smoking status: Former     Types: Cigarettes     Quit date: 2022     Years since quittin.4    Smokeless tobacco: Never   Substance and Sexual Activity    Alcohol use: Never    Drug use: Not Currently     Social Determinants of Health     Financial Resource Strain: Low Risk     Difficulty of Paying Living Expenses: Not hard at all   Food Insecurity: No Food Insecurity    Worried About Running Out of Food in the Last Year: Never true    Ran Out of Food in the Last Year: Never true   Transportation Needs: No Transportation Needs    Lack of Transportation (Medical): No    Lack of Transportation (Non-Medical): No   Physical Activity: Inactive    Days of Exercise per Week: 0 days    Minutes of Exercise per Session: 60 min   Stress: No Stress Concern Present    Feeling of Stress : Only a little   Social Connections: Socially Isolated    Frequency of Communication with Friends and Family: More than three times a week    Frequency of Social Gatherings with Friends and Family: More than three times a week    Attends Spiritism Services: Never    Active Member of Clubs or Organizations: No    Attends Club or Organization Meetings: Never    Marital Status: Never    Housing Stability: Low Risk     Unable to Pay for Housing in the Last Year: No    Number of Places Lived in the Last Year: 1    Unstable Housing in the Last Year: No       Medications:  No current facility-administered medications on file prior to encounter.     Current Outpatient Medications on File Prior to Encounter   Medication Sig Dispense Refill     acetaminophen (TYLENOL) 500 MG tablet Take 1,000 mg by mouth every 6 (six) hours as needed for Pain.      aspirin (ECOTRIN) 81 MG EC tablet Take 1 tablet (81 mg total) by mouth once daily. 30 tablet 11    atorvastatin (LIPITOR) 40 MG tablet Take 1 tablet (40 mg total) by mouth once daily. 90 tablet 3    clopidogreL (PLAVIX) 75 mg tablet Take 1 tablet (75 mg total) by mouth once daily. 90 tablet 3    pantoprazole (PROTONIX) 20 MG tablet Take 1 tablet (20 mg total) by mouth once daily. 30 tablet 0    sacubitriL-valsartan (ENTRESTO) 24-26 mg per tablet Take 1 tablet by mouth 2 (two) times daily. 180 tablet 3    spironolactone (ALDACTONE) 25 MG tablet Take 1 tablet (25 mg total) by mouth once daily. 90 tablet 3       Allergies:  Review of patient's allergies indicates:  No Known Allergies    Active Problems:  Patient Active Problem List   Diagnosis    Wet gangrene    Hypertensive urgency    PAD (peripheral artery disease)    ACS (acute coronary syndrome)    Hypokalemia    Dyslipidemia    Ischemic cardiomyopathy    NSTEMI (non-ST elevated myocardial infarction)    Transient hyperglycemia post procedure    S/P CABG (coronary artery bypass graft)    Coronary artery disease involving coronary bypass graft of native heart without angina pectoris    Critical lower limb ischemia    Amputation of toe    Atherosclerosis of native artery of right leg with ulceration       Diagnostic Studies:   Latest Reference Range & Units 09/19/22 12:06   WBC 3.90 - 12.70 K/uL 9.28   RBC 4.60 - 6.20 M/uL 3.89 (L)   Hemoglobin 14.0 - 18.0 g/dL 12.0 (L)   Hematocrit 40.0 - 54.0 % 37.4 (L)   MCV 82 - 98 fL 96   MCH 27.0 - 31.0 pg 30.8   MCHC 32.0 - 36.0 g/dL 32.1   RDW 11.5 - 14.5 % 15.7 (H)   Platelets 150 - 450 K/uL 314   MPV 9.2 - 12.9 fL 8.6 (L)   Gran % 38.0 - 73.0 % 69.4   Lymph % 18.0 - 48.0 % 21.2   Mono % 4.0 - 15.0 % 7.7   Eosinophil % 0.0 - 8.0 % 0.9   Basophil % 0.0 - 1.9 % 0.5   Immature Granulocytes 0.0 - 0.5  % 0.3   Gran # (ANC) 1.8 - 7.7 K/uL 6.4   Lymph # 1.0 - 4.8 K/uL 2.0   Mono # 0.3 - 1.0 K/uL 0.7   Eos # 0.0 - 0.5 K/uL 0.1   Baso # 0.00 - 0.20 K/uL 0.05   Immature Grans (Abs) 0.00 - 0.04 K/uL 0.03   nRBC 0 /100 WBC 0   Differential Method  Automated      Latest Reference Range & Units 09/29/22 08:28   Sodium 136 - 145 mmol/L 138   Potassium 3.5 - 5.1 mmol/L 4.9   Chloride 95 - 110 mmol/L 102   CO2 23 - 29 mmol/L 28   Anion Gap 8 - 16 mmol/L 8   BUN 6 - 20 mg/dL 16   Creatinine 0.5 - 1.4 mg/dL 0.7   eGFR >60 mL/min/1.73 m^2 >60     EKG (9/18/22):  Normal sinus rhythm   Septal infarct (cited on or before 07-SEP-2022)   ST and T wave abnormality, consider inferior ischemia    TTE (7/16/22):   The left ventricle is normal in size with moderate concentric hypertrophy and moderately decreased systolic function.   The estimated ejection fraction is 30%.   Mod-severe global hypokinesis with LAD territory WMA.   Grade II left ventricular diastolic dysfunction.   Normal right ventricular size with normal right ventricular systolic function.   Mild-to-moderate mitral regurgitation.   Mild tricuspid regurgitation.   The estimated PA systolic pressure is 62 mmHg.   There is pulmonary hypertension.    24 Hour Vitals:      See Nursing Charting For Additional Vitals      Pre-op Assessment    I have reviewed the Patient Summary Reports.     I have reviewed the Nursing Notes.       Review of Systems  Social:  Former Smoker, No Alcohol Use    Hematology/Oncology:         -- Anemia:   Cardiovascular:   Hypertension Past MI CAD  CABG/stent  PVD ECG has been reviewed. Plavix    Cardiomyopathy   Pulmonary:  Pulmonary Normal    Hepatic/GI:  Hepatic/GI Normal    Musculoskeletal:   RLE ischemic injury   Neurological:  Neurology Normal    Endocrine:  Endocrine Normal        Physical Exam  General: Well nourished    Airway:  Mallampati: II   Mouth Opening: Normal  TM Distance: 4 - 6 cm  Tongue: Normal  Neck ROM: Normal  ROM        Anesthesia Plan  Type of Anesthesia, risks & benefits discussed:    Anesthesia Type: Regional  Post Op Pain Control Plan: multimodal analgesia  Airway Plan: Direct and Video  Informed Consent: Patient consented to blood products? Yes  ASA Score: 3  Anesthesia Plan Notes: npo    Ready For Surgery From Anesthesia Perspective.     .

## 2022-10-17 NOTE — H&P
HPI:  Yo Pink is a 57 y.o. male with       Patient Active Problem List   Diagnosis    Dry gangrene    Hypertensive urgency    PAD (peripheral artery disease)    ACS (acute coronary syndrome)    Hypokalemia    Dyslipidemia    Ischemic cardiomyopathy    NSTEMI (non-ST elevated myocardial infarction)    being managed by PCP and specialists who is here today for evaluation of bilateral toe wounds.  Patient states location is bilateral feet occurring for months.  Associated signs and symptoms include discoloration and pain.  Quality is dull and severity is 6/10.  Symptoms began mo ago.  Alleviating factors include wound care.  Worsening factors include pressure.  Cardiology evaluated patient and is planning heart cath 7/18/22.     no MI  no Stroke  Tobacco use: daily    August 2022:  Patient presents for follow-up status post coronary artery bypass surgery.  His wounds remained dry.  He denies fevers or chills.  Followed in hospital and discussed plan with Dr. Chin who states that at least 3 weeks, ideally 6 weeks postoperatively would be best to perform any further surgery on this patient.     9/2022:  s/p L 3rd toe amputation and R toe wound debridements last week.  Doing well with wound care.  PO Abx.    10/2022:  No new issues.          Past Medical History:   Diagnosis Date    PAD (peripheral artery disease)              Past Surgical History:   Procedure Laterality Date    ANGIOGRAPHY OF LOWER EXTREMITY Left 7/5/2022     Procedure: Angiogram Extremity Unilateral;  Surgeon: Mehul Rich MD;  Location: [facility] OR;  Service: Vascular;  Laterality: Left;  RN PREOP ON 7/1/22. BINAX ON 7/5/22---NEED CONSENT    LEFT HEART CATHETERIZATION Left 7/18/2022     Procedure: Left heart cath;  Surgeon: Noah Huffman MD;  Location: [facility] CATH LAB;  Service: Cardiology;  Laterality: Left;  not before 9am, R rad access      History reviewed. No pertinent family history.  Social History            Socioeconomic  History    Marital status: Single   Tobacco Use    Smoking status: Former Smoker       Quit date: 2022       Years since quittin.1    Smokeless tobacco: Never Used   Substance and Sexual Activity    Alcohol use: Never    Drug use: Not Currently         Current Facility-Administered Medications:     acetaminophen tablet 650 mg, 650 mg, Oral, Q4H PRN, Noah Huffman MD, 650 mg at 22 0949    acetaminophen tablet 650 mg, 650 mg, Oral, Q4H PRN, Noah Huffman MD    aspirin chewable tablet 81 mg, 81 mg, Oral, Daily, Noah Huffman MD, 81 mg at 22 0807    atorvastatin tablet 80 mg, 80 mg, Oral, QHS, Noah Huffman MD, 80 mg at 22 204    atropine injection 0.5 mg, 0.5 mg, Intravenous, PRN, Noah Huffman MD    clopidogreL tablet 75 mg, 75 mg, Oral, Daily, Noah Huffman MD, 75 mg at 22 0808    dextrose 10% bolus 125 mL, 12.5 g, Intravenous, PRN, Noah Huffman MD    dextrose 10% bolus 250 mL, 25 g, Intravenous, PRN, Noah Huffman MD    glucagon (human recombinant) injection 1 mg, 1 mg, Intramuscular, PRN, Noah Huffman MD    glucose chewable tablet 16 g, 16 g, Oral, PRN, Noah Huffman MD    glucose chewable tablet 24 g, 24 g, Oral, PRN, Noah Huffman MD    hydrALAZINE injection 10 mg, 10 mg, Intravenous, Q6H PRN, Noah Huffman MD    HYDROcodone-acetaminophen  mg per tablet 1 tablet, 1 tablet, Oral, Q4H PRN, Noah Huffman MD, 1 tablet at 22 0551    HYDROcodone-acetaminophen 5-325 mg per tablet 1 tablet, 1 tablet, Oral, Q6H PRN, Noah Huffman MD, 1 tablet at 22 0241    HYDROcodone-acetaminophen 5-325 mg per tablet 1 tablet, 1 tablet, Oral, Q4H PRN, Noah Huffman MD, 1 tablet at 22 0237    ibuprofen tablet 400 mg, 400 mg, Oral, Q6H PRN, Noah Huffman MD    insulin aspart U-100 pen 0-5 Units, 0-5 Units, Subcutaneous, QID (AC + HS) PRN, Noah Huffman MD    melatonin tablet 6 mg,  6 mg, Oral, Nightly PRN, Noah Huffman MD    metoprolol succinate (TOPROL-XL) 24 hr tablet 25 mg, 25 mg, Oral, Daily, Noah Huffman MD, 25 mg at 07/20/22 0808    morphine injection 2 mg, 2 mg, Intravenous, Q10 Min PRN, Noah Huffman MD    naloxone 0.4 mg/mL injection 0.02 mg, 0.02 mg, Intravenous, PRN, Noah Huffman MD    nitroGLYCERIN SL tablet 0.4 mg, 0.4 mg, Sublingual, Q5 Min PRN, Noah Huffman MD    ondansetron disintegrating tablet 8 mg, 8 mg, Oral, Q8H PRN, Noah Huffman MD    ondansetron injection 4 mg, 4 mg, Intravenous, Q8H PRN, Noah Huffman MD    oxyCODONE immediate release tablet 5 mg, 5 mg, Oral, Q6H PRN, Noah Huffman MD, 5 mg at 07/20/22 0423    sacubitriL-valsartan 24-26 mg per tablet 2 tablet, 2 tablet, Oral, BID, Noah Huffman MD    sodium chloride 0.9% bolus 250 mL, 250 mL, Intravenous, PRN, Noah Huffman MD    sodium chloride 0.9% flush 10 mL, 10 mL, Intravenous, Q12H PRN, Noah Huffman MD                   Medications Prior to Admission   Medication Sig Dispense Refill Last Dose    acetaminophen (TYLENOL) 500 MG tablet Take 1,000 mg by mouth every 6 (six) hours as needed for Pain.          amoxicillin-clavulanate 875-125mg (AUGMENTIN) 875-125 mg per tablet Take 1 tablet by mouth every 12 (twelve) hours. 20 tablet 0      amoxicillin-clavulanate 875-125mg (AUGMENTIN) 875-125 mg per tablet Take 1 tablet by mouth every 12 (twelve) hours. for 7 days 14 tablet 0      clopidogreL (PLAVIX) 75 mg tablet Take 1 tablet (75 mg total) by mouth once daily. 30 tablet 11      oxyCODONE-acetaminophen (PERCOCET) 5-325 mg per tablet Take 1 tablet by mouth every 4 (four) hours as needed for Pain. 28 tablet 0           Review of patient's allergies indicates:  No Known Allergies          Past Medical History:   Diagnosis Date    PAD (peripheral artery disease)              Past Surgical History:   Procedure Laterality Date    ANGIOGRAPHY OF LOWER  EXTREMITY Left 2022     Procedure: Angiogram Extremity Unilateral;  Surgeon: Mehul Rich MD;  Location: [facility] OR;  Service: Vascular;  Laterality: Left;  RN PREOP ON 22. BINAX ON 22---NEED CONSENT    LEFT HEART CATHETERIZATION Left 2022     Procedure: Left heart cath;  Surgeon: Noah Huffman MD;  Location: [facility] CATH LAB;  Service: Cardiology;  Laterality: Left;  not before 9am, R rad access      Family History    None               Tobacco Use    Smoking status: Former Smoker       Quit date: 2022       Years since quittin.1    Smokeless tobacco: Never Used   Substance and Sexual Activity    Alcohol use: Never    Drug use: Not Currently    Sexual activity: Not on file      Review of Systems   Constitutional:  Negative for chills and fever.   HENT:  Negative for congestion.    Eyes:  Negative for visual disturbance.   Respiratory:  Negative for shortness of breath.    Cardiovascular:  Negative for chest pain.   Gastrointestinal:  Negative for abdominal distention.   Endocrine: Negative for cold intolerance.   Genitourinary:  Negative for flank pain.   Musculoskeletal:  Negative for back pain.   Skin:  Positive for color change and wound. Negative for pallor and rash.   Allergic/Immunologic: Negative for immunocompromised state.   Neurological:  Negative for dizziness.   Hematological:  Does not bruise/bleed easily.   Psychiatric/Behavioral:  Negative for agitation.    Objective:      Vital Signs (Most Recent):  Temp: 98.4 °F (36.9 °C) (22 1132)  Pulse: 73 (22 1132)  Resp: 20 (22 1132)  BP: 123/81 (22 1132)  SpO2: 99 % (22 1132) Vital Signs (24h Range):  Temp:  [97.5 °F (36.4 °C)-98.4 °F (36.9 °C)] 98.4 °F (36.9 °C)  Pulse:  [63-85] 73  Resp:  [16-20] 20  SpO2:  [96 %-99 %] 99 %  BP: (123-155)/(81-94) 123/81      Weight: 77.1 kg (170 lb)  Body mass index is 24.39 kg/m².     Physical Exam  Vitals reviewed.   Constitutional:       General: He is not  in acute distress.     Appearance: He is well-developed. He is not diaphoretic.   HENT:      Head: Normocephalic and atraumatic.   Eyes:      Conjunctiva/sclera: Conjunctivae normal.   Cardiovascular:      Rate and Rhythm: Normal rate.      Pulses:           Femoral pulses are 2+ on the right side and 2+ on the left side.       Dorsalis pedis pulses are detected w/ Doppler on the right side and detected w/ Doppler on the left side.        Posterior tibial pulses are detected w/ Doppler on the right side and detected w/ Doppler on the left side.   Pulmonary:      Effort: Pulmonary effort is normal.   Abdominal:      General: There is no distension.      Palpations: Abdomen is soft. There is no mass.      Tenderness: There is no abdominal tenderness. There is no guarding or rebound.      Hernia: No hernia is present.   Musculoskeletal:         General: No deformity. Normal range of motion.      Cervical back: Neck supple.   Skin:     Findings: No rash.   Neurological:      Mental Status: He is alert and oriented to person, place, and time.    R 4th toe dry ulcer and dry ischemic changes to R great toe, L 3rd toe amp site healing well     Significant Labs:  All pertinent labs from the last 24 hours have been reviewed.     Significant Diagnostics:  I have reviewed all pertinent imaging results/findings within the past 24 hours.     Assessment/Plan:      * Dry gangrene  S/p L 3rd toe amputation - cont wound care  S/p R 1-2 toe wound debridements with slow healing s/p RLE angiogram for critical limb ischemia - rec R foot debridement  Recommend cont wound care, continued offloading and optimization of comorbidities

## 2022-10-17 NOTE — ANESTHESIA PROCEDURE NOTES
Peripheral Block    Patient location during procedure: pre-op    Reason for block: primary anesthetic    Diagnosis: rt foot cellulitis and first rt foot digit osteomyelitis   Start time: 10/17/2022 12:30 PM  Timeout: 10/17/2022 12:30 PM   End time: 10/17/2022 12:40 PM    Staffing  Authorizing Provider: Kenton Mancia MD  Performing Provider: Kenton Mancia MD    Preanesthetic Checklist  Completed: patient identified, IV checked, site marked, risks and benefits discussed, surgical consent, monitors and equipment checked, pre-op evaluation and timeout performed  Peripheral Block  Patient position: supine  Prep: ChloraPrep  Patient monitoring: heart rate, cardiac monitor, continuous pulse ox, continuous capnometry and frequent blood pressure checks  Block type: adductor canal  Laterality: right  Injection technique: single shot  Needle  Needle type: Stimuplex   Needle gauge: 21 G  Needle length: 4 in  Needle localization: anatomical landmarks and ultrasound guidance   -ultrasound image captured on disc.  Assessment  Injection assessment: negative aspiration, negative parasthesia and local visualized surrounding nerve  Paresthesia pain: none  Heart rate change: no  Slow fractionated injection: yes    Medications:    Medications: ropivacaine (NAROPIN) injection 0.5% - Perineural   12 mL - 10/17/2022 12:35:00 PM    Additional Notes  VSS.  [facility] RN monitoring vitals throughout procedure.  Patient tolerated procedure well.  Slow injection with asp

## 2022-10-18 LAB
FUNGUS SPEC CULT: NORMAL
FUNGUS SPEC CULT: NORMAL
GRAM STN SPEC: NORMAL

## 2022-10-19 LAB — BACTERIA SPEC AEROBE CULT: ABNORMAL

## 2022-10-20 LAB
BACTERIA SPEC AEROBE CULT: ABNORMAL

## 2022-10-21 ENCOUNTER — TELEPHONE (OUTPATIENT)
Dept: INFECTIOUS DISEASES | Facility: CLINIC | Age: 57
End: 2022-10-21
Payer: MEDICAID

## 2022-10-21 LAB
BACTERIA SPEC ANAEROBE CULT: NORMAL
BACTERIA SPEC ANAEROBE CULT: NORMAL

## 2022-10-21 RX ORDER — LEVOFLOXACIN 750 MG/1
750 TABLET ORAL DAILY
Qty: 30 TABLET | Refills: 1 | Status: SHIPPED | OUTPATIENT
Start: 2022-10-21 | End: 2022-11-20

## 2022-11-01 LAB
LEFT ABI: 1.1
LEFT ANT TIBIAL SYS PSV: 59 CM/S
LEFT ARM BP: 179 MMHG
LEFT CFA PSV: 121 CM/S
LEFT DORSALIS PEDIS: 163 MMHG
LEFT EXTERNAL ILIAC PSV: 182 CM/S
LEFT PERONEAL SYS PSV: 64 CM/S
LEFT POPLITEAL PSV: 82 CM/S
LEFT POST TIBIAL SYS PSV: 93 CM/S
LEFT POSTERIOR TIBIAL: 197 MMHG
LEFT PROFUNDA SYS PSV: 120 CM/S
LEFT SUPER FEMORAL DIST SYS PSV: 72 CM/S
LEFT SUPER FEMORAL MID SYS PSV: 185 CM/S
LEFT SUPER FEMORAL OSTIAL SYS PSV: 110 CM/S
LEFT SUPER FEMORAL PROX SYS PSV: 91 CM/S
LEFT TBI: 0.29
LEFT TIB/PER TRUNK SYS PSV: 78 CM/S
LEFT TOE PRESSURE: 52 MMHG
OHS CV LEFT LOWER EXTREMITY ABI (NO CALC): 1.1
OHS CV LOWER EXTREMITY STENT MEASUREMENTS - LEFT - MSFA S1 - DIST: 84
OHS CV LOWER EXTREMITY STENT MEASUREMENTS - LEFT - MSFA S1 - MID: 82
OHS CV LOWER EXTREMITY STENT MEASUREMENTS - LEFT - MSFA S1 - OST: 181
OHS CV LOWER EXTREMITY STENT MEASUREMENTS - LEFT - MSFA S1 - PROX: 142
OHS CV RIGHT ABI LOWER EXTREMITY (NO CALC): 1.15
RIGHT ABI: 1.15
RIGHT ANT TIBIAL SYS PSV: 0 CM/S
RIGHT ARM BP: 179 MMHG
RIGHT CFA PSV: 80 CM/S
RIGHT DORSALIS PEDIS: 206 MMHG
RIGHT EXTERNAL ILLIAC PSV: 88 CM/S
RIGHT PERONEAL SYS PSV: 16 CM/S
RIGHT POPLITEAL PSV: 73 CM/S
RIGHT POST TIBIAL SYS PSV: 95 CM/S
RIGHT POSTERIOR TIBIAL: 205 MMHG
RIGHT PROFUNDA SYS PSV: 83 CM/S
RIGHT SUPER FEMORAL DIST SYS PSV: 87 CM/S
RIGHT SUPER FEMORAL MID SYS PSV: 93 CM/S
RIGHT SUPER FEMORAL OSTIAL SYS PSV: 67 CM/S
RIGHT SUPER FEMORAL PROX SYS PSV: 95 CM/S
RIGHT TBI: 0
RIGHT TIB/PER TRUNK SYS PSV: 151 CM/S
RIGHT TOE PRESSURE: 0 MMHG

## 2022-11-03 ENCOUNTER — OFFICE VISIT (OUTPATIENT)
Dept: VASCULAR SURGERY | Facility: CLINIC | Age: 57
End: 2022-11-03
Payer: MEDICAID

## 2022-11-03 VITALS
BODY MASS INDEX: 21.64 KG/M2 | WEIGHT: 150.81 LBS | SYSTOLIC BLOOD PRESSURE: 140 MMHG | DIASTOLIC BLOOD PRESSURE: 86 MMHG

## 2022-11-03 DIAGNOSIS — I70.229 CRITICAL LOWER LIMB ISCHEMIA: Primary | ICD-10-CM

## 2022-11-03 PROCEDURE — 4010F ACE/ARB THERAPY RXD/TAKEN: CPT | Mod: CPTII,,, | Performed by: SURGERY

## 2022-11-03 PROCEDURE — 99213 OFFICE O/P EST LOW 20 MIN: CPT | Mod: PBBFAC | Performed by: SURGERY

## 2022-11-03 PROCEDURE — 99024 POSTOP FOLLOW-UP VISIT: CPT | Mod: S$PBB,,, | Performed by: SURGERY

## 2022-11-03 PROCEDURE — 3044F PR MOST RECENT HEMOGLOBIN A1C LEVEL <7.0%: ICD-10-PCS | Mod: CPTII,,, | Performed by: SURGERY

## 2022-11-03 PROCEDURE — 1159F MED LIST DOCD IN RCRD: CPT | Mod: CPTII,,, | Performed by: SURGERY

## 2022-11-03 PROCEDURE — 99024 PR POST-OP FOLLOW-UP VISIT: ICD-10-PCS | Mod: S$PBB,,, | Performed by: SURGERY

## 2022-11-03 PROCEDURE — 3079F DIAST BP 80-89 MM HG: CPT | Mod: CPTII,,, | Performed by: SURGERY

## 2022-11-03 PROCEDURE — 3008F PR BODY MASS INDEX (BMI) DOCUMENTED: ICD-10-PCS | Mod: CPTII,,, | Performed by: SURGERY

## 2022-11-03 PROCEDURE — 3044F HG A1C LEVEL LT 7.0%: CPT | Mod: CPTII,,, | Performed by: SURGERY

## 2022-11-03 PROCEDURE — 99999 PR PBB SHADOW E&M-EST. PATIENT-LVL III: ICD-10-PCS | Mod: PBBFAC,,, | Performed by: SURGERY

## 2022-11-03 PROCEDURE — 1159F PR MEDICATION LIST DOCUMENTED IN MEDICAL RECORD: ICD-10-PCS | Mod: CPTII,,, | Performed by: SURGERY

## 2022-11-03 PROCEDURE — 3077F SYST BP >= 140 MM HG: CPT | Mod: CPTII,,, | Performed by: SURGERY

## 2022-11-03 PROCEDURE — 3008F BODY MASS INDEX DOCD: CPT | Mod: CPTII,,, | Performed by: SURGERY

## 2022-11-03 PROCEDURE — 3079F PR MOST RECENT DIASTOLIC BLOOD PRESSURE 80-89 MM HG: ICD-10-PCS | Mod: CPTII,,, | Performed by: SURGERY

## 2022-11-03 PROCEDURE — 99999 PR PBB SHADOW E&M-EST. PATIENT-LVL III: CPT | Mod: PBBFAC,,, | Performed by: SURGERY

## 2022-11-03 PROCEDURE — 3077F PR MOST RECENT SYSTOLIC BLOOD PRESSURE >= 140 MM HG: ICD-10-PCS | Mod: CPTII,,, | Performed by: SURGERY

## 2022-11-03 PROCEDURE — 4010F PR ACE/ARB THEARPY RXD/TAKEN: ICD-10-PCS | Mod: CPTII,,, | Performed by: SURGERY

## 2022-11-03 NOTE — PROGRESS NOTES
HPI:  Yo Pink is a 57 y.o. male with       Patient Active Problem List   Diagnosis    Dry gangrene    Hypertensive urgency    PAD (peripheral artery disease)    ACS (acute coronary syndrome)    Hypokalemia    Dyslipidemia    Ischemic cardiomyopathy    NSTEMI (non-ST elevated myocardial infarction)    being managed by PCP and specialists who is here today for evaluation of bilateral toe wounds.  Patient states location is bilateral feet occurring for months.  Associated signs and symptoms include discoloration and pain.  Quality is dull and severity is 6/10.  Symptoms began mo ago.  Alleviating factors include wound care.  Worsening factors include pressure.  Cardiology evaluated patient and is planning heart cath 7/18/22.     no MI  no Stroke  Tobacco use: daily    August 2022:  Patient presents for follow-up status post coronary artery bypass surgery.  His wounds remained dry.  He denies fevers or chills.  Followed in hospital and discussed plan with Dr. Chin who states that at least 3 weeks, ideally 6 weeks postoperatively would be best to perform any further surgery on this patient.     9/2022:  s/p L 3rd toe amputation and R toe wound debridements last week.  Doing well with wound care.  PO Abx.    11/2022:  s/p R 1 and 4 toe debridement with graft placement          Past Medical History:   Diagnosis Date    PAD (peripheral artery disease)              Past Surgical History:   Procedure Laterality Date    ANGIOGRAPHY OF LOWER EXTREMITY Left 7/5/2022     Procedure: Angiogram Extremity Unilateral;  Surgeon: Mehul Rich MD;  Location: [facility] OR;  Service: Vascular;  Laterality: Left;  RN PREOP ON 7/1/22. BINAX ON 7/5/22---NEED CONSENT    LEFT HEART CATHETERIZATION Left 7/18/2022     Procedure: Left heart cath;  Surgeon: Noah Huffman MD;  Location: [facility] CATH LAB;  Service: Cardiology;  Laterality: Left;  not before 9am, R rad access      History reviewed. No pertinent family history.  Social  History            Socioeconomic History    Marital status: Single   Tobacco Use    Smoking status: Former Smoker       Quit date: 2022       Years since quittin.1    Smokeless tobacco: Never Used   Substance and Sexual Activity    Alcohol use: Never    Drug use: Not Currently         Current Facility-Administered Medications:     acetaminophen tablet 650 mg, 650 mg, Oral, Q4H PRN, Noah Huffman MD, 650 mg at 22 0949    acetaminophen tablet 650 mg, 650 mg, Oral, Q4H PRN, Noah Huffman MD    aspirin chewable tablet 81 mg, 81 mg, Oral, Daily, Noah Huffman MD, 81 mg at 22 0807    atorvastatin tablet 80 mg, 80 mg, Oral, QHS, Noah Huffman MD, 80 mg at 22 204    atropine injection 0.5 mg, 0.5 mg, Intravenous, PRN, Noah Huffman MD    clopidogreL tablet 75 mg, 75 mg, Oral, Daily, Noah Huffman MD, 75 mg at 22 0808    dextrose 10% bolus 125 mL, 12.5 g, Intravenous, PRN, Noah Huffman MD    dextrose 10% bolus 250 mL, 25 g, Intravenous, PRN, Noah Huffman MD    glucagon (human recombinant) injection 1 mg, 1 mg, Intramuscular, PRN, Noah Huffman MD    glucose chewable tablet 16 g, 16 g, Oral, PRN, Noah Huffman MD    glucose chewable tablet 24 g, 24 g, Oral, PRN, Noah Huffman MD    hydrALAZINE injection 10 mg, 10 mg, Intravenous, Q6H PRN, Noah Huffman MD    HYDROcodone-acetaminophen  mg per tablet 1 tablet, 1 tablet, Oral, Q4H PRN, Noah Huffman MD, 1 tablet at 22 0551    HYDROcodone-acetaminophen 5-325 mg per tablet 1 tablet, 1 tablet, Oral, Q6H PRN, Noah Huffman MD, 1 tablet at 22 0241    HYDROcodone-acetaminophen 5-325 mg per tablet 1 tablet, 1 tablet, Oral, Q4H PRN, Noah Huffman MD, 1 tablet at 22 0237    ibuprofen tablet 400 mg, 400 mg, Oral, Q6H PRN, Noah Huffman MD    insulin aspart U-100 pen 0-5 Units, 0-5 Units, Subcutaneous, QID (AC + HS) PRN, Noah JENKINS  MD Armida    melatonin tablet 6 mg, 6 mg, Oral, Nightly PRN, Noah Huffman MD    metoprolol succinate (TOPROL-XL) 24 hr tablet 25 mg, 25 mg, Oral, Daily, Noah Huffman MD, 25 mg at 07/20/22 0808    morphine injection 2 mg, 2 mg, Intravenous, Q10 Min PRN, Noah Huffman MD    naloxone 0.4 mg/mL injection 0.02 mg, 0.02 mg, Intravenous, PRN, Noah Huffman MD    nitroGLYCERIN SL tablet 0.4 mg, 0.4 mg, Sublingual, Q5 Min PRN, Noah Huffman MD    ondansetron disintegrating tablet 8 mg, 8 mg, Oral, Q8H PRN, Noah Huffman MD    ondansetron injection 4 mg, 4 mg, Intravenous, Q8H PRN, Noah Huffman MD    oxyCODONE immediate release tablet 5 mg, 5 mg, Oral, Q6H PRN, Noah Huffman MD, 5 mg at 07/20/22 0423    sacubitriL-valsartan 24-26 mg per tablet 2 tablet, 2 tablet, Oral, BID, Noah Huffman MD    sodium chloride 0.9% bolus 250 mL, 250 mL, Intravenous, PRN, Noah Huffman MD    sodium chloride 0.9% flush 10 mL, 10 mL, Intravenous, Q12H PRN, Noah Huffman MD                   Medications Prior to Admission   Medication Sig Dispense Refill Last Dose    acetaminophen (TYLENOL) 500 MG tablet Take 1,000 mg by mouth every 6 (six) hours as needed for Pain.          amoxicillin-clavulanate 875-125mg (AUGMENTIN) 875-125 mg per tablet Take 1 tablet by mouth every 12 (twelve) hours. 20 tablet 0      amoxicillin-clavulanate 875-125mg (AUGMENTIN) 875-125 mg per tablet Take 1 tablet by mouth every 12 (twelve) hours. for 7 days 14 tablet 0      clopidogreL (PLAVIX) 75 mg tablet Take 1 tablet (75 mg total) by mouth once daily. 30 tablet 11      oxyCODONE-acetaminophen (PERCOCET) 5-325 mg per tablet Take 1 tablet by mouth every 4 (four) hours as needed for Pain. 28 tablet 0           Review of patient's allergies indicates:  No Known Allergies          Past Medical History:   Diagnosis Date    PAD (peripheral artery disease)              Past Surgical History:   Procedure  Laterality Date    ANGIOGRAPHY OF LOWER EXTREMITY Left 2022     Procedure: Angiogram Extremity Unilateral;  Surgeon: Mehul Rich MD;  Location: [facility] OR;  Service: Vascular;  Laterality: Left;  RN PREOP ON 22. BINAX ON 22---NEED CONSENT    LEFT HEART CATHETERIZATION Left 2022     Procedure: Left heart cath;  Surgeon: Noah Huffman MD;  Location: [facility] CATH LAB;  Service: Cardiology;  Laterality: Left;  not before 9am, R rad access      Family History    None               Tobacco Use    Smoking status: Former Smoker       Quit date: 2022       Years since quittin.1    Smokeless tobacco: Never Used   Substance and Sexual Activity    Alcohol use: Never    Drug use: Not Currently    Sexual activity: Not on file      Review of Systems   Constitutional:  Negative for chills and fever.   HENT:  Negative for congestion.    Eyes:  Negative for visual disturbance.   Respiratory:  Negative for shortness of breath.    Cardiovascular:  Negative for chest pain.   Gastrointestinal:  Negative for abdominal distention.   Endocrine: Negative for cold intolerance.   Genitourinary:  Negative for flank pain.   Musculoskeletal:  Negative for back pain.   Skin:  Positive for color change and wound. Negative for pallor and rash.   Allergic/Immunologic: Negative for immunocompromised state.   Neurological:  Negative for dizziness.   Hematological:  Does not bruise/bleed easily.   Psychiatric/Behavioral:  Negative for agitation.    Objective:      Vital Signs (Most Recent):  Temp: 98.4 °F (36.9 °C) (22 1132)  Pulse: 73 (22 1132)  Resp: 20 (22 1132)  BP: 123/81 (22 1132)  SpO2: 99 % (22 1132) Vital Signs (24h Range):  Temp:  [97.5 °F (36.4 °C)-98.4 °F (36.9 °C)] 98.4 °F (36.9 °C)  Pulse:  [63-85] 73  Resp:  [16-20] 20  SpO2:  [96 %-99 %] 99 %  BP: (123-155)/(81-94) 123/81      Weight: 77.1 kg (170 lb)  Body mass index is 24.39 kg/m².     Physical Exam  Vitals reviewed.    Constitutional:       General: He is not in acute distress.     Appearance: He is well-developed. He is not diaphoretic.   HENT:      Head: Normocephalic and atraumatic.   Eyes:      Conjunctiva/sclera: Conjunctivae normal.   Cardiovascular:      Rate and Rhythm: Normal rate.      Pulses:           Femoral pulses are 2+ on the right side and 2+ on the left side.       Dorsalis pedis pulses are detected w/ Doppler on the right side and detected w/ Doppler on the left side.        Posterior tibial pulses are detected w/ Doppler on the right side and detected w/ Doppler on the left side.   Pulmonary:      Effort: Pulmonary effort is normal.   Abdominal:      General: There is no distension.      Palpations: Abdomen is soft. There is no mass.      Tenderness: There is no abdominal tenderness. There is no guarding or rebound.      Hernia: No hernia is present.   Musculoskeletal:         General: No deformity. Normal range of motion.      Cervical back: Neck supple.   Skin:     Findings: No rash.   Neurological:      Mental Status: He is alert and oriented to person, place, and time.    R 4th toe dry ulcer and dry ischemic changes to R great toe, L 3rd toe amp site healing well     Significant Labs:  All pertinent labs from the last 24 hours have been reviewed.     Significant Diagnostics:  I have reviewed all pertinent imaging results/findings within the past 24 hours.     Assessment/Plan:      * Dry gangrene  S/p L 3rd toe amputation - healing well  S/p R 1-2 toe wound debridements with slow healing s/p RLE angiogram s/p R 1 and 4 toe debridements with puraply graft placement - Abx per ID  Recommend wound care, continued offloading and optimization of comorbidities  RTC 2 weeks for wound check

## 2022-11-04 DIAGNOSIS — I70.229 CRITICAL LOWER LIMB ISCHEMIA: Primary | ICD-10-CM

## 2022-11-07 ENCOUNTER — PATIENT OUTREACH (OUTPATIENT)
Dept: ADMINISTRATIVE | Facility: OTHER | Age: 57
End: 2022-11-07
Payer: MEDICAID

## 2022-11-07 NOTE — PROGRESS NOTES
CHW - Follow Up and Case Closure    Completed a follow up visit with patient via telephone today.  Pt reported: had bandages and the medications he needs.   Pt denied any additional needs at this time and agrees with episode closure at this time.  Provided patient with my contact information and encouraged him to contact me if additional needs arise.

## 2022-11-08 ENCOUNTER — TELEPHONE (OUTPATIENT)
Dept: VASCULAR SURGERY | Facility: CLINIC | Age: 57
End: 2022-11-08
Payer: MEDICAID

## 2022-11-08 LAB
ACID FAST MOD KINY STN SPEC: NORMAL
ACID FAST MOD KINY STN SPEC: NORMAL
MYCOBACTERIUM SPEC QL CULT: NORMAL
MYCOBACTERIUM SPEC QL CULT: NORMAL

## 2022-11-14 ENCOUNTER — TELEPHONE (OUTPATIENT)
Dept: VASCULAR SURGERY | Facility: CLINIC | Age: 57
End: 2022-11-14
Payer: MEDICAID

## 2022-11-15 LAB
FUNGUS SPEC CULT: NORMAL
FUNGUS SPEC CULT: NORMAL

## 2022-11-16 NOTE — DISCHARGE SUMMARY
[facility]  Vascular Surgery  Discharge Summary      Patient Name: Yo Pink  MRN: [id number]  Admission Date: 9/19/2022  Hospital Length of Stay: 2 days  Discharge Date and Time: 9/24/2022  8:00 AM  Attending Physician: Mehul Rich MD   Discharging Provider: Abdi Casarez NP  Primary Care Provider: [facility]     HPI: HPI:  Yo Pink is a 57 y.o. male with         Patient Active Problem List   Diagnosis    Dry gangrene    Hypertensive urgency    PAD (peripheral artery disease)    ACS (acute coronary syndrome)    Hypokalemia    Dyslipidemia    Ischemic cardiomyopathy    NSTEMI (non-ST elevated myocardial infarction)    being managed by PCP and specialists who is here today for evaluation of bilateral toe wounds.  Patient states location is bilateral feet occurring for months.  Associated signs and symptoms include discoloration and pain.  Quality is dull and severity is 6/10.  Symptoms began mo ago.  Alleviating factors include wound care.  Worsening factors include pressure.  Cardiology evaluated patient and is planning heart cath 7/18/22.     no MI  no Stroke  Tobacco use: daily     August 2022:  Patient presents for follow-up status post coronary artery bypass surgery.  His wounds remained dry.  He denies fevers or chills.  Followed in hospital and discussed plan with Dr. Chin who states that at least 3 weeks, ideally 6 weeks postoperatively would be best to perform any further surgery on this patient.      9/2022:  s/p L 3rd toe amputation and R toe wound debridements last week.  Doing well with wound care.  PO Abx.     11/2022:  s/p R 1 and 4 toe debridement with graft placement      Procedure(s) (LRB):  1.AMPUTATION, TOE THIRD TOE INCLUDING METATARSAL HEAD LEFT   2. BILATERAL FOOT DEBRIDEMENT (Bilateral)    Disposition of the case: Home with Family    Follow up: Pt to follow up in Vascular surgery clinic for post op appt in 1 week    Final diagnosis: necrotic L  3rd toe, purulence from R great toe, no deep infection. * Critical lower limb ischemia [I70.229]             Past Medical History:   Diagnosis Date    PAD (peripheral artery disease)                  Past Surgical History:   Procedure Laterality Date    ANGIOGRAPHY OF LOWER EXTREMITY Left 2022     Procedure: Angiogram Extremity Unilateral;  Surgeon: Mehul Rich MD;  Location: [facility] OR;  Service: Vascular;  Laterality: Left;  RN PREOP ON 22. BINAX ON 22---NEED CONSENT    LEFT HEART CATHETERIZATION Left 2022     Procedure: Left heart cath;  Surgeon: Noah Huffman MD;  Location: [facility] CATH LAB;  Service: Cardiology;  Laterality: Left;  not before 9am, R rad access      History reviewed. No pertinent family history.  Social History                Socioeconomic History    Marital status: Single   Tobacco Use    Smoking status: Former Smoker       Quit date: 2022       Years since quittin.1    Smokeless tobacco: Never Used   Substance and Sexual Activity    Alcohol use: Never    Drug use: Not Currently        Procedure(s) (LRB):  AMPUTATION, TOE THIRD TOE WITH BILATERAL FOOT DEBRIDEMENT (Bilateral)  DEBRIDEMENT, FOOT (Bilateral)     Consults:   Consults (From admission, onward)          Status Ordering Provider     Inpatient consult to Infectious Diseases  Once        Provider:  Estela Mayfield MD    Completed XENA BELTRAN              Pending Diagnostic Studies:       None          Final Active Diagnoses:    Diagnosis Date Noted POA    PRINCIPAL PROBLEM:  Critical lower limb ischemia [I70.229] 2022 Yes    Amputation of toe [S98.139A] 2022 Yes    Wet gangrene [I96] 2022 Yes      Problems Resolved During this Admission:      Discharged Condition: fair    Disposition: Home or Self Care    Follow Up:   Follow-up Information       Mehul Rich MD Follow up on 10/17/2022.    Specialties: Vascular Surgery, Surgery  Why: at 8am  Contact information:  [address] [facility]  [address]  [facility] [address]  [phone]               [facility] Follow up.    Why: please call at time of discharge for post hospital follow up and establish care for future medical needs  Contact information:  [address] [place] [address]  [phone]               [facility] - Wound Care .    Specialty: Wound Care  Contact information:  [address] [facility] [address]  [phone]               [facility] Follow up on 9/26/2022.    Why: at 9am--you will be seen by PCP to follow up post hospital discharge and establish care for future medical needs as well as be referred to a Wound Care Clinic  Contact information:  [address] [facility] [address]                           Patient Instructions:      Ambulatory referral/consult to Outpatient Case Management   Referral Priority: Routine Referral Type: Consultation   Referral Reason: Specialty Services Required   Number of Visits Requested: 1     Ambulatory referral/consult to Wound Clinic   Standing Status: Future   Referral Priority: Routine Referral Type: Consultation   Referral Reason: Specialty Services Required   Requested Specialty: Wound Care   Number of Visits Requested: 1     Ambulatory referral/consult to Wound Clinic   Standing Status: Future   Referral Priority: Urgent Referral Type: Consultation   Referral Reason: Specialty Services Required   Referred to Provider: [facility] - WOUND CARE Requested Specialty: Wound Care   Number of Visits Requested: 1     Medications:  Reconciled Home Medications:      Medication List        ASK your doctor about these medications      acetaminophen 500 MG tablet  Commonly known as: TYLENOL  Take 1,000 mg by mouth every 6 (six) hours as needed for Pain.     aspirin 81 MG EC tablet  Commonly known as: ECOTRIN  Take 1 tablet (81 mg total) by mouth once daily.     atorvastatin 40 MG tablet  Commonly known  as: LIPITOR  Take 1 tablet (40 mg total) by mouth once daily.     clopidogreL 75 mg tablet  Commonly known as: PLAVIX  Take 1 tablet (75 mg total) by mouth once daily.     ENTRESTO 24-26 mg per tablet  Generic drug: sacubitriL-valsartan  Take 1 tablet by mouth 2 (two) times daily.     pantoprazole 20 MG tablet  Commonly known as: PROTONIX  Take 1 tablet (20 mg total) by mouth once daily.     spironolactone 25 MG tablet  Commonly known as: ALDACTONE  Take 1 tablet (25 mg total) by mouth once daily.              Abdi Casarez NP  Vascular Surgery  [facility]

## 2022-11-21 ENCOUNTER — OFFICE VISIT (OUTPATIENT)
Dept: VASCULAR SURGERY | Facility: CLINIC | Age: 57
End: 2022-11-21
Payer: MEDICAID

## 2022-11-21 ENCOUNTER — HOSPITAL ENCOUNTER (OUTPATIENT)
Dept: CARDIOLOGY | Facility: HOSPITAL | Age: 57
Discharge: HOME | End: 2022-11-21
Attending: SURGERY
Payer: MEDICAID

## 2022-11-21 VITALS
DIASTOLIC BLOOD PRESSURE: 87 MMHG | SYSTOLIC BLOOD PRESSURE: 143 MMHG | BODY MASS INDEX: 22.02 KG/M2 | WEIGHT: 153.44 LBS

## 2022-11-21 DIAGNOSIS — I96 WET GANGRENE: Primary | ICD-10-CM

## 2022-11-21 DIAGNOSIS — I73.9 PAD (PERIPHERAL ARTERY DISEASE): ICD-10-CM

## 2022-11-21 LAB
LEFT ABI: 1.03
LEFT ARM BP: 137 MMHG
LEFT DORSALIS PEDIS: 125 MMHG
LEFT POSTERIOR TIBIAL: 141 MMHG
LEFT TBI: 0.47
LEFT TOE PRESSURE: 65 MMHG
RIGHT ABI: 1.13
RIGHT ARM BP: 135 MMHG
RIGHT DORSALIS PEDIS: 135 MMHG
RIGHT POSTERIOR TIBIAL: 155 MMHG
RIGHT TBI: 0.59
RIGHT TOE PRESSURE: 81 MMHG

## 2022-11-21 PROCEDURE — 93922 UPR/L XTREMITY ART 2 LEVELS: CPT

## 2022-11-21 PROCEDURE — 99024 PR POST-OP FOLLOW-UP VISIT: ICD-10-PCS | Mod: S$PBB,,, | Performed by: NURSE PRACTITIONER

## 2022-11-21 PROCEDURE — 3079F PR MOST RECENT DIASTOLIC BLOOD PRESSURE 80-89 MM HG: ICD-10-PCS | Mod: CPTII,,, | Performed by: NURSE PRACTITIONER

## 2022-11-21 PROCEDURE — 99212 OFFICE O/P EST SF 10 MIN: CPT | Mod: PBBFAC | Performed by: NURSE PRACTITIONER

## 2022-11-21 PROCEDURE — 1159F PR MEDICATION LIST DOCUMENTED IN MEDICAL RECORD: ICD-10-PCS | Mod: CPTII,,, | Performed by: NURSE PRACTITIONER

## 2022-11-21 PROCEDURE — 3008F BODY MASS INDEX DOCD: CPT | Mod: CPTII,,, | Performed by: NURSE PRACTITIONER

## 2022-11-21 PROCEDURE — 99024 POSTOP FOLLOW-UP VISIT: CPT | Mod: S$PBB,,, | Performed by: NURSE PRACTITIONER

## 2022-11-21 PROCEDURE — 3008F PR BODY MASS INDEX (BMI) DOCUMENTED: ICD-10-PCS | Mod: CPTII,,, | Performed by: NURSE PRACTITIONER

## 2022-11-21 PROCEDURE — 93922 UPR/L XTREMITY ART 2 LEVELS: CPT | Mod: 26,,, | Performed by: SURGERY

## 2022-11-21 PROCEDURE — 4010F PR ACE/ARB THEARPY RXD/TAKEN: ICD-10-PCS | Mod: CPTII,,, | Performed by: NURSE PRACTITIONER

## 2022-11-21 PROCEDURE — 3079F DIAST BP 80-89 MM HG: CPT | Mod: CPTII,,, | Performed by: NURSE PRACTITIONER

## 2022-11-21 PROCEDURE — 3044F PR MOST RECENT HEMOGLOBIN A1C LEVEL <7.0%: ICD-10-PCS | Mod: CPTII,,, | Performed by: NURSE PRACTITIONER

## 2022-11-21 PROCEDURE — 1159F MED LIST DOCD IN RCRD: CPT | Mod: CPTII,,, | Performed by: NURSE PRACTITIONER

## 2022-11-21 PROCEDURE — 99999 PR PBB SHADOW E&M-EST. PATIENT-LVL II: CPT | Mod: PBBFAC,,, | Performed by: NURSE PRACTITIONER

## 2022-11-21 PROCEDURE — 3077F SYST BP >= 140 MM HG: CPT | Mod: CPTII,,, | Performed by: NURSE PRACTITIONER

## 2022-11-21 PROCEDURE — 4010F ACE/ARB THERAPY RXD/TAKEN: CPT | Mod: CPTII,,, | Performed by: NURSE PRACTITIONER

## 2022-11-21 PROCEDURE — 93922 ANKLE BRACHIAL INDICES (ABI): ICD-10-PCS | Mod: 26,,, | Performed by: SURGERY

## 2022-11-21 PROCEDURE — 3044F HG A1C LEVEL LT 7.0%: CPT | Mod: CPTII,,, | Performed by: NURSE PRACTITIONER

## 2022-11-21 PROCEDURE — 3077F PR MOST RECENT SYSTOLIC BLOOD PRESSURE >= 140 MM HG: ICD-10-PCS | Mod: CPTII,,, | Performed by: NURSE PRACTITIONER

## 2022-11-21 PROCEDURE — 99999 PR PBB SHADOW E&M-EST. PATIENT-LVL II: ICD-10-PCS | Mod: PBBFAC,,, | Performed by: NURSE PRACTITIONER

## 2022-11-21 NOTE — PROGRESS NOTES
HPI:  Yo Pink is a 57 y.o. male with       Patient Active Problem List   Diagnosis    Dry gangrene    Hypertensive urgency    PAD (peripheral artery disease)    ACS (acute coronary syndrome)    Hypokalemia    Dyslipidemia    Ischemic cardiomyopathy    NSTEMI (non-ST elevated myocardial infarction)    being managed by PCP and specialists who is here today for evaluation of bilateral toe wounds.  Patient states location is bilateral feet occurring for months.  Associated signs and symptoms include discoloration and pain.  Quality is dull and severity is 6/10.  Symptoms began mo ago.  Alleviating factors include wound care.  Worsening factors include pressure.  Cardiology evaluated patient and is planning heart cath 7/18/22.     no MI  no Stroke  Tobacco use: daily    August 2022:  Patient presents for follow-up status post coronary artery bypass surgery.  His wounds remained dry.  He denies fevers or chills.  Followed in hospital and discussed plan with Dr. Chin who states that at least 3 weeks, ideally 6 weeks postoperatively would be best to perform any further surgery on this patient.     9/2022:  s/p L 3rd toe amputation and R toe wound debridements last week.  Doing well with wound care.  PO Abx.    11/2022:  s/p R 1 and 4 toe debridement with graft placement    11/21/22: s/p R 1 and 4 toe debridement with graft placement. Wounds healing well.          Past Medical History:   Diagnosis Date    PAD (peripheral artery disease)              Past Surgical History:   Procedure Laterality Date    ANGIOGRAPHY OF LOWER EXTREMITY Left 7/5/2022     Procedure: Angiogram Extremity Unilateral;  Surgeon: Mehul Rich MD;  Location: [facility] OR;  Service: Vascular;  Laterality: Left;  RN PREOP ON 7/1/22. BINAX ON 7/5/22---NEED CONSENT    LEFT HEART CATHETERIZATION Left 7/18/2022     Procedure: Left heart cath;  Surgeon: Noah Huffman MD;  Location: [facility] CATH LAB;  Service: Cardiology;  Laterality: Left;  not  before 9am, R rad access      History reviewed. No pertinent family history.  Social History            Socioeconomic History    Marital status: Single   Tobacco Use    Smoking status: Former Smoker       Quit date: 2022       Years since quittin.1    Smokeless tobacco: Never Used   Substance and Sexual Activity    Alcohol use: Never    Drug use: Not Currently         Current Facility-Administered Medications:     acetaminophen tablet 650 mg, 650 mg, Oral, Q4H PRN, Noah Huffman MD, 650 mg at 22 0949    acetaminophen tablet 650 mg, 650 mg, Oral, Q4H PRN, Noah Huffman MD    aspirin chewable tablet 81 mg, 81 mg, Oral, Daily, Noah Huffman MD, 81 mg at 22 0807    atorvastatin tablet 80 mg, 80 mg, Oral, QHS, Noah Huffman MD, 80 mg at 22 204    atropine injection 0.5 mg, 0.5 mg, Intravenous, PRN, Noah Huffman MD    clopidogreL tablet 75 mg, 75 mg, Oral, Daily, Noah Huffman MD, 75 mg at 22 0808    dextrose 10% bolus 125 mL, 12.5 g, Intravenous, PRN, Noah Huffman MD    dextrose 10% bolus 250 mL, 25 g, Intravenous, PRN, Noah Huffman MD    glucagon (human recombinant) injection 1 mg, 1 mg, Intramuscular, PRN, Noah Huffman MD    glucose chewable tablet 16 g, 16 g, Oral, PRN, Noah Huffman MD    glucose chewable tablet 24 g, 24 g, Oral, PRN, Noah Huffman MD    hydrALAZINE injection 10 mg, 10 mg, Intravenous, Q6H PRN, Noah Huffman MD    HYDROcodone-acetaminophen  mg per tablet 1 tablet, 1 tablet, Oral, Q4H PRN, Noah Huffman MD, 1 tablet at 22 0551    HYDROcodone-acetaminophen 5-325 mg per tablet 1 tablet, 1 tablet, Oral, Q6H PRN, Noah Huffman MD, 1 tablet at 22 0241    HYDROcodone-acetaminophen 5-325 mg per tablet 1 tablet, 1 tablet, Oral, Q4H PRN, Noah Huffman MD, 1 tablet at 22 0237    ibuprofen tablet 400 mg, 400 mg, Oral, Q6H PRN, Noah Huffman MD    insulin  aspart U-100 pen 0-5 Units, 0-5 Units, Subcutaneous, QID (AC + HS) PRN, Noah Huffman MD    melatonin tablet 6 mg, 6 mg, Oral, Nightly PRN, Noah Huffman MD    metoprolol succinate (TOPROL-XL) 24 hr tablet 25 mg, 25 mg, Oral, Daily, Noah Huffman MD, 25 mg at 07/20/22 0808    morphine injection 2 mg, 2 mg, Intravenous, Q10 Min PRN, Noah Huffman MD    naloxone 0.4 mg/mL injection 0.02 mg, 0.02 mg, Intravenous, PRN, Noah Huffman MD    nitroGLYCERIN SL tablet 0.4 mg, 0.4 mg, Sublingual, Q5 Min PRN, Noah Huffman MD    ondansetron disintegrating tablet 8 mg, 8 mg, Oral, Q8H PRN, Noah Huffman MD    ondansetron injection 4 mg, 4 mg, Intravenous, Q8H PRN, Noah Huffman MD    oxyCODONE immediate release tablet 5 mg, 5 mg, Oral, Q6H PRN, Noah Huffman MD, 5 mg at 07/20/22 0423    sacubitriL-valsartan 24-26 mg per tablet 2 tablet, 2 tablet, Oral, BID, Noah Huffman MD    sodium chloride 0.9% bolus 250 mL, 250 mL, Intravenous, PRN, Noah Huffman MD    sodium chloride 0.9% flush 10 mL, 10 mL, Intravenous, Q12H PRN, Noah Huffman MD                   Medications Prior to Admission   Medication Sig Dispense Refill Last Dose    acetaminophen (TYLENOL) 500 MG tablet Take 1,000 mg by mouth every 6 (six) hours as needed for Pain.          amoxicillin-clavulanate 875-125mg (AUGMENTIN) 875-125 mg per tablet Take 1 tablet by mouth every 12 (twelve) hours. 20 tablet 0      amoxicillin-clavulanate 875-125mg (AUGMENTIN) 875-125 mg per tablet Take 1 tablet by mouth every 12 (twelve) hours. for 7 days 14 tablet 0      clopidogreL (PLAVIX) 75 mg tablet Take 1 tablet (75 mg total) by mouth once daily. 30 tablet 11      oxyCODONE-acetaminophen (PERCOCET) 5-325 mg per tablet Take 1 tablet by mouth every 4 (four) hours as needed for Pain. 28 tablet 0           Review of patient's allergies indicates:  No Known Allergies          Past Medical History:   Diagnosis Date     PAD (peripheral artery disease)              Past Surgical History:   Procedure Laterality Date    ANGIOGRAPHY OF LOWER EXTREMITY Left 2022     Procedure: Angiogram Extremity Unilateral;  Surgeon: Mehul Rich MD;  Location: [facility] OR;  Service: Vascular;  Laterality: Left;  RN PREOP ON 22. BINAX ON 22---NEED CONSENT    LEFT HEART CATHETERIZATION Left 2022     Procedure: Left heart cath;  Surgeon: Noah Huffman MD;  Location: [facility] CATH LAB;  Service: Cardiology;  Laterality: Left;  not before 9am, R rad access      Family History    None               Tobacco Use    Smoking status: Former Smoker       Quit date: 2022       Years since quittin.1    Smokeless tobacco: Never Used   Substance and Sexual Activity    Alcohol use: Never    Drug use: Not Currently    Sexual activity: Not on file      Review of Systems   Constitutional:  Negative for chills and fever.   HENT:  Negative for congestion.    Eyes:  Negative for visual disturbance.   Respiratory:  Negative for shortness of breath.    Cardiovascular:  Negative for chest pain.   Gastrointestinal:  Negative for abdominal distention.   Endocrine: Negative for cold intolerance.   Genitourinary:  Negative for flank pain.   Musculoskeletal:  Negative for back pain.   Skin:  Positive for color change and wound. Negative for pallor and rash.   Allergic/Immunologic: Negative for immunocompromised state.   Neurological:  Negative for dizziness.   Hematological:  Does not bruise/bleed easily.   Psychiatric/Behavioral:  Negative for agitation.    Objective:      Vital Signs (Most Recent):  Temp: 98.4 °F (36.9 °C) (22 1132)  Pulse: 73 (22 1132)  Resp: 20 (22 1132)  BP: 123/81 (22 1132)  SpO2: 99 % (22 1132) Vital Signs (24h Range):  Temp:  [97.5 °F (36.4 °C)-98.4 °F (36.9 °C)] 98.4 °F (36.9 °C)  Pulse:  [63-85] 73  Resp:  [16-20] 20  SpO2:  [96 %-99 %] 99 %  BP: (123-155)/(81-94) 123/81      Weight: 77.1 kg  (170 lb)  Body mass index is 24.39 kg/m².     Physical Exam  Vitals reviewed.   Constitutional:       General: He is not in acute distress.     Appearance: He is well-developed. He is not diaphoretic.   HENT:      Head: Normocephalic and atraumatic.   Eyes:      Conjunctiva/sclera: Conjunctivae normal.   Cardiovascular:      Rate and Rhythm: Normal rate.      Pulses:           Femoral pulses are 2+ on the right side and 2+ on the left side.       Dorsalis pedis pulses are detected w/ Doppler on the right side and detected w/ Doppler on the left side.        Posterior tibial pulses are detected w/ Doppler on the right side and detected w/ Doppler on the left side.   Pulmonary:      Effort: Pulmonary effort is normal.   Abdominal:      General: There is no distension.      Palpations: Abdomen is soft. There is no mass.      Tenderness: There is no abdominal tenderness. There is no guarding or rebound.      Hernia: No hernia is present.   Musculoskeletal:         General: No deformity. Normal range of motion.      Cervical back: Neck supple.   Skin:     Findings: No rash.   Neurological:      Mental Status: He is alert and oriented to person, place, and time.    R 4th toe dry ulcer and dry ischemic changes to R great toe, L 3rd toe amp site healing well     Significant Labs:  All pertinent labs from the last 24 hours have been reviewed.     Significant Diagnostics:  I have reviewed all pertinent imaging results/findings within the past 24 hours.     Assessment/Plan:      * Dry gangrene  S/p L 3rd toe amputation - healing well  S/p R 1-2 toe wound debridements with slow healing s/p RLE angiogram s/p R 1 and 4 toe debridements with puraply graft placement - Abx per ID  Continue wound care, continued offloading and optimization of comorbidities- Nurse will call for sooner wound care date.       RTC 3 weeks for wound check         Dressings changed in clinic    Lucy Casarez NP  Vascular Surgery

## 2022-12-02 ENCOUNTER — HOSPITAL ENCOUNTER (OUTPATIENT)
Dept: WOUND CARE | Facility: HOSPITAL | Age: 57
Discharge: HOME | End: 2022-12-02
Attending: SURGERY
Payer: MEDICAID

## 2022-12-02 VITALS
DIASTOLIC BLOOD PRESSURE: 70 MMHG | BODY MASS INDEX: 21.9 KG/M2 | TEMPERATURE: 98 F | HEIGHT: 70 IN | WEIGHT: 153 LBS | SYSTOLIC BLOOD PRESSURE: 130 MMHG | HEART RATE: 78 BPM | RESPIRATION RATE: 20 BRPM

## 2022-12-02 DIAGNOSIS — I70.239 ATHEROSCLEROSIS OF NATIVE ARTERY OF RIGHT LEG WITH ULCERATION: Primary | ICD-10-CM

## 2022-12-02 DIAGNOSIS — S98.139A: ICD-10-CM

## 2022-12-02 DIAGNOSIS — I70.229 CRITICAL LOWER LIMB ISCHEMIA: ICD-10-CM

## 2022-12-02 PROCEDURE — 99203 PR OFFICE/OUTPT VISIT, NEW, LEVL III, 30-44 MIN: ICD-10-PCS | Mod: ,,, | Performed by: FAMILY MEDICINE

## 2022-12-02 PROCEDURE — 99203 OFFICE O/P NEW LOW 30 MIN: CPT | Mod: ,,, | Performed by: FAMILY MEDICINE

## 2022-12-02 PROCEDURE — 99204 OFFICE O/P NEW MOD 45 MIN: CPT | Performed by: FAMILY MEDICINE

## 2022-12-02 RX ORDER — ATORVASTATIN CALCIUM 40 MG/1
TABLET, FILM COATED ORAL
COMMUNITY
Start: 2022-07-30 | End: 2022-12-02

## 2022-12-02 RX ORDER — ATORVASTATIN CALCIUM 40 MG/1
40 TABLET, FILM COATED ORAL DAILY
COMMUNITY
End: 2023-02-17 | Stop reason: SDUPTHER

## 2022-12-02 RX ORDER — SACUBITRIL AND VALSARTAN 24; 26 MG/1; MG/1
TABLET, FILM COATED ORAL
COMMUNITY
Start: 2022-07-30 | End: 2022-12-02

## 2022-12-02 RX ORDER — CLOPIDOGREL BISULFATE 75 MG/1
75 TABLET ORAL DAILY
COMMUNITY
End: 2023-09-26

## 2022-12-02 RX ORDER — ASPIRIN 81 MG/1
TABLET ORAL
COMMUNITY
Start: 2022-07-30 | End: 2022-12-02

## 2022-12-02 RX ORDER — CLOPIDOGREL BISULFATE 75 MG/1
TABLET ORAL
COMMUNITY
Start: 2022-08-03 | End: 2022-12-02

## 2022-12-02 NOTE — PROGRESS NOTES
[facility]                Progress Note    Subjective:       Patient ID: Yo Pink is a 57 y.o. male.    Chief Complaint: Wound Check    Walked to clinic unaided wearing bilateral Darco shoes and cast covered wounds. States had not been to Dr since he was a child and although his mother had vascular disease he didn't have any problems until 3 months ago when it was determined that he needed amputation of 3rd toe R foot. After angiogram done and stent placed in leg it was determined that he needed CABG and amputation had to wait. After CABG 10 weeks ago underwent stent to L leg and amputation of third toe. Feet are cool and elvis but pulses felt bilaterally. He has been taking care of wounds at home with Iodine and wrap. R  toe had soft wound deposits  that were removed to reveal pink dry tissue and medial great toe also pink. Left distal third toe eschar 25% removed to reveal pink wound underneath and remainder removed with tightly adhered eschar.     MD note:  patient presenting for wounds to bilateral feet.  He states that these have been gradually appearing.  He has been putting betadine on them at home.  He denies any pain at this time.  He is scheduled to follow up with vascular    Review of Systems   Constitutional: Negative.    HENT: Negative.     Eyes: Negative.    Respiratory: Negative.     Cardiovascular: Negative.    Gastrointestinal: Negative.    Skin:  Positive for wound.       Objective:        Physical Exam  Constitutional:       Appearance: Normal appearance.   HENT:      Head: Normocephalic and atraumatic.      Right Ear: External ear normal.      Left Ear: External ear normal.      Mouth/Throat:      Mouth: Mucous membranes are moist.      Pharynx: Oropharynx is clear.   Eyes:      Extraocular Movements: Extraocular movements intact.      Conjunctiva/sclera: Conjunctivae normal.      Pupils: Pupils are equal, round, and reactive to light.    Cardiovascular:      Rate and Rhythm: Normal rate and regular rhythm.   Pulmonary:      Effort: Pulmonary effort is normal. No respiratory distress.   Abdominal:      General: There is no distension.      Palpations: Abdomen is soft.   Skin:     General: Skin is warm and dry.      Comments: +vascular ulcers to bilateral feet with eschar to most; right third toe has small open wound with minimal maceration and no slough   Neurological:      Mental Status: He is alert.       Vitals:    12/02/22 1106   BP: 130/70   Pulse: 78   Resp: 20   Temp: 97.7 °F (36.5 °C)       Assessment:           ICD-10-CM ICD-9-CM   1. Atherosclerosis of native artery of right leg with ulceration  I70.239 440.23     707.9   2. Amputation of toe  S98.139A 895.0   3. Critical lower limb ischemia  I70.229 440.22            Altered Skin Integrity 12/02/22 1127 Right distal Toe, fourth (Active)   12/02/22 1127   Altered Skin Integrity Present on Admission:    Side: Right   Orientation: distal   Location: Toe, fourth   Wound Number:    Is this injury device related?:    Primary Wound Type:    Description of Altered Skin Integrity:    Ankle-Brachial Index:    Pulses:    Removal Indication and Assessment:    Wound Outcome:    (Retired) Wound Length (cm):    (Retired) Wound Width (cm):    (Retired) Depth (cm):    Wound Description (Comments):    Removal Indications:    Wound Image   12/02/22 1127   Description of Altered Skin Integrity Partial thickness tissue loss. Shallow open ulcer with a red or pink wound bed, without slough. Intact or Open/Ruptured Serum-filled blister. 12/02/22 1127   Dressing Appearance Open to air;Dry;Intact 12/02/22 1127   Drainage Amount None 12/02/22 1127   Appearance Pink 12/02/22 1127   Tissue loss description Partial thickness 12/02/22 1127   Red (%), Wound Tissue Color 100 % 12/02/22 1127   Periwound Area Intact;Dry 12/02/22 1127   Wound Edges Defined 12/02/22 1127   Wound Length (cm) 1.8 cm 12/02/22 1127   Wound  Width (cm) 2 cm 12/02/22 1127   Wound Depth (cm) 0.1 cm 12/02/22 1127   Wound Volume (cm^3) 0.36 cm^3 12/02/22 1127   Wound Surface Area (cm^2) 3.6 cm^2 12/02/22 1127   Care Cleansed with:;Antimicrobial agent;Sterile normal saline 12/02/22 1127   Dressing Changed 12/02/22 1127   Dressing Change Due 12/09/22 12/02/22 1127            Altered Skin Integrity 12/02/22 1233 Right medial Toe, first (Active)   12/02/22 1233   Altered Skin Integrity Present on Admission: yes   Side: Right   Orientation: medial   Location: Toe, first   Wound Number:    Is this injury device related?:    Primary Wound Type:    Description of Altered Skin Integrity:    Ankle-Brachial Index:    Pulses:    Removal Indication and Assessment:    Wound Outcome:    (Retired) Wound Length (cm):    (Retired) Wound Width (cm):    (Retired) Depth (cm):    Wound Description (Comments):    Removal Indications:    Wound Image   12/02/22 1127   Description of Altered Skin Integrity Partial thickness tissue loss. Shallow open ulcer with a red or pink wound bed, without slough. Intact or Open/Ruptured Serum-filled blister. 12/02/22 1127   Dressing Appearance Open to air;Dry;Intact 12/02/22 1127   Drainage Amount None 12/02/22 1127   Appearance Pink 12/02/22 1127   Tissue loss description Partial thickness 12/02/22 1127   Red (%), Wound Tissue Color 100 % 12/02/22 1127   Periwound Area Dry 12/02/22 1127   Wound Edges Defined 12/02/22 1127   Wound Length (cm) 1.3 cm 12/02/22 1127   Wound Width (cm) 0.8 cm 12/02/22 1127   Wound Depth (cm) 0.1 cm 12/02/22 1127   Wound Volume (cm^3) 0.104 cm^3 12/02/22 1127   Wound Surface Area (cm^2) 1.04 cm^2 12/02/22 1127   Care Cleansed with:;Antimicrobial agent;Sterile normal saline 12/02/22 1127   Dressing Changed 12/02/22 1127   Dressing Change Due 12/09/22 12/02/22 1127            Altered Skin Integrity 12/02/22 1238 Left distal Toe, fourth (Active)   12/02/22 1238   Altered Skin Integrity Present on Admission: yes   Side:  Left   Orientation: distal   Location: Toe, fourth   Wound Number:    Is this injury device related?:    Primary Wound Type:    Description of Altered Skin Integrity:    Ankle-Brachial Index:    Pulses:    Removal Indication and Assessment:    Wound Outcome:    (Retired) Wound Length (cm):    (Retired) Wound Width (cm):    (Retired) Depth (cm):    Wound Description (Comments):    Removal Indications:    Wound Image   12/02/22 1127   Description of Altered Skin Integrity Full thickness tissue loss. Subcutaneous fat may be visible but bone, tendon or muscle are not exposed 12/02/22 1127   Dressing Appearance Dry;Intact 12/02/22 1127   Drainage Amount None 12/02/22 1127   Appearance Red;Black 12/02/22 1127   Tissue loss description Full thickness 12/02/22 1127   Black (%), Wound Tissue Color 80 % 12/02/22 1127   Red (%), Wound Tissue Color 20 % 12/02/22 1127   Periwound Area Intact;Dry 12/02/22 1127   Wound Edges Defined 12/02/22 1127   Wound Length (cm) 2.5 cm 12/02/22 1127   Wound Width (cm) 2.5 cm 12/02/22 1127   Wound Depth (cm) 0.2 cm 12/02/22 1127   Wound Volume (cm^3) 1.25 cm^3 12/02/22 1127   Wound Surface Area (cm^2) 6.25 cm^2 12/02/22 1127   Off Loading Football dressing;Off loading shoe 12/02/22 1127   Dressing Change Due 12/09/22 12/02/22 1127            Altered Skin Integrity 12/02/22 1242 Right distal Toe, first (Active)   12/02/22 1242   Altered Skin Integrity Present on Admission: yes   Side: Right   Orientation: distal   Location: Toe, first   Wound Number:    Is this injury device related?:    Primary Wound Type:    Description of Altered Skin Integrity:    Ankle-Brachial Index:    Pulses:    Removal Indication and Assessment:    Wound Outcome:    (Retired) Wound Length (cm):    (Retired) Wound Width (cm):    (Retired) Depth (cm):    Wound Description (Comments):    Removal Indications:    Wound Image   12/02/22 1127   Description of Altered Skin Integrity Full thickness tissue loss. Subcutaneous fat  may be visible but bone, tendon or muscle are not exposed 12/02/22 1127   Dressing Appearance Dry;Intact 12/02/22 1127   Drainage Amount None 12/02/22 1127   Appearance Black 12/02/22 1127   Tissue loss description Not applicable 12/02/22 1127   Black (%), Wound Tissue Color 100 % 12/02/22 1127   Periwound Area Intact;Dry 12/02/22 1127   Wound Edges Defined 12/02/22 1127   Wound Length (cm) 3 cm 12/02/22 1127   Wound Width (cm) 3 cm 12/02/22 1127   Wound Surface Area (cm^2) 9 cm^2 12/02/22 1127   Care Cleansed with:;Antimicrobial agent;Sterile normal saline 12/02/22 1127   Dressing Changed 12/02/22 1127   Off Loading Football dressing;Off loading shoe 12/02/22 1127   Dressing Change Due 12/09/22 12/02/22 1127           Plan:              Tissue pathology and/or culture taken     [] Yes      [x] No  Sharp debridement performed                   [] Yes       [x] No  Labs ordered     [] Yes       [x] No  Imaging ordered    [] Yes      [x] No    Orders Placed This Encounter   Procedures    Change dressing     Betadine to dry black eschar covered wounds and iodosorb to remainder.  Two Alan foam in perpendicular fashion over toes both feet   Three cast padding football with stockinet and Darco shoe bilaterally.        Follow up in about 1 week (around 12/9/2022).     Estevan Lara MD

## 2022-12-09 ENCOUNTER — HOSPITAL ENCOUNTER (OUTPATIENT)
Dept: WOUND CARE | Facility: HOSPITAL | Age: 57
Discharge: HOME | End: 2022-12-09
Attending: PODIATRIST
Payer: MEDICAID

## 2022-12-09 ENCOUNTER — HOSPITAL ENCOUNTER (OUTPATIENT)
Dept: RADIOLOGY | Facility: HOSPITAL | Age: 57
Discharge: HOME | End: 2022-12-09
Attending: PODIATRIST
Payer: MEDICAID

## 2022-12-09 VITALS
HEART RATE: 75 BPM | RESPIRATION RATE: 20 BRPM | SYSTOLIC BLOOD PRESSURE: 143 MMHG | TEMPERATURE: 98 F | DIASTOLIC BLOOD PRESSURE: 101 MMHG

## 2022-12-09 DIAGNOSIS — I73.9 PAD (PERIPHERAL ARTERY DISEASE): ICD-10-CM

## 2022-12-09 DIAGNOSIS — I73.9 PAD (PERIPHERAL ARTERY DISEASE): Primary | ICD-10-CM

## 2022-12-09 DIAGNOSIS — I70.229 CRITICAL LOWER LIMB ISCHEMIA: ICD-10-CM

## 2022-12-09 DIAGNOSIS — I96 GANGRENE OF TOE: ICD-10-CM

## 2022-12-09 PROCEDURE — 99214 OFFICE O/P EST MOD 30 MIN: CPT | Mod: ,,, | Performed by: PODIATRIST

## 2022-12-09 PROCEDURE — 99214 PR OFFICE/OUTPT VISIT, EST, LEVL IV, 30-39 MIN: ICD-10-PCS | Mod: ,,, | Performed by: PODIATRIST

## 2022-12-09 PROCEDURE — 73630 X-RAY EXAM OF FOOT: CPT | Mod: TC,FY,RT

## 2022-12-09 PROCEDURE — 73630 X-RAY EXAM OF FOOT: CPT | Mod: 26,RT,, | Performed by: RADIOLOGY

## 2022-12-09 PROCEDURE — 99213 OFFICE O/P EST LOW 20 MIN: CPT | Performed by: PODIATRIST

## 2022-12-09 PROCEDURE — 73630 XR FOOT COMPLETE 3 VIEW RIGHT: ICD-10-PCS | Mod: 26,RT,, | Performed by: RADIOLOGY

## 2022-12-09 NOTE — PROGRESS NOTES
[facility]                Progress Note    Subjective:       Patient ID: Yo Pink is a 57 y.o. male.    Chief Complaint: Wound Check    F/u wound care visit. Patient ambulatory to exam room with bilateral darco shoes on as ordered, no difficulty noted. Patient denies pain or discomfort at present. Wound dressing to bilateral feet intact with no drainage noted from wounds. Wounds unchanged with necrotic, eschar tissue noted to right distal first toe and left distal 4th toe. Wound care done per order.    Review of Systems   Constitutional:  Positive for activity change.   Respiratory:  Negative for shortness of breath.    Cardiovascular:  Negative for chest pain and leg swelling.   Gastrointestinal:  Negative for nausea and vomiting.   Musculoskeletal:  Positive for arthralgias.   Skin:  Positive for wound.   Neurological:  Positive for numbness. Negative for weakness.       Objective:        Physical Exam  Constitutional:       Appearance: He is well-developed.      Comments: Oriented to time, place, and person.   Cardiovascular:      Comments: DP and PT pulses are palpable bilaterally. 3 sec capillary refill time and toes and feet are warm to touch proximally .  There is  hair growth on the feet and toes b/l. There is no edema b/l. No spider veins or varicosities present b/l.     Musculoskeletal:      Comments: Equinus noted b/l ankles with < 10 deg DF noted. MMT 5/5 in DF/PF/Inv/Ev resistance with no reproduction of pain in any direction. Passive range of motion of ankle and pedal joints is painless b/l.     Feet:      Right foot:      Skin integrity: No callus or dry skin.      Left foot:      Skin integrity: No callus or dry skin.   Lymphadenopathy:      Comments: Negative lymphadenopathy bilateral popliteal fossa and tarsal tunnel.   Skin:     Comments: See wound description      Neurological:      Mental Status: He is alert.      Comments: Light touch,  proprioception, and sharp/dull sensation are all intact bilaterally. Protective threshold with the Casco-Wienstein monofilament is intact bilaterally.    Psychiatric:         Behavior: Behavior is cooperative.       Vitals:    12/09/22 1503   BP: (!) 143/101   Pulse: 75   Resp: 20   Temp: 97.7 °F (36.5 °C)       Assessment:           ICD-10-CM ICD-9-CM   1. PAD (peripheral artery disease)  I73.9 443.9   2. Critical lower limb ischemia  I70.229 440.22   3. Gangrene of toe  I96 785.4            Altered Skin Integrity 12/02/22 1127 Right distal Toe, fourth (Active)   12/02/22 1127   Altered Skin Integrity Present on Admission:    Side: Right   Orientation: distal   Location: Toe, fourth   Wound Number:    Is this injury device related?:    Primary Wound Type:    Description of Altered Skin Integrity:    Ankle-Brachial Index:    Pulses:    Removal Indication and Assessment:    Wound Outcome:    (Retired) Wound Length (cm):    (Retired) Wound Width (cm):    (Retired) Depth (cm):    Wound Description (Comments):    Removal Indications:    Wound Image   12/09/22 1508   Description of Altered Skin Integrity Partial thickness tissue loss. Shallow open ulcer with a red or pink wound bed, without slough. Intact or Open/Ruptured Serum-filled blister. 12/09/22 1508   Dressing Appearance Dry;Intact 12/09/22 1508   Drainage Amount None 12/09/22 1508   Appearance Pink 12/09/22 1508   Tissue loss description Partial thickness 12/09/22 1508   Black (%), Wound Tissue Color 0 % 12/09/22 1508   Red (%), Wound Tissue Color 100 % 12/09/22 1508   Yellow (%), Wound Tissue Color 0 % 12/09/22 1508   Periwound Area Dry;Intact 12/09/22 1508   Wound Edges Callused;Defined 12/09/22 1508   Wound Length (cm) 0.1 cm 12/09/22 1508   Wound Width (cm) 0.1 cm 12/09/22 1508   Wound Depth (cm) 0 cm 12/09/22 1508   Wound Volume (cm^3) 0 cm^3 12/09/22 1508   Wound Surface Area (cm^2) 0.01 cm^2 12/09/22 1508   Care Cleansed with:;Soap and water;Sterile  normal saline 12/09/22 1508   Dressing Changed 12/09/22 1508   Off Loading Football dressing;Off loading shoe 12/09/22 1508   Dressing Change Due 12/12/22 12/09/22 1508            Altered Skin Integrity 12/02/22 1233 Right medial Toe, first (Active)   12/02/22 1233   Altered Skin Integrity Present on Admission: yes   Side: Right   Orientation: medial   Location: Toe, first   Wound Number:    Is this injury device related?:    Primary Wound Type:    Description of Altered Skin Integrity:    Ankle-Brachial Index:    Pulses:    Removal Indication and Assessment:    Wound Outcome:    (Retired) Wound Length (cm):    (Retired) Wound Width (cm):    (Retired) Depth (cm):    Wound Description (Comments):    Removal Indications:    Wound Image   12/09/22 1508   Description of Altered Skin Integrity Partial thickness tissue loss. Shallow open ulcer with a red or pink wound bed, without slough. Intact or Open/Ruptured Serum-filled blister. 12/09/22 1508   Dressing Appearance Intact;Dry 12/09/22 1508   Drainage Amount None 12/09/22 1508   Appearance Pink 12/09/22 1508   Tissue loss description Partial thickness 12/09/22 1508   Black (%), Wound Tissue Color 0 % 12/09/22 1508   Red (%), Wound Tissue Color 100 % 12/09/22 1508   Yellow (%), Wound Tissue Color 0 % 12/09/22 1508   Periwound Area Dry;Intact 12/09/22 1508   Wound Edges Callused 12/09/22 1508   Wound Length (cm) 0.5 cm 12/09/22 1508   Wound Width (cm) 0.3 cm 12/09/22 1508   Wound Depth (cm) 0.1 cm 12/09/22 1508   Wound Volume (cm^3) 0.015 cm^3 12/09/22 1508   Wound Surface Area (cm^2) 0.15 cm^2 12/09/22 1508   Care Cleansed with:;Soap and water;Sterile normal saline 12/09/22 1508   Dressing Changed 12/09/22 1508   Off Loading Football dressing;Off loading shoe 12/09/22 1508   Dressing Change Due 12/12/22 12/09/22 1508            Altered Skin Integrity 12/02/22 1238 Left distal Toe, fourth (Active)   12/02/22 1238   Altered Skin Integrity Present on Admission: yes   Side:  Left   Orientation: distal   Location: Toe, fourth   Wound Number:    Is this injury device related?:    Primary Wound Type:    Description of Altered Skin Integrity:    Ankle-Brachial Index:    Pulses:    Removal Indication and Assessment:    Wound Outcome:    (Retired) Wound Length (cm):    (Retired) Wound Width (cm):    (Retired) Depth (cm):    Wound Description (Comments):    Removal Indications:    Wound Image   12/09/22 1508   Description of Altered Skin Integrity Full thickness tissue loss. Base is covered by slough and/or eschar in the wound bed 12/09/22 1508   Dressing Appearance Dry;Intact 12/09/22 1508   Drainage Amount None 12/09/22 1508   Appearance Black;Necrotic;Eschar 12/09/22 1508   Black (%), Wound Tissue Color 100 % 12/09/22 1508   Red (%), Wound Tissue Color 0 % 12/09/22 1508   Yellow (%), Wound Tissue Color 0 % 12/09/22 1508   Periwound Area Dry;Blistered 12/09/22 1508   Wound Edges Callused 12/09/22 1508   Wound Length (cm) 1 cm 12/09/22 1508   Wound Width (cm) 0.9 cm 12/09/22 1508   Wound Surface Area (cm^2) 0.9 cm^2 12/09/22 1508   Care Cleansed with:;Soap and water;Sterile normal saline 12/09/22 1508   Dressing Changed 12/09/22 1508   Off Loading Football dressing;Off loading shoe 12/09/22 1508   Dressing Change Due 12/12/22 12/09/22 1508            Altered Skin Integrity 12/02/22 1242 Right distal Toe, first (Active)   12/02/22 1242   Altered Skin Integrity Present on Admission: yes   Side: Right   Orientation: distal   Location: Toe, first   Wound Number:    Is this injury device related?:    Primary Wound Type:    Description of Altered Skin Integrity:    Ankle-Brachial Index:    Pulses:    Removal Indication and Assessment:    Wound Outcome:    (Retired) Wound Length (cm):    (Retired) Wound Width (cm):    (Retired) Depth (cm):    Wound Description (Comments):    Removal Indications:    Wound Image   12/09/22 1508   Description of Altered Skin Integrity Full thickness tissue loss. Base  is covered by slough and/or eschar in the wound bed 12/09/22 1508   Dressing Appearance Dry;Intact 12/09/22 1508   Drainage Amount None 12/09/22 1508   Appearance Black;Necrotic;Eschar 12/09/22 1508   Black (%), Wound Tissue Color 100 % 12/09/22 1508   Red (%), Wound Tissue Color 0 % 12/09/22 1508   Yellow (%), Wound Tissue Color 0 % 12/09/22 1508   Periwound Area Dry;Intact 12/09/22 1508   Wound Edges Callused 12/09/22 1508   Wound Length (cm) 2 cm 12/09/22 1508   Wound Width (cm) 1.8 cm 12/09/22 1508   Wound Surface Area (cm^2) 3.6 cm^2 12/09/22 1508   Care Cleansed with:;Soap and water;Sterile normal saline 12/09/22 1508   Dressing Changed 12/09/22 1508   Off Loading Football dressing;Off loading shoe 12/09/22 1508   Dressing Change Due 12/12/22 12/09/22 1508           Plan:          Pending appt. With Dr. Rich on 12/12/22.     Dressings: per above  Offloading:Foam    Follow-up:Patient is to return to the clinic in 2 weeks  for follow-up but should call [facility] immediately if any signs of infection, such as fever, chills, sweats, increased redness or pain.    Short-term goals include maintaining good offloading and minimizing bioburden, promoting granulation and epithelialization to healing.  Long-term goals include keeping the wound healed by good offloading and medical management under the direction of internist.      Tissue pathology and/or culture taken     [] Yes      [x] No  Sharp debridement performed                   [] Yes       [x] No  Labs ordered     [] Yes       [x] No  Imaging ordered    [] Yes      [x] No    Orders Placed This Encounter   Procedures    X-Ray Foot Complete Right     Standing Status:   Future     Number of Occurrences:   1     Standing Expiration Date:   12/9/2023     Order Specific Question:   Reason for Exam:     Answer:   right great toe left 4th toe wound    Change dressing     Clean with NS. Betadine to eschar tissues to right 1st distal toe and left 4th distal toe.  Iodosorb to right medial 1st toe. Football dressing (cast padding, stockinet). Bilateral darco shoes.          Follow up in about 13 days (around 12/22/2022), or if symptoms worsen or fail to improve, for MD wound care visit.

## 2022-12-22 ENCOUNTER — TELEPHONE (OUTPATIENT)
Dept: WOUND CARE | Facility: HOSPITAL | Age: 57
End: 2022-12-22
Payer: MEDICAID

## 2023-01-06 DIAGNOSIS — I25.5 ISCHEMIC CARDIOMYOPATHY: Primary | ICD-10-CM

## 2023-01-09 NOTE — ASSESSMENT & PLAN NOTE
What to expect:  Botox for adductor spasmodic dysphonia    The purpose of the Botox injection into the larynx is to cause a temporary weakening of the vocal fold muscles in order to reduce the voice spasms.  It takes several days to begin working, but after the first few days you will notice a decrease in the spasms.  Every injection may be slightly different, so it is important for you to keep track of your voice and swallowing status on the log sheet provided.  Please return the log each time you come in for an injection.      Basic Botox guidelines  Within 24-72 hours, you should expect your voice to become weak and breathy.  After one week, call into the [facility] at [phone] and leave a message so our team can hear your voice and  the effectiveness of the injection.   We will not call you back unless you ask us to do so.      After 1-2 weeks, your voice should start to get stronger and have fewer spasms.  The stronger voice typically lasts for about three months, but there is some variability.    You may experience some difficulties with swallowing for the first week after your injection, but these should be short-lived.  Take extra care when drinking thin liquids such as water, coffee, juice, soda, and tea.  If you do find yourself coughing frequently with liquids, try tucking your chin while you swallow, or thickening your liquids.  Thick-It, a liquid thickener, can be purchased at most pharmacies.      Helpful resources:  http://www.dysphonia.org   http://www.kiki.org  Http://www.entlink.net    If you have any questions, please call our office at [phone], or contact us through the [facility] portal.       -Overnight on 07/15, patient with SOB and diaphoresis. No CP, but admits later intermittent chest discomfort  -chest x-ray no acute process  -unable to find EKG from 7/15.  Per documentation, EKG reading sinus rhythm with fusion complexes and PACs  -Initial troponin 0.07, repeat trop was 0.114  -Echo with EF of 30%, moderately decreased systolic function, grade 2 diastolic dysfunction, pulmonary hypertension, and moderate to severe global hypokinesis with WMA.   -BNP elevated >900  -Cardiology consulted: plans for heart cath on 07/18. Continue lovenox, ASA, statin.

## 2023-01-30 ENCOUNTER — HOSPITAL ENCOUNTER (OUTPATIENT)
Dept: CARDIOLOGY | Facility: HOSPITAL | Age: 58
Discharge: HOME | End: 2023-01-30
Attending: INTERNAL MEDICINE
Payer: MEDICAID

## 2023-01-30 DIAGNOSIS — I25.5 ISCHEMIC CARDIOMYOPATHY: ICD-10-CM

## 2023-01-30 LAB
AV PEAK GRADIENT: 7 MMHG
AV VELOCITY RATIO: 0.79
CV ECHO LV RWT: 0.42 CM
DOP CALC AO PEAK VEL: 1.3 M/S
DOP CALC LVOT AREA: 3.4 CM2
DOP CALC LVOT DIAMETER: 2.08 CM
DOP CALC LVOT PEAK VEL: 1.03 M/S
DOP CALC LVOT STROKE VOLUME: 66.57 CM3
DOP CALCLVOT PEAK VEL VTI: 19.6 CM
E WAVE DECELERATION TIME: 223.21 MSEC
E/A RATIO: 0.52
E/E' RATIO: 23.6 M/S
ECHO LV POSTERIOR WALL: 1.1 CM (ref 0.6–1.1)
EJECTION FRACTION: 40 %
FRACTIONAL SHORTENING: 23 % (ref 28–44)
INTERVENTRICULAR SEPTUM: 1.26 CM (ref 0.6–1.1)
IVC DIAMETER: 1.7 CM
IVRT: 76.12 MSEC
LA MAJOR: 4.86 CM
LA MINOR: 5.39 CM
LA WIDTH: 4.1 CM
LEFT ATRIUM SIZE: 3.75 CM
LEFT ATRIUM VOLUME: 66.8 CM3
LEFT INTERNAL DIMENSION IN SYSTOLE: 4.03 CM (ref 2.1–4)
LEFT VENTRICLE DIASTOLIC VOLUME: 129.58 ML
LEFT VENTRICLE SYSTOLIC VOLUME: 71.34 ML
LEFT VENTRICULAR INTERNAL DIMENSION IN DIASTOLE: 5.2 CM (ref 3.5–6)
LEFT VENTRICULAR MASS: 243.11 G
LV LATERAL E/E' RATIO: 19.67 M/S
LV SEPTAL E/E' RATIO: 29.5 M/S
LVOT MG: 2.43 MMHG
LVOT MV: 0.74 CM/S
MV PEAK A VEL: 1.14 M/S
MV PEAK E VEL: 0.59 M/S
MV STENOSIS PRESSURE HALF TIME: 64.73 MS
MV VALVE AREA P 1/2 METHOD: 3.4 CM2
PISA TR MAX VEL: 2.2 M/S
PV PEAK VELOCITY: 0.72 CM/S
RA MAJOR: 4.89 CM
RA PRESSURE: 3 MMHG
RA WIDTH: 3.3 CM
RIGHT VENTRICULAR END-DIASTOLIC DIMENSION: 3.22 CM
SINUS: 3.8 CM
STJ: 2.64 CM
TDI LATERAL: 0.03 M/S
TDI SEPTAL: 0.02 M/S
TDI: 0.03 M/S
TR MAX PG: 19 MMHG
TRICUSPID ANNULAR PLANE SYSTOLIC EXCURSION: 1.6 CM
TV REST PULMONARY ARTERY PRESSURE: 22 MMHG

## 2023-01-30 PROCEDURE — 93306 TTE W/DOPPLER COMPLETE: CPT

## 2023-01-30 PROCEDURE — 93306 ECHO (CUPID ONLY): ICD-10-PCS | Mod: 26,,, | Performed by: INTERNAL MEDICINE

## 2023-01-30 PROCEDURE — 93306 TTE W/DOPPLER COMPLETE: CPT | Mod: 26,,, | Performed by: INTERNAL MEDICINE

## 2023-02-17 ENCOUNTER — OFFICE VISIT (OUTPATIENT)
Dept: CARDIOLOGY | Facility: CLINIC | Age: 58
End: 2023-02-17
Payer: MEDICAID

## 2023-02-17 VITALS
HEART RATE: 89 BPM | RESPIRATION RATE: 18 BRPM | HEIGHT: 70 IN | SYSTOLIC BLOOD PRESSURE: 122 MMHG | OXYGEN SATURATION: 98 % | BODY MASS INDEX: 23.93 KG/M2 | WEIGHT: 167.13 LBS | DIASTOLIC BLOOD PRESSURE: 78 MMHG

## 2023-02-17 DIAGNOSIS — I73.9 PAD (PERIPHERAL ARTERY DISEASE): ICD-10-CM

## 2023-02-17 DIAGNOSIS — Z95.1 S/P CABG (CORONARY ARTERY BYPASS GRAFT): ICD-10-CM

## 2023-02-17 DIAGNOSIS — I77.810 AORTIC ROOT DILATATION: ICD-10-CM

## 2023-02-17 DIAGNOSIS — I25.5 ISCHEMIC CARDIOMYOPATHY: Primary | ICD-10-CM

## 2023-02-17 DIAGNOSIS — E78.5 DYSLIPIDEMIA: ICD-10-CM

## 2023-02-17 DIAGNOSIS — Z00.00 REGULAR CHECK-UP: ICD-10-CM

## 2023-02-17 DIAGNOSIS — I25.810 CORONARY ARTERY DISEASE INVOLVING CORONARY BYPASS GRAFT OF NATIVE HEART WITHOUT ANGINA PECTORIS: ICD-10-CM

## 2023-02-17 PROCEDURE — 99999 PR PBB SHADOW E&M-EST. PATIENT-LVL IV: CPT | Mod: PBBFAC,,, | Performed by: INTERNAL MEDICINE

## 2023-02-17 PROCEDURE — 3008F BODY MASS INDEX DOCD: CPT | Mod: CPTII,,, | Performed by: INTERNAL MEDICINE

## 2023-02-17 PROCEDURE — 99214 PR OFFICE/OUTPT VISIT, EST, LEVL IV, 30-39 MIN: ICD-10-PCS | Mod: S$PBB,,, | Performed by: INTERNAL MEDICINE

## 2023-02-17 PROCEDURE — 99214 OFFICE O/P EST MOD 30 MIN: CPT | Mod: S$PBB,,, | Performed by: INTERNAL MEDICINE

## 2023-02-17 PROCEDURE — 1159F MED LIST DOCD IN RCRD: CPT | Mod: CPTII,,, | Performed by: INTERNAL MEDICINE

## 2023-02-17 PROCEDURE — 3078F DIAST BP <80 MM HG: CPT | Mod: CPTII,,, | Performed by: INTERNAL MEDICINE

## 2023-02-17 PROCEDURE — 93010 EKG 12-LEAD: ICD-10-PCS | Mod: S$PBB,,, | Performed by: INTERNAL MEDICINE

## 2023-02-17 PROCEDURE — 99999 PR PBB SHADOW E&M-EST. PATIENT-LVL IV: ICD-10-PCS | Mod: PBBFAC,,, | Performed by: INTERNAL MEDICINE

## 2023-02-17 PROCEDURE — 3008F PR BODY MASS INDEX (BMI) DOCUMENTED: ICD-10-PCS | Mod: CPTII,,, | Performed by: INTERNAL MEDICINE

## 2023-02-17 PROCEDURE — 3074F PR MOST RECENT SYSTOLIC BLOOD PRESSURE < 130 MM HG: ICD-10-PCS | Mod: CPTII,,, | Performed by: INTERNAL MEDICINE

## 2023-02-17 PROCEDURE — 3074F SYST BP LT 130 MM HG: CPT | Mod: CPTII,,, | Performed by: INTERNAL MEDICINE

## 2023-02-17 PROCEDURE — 3078F PR MOST RECENT DIASTOLIC BLOOD PRESSURE < 80 MM HG: ICD-10-PCS | Mod: CPTII,,, | Performed by: INTERNAL MEDICINE

## 2023-02-17 PROCEDURE — 93010 ELECTROCARDIOGRAM REPORT: CPT | Mod: S$PBB,,, | Performed by: INTERNAL MEDICINE

## 2023-02-17 PROCEDURE — 1160F PR REVIEW ALL MEDS BY PRESCRIBER/CLIN PHARMACIST DOCUMENTED: ICD-10-PCS | Mod: CPTII,,, | Performed by: INTERNAL MEDICINE

## 2023-02-17 PROCEDURE — 93005 ELECTROCARDIOGRAM TRACING: CPT | Mod: PBBFAC | Performed by: INTERNAL MEDICINE

## 2023-02-17 PROCEDURE — 1159F PR MEDICATION LIST DOCUMENTED IN MEDICAL RECORD: ICD-10-PCS | Mod: CPTII,,, | Performed by: INTERNAL MEDICINE

## 2023-02-17 PROCEDURE — 1160F RVW MEDS BY RX/DR IN RCRD: CPT | Mod: CPTII,,, | Performed by: INTERNAL MEDICINE

## 2023-02-17 PROCEDURE — 99214 OFFICE O/P EST MOD 30 MIN: CPT | Mod: PBBFAC | Performed by: INTERNAL MEDICINE

## 2023-02-17 RX ORDER — METOPROLOL SUCCINATE 25 MG/1
25 TABLET, EXTENDED RELEASE ORAL DAILY
Qty: 90 TABLET | Refills: 3 | Status: SHIPPED | OUTPATIENT
Start: 2023-02-17 | End: 2024-03-07 | Stop reason: SDUPTHER

## 2023-02-17 RX ORDER — ATORVASTATIN CALCIUM 80 MG/1
80 TABLET, FILM COATED ORAL NIGHTLY
Qty: 90 TABLET | Refills: 3 | Status: SHIPPED | OUTPATIENT
Start: 2023-02-17 | End: 2024-02-19

## 2023-02-17 NOTE — LETTER
February 17, 2023        [facility]  [address] [facility] [address] [place] [address]             [facility]  [address]  [facility] [address]  Phone: [phone]   Patient: Yo Pink   MR Number: [id number]   YOB: 1965   Date of Visit: 2/17/2023           Thank you for referring Yo Pink to me for evaluation. Attached you will find relevant portions of my assessment and plan of care.    If you have questions, please do not hesitate to call me. I look forward to following Yo Pink along with you.    Sincerely,      Noah Huffman MD            CC  No Recipients    Enclosure

## 2023-02-17 NOTE — PROGRESS NOTES
CARDIOVASCULAR PROGRESS NOTE    REASON FOR CONSULT:   Yo Pink is a 57 y.o. male who presents for follow up of CAD/CABG/ICM/PAD.    PCP: [facility]: [facility]ilario  HISTORY OF PRESENT ILLNESS:   Last seen September 2022.    The patient returns for follow-up.  He reports an asymptomatic status without angina, dyspnea, palpitations, or syncope.  There has been no PND, orthopnea, or lower extremity edema.  Denies melena, hematuria, or claudicant symptoms.  He continues take his dual antiplatelet therapy without any issues.    CARDIOVASCULAR HISTORY:   CAD ACS/NSTEMI 7/18/22: MV CAD->CABG 7/25/22 (Tianna): LIMA-LAD, SVG-R, SVG-PDA    ICM, EF 30%->40% (echo 1/30/23)    PAD, followed by Dr. Rich    Ao root dil, 3.8cm (echo 1/2023)    PAST MEDICAL HISTORY:     Past Medical History:   Diagnosis Date    Anticoagulant long-term use     Cardiomyopathy     Coronary artery disease     Myocardial infarction     PAD (peripheral artery disease)     Stroke        PAST SURGICAL HISTORY:     Past Surgical History:   Procedure Laterality Date    ANGIOGRAPHY OF LOWER EXTREMITY Left 7/5/2022    Procedure: Angiogram Extremity Unilateral;  Surgeon: Mehul Rich MD;  Location: [facility] OR;  Service: Vascular;  Laterality: Left;  RN PREOP ON 7/1/22. BINAX ON 7/5/22---NEED CONSENT    ANGIOGRAPHY OF LOWER EXTREMITY Right 10/4/2022    Procedure: Angiogram Extremity Unilateral;  Surgeon: Mehul Rich MD;  Location: [facility] OR;  Service: Vascular;  Laterality: Right;  RN PHONE PREOP 9/27/2022    BINAX DAY OF PROCEDURE    CORONARY ARTERY BYPASS GRAFT (CABG) N/A 7/25/2022    Procedure: CORONARY ARTERY BYPASS GRAFT (CABG) X 3;  Surgeon: Kenton Wynn MD;  Location: [facility] OR [address] [place] [address];  Service: Cardiovascular;  Laterality: N/A;    DEBRIDEMENT OF FOOT Bilateral 9/19/2022    Procedure: DEBRIDEMENT, FOOT;  Surgeon: Mehul Rich MD;  Location: [facility] OR;  Service: Vascular;  Laterality: Bilateral;    ENDOSCOPIC HARVEST OF VEIN  Left 7/25/2022    Procedure: SURGICAL PROCUREMENT, VEIN, ENDOSCOPIC;  Surgeon: Kenton Wynn MD;  Location: [facility] OR [address] [place] [address];  Service: Cardiovascular;  Laterality: Left;  Vein harvest start: 1337  Vein harvest stop: 1427  Vein prep start: 1428  Vein prep stop: 1454    LEFT HEART CATHETERIZATION Left 7/18/2022    Procedure: Left heart cath;  Surgeon: Noah Huffman MD;  Location: [facility] CATH LAB;  Service: Cardiology;  Laterality: Left;  not before 9am, R rad access    TOE AMPUTATION Bilateral 9/19/2022    Procedure: AMPUTATION, TOE THIRD TOE WITH BILATERAL FOOT DEBRIDEMENT;  Surgeon: Mehul Rich MD;  Location: [facility] OR;  Service: Vascular;  Laterality: Bilateral;  RN PREOP 9/15/2022   BINAX DAY OF SURGERY----NEED ORDERS--CLEARED BY CARDS    WOUND DEBRIDEMENT Right 10/17/2022    Procedure: DEBRIDEMENT, WOUND, RIGHT FOOT;  Surgeon: Mehul Rich MD;  Location: [facility];  Service: Vascular;  Laterality: Right;  RN PHONE PREOP 10/11--BINAX ON ARRIVAL---NEED CONSENT, H/P , ORDERS       ALLERGIES AND MEDICATION:   Review of patient's allergies indicates:  No Known Allergies     Medication List            Accurate as of February 17, 2023  3:13 PM. If you have any questions, ask your nurse or doctor.                CONTINUE taking these medications      aspirin 81 MG EC tablet  Commonly known as: ECOTRIN  Take 1 tablet (81 mg total) by mouth once daily.     atorvastatin 40 MG tablet  Commonly known as: LIPITOR     clopidogreL 75 mg tablet  Commonly known as: PLAVIX     pantoprazole 20 MG tablet  Commonly known as: PROTONIX  Take 1 tablet (20 mg total) by mouth once daily.     sacubitriL-valsartan 24-26 mg per tablet  Commonly known as: ENTRESTO     spironolactone 25 MG tablet  Commonly known as: ALDACTONE  Take 1 tablet (25 mg total) by mouth once daily.            STOP taking these medications      acetaminophen 500 MG tablet  Commonly known as: TYLENOL  Stopped by: Noah Huffman MD               SOCIAL HISTORY:     Social History     Socioeconomic History    Marital status: Single   Tobacco Use    Smoking status: Former     Types: Cigarettes     Quit date: 2022     Years since quittin.7    Smokeless tobacco: Never   Substance and Sexual Activity    Alcohol use: Never    Drug use: Not Currently     Social Determinants of Health     Financial Resource Strain: Low Risk     Difficulty of Paying Living Expenses: Not hard at all   Food Insecurity: No Food Insecurity    Worried About Running Out of Food in the Last Year: Never true    Ran Out of Food in the Last Year: Never true   Transportation Needs: No Transportation Needs    Lack of Transportation (Medical): No    Lack of Transportation (Non-Medical): No   Physical Activity: Inactive    Days of Exercise per Week: 0 days    Minutes of Exercise per Session: 60 min   Stress: No Stress Concern Present    Feeling of Stress : Only a little   Social Connections: Socially Isolated    Frequency of Communication with Friends and Family: More than three times a week    Frequency of Social Gatherings with Friends and Family: More than three times a week    Attends Zoroastrianism Services: Never    Active Member of Clubs or Organizations: No    Attends Club or Organization Meetings: Never    Marital Status: Never    Housing Stability: Low Risk     Unable to Pay for Housing in the Last Year: No    Number of Places Lived in the Last Year: 1    Unstable Housing in the Last Year: No       FAMILY HISTORY:     Family History   Problem Relation Age of Onset    Peripheral vascular disease Mother     Cancer Father        REVIEW OF SYSTEMS:   Review of Systems   Constitutional:  Negative for chills, diaphoresis and fever.   HENT:  Negative for nosebleeds.    Eyes:  Negative for blurred vision, double vision and photophobia.   Respiratory:  Negative for hemoptysis, shortness of breath and wheezing.    Cardiovascular:  Negative for chest pain, palpitations, orthopnea,  "claudication, leg swelling and PND.   Gastrointestinal:  Negative for abdominal pain, blood in stool, heartburn, melena, nausea and vomiting.   Genitourinary:  Negative for flank pain and hematuria.   Musculoskeletal:  Negative for falls, myalgias and neck pain.   Skin:  Negative for rash.   Neurological:  Negative for dizziness, seizures, loss of consciousness, weakness and headaches.   Endo/Heme/Allergies:  Negative for polydipsia. Does not bruise/bleed easily.   Psychiatric/Behavioral:  Negative for depression and memory loss. The patient is not nervous/anxious.      PHYSICAL EXAM:     Vitals:    02/17/23 1505   BP: 122/78   Pulse: 89   Resp: 18      Body mass index is 23.98 kg/m².  Weight: 75.8 kg (167 lb 1.7 oz)   Height: 5' 10" (177.8 cm)     Physical Exam  Vitals reviewed.   Constitutional:       General: He is not in acute distress.     Appearance: Normal appearance. He is well-developed and normal weight. He is not ill-appearing, toxic-appearing or diaphoretic.   HENT:      Head: Normocephalic and atraumatic.   Eyes:      General: No scleral icterus.     Extraocular Movements: Extraocular movements intact.      Conjunctiva/sclera: Conjunctivae normal.      Pupils: Pupils are equal, round, and reactive to light.   Neck:      Thyroid: No thyromegaly.      Vascular: Normal carotid pulses. No carotid bruit or JVD.      Trachea: Trachea normal.   Cardiovascular:      Rate and Rhythm: Normal rate and regular rhythm.      Pulses:           Carotid pulses are 2+ on the right side and 2+ on the left side.     Heart sounds: S1 normal and S2 normal. No murmur heard.    No friction rub. No gallop.      Comments: Sternotomy well healed  Pulmonary:      Effort: Pulmonary effort is normal. No respiratory distress.      Breath sounds: Normal breath sounds. No stridor. No wheezing, rhonchi or rales.   Chest:      Chest wall: No tenderness.   Abdominal:      General: There is no distension.      Palpations: Abdomen is soft. "   Musculoskeletal:         General: No swelling or tenderness. Normal range of motion.      Cervical back: Normal range of motion and neck supple. No edema or rigidity.      Right lower leg: No edema.      Left lower leg: No edema.   Feet:      Right foot:      Skin integrity: No ulcer.      Left foot:      Skin integrity: No ulcer.   Skin:     General: Skin is warm and dry.      Coloration: Skin is not jaundiced.   Neurological:      General: No focal deficit present.      Mental Status: He is alert and oriented to person, place, and time.      Cranial Nerves: No cranial nerve deficit.   Psychiatric:         Mood and Affect: Mood normal.         Speech: Speech normal.         Behavior: Behavior normal. Behavior is cooperative.       DATA:   EKG: (personally reviewed tracing(s))  2/17/23 SR 86, ASMI-age indet, similar to 9/16/22    Laboratory:  CBC:  Recent Labs   Lab 09/18/22  0313 09/19/22  1206 10/17/22  1201   WBC 10.49 9.28 6.77   Hemoglobin 12.8 L 12.0 L 12.5 L   Hematocrit 37.7 L 37.4 L 39.6 L   Platelets 328 314 298       CHEMISTRIES:  Recent Labs   Lab 07/28/22  0341 07/28/22  1118 07/29/22  0643 07/30/22  0412 09/15/22  1610 09/18/22  0313 09/29/22  0828 10/17/22  1201   Glucose 106  --  108 82  --  146 H 104 101   Sodium 133 L  --  134 L 132 L  --  130 L 138 133 L   Potassium 3.4 L   < > 3.9 3.9  --  3.4 L 4.9 3.5   BUN 11  --  12 17  --  19 16 14   Creatinine 0.6  --  0.6 0.7  --  0.7 0.7 0.7   eGFR if African American >60.0  --  >60.0 >60.0  --   --   --   --    Calcium 8.5 L  --  8.7 8.9  --  8.9 9.1 9.5   Magnesium 1.9   < > 2.0 1.8 2.3 1.8  --   --     < > = values in this interval not displayed.       CARDIAC BIOMARKERS:  Recent Labs   Lab 07/16/22  0101 07/16/22  0644   Troponin I 0.073 H 0.114 H       COAGS:  Recent Labs   Lab 07/26/22  0306 09/15/22  1610 09/18/22  0313   INR 1.3 H 1.0 1.2       LIPIDS/LFTS:  Recent Labs   Lab 07/16/22  0644 07/24/22  0621 07/29/22  0643 07/30/22  0412  09/18/22  0313   Cholesterol 144  --   --   --   --    Triglycerides 122  --   --   --   --    HDL 35 L  --   --   --   --    LDL Cholesterol 84.6  --   --   --   --    Non-HDL Cholesterol 109  --   --   --   --    AST  --    < > 26 26 31   ALT  --    < > 11 13 23    < > = values in this interval not displayed.       Cardiovascular Testing:  Echo 1/30/23  TDS.  The left ventricle is mildly enlarged with eccentric hypertrophy and mildly decreased systolic function.  The estimated ejection fraction is 40%.  Grade I left ventricular diastolic dysfunction.  Normal right ventricular size with normal right ventricular systolic function.  Mild tricuspid regurgitation.  Normal central venous pressure (3 mmHg).  The estimated PA systolic pressure is 22 mmHg.  There is no pulmonary hypertension.    CAREY 9/13/22  Right lower extremity pressures and waveforms indicate no hemodynamically significant arterial occlusive disease.  CAREY 1.18.  Left lower extremity pressures and waveforms indicate no hemodynamically significant arterial occlusive disease.  CAREY 1.19    LE art US 9/13/22  Right lower extremity arterial ultrasound shows decreased popliteal velocity without focal hemodynamically significant stenosis.  Pressures indicate no arterial occlusive disease.  Left lower extremity arterial ultrasound shows a patent popliteal stent with no hemodynamically significant stenosis.  There is decreased velocity in the popliteal artery and AT artery.  Pressures indicate no arterial occlusive disease.    Carotid US 7/19/22  There is 0-19% right Internal Carotid Stenosis.  There is 0-19% left Internal Carotid Stenosis.     Cath: 7/18/22 (->CABG 7/25/22: LIMA-LAD, SVG-R, SVG-PDA)  LVEDP: 14mmHg  LVEF: 30% by echo  Wall Motion: LAD WMA  Dominance: Right  LM: distal 60%, iFR 0.86  LAD: prox  after S1, distal vessel fills via L-L and R-L george  Ramus: large, MLI  LCx: large, mid 20-30%, no step-up with iFR pullback  RCA: mid , dist vessel  fills via L-L and R-L george  Hemostasis:  R Radial band  Impression:  NSTEMI  LM/2V CAD  Severe LV dysfxn  Severe PAD s/p recent L SFA PTAS, needing L 3rd toe amputation +/- RLE angio  R rad vasband for hemostasis  Plan:  Cont med rx.  Cont ASA/Plavix/Statin.  Start entresto/toprol, titrate as BP/HR will allow.  Case d/w Dr. Wynn, will transfer to [facility] for CABG vs MV PCI eval.  Lifevest ordered.  Repeat echo in 3 months (mid Oct 2022) for reassessment of LVEF.     LE art US 7/15/22  Interval placement of left lower extremity arterial vascular stent, the vascular stent appears patent.  The dorsalis pedis artery bilaterally demonstrates evidence for reversal of flow.  Otherwise the arterial vascular structures demonstrate evidence for arterial atherosclerotic disease, without evidence for occlusion.      ASSESSMENT:   # CAD/ACS s/p CABG x3V 7/2022, doing well.  # ICM, EF 30%->40% (echo 1/2023)  # PAD, followed by Dr. Rich  # dyslipidemia on atorva 40mg  # Ao root dil, 3.8cm (echo 1/2023)    PLAN:   Cont med rx  Cont ASA 81mg qd  Cont Plavix 75mg qd for 1 year (thru 7/023), consider Xarelto 2.5mg bid thereafter  Inc atorva 80mg qhs  Add toprol XL 25mg qd  RTC 6 months with echo and lipids/LFT (Aug 2023)      Noah Huffman MD, FACC

## 2023-08-18 ENCOUNTER — TELEPHONE (OUTPATIENT)
Dept: CARDIOLOGY | Facility: CLINIC | Age: 58
End: 2023-08-18
Payer: MEDICAID

## 2023-08-22 ENCOUNTER — HOSPITAL ENCOUNTER (OUTPATIENT)
Dept: CARDIOLOGY | Facility: HOSPITAL | Age: 58
Discharge: HOME | End: 2023-08-22
Attending: INTERNAL MEDICINE
Payer: MEDICAID

## 2023-08-22 VITALS — HEIGHT: 70 IN | WEIGHT: 167 LBS | BODY MASS INDEX: 23.91 KG/M2

## 2023-08-22 DIAGNOSIS — I25.5 ISCHEMIC CARDIOMYOPATHY: ICD-10-CM

## 2023-08-22 DIAGNOSIS — I25.810 CORONARY ARTERY DISEASE INVOLVING CORONARY BYPASS GRAFT OF NATIVE HEART WITHOUT ANGINA PECTORIS: ICD-10-CM

## 2023-08-22 LAB
AV INDEX (PROSTH): 0.84
AV MEAN GRADIENT: 4 MMHG
AV PEAK GRADIENT: 7 MMHG
AV VALVE AREA BY VELOCITY RATIO: 3.24 CM²
AV VALVE AREA: 3.17 CM²
AV VELOCITY RATIO: 0.86
BSA FOR ECHO PROCEDURE: 1.93 M2
CV ECHO LV RWT: 0.48 CM
DOP CALC AO PEAK VEL: 1.29 M/S
DOP CALC AO VTI: 24 CM
DOP CALC LVOT AREA: 3.8 CM2
DOP CALC LVOT DIAMETER: 2.19 CM
DOP CALC LVOT PEAK VEL: 1.11 M/S
DOP CALC LVOT STROKE VOLUME: 76.05 CM3
DOP CALC MV VTI: 18.1 CM
DOP CALCLVOT PEAK VEL VTI: 20.2 CM
E WAVE DECELERATION TIME: 264.27 MSEC
E/A RATIO: 0.49
E/E' RATIO: 9.27 M/S
ECHO LV POSTERIOR WALL: 1.07 CM (ref 0.6–1.1)
FRACTIONAL SHORTENING: 15 % (ref 28–44)
INTERVENTRICULAR SEPTUM: 1.12 CM (ref 0.6–1.1)
IVC DIAMETER: 1.68 CM
LA MAJOR: 4.68 CM
LA MINOR: 4.72 CM
LA WIDTH: 3.6 CM
LEFT ATRIUM SIZE: 3.11 CM
LEFT ATRIUM VOLUME INDEX: 23.2 ML/M2
LEFT ATRIUM VOLUME: 44.73 CM3
LEFT INTERNAL DIMENSION IN SYSTOLE: 3.78 CM (ref 2.1–4)
LEFT VENTRICLE DIASTOLIC VOLUME INDEX: 47.06 ML/M2
LEFT VENTRICLE DIASTOLIC VOLUME: 90.83 ML
LEFT VENTRICLE MASS INDEX: 89 G/M2
LEFT VENTRICLE SYSTOLIC VOLUME INDEX: 31.6 ML/M2
LEFT VENTRICLE SYSTOLIC VOLUME: 61.01 ML
LEFT VENTRICULAR INTERNAL DIMENSION IN DIASTOLE: 4.47 CM (ref 3.5–6)
LEFT VENTRICULAR MASS: 172.07 G
LV LATERAL E/E' RATIO: 7.29 M/S
LV SEPTAL E/E' RATIO: 12.75 M/S
LVOT MG: 2.63 MMHG
LVOT MV: 0.77 CM/S
MV MEAN GRADIENT: 1 MMHG
MV PEAK A VEL: 1.05 M/S
MV PEAK E VEL: 0.51 M/S
MV PEAK GRADIENT: 4 MMHG
MV STENOSIS PRESSURE HALF TIME: 76.64 MS
MV VALVE AREA BY CONTINUITY EQUATION: 4.2 CM2
MV VALVE AREA P 1/2 METHOD: 2.87 CM2
PISA TR MAX VEL: 2.73 M/S
PV PEAK GRADIENT: 2 MMHG
PV PEAK VELOCITY: 0.76 M/S
RA MAJOR: 4.34 CM
RA PRESSURE ESTIMATED: 8 MMHG
RA WIDTH: 3.3 CM
RIGHT VENTRICULAR END-DIASTOLIC DIMENSION: 2.63 CM
RV TB RVSP: 11 MMHG
RV TISSUE DOPPLER FREE WALL SYSTOLIC VELOCITY 1 (APICAL 4 CHAMBER VIEW): 9.18 CM/S
SINUS: 3.25 CM
STJ: 2.69 CM
TDI LATERAL: 0.07 M/S
TDI SEPTAL: 0.04 M/S
TDI: 0.06 M/S
TR MAX PG: 30 MMHG
TRICUSPID ANNULAR PLANE SYSTOLIC EXCURSION: 0.95 CM
TV REST PULMONARY ARTERY PRESSURE: 38 MMHG
Z-SCORE OF LEFT VENTRICULAR DIMENSION IN END DIASTOLE: -2.01
Z-SCORE OF LEFT VENTRICULAR DIMENSION IN END SYSTOLE: 0.92

## 2023-08-22 PROCEDURE — 93306 TTE W/DOPPLER COMPLETE: CPT | Mod: 26,,, | Performed by: INTERNAL MEDICINE

## 2023-08-22 PROCEDURE — 93306 ECHO (CUPID ONLY): ICD-10-PCS | Mod: 26,,, | Performed by: INTERNAL MEDICINE

## 2023-08-22 PROCEDURE — 93306 TTE W/DOPPLER COMPLETE: CPT

## 2023-09-25 ENCOUNTER — LAB VISIT (OUTPATIENT)
Dept: LAB | Facility: HOSPITAL | Age: 58
End: 2023-09-25
Attending: INTERNAL MEDICINE
Payer: MEDICAID

## 2023-09-25 DIAGNOSIS — E78.5 DYSLIPIDEMIA: ICD-10-CM

## 2023-09-25 LAB
ALBUMIN SERPL BCP-MCNC: 3.9 G/DL (ref 3.5–5.2)
ALP SERPL-CCNC: 134 U/L (ref 55–135)
ALT SERPL W/O P-5'-P-CCNC: 22 U/L (ref 10–44)
AST SERPL-CCNC: 20 U/L (ref 10–40)
BILIRUB DIRECT SERPL-MCNC: 0.3 MG/DL (ref 0.1–0.3)
BILIRUB SERPL-MCNC: 0.6 MG/DL (ref 0.1–1)
CHOLEST SERPL-MCNC: 108 MG/DL (ref 120–199)
CHOLEST/HDLC SERPL: 3.1 {RATIO} (ref 2–5)
HDLC SERPL-MCNC: 35 MG/DL (ref 40–75)
HDLC SERPL: 32.4 % (ref 20–50)
LDLC SERPL CALC-MCNC: 54.2 MG/DL (ref 63–159)
NONHDLC SERPL-MCNC: 73 MG/DL
PROT SERPL-MCNC: 7.5 G/DL (ref 6–8.4)
TRIGL SERPL-MCNC: 94 MG/DL (ref 30–150)

## 2023-09-25 PROCEDURE — 36415 COLL VENOUS BLD VENIPUNCTURE: CPT | Performed by: INTERNAL MEDICINE

## 2023-09-25 PROCEDURE — 80061 LIPID PANEL: CPT | Performed by: INTERNAL MEDICINE

## 2023-09-25 PROCEDURE — 80076 HEPATIC FUNCTION PANEL: CPT | Performed by: INTERNAL MEDICINE

## 2023-09-26 ENCOUNTER — OFFICE VISIT (OUTPATIENT)
Dept: CARDIOLOGY | Facility: CLINIC | Age: 58
End: 2023-09-26
Payer: MEDICAID

## 2023-09-26 VITALS
WEIGHT: 175.5 LBS | SYSTOLIC BLOOD PRESSURE: 130 MMHG | RESPIRATION RATE: 18 BRPM | BODY MASS INDEX: 25.13 KG/M2 | HEIGHT: 70 IN | HEART RATE: 85 BPM | OXYGEN SATURATION: 98 % | DIASTOLIC BLOOD PRESSURE: 72 MMHG

## 2023-09-26 DIAGNOSIS — I77.810 AORTIC ROOT DILATATION: ICD-10-CM

## 2023-09-26 DIAGNOSIS — I25.810 CORONARY ARTERY DISEASE INVOLVING CORONARY BYPASS GRAFT OF NATIVE HEART WITHOUT ANGINA PECTORIS: ICD-10-CM

## 2023-09-26 DIAGNOSIS — Z95.1 S/P CABG (CORONARY ARTERY BYPASS GRAFT): ICD-10-CM

## 2023-09-26 DIAGNOSIS — E87.5 HYPERKALEMIA: ICD-10-CM

## 2023-09-26 DIAGNOSIS — Z09 FOLLOW-UP EXAM: ICD-10-CM

## 2023-09-26 DIAGNOSIS — E78.5 DYSLIPIDEMIA: ICD-10-CM

## 2023-09-26 DIAGNOSIS — I73.9 PAD (PERIPHERAL ARTERY DISEASE): ICD-10-CM

## 2023-09-26 DIAGNOSIS — I25.5 ISCHEMIC CARDIOMYOPATHY: Primary | ICD-10-CM

## 2023-09-26 PROCEDURE — 3078F DIAST BP <80 MM HG: CPT | Mod: CPTII,,, | Performed by: INTERNAL MEDICINE

## 2023-09-26 PROCEDURE — 1160F PR REVIEW ALL MEDS BY PRESCRIBER/CLIN PHARMACIST DOCUMENTED: ICD-10-PCS | Mod: CPTII,,, | Performed by: INTERNAL MEDICINE

## 2023-09-26 PROCEDURE — 1159F PR MEDICATION LIST DOCUMENTED IN MEDICAL RECORD: ICD-10-PCS | Mod: CPTII,,, | Performed by: INTERNAL MEDICINE

## 2023-09-26 PROCEDURE — 1159F MED LIST DOCD IN RCRD: CPT | Mod: CPTII,,, | Performed by: INTERNAL MEDICINE

## 2023-09-26 PROCEDURE — 3075F SYST BP GE 130 - 139MM HG: CPT | Mod: CPTII,,, | Performed by: INTERNAL MEDICINE

## 2023-09-26 PROCEDURE — 99999 PR PBB SHADOW E&M-EST. PATIENT-LVL III: CPT | Mod: PBBFAC,,, | Performed by: INTERNAL MEDICINE

## 2023-09-26 PROCEDURE — 3008F BODY MASS INDEX DOCD: CPT | Mod: CPTII,,, | Performed by: INTERNAL MEDICINE

## 2023-09-26 PROCEDURE — 3075F PR MOST RECENT SYSTOLIC BLOOD PRESS GE 130-139MM HG: ICD-10-PCS | Mod: CPTII,,, | Performed by: INTERNAL MEDICINE

## 2023-09-26 PROCEDURE — 4010F ACE/ARB THERAPY RXD/TAKEN: CPT | Mod: CPTII,,, | Performed by: INTERNAL MEDICINE

## 2023-09-26 PROCEDURE — 93010 ELECTROCARDIOGRAM REPORT: CPT | Mod: S$PBB,,, | Performed by: INTERNAL MEDICINE

## 2023-09-26 PROCEDURE — 3008F PR BODY MASS INDEX (BMI) DOCUMENTED: ICD-10-PCS | Mod: CPTII,,, | Performed by: INTERNAL MEDICINE

## 2023-09-26 PROCEDURE — 99214 PR OFFICE/OUTPT VISIT, EST, LEVL IV, 30-39 MIN: ICD-10-PCS | Mod: S$PBB,,, | Performed by: INTERNAL MEDICINE

## 2023-09-26 PROCEDURE — 3078F PR MOST RECENT DIASTOLIC BLOOD PRESSURE < 80 MM HG: ICD-10-PCS | Mod: CPTII,,, | Performed by: INTERNAL MEDICINE

## 2023-09-26 PROCEDURE — 93005 ELECTROCARDIOGRAM TRACING: CPT | Mod: PBBFAC | Performed by: INTERNAL MEDICINE

## 2023-09-26 PROCEDURE — 1160F RVW MEDS BY RX/DR IN RCRD: CPT | Mod: CPTII,,, | Performed by: INTERNAL MEDICINE

## 2023-09-26 PROCEDURE — 99999 PR PBB SHADOW E&M-EST. PATIENT-LVL III: ICD-10-PCS | Mod: PBBFAC,,, | Performed by: INTERNAL MEDICINE

## 2023-09-26 PROCEDURE — 93010 EKG 12-LEAD: ICD-10-PCS | Mod: S$PBB,,, | Performed by: INTERNAL MEDICINE

## 2023-09-26 PROCEDURE — 99214 OFFICE O/P EST MOD 30 MIN: CPT | Mod: S$PBB,,, | Performed by: INTERNAL MEDICINE

## 2023-09-26 PROCEDURE — 99213 OFFICE O/P EST LOW 20 MIN: CPT | Mod: PBBFAC | Performed by: INTERNAL MEDICINE

## 2023-09-26 PROCEDURE — 4010F PR ACE/ARB THEARPY RXD/TAKEN: ICD-10-PCS | Mod: CPTII,,, | Performed by: INTERNAL MEDICINE

## 2023-09-26 RX ORDER — SPIRONOLACTONE 25 MG/1
25 TABLET ORAL
Qty: 90 TABLET | Refills: 3 | Status: SHIPPED | OUTPATIENT
Start: 2023-09-26 | End: 2023-10-10

## 2023-09-26 RX ORDER — CLOPIDOGREL BISULFATE 75 MG/1
75 TABLET ORAL
Qty: 90 TABLET | OUTPATIENT
Start: 2023-09-26

## 2023-09-26 RX ORDER — RIVAROXABAN 2.5 MG/1
2.5 TABLET, FILM COATED ORAL 2 TIMES DAILY
Qty: 180 TABLET | Refills: 3 | Status: SHIPPED | OUTPATIENT
Start: 2023-09-26

## 2023-09-26 RX ORDER — SACUBITRIL AND VALSARTAN 49; 51 MG/1; MG/1
1 TABLET, FILM COATED ORAL 2 TIMES DAILY
Qty: 180 TABLET | Refills: 3 | Status: SHIPPED | OUTPATIENT
Start: 2023-09-26 | End: 2023-10-10

## 2023-09-26 NOTE — PROGRESS NOTES
CARDIOVASCULAR PROGRESS NOTE    REASON FOR CONSULT:   Yo Pink is a 58 y.o. male who presents for follow up of CAD/CABG/ICM/PAD.    PCP: [facility]: Hilario  HISTORY OF PRESENT ILLNESS:   Last seen February 2023.    The patient returns for follow-up.  He reports an asymptomatic status without angina, dyspnea, palpitations, or syncope.  There has been no PND, orthopnea, or lower extremity edema.  Denies melena, hematuria, or claudicant symptoms.  He continues take his dual antiplatelet therapy without any issues.  He thought that his metoprolol was previously causing runny stools and has stopped taking it.  He will resume it now.    I reviewed the results of his lipids noting improvement in LDL.  His echocardiogram notes persistent mild aortic root enlargement.  His EF has dropped down to 30-35%.    CARDIOVASCULAR HISTORY:   CAD ACS/NSTEMI 7/18/22: MV CAD->CABG 7/25/22 ([facility]): LIMA-LAD, SVG-R, SVG-PDA    ICM, EF 30%->40% (echo 1/30/23)    PAD, followed by Dr. Rich    Ao root dil, 3.8cm (echo 8/2023)    PAST MEDICAL HISTORY:     Past Medical History:   Diagnosis Date    Anticoagulant long-term use     Cardiomyopathy     Coronary artery disease     Myocardial infarction     PAD (peripheral artery disease)     Stroke        PAST SURGICAL HISTORY:     Past Surgical History:   Procedure Laterality Date    ANGIOGRAPHY OF LOWER EXTREMITY Left 7/5/2022    Procedure: Angiogram Extremity Unilateral;  Surgeon: Mehul Rich MD;  Location: [facility] OR;  Service: Vascular;  Laterality: Left;  RN PREOP ON 7/1/22. BINAX ON 7/5/22---NEED CONSENT    ANGIOGRAPHY OF LOWER EXTREMITY Right 10/4/2022    Procedure: Angiogram Extremity Unilateral;  Surgeon: Mehul Rich MD;  Location: [facility] OR;  Service: Vascular;  Laterality: Right;  RN PHONE PREOP 9/27/2022    BINAX DAY OF PROCEDURE    CORONARY ARTERY BYPASS GRAFT (CABG) N/A 7/25/2022    Procedure: CORONARY ARTERY BYPASS GRAFT (CABG) X 3;  Surgeon: Kenton WEBER  MD [place];  [facility];  Service: Cardiovascular;  Laterality: N/A;    DEBRIDEMENT OF FOOT Bilateral 9/19/2022    Procedure: DEBRIDEMENT, FOOT;  Surgeon: Mehul Rich MD;  Location: [facility] OR;  Service: Vascular;  Laterality: Bilateral;    ENDOSCOPIC HARVEST OF VEIN Left 7/25/2022    Procedure: SURGICAL PROCUREMENT, VEIN, ENDOSCOPIC;  Surgeon: Kenton Wynn MD;  [facility];  Service: Cardiovascular;  Laterality: Left;  Vein harvest start: 1337  Vein harvest stop: 1427  Vein prep start: 1428  Vein prep stop: 1454    LEFT HEART CATHETERIZATION Left 7/18/2022    Procedure: Left heart cath;  Surgeon: Noah Huffman MD;  Location: [facility] CATH LAB;  Service: Cardiology;  Laterality: Left;  not before 9am, R rad access    TOE AMPUTATION Bilateral 9/19/2022    Procedure: AMPUTATION, TOE THIRD TOE WITH BILATERAL FOOT DEBRIDEMENT;  Surgeon: Mehul Rich MD;  Location: [facility] OR;  Service: Vascular;  Laterality: Bilateral;  RN PREOP 9/15/2022   BINAX DAY OF SURGERY----NEED ORDERS--CLEARED BY CARDS    WOUND DEBRIDEMENT Right 10/17/2022    Procedure: DEBRIDEMENT, WOUND, RIGHT FOOT;  Surgeon: Mehul Rich MD;  Location: [facility];  Service: Vascular;  Laterality: Right;  RN PHONE PREOP 10/11--BINAX ON ARRIVAL---NEED CONSENT, H/P , ORDERS       ALLERGIES AND MEDICATION:   Review of patient's allergies indicates:  No Known Allergies     Medication List            Accurate as of September 26, 2023  3:43 PM. If you have any questions, ask your nurse or doctor.                CONTINUE taking these medications      aspirin 81 MG EC tablet  Commonly known as: ECOTRIN  Take 1 tablet (81 mg total) by mouth once daily.     atorvastatin 80 MG tablet  Commonly known as: LIPITOR  Take 1 tablet (80 mg total) by mouth every evening.     clopidogreL 75 mg tablet  Commonly known as: PLAVIX     metoprolol succinate 25 MG 24 hr tablet  Commonly known as: TOPROL-XL  Take 1 tablet (25 mg  total) by mouth once daily.     pantoprazole 20 MG tablet  Commonly known as: PROTONIX  Take 1 tablet (20 mg total) by mouth once daily.     sacubitriL-valsartan 24-26 mg per tablet  Commonly known as: ENTRESTO     spironolactone 25 MG tablet  Commonly known as: ALDACTONE  Take 1 tablet (25 mg total) by mouth once daily.              SOCIAL HISTORY:     Social History     Socioeconomic History    Marital status: Single   Tobacco Use    Smoking status: Former     Current packs/day: 0.00     Types: Cigarettes     Quit date: 2022     Years since quittin.3    Smokeless tobacco: Never   Substance and Sexual Activity    Alcohol use: Never    Drug use: Not Currently     Social Determinants of Health     Financial Resource Strain: Low Risk  (2022)    Overall Financial Resource Strain (CARDIA)     Difficulty of Paying Living Expenses: Not hard at all   Food Insecurity: No Food Insecurity (2022)    Hunger Vital Sign     Worried About Running Out of Food in the Last Year: Never true     Ran Out of Food in the Last Year: Never true   Transportation Needs: No Transportation Needs (2022)    PRAPARE - Transportation     Lack of Transportation (Medical): No     Lack of Transportation (Non-Medical): No   Physical Activity: Inactive (2022)    Exercise Vital Sign     Days of Exercise per Week: 0 days     Minutes of Exercise per Session: 60 min   Stress: No Stress Concern Present (2022)    [employer] [place] [employer] - Occupational Stress Questionnaire     Feeling of Stress : Only a little   Social Connections: Socially Isolated (2022)    Social Connection and Isolation Panel [NHANES]     Frequency of Communication with Friends and Family: More than three times a week     Frequency of Social Gatherings with Friends and Family: More than three times a week     Attends Rastafari Services: Never     Active Member of Clubs or Organizations: No     Attends Club or Organization Meetings:  "Never     Marital Status: Never    Housing Stability: Low Risk  (9/28/2022)    Housing Stability Vital Sign     Unable to Pay for Housing in the Last Year: No     Number of Places Lived in the Last Year: 1     Unstable Housing in the Last Year: No       FAMILY HISTORY:     Family History   Problem Relation Age of Onset    Peripheral vascular disease Mother     Cancer Father        REVIEW OF SYSTEMS:   Review of Systems   Constitutional:  Negative for chills, diaphoresis and fever.   HENT:  Negative for nosebleeds.    Eyes:  Negative for blurred vision, double vision and photophobia.   Respiratory:  Negative for hemoptysis, shortness of breath and wheezing.    Cardiovascular:  Negative for chest pain, palpitations, orthopnea, claudication, leg swelling and PND.   Gastrointestinal:  Negative for abdominal pain, blood in stool, heartburn, melena, nausea and vomiting.   Genitourinary:  Negative for flank pain and hematuria.   Musculoskeletal:  Negative for falls, myalgias and neck pain.   Skin:  Negative for rash.   Neurological:  Negative for dizziness, seizures, loss of consciousness, weakness and headaches.   Endo/Heme/Allergies:  Negative for polydipsia. Does not bruise/bleed easily.   Psychiatric/Behavioral:  Negative for depression and memory loss. The patient is not nervous/anxious.        PHYSICAL EXAM:     Vitals:    09/26/23 1539   BP: 130/72   Pulse: 85   Resp: 18        Body mass index is 25.18 kg/m².  Weight: 79.6 kg (175 lb 7.8 oz)   Height: 5' 10" (177.8 cm)     Physical Exam  Vitals reviewed.   Constitutional:       General: He is not in acute distress.     Appearance: Normal appearance. He is well-developed and normal weight. He is not ill-appearing, toxic-appearing or diaphoretic.   HENT:      Head: Normocephalic and atraumatic.   Eyes:      General: No scleral icterus.     Extraocular Movements: Extraocular movements intact.      Conjunctiva/sclera: Conjunctivae normal.      Pupils: Pupils are " equal, round, and reactive to light.   Neck:      Thyroid: No thyromegaly.      Vascular: Normal carotid pulses. No carotid bruit or JVD.      Trachea: Trachea normal.   Cardiovascular:      Rate and Rhythm: Normal rate and regular rhythm.      Pulses:           Carotid pulses are 2+ on the right side and 2+ on the left side.     Heart sounds: S1 normal and S2 normal. No murmur heard.     No friction rub. No gallop.      Comments: Sternotomy well healed  Pulmonary:      Effort: Pulmonary effort is normal. No respiratory distress.      Breath sounds: Normal breath sounds. No stridor. No wheezing, rhonchi or rales.   Chest:      Chest wall: No tenderness.   Abdominal:      General: There is no distension.      Palpations: Abdomen is soft.   Musculoskeletal:         General: No swelling or tenderness. Normal range of motion.      Cervical back: Normal range of motion and neck supple. No edema or rigidity.      Right lower leg: No edema.      Left lower leg: No edema.   Feet:      Right foot:      Skin integrity: No ulcer.      Left foot:      Skin integrity: No ulcer.   Skin:     General: Skin is warm and dry.      Coloration: Skin is not jaundiced.   Neurological:      General: No focal deficit present.      Mental Status: He is alert and oriented to person, place, and time.      Cranial Nerves: No cranial nerve deficit.   Psychiatric:         Mood and Affect: Mood normal.         Speech: Speech normal.         Behavior: Behavior normal. Behavior is cooperative.         DATA:   EKG: (personally reviewed tracing(s)):  9/26/23 SR 87, ASMI-age indet, lat TWI ?isch, similar to 2/17/23    Laboratory:  CBC:  Recent Labs   Lab 09/18/22  0313 09/19/22  1206 10/17/22  1201   WBC 10.49 9.28 6.77   Hemoglobin 12.8 L 12.0 L 12.5 L   Hematocrit 37.7 L 37.4 L 39.6 L   Platelets 328 314 298       CHEMISTRIES:  Recent Labs   Lab 07/28/22  0341 07/28/22  1118 07/29/22  0643 07/30/22  0412 09/15/22  1610 09/18/22  0313 09/29/22  0828  10/17/22  1201   Glucose 106  --  108 82  --  146 H 104 101   Sodium 133 L  --  134 L 132 L  --  130 L 138 133 L   Potassium 3.4 L   < > 3.9 3.9  --  3.4 L 4.9 3.5   BUN 11  --  12 17  --  19 16 14   Creatinine 0.6  --  0.6 0.7  --  0.7 0.7 0.7   eGFR if African American >60.0  --  >60.0 >60.0  --   --   --   --    Calcium 8.5 L  --  8.7 8.9  --  8.9 9.1 9.5   Magnesium 1.9   < > 2.0 1.8 2.3 1.8  --   --     < > = values in this interval not displayed.       CARDIAC BIOMARKERS:  Recent Labs   Lab 07/16/22  0101 07/16/22  0644   Troponin I 0.073 H 0.114 H       COAGS:  Recent Labs   Lab 07/26/22  0306 09/15/22  1610 09/18/22  0313   INR 1.3 H 1.0 1.2       LIPIDS/LFTS:  Recent Labs   Lab 07/16/22  0644 07/24/22  0621 07/30/22  0412 09/18/22  0313 09/25/23  0910   Cholesterol 144  --   --   --  108 L   Triglycerides 122  --   --   --  94   HDL 35 L  --   --   --  35 L   LDL Cholesterol 84.6  --   --   --  54.2 L   Non-HDL Cholesterol 109  --   --   --  73   AST  --    < > 26 31 20   ALT  --    < > 13 23 22    < > = values in this interval not displayed.       Cardiovascular Testing:  Echo 8/22/23 (EF 40% on report 1/30/23; Ao root 3.8cm (images pers reviewed))    Left Ventricle: regional wall motion abnormalities present. There is moderately reduced systolic function with a visually estimated ejection fraction of 30 - 35%. mid-distal anterior and apical hypokinesis    Left Ventricle: Grade I diastolic dysfunction.    Left Atrium: Left atrium is mildly dilated.    Right Ventricle: Normal right ventricular cavity size. Systolic function is normal.    IVC/SVC: Intermediate venous pressure at 8 mmHg.    CAREY 11/21/22  Right lower extremity pressures and waveforms indicate no hemodynamically significant arterial occlusive disease.  Left lower extremity pressures and waveforms indicate no hemodynamically significant arterial occlusive disease.    LE art US 10/17/22  -Right lower extremity arterial ultrasound shows an  occluded AT with 50% TP trunk stenosis.  Pressures indicate no arterial occlusive disease.  -Left lower extremity arterial ultrasound shows a patent SFA/popliteal stent without hemodynamically significant stenosis.  Pressures indicate no arterial occlusive disease.    Carotid US 7/19/22  There is 0-19% right Internal Carotid Stenosis.  There is 0-19% left Internal Carotid Stenosis.     Cath: 7/18/22 (->CABG 7/25/22: LIMA-LAD, SVG-R, SVG-PDA)  LVEDP: 14mmHg  LVEF: 30% by echo  Wall Motion: LAD WMA  Dominance: Right  LM: distal 60%, iFR 0.86  LAD: prox  after S1, distal vessel fills via L-L and R-L george  Ramus: large, MLI  LCx: large, mid 20-30%, no step-up with iFR pullback  RCA: mid , dist vessel fills via L-L and R-L george  Hemostasis:  R Radial band  Impression:  NSTEMI  LM/2V CAD  Severe LV dysfxn  Severe PAD s/p recent L SFA PTAS, needing L 3rd toe amputation +/- RLE angio  R rad vasband for hemostasis  Plan:  Cont med rx.  Cont ASA/Plavix/Statin.  Start entresto/toprol, titrate as BP/HR will allow.  Case d/w Dr. Wynn, will transfer to [facility] for CABG vs MV PCI eval.  Lifevest ordered.  Repeat echo in 3 months (mid Oct 2022) for reassessment of LVEF.      ASSESSMENT:   # CAD/ACS s/p CABG x3V 7/2022, doing well.  # ICM, EF 30%->40%->30% (echo 8/2023), appears euvolemic  # PAD, followed by Dr. Rich, no claudicant sxs.  # dyslipidemia on atorva 80mg  # Ao root dil, 3.8cm (echo 8/2023)    PLAN:   Cont med rx  Cont ASA 81mg qd  Stop plavix  Start Xarelto 2.5mg bid  Inc entresto 49/51mg bid  Resume toprol XL 25mg qd  Check BMP 2 weeks  RTC 1 month (Oct 2023).    Repeat echo in 3 months (after maximization of GDMT) for reassessment of LVEF and need for ICD (Jan 2024 at earliest).      Noah Huffman MD, [facility]    Addendum 10/10/23:    BMP  Lab Results   Component Value Date     10/10/2023    K 6.4 (HH) 10/10/2023     10/10/2023    CO2 27 10/10/2023    BUN 24 (H) 10/10/2023    CREATININE 1.1  10/10/2023    CALCIUM 9.4 10/10/2023    ANIONGAP 7 (L) 10/10/2023    EGFRNORACEVR >60 10/10/2023         Will have staff call pt to instruct him to stop aldactone and decrease entresto back to 24/26mg bid.  Repeat BMP in 1 week.

## 2023-09-26 NOTE — LETTER
September 26, 2023        [facility] [place]  [address] [place] [address]             [facility] [place], [place] - Cardiology  [address]  [facility] [address]  Phone: [phone]   Patient: Yo Pink   MR Number: [id number]   YOB: 1965   Date of Visit: 9/26/2023           Thank you for referring Yo Pink to me for evaluation. Attached you will find relevant portions of my assessment and plan of care.    If you have questions, please do not hesitate to call me. I look forward to following Yo Pink along with you.    Sincerely,      Noah Huffman MD            CC  No Recipients    Enclosure

## 2023-09-26 NOTE — TELEPHONE ENCOUNTER
Patient requesting a refill, last seen 2/17/2023. Next appt patient supposed to be seen today. gomez

## 2023-10-10 ENCOUNTER — LAB VISIT (OUTPATIENT)
Dept: LAB | Facility: HOSPITAL | Age: 58
End: 2023-10-10
Attending: INTERNAL MEDICINE
Payer: MEDICAID

## 2023-10-10 ENCOUNTER — PATIENT MESSAGE (OUTPATIENT)
Dept: CARDIOLOGY | Facility: CLINIC | Age: 58
End: 2023-10-10
Payer: MEDICAID

## 2023-10-10 ENCOUNTER — TELEPHONE (OUTPATIENT)
Dept: CARDIOLOGY | Facility: CLINIC | Age: 58
End: 2023-10-10
Payer: MEDICAID

## 2023-10-10 DIAGNOSIS — I25.5 ISCHEMIC CARDIOMYOPATHY: ICD-10-CM

## 2023-10-10 DIAGNOSIS — R94.31 ABNORMAL EKG: Primary | ICD-10-CM

## 2023-10-10 LAB
ANION GAP SERPL CALC-SCNC: 7 MMOL/L (ref 8–16)
BUN SERPL-MCNC: 24 MG/DL (ref 6–20)
CALCIUM SERPL-MCNC: 9.4 MG/DL (ref 8.7–10.5)
CHLORIDE SERPL-SCNC: 102 MMOL/L (ref 95–110)
CO2 SERPL-SCNC: 27 MMOL/L (ref 23–29)
CREAT SERPL-MCNC: 1.1 MG/DL (ref 0.5–1.4)
EST. GFR  (NO RACE VARIABLE): >60 ML/MIN/1.73 M^2
GLUCOSE SERPL-MCNC: 134 MG/DL (ref 70–110)
POTASSIUM SERPL-SCNC: 6.4 MMOL/L (ref 3.5–5.1)
SODIUM SERPL-SCNC: 136 MMOL/L (ref 136–145)

## 2023-10-10 PROCEDURE — 80048 BASIC METABOLIC PNL TOTAL CA: CPT | Performed by: INTERNAL MEDICINE

## 2023-10-10 PROCEDURE — 36415 COLL VENOUS BLD VENIPUNCTURE: CPT | Performed by: INTERNAL MEDICINE

## 2023-10-10 RX ORDER — SACUBITRIL AND VALSARTAN 24; 26 MG/1; MG/1
1 TABLET, FILM COATED ORAL 2 TIMES DAILY
Qty: 180 TABLET | Refills: 3 | Status: SHIPPED | OUTPATIENT
Start: 2023-10-10 | End: 2023-10-10

## 2023-10-10 NOTE — HPI
Mehul Rich MD [facility]                         09/20/2022    HPI:  Yo Pink is a 57 y.o. male with   Patient Active Problem List   Diagnosis    Dry gangrene    Hypertensive urgency    PAD (peripheral artery disease)    ACS (acute coronary syndrome)    Hypokalemia    Dyslipidemia    Ischemic cardiomyopathy    NSTEMI (non-ST elevated myocardial infarction)    Transient hyperglycemia post procedure    S/P CABG (coronary artery bypass graft)    Coronary artery disease involving coronary bypass graft of native heart without angina pectoris    Critical lower limb ischemia     being managed by PCP and specialists who is here today for evaluation of bilateral toe wounds.  Patient states location is bilateral feet occurring for months.  Associated signs and symptoms include discoloration and pain.  Quality is dull and severity is 6/10.  Symptoms began mo ago.  Alleviating factors include wound care.  Worsening factors include pressure.       no MI  no Stroke  Tobacco use: daily     8/2022:  Patient presents for follow-up status post coronary artery bypass surgery.  His wounds remained dry.  He denies fevers or chills.  Followed in hospital and discussed plan with Dr. Chin who states that at least 3 weeks, ideally 6 weeks postoperatively would be best to perform any further surgery on this patient.     Past Medical History:   Diagnosis Date    PAD (peripheral artery disease)      Past Surgical History:   Procedure Laterality Date    ANGIOGRAPHY OF LOWER EXTREMITY Left 7/5/2022    Procedure: Angiogram Extremity Unilateral;  Surgeon: Mehul Rich MD;  Location: [facility] OR;  Service: Vascular;  Laterality: Left;  RN PREOP ON 7/1/22. BINAX ON 7/5/22---NEED CONSENT    CORONARY ARTERY BYPASS GRAFT (CABG) N/A 7/25/2022    Procedure: CORONARY ARTERY BYPASS GRAFT (CABG) X 3;  Surgeon: Kenton Wynn MD;  Location: [facility] OR [address] [place] [address];  Service: Cardiovascular;   Laterality: N/A;    ENDOSCOPIC HARVEST OF VEIN Left 2022    Procedure: SURGICAL PROCUREMENT, VEIN, ENDOSCOPIC;  Surgeon: Kenton Wynn MD;  Location: [facility] OR [address] [place] [address];  Service: Cardiovascular;  Laterality: Left;  Vein harvest start:   Vein harvest stop:   Vein prep start: 142  Vein prep stop: 145    LEFT HEART CATHETERIZATION Left 2022    Procedure: Left heart cath;  Surgeon: Noah Huffman MD;  Location: [facility] CATH LAB;  Service: Cardiology;  Laterality: Left;  not before 9am, R rad access     History reviewed. No pertinent family history.  Social History     Socioeconomic History    Marital status: Single   Tobacco Use    Smoking status: Former     Types: Cigarettes     Quit date: 2022     Years since quittin.3    Smokeless tobacco: Never   Substance and Sexual Activity    Alcohol use: Never    Drug use: Not Currently       Current Facility-Administered Medications:     acetaminophen tablet 650 mg, 650 mg, Oral, Q4H PRN, Mehul Rich MD    aspirin EC tablet 81 mg, 81 mg, Oral, Daily, Mehul Rich MD, 81 mg at 22 0805    atorvastatin tablet 40 mg, 40 mg, Oral, Daily, Mheul Rich MD, 40 mg at 22 0806    clopidogreL tablet 75 mg, 75 mg, Oral, Daily, Mehul Rich MD, 75 mg at 22 0806    gabapentin capsule 600 mg, 600 mg, Oral, BID, Mehul Rich MD, 600 mg at 22 0806    heparin (porcine) injection 5,000 Units, 5,000 Units, Subcutaneous, Q8H, Mehul Rich MD, 5,000 Units at 22 0704    hydrALAZINE injection 10 mg, 10 mg, Intravenous, Q6H PRN, Mehul Rich MD    HYDROcodone-acetaminophen 5-325 mg per tablet 1 tablet, 1 tablet, Oral, Q4H PRN, Mehul Rich MD, 1 tablet at 22 0704    HYDROmorphone (PF) injection 0.2 mg, 0.2 mg, Intravenous, Q2H PRN, Mehul Rich MD, 0.2 mg at 22 0733    labetaloL injection 10 mg, 10 mg, Intravenous, Q4H PRN, Mehul Rich MD, 10 mg  at 09/19/22 1425    melatonin tablet 6 mg, 6 mg, Oral, Nightly PRN, Olga Conde, NP, 6 mg at 09/19/22 2227    ondansetron injection 4 mg, 4 mg, Intravenous, Q8H PRN, Mehul Rich MD    pantoprazole EC tablet 40 mg, 40 mg, Oral, Daily, Mehul Rich MD, 40 mg at 09/20/22 0805    sacubitriL-valsartan 24-26 mg per tablet 1 tablet, 1 tablet, Oral, BID, Mehul Rich MD, 1 tablet at 09/20/22 0806    sodium chloride 0.9% flush 10 mL, 10 mL, Intravenous, PRN, Mehul Rich MD    spironolactone tablet 25 mg, 25 mg, Oral, Daily, Mehul Rich MD, 25 mg at 09/20/22 0806     Minoxidil Pregnancy And Lactation Text: This medication has not been assigned a Pregnancy Risk Category but animal studies failed to show danger with the topical medication. It is unknown if the medication is excreted in breast milk.

## 2023-10-10 NOTE — TELEPHONE ENCOUNTER
----- Message from Noah Huffman MD sent at 10/10/2023 11:34 AM CDT -----  Pls call pt to let him know his potassium is high.  Instruct him to stop the spironolactone and decrease his entresto back down to 24/26mg bid dose.    I ordered repeat labs for 1 week, pls get them scheduled.    [facility]

## 2023-10-10 NOTE — TELEPHONE ENCOUNTER
I have spoke with this patient in regards of instructions from Dr. Huffman due to potassium being high.. Labs were scheduled for next Tuesday.

## 2023-10-17 ENCOUNTER — LAB VISIT (OUTPATIENT)
Dept: LAB | Facility: HOSPITAL | Age: 58
End: 2023-10-17
Attending: INTERNAL MEDICINE
Payer: MEDICAID

## 2023-10-17 DIAGNOSIS — I25.5 ISCHEMIC CARDIOMYOPATHY: ICD-10-CM

## 2023-10-17 DIAGNOSIS — E87.5 HYPERKALEMIA: ICD-10-CM

## 2023-10-17 LAB
ANION GAP SERPL CALC-SCNC: 7 MMOL/L (ref 8–16)
BUN SERPL-MCNC: 15 MG/DL (ref 6–20)
CALCIUM SERPL-MCNC: 9 MG/DL (ref 8.7–10.5)
CHLORIDE SERPL-SCNC: 107 MMOL/L (ref 95–110)
CO2 SERPL-SCNC: 21 MMOL/L (ref 23–29)
CREAT SERPL-MCNC: 0.9 MG/DL (ref 0.5–1.4)
EST. GFR  (NO RACE VARIABLE): >60 ML/MIN/1.73 M^2
GLUCOSE SERPL-MCNC: 127 MG/DL (ref 70–110)
POTASSIUM SERPL-SCNC: 5.3 MMOL/L (ref 3.5–5.1)
SODIUM SERPL-SCNC: 135 MMOL/L (ref 136–145)

## 2023-10-17 PROCEDURE — 80048 BASIC METABOLIC PNL TOTAL CA: CPT | Performed by: INTERNAL MEDICINE

## 2023-10-17 PROCEDURE — 36415 COLL VENOUS BLD VENIPUNCTURE: CPT | Performed by: INTERNAL MEDICINE

## 2023-11-07 ENCOUNTER — OFFICE VISIT (OUTPATIENT)
Dept: CARDIOLOGY | Facility: CLINIC | Age: 58
End: 2023-11-07
Payer: MEDICAID

## 2023-11-07 VITALS
HEIGHT: 70 IN | BODY MASS INDEX: 26.22 KG/M2 | DIASTOLIC BLOOD PRESSURE: 74 MMHG | SYSTOLIC BLOOD PRESSURE: 137 MMHG | WEIGHT: 183.19 LBS | HEART RATE: 56 BPM | RESPIRATION RATE: 17 BRPM

## 2023-11-07 DIAGNOSIS — E87.5 HYPERKALEMIA: ICD-10-CM

## 2023-11-07 DIAGNOSIS — I73.9 PAD (PERIPHERAL ARTERY DISEASE): ICD-10-CM

## 2023-11-07 DIAGNOSIS — I25.810 CORONARY ARTERY DISEASE INVOLVING CORONARY BYPASS GRAFT OF NATIVE HEART WITHOUT ANGINA PECTORIS: ICD-10-CM

## 2023-11-07 DIAGNOSIS — I25.5 ISCHEMIC CARDIOMYOPATHY: Primary | ICD-10-CM

## 2023-11-07 DIAGNOSIS — I77.810 AORTIC ROOT DILATATION: ICD-10-CM

## 2023-11-07 DIAGNOSIS — Z95.1 S/P CABG (CORONARY ARTERY BYPASS GRAFT): ICD-10-CM

## 2023-11-07 DIAGNOSIS — E78.5 DYSLIPIDEMIA: ICD-10-CM

## 2023-11-07 PROCEDURE — 4010F ACE/ARB THERAPY RXD/TAKEN: CPT | Mod: CPTII,,, | Performed by: INTERNAL MEDICINE

## 2023-11-07 PROCEDURE — 99999 PR PBB SHADOW E&M-EST. PATIENT-LVL III: CPT | Mod: PBBFAC,,, | Performed by: INTERNAL MEDICINE

## 2023-11-07 PROCEDURE — 3075F SYST BP GE 130 - 139MM HG: CPT | Mod: CPTII,,, | Performed by: INTERNAL MEDICINE

## 2023-11-07 PROCEDURE — 1159F PR MEDICATION LIST DOCUMENTED IN MEDICAL RECORD: ICD-10-PCS | Mod: CPTII,,, | Performed by: INTERNAL MEDICINE

## 2023-11-07 PROCEDURE — 3078F PR MOST RECENT DIASTOLIC BLOOD PRESSURE < 80 MM HG: ICD-10-PCS | Mod: CPTII,,, | Performed by: INTERNAL MEDICINE

## 2023-11-07 PROCEDURE — 99999 PR PBB SHADOW E&M-EST. PATIENT-LVL III: ICD-10-PCS | Mod: PBBFAC,,, | Performed by: INTERNAL MEDICINE

## 2023-11-07 PROCEDURE — 4010F PR ACE/ARB THEARPY RXD/TAKEN: ICD-10-PCS | Mod: CPTII,,, | Performed by: INTERNAL MEDICINE

## 2023-11-07 PROCEDURE — 1160F PR REVIEW ALL MEDS BY PRESCRIBER/CLIN PHARMACIST DOCUMENTED: ICD-10-PCS | Mod: CPTII,,, | Performed by: INTERNAL MEDICINE

## 2023-11-07 PROCEDURE — 99214 PR OFFICE/OUTPT VISIT, EST, LEVL IV, 30-39 MIN: ICD-10-PCS | Mod: S$PBB,,, | Performed by: INTERNAL MEDICINE

## 2023-11-07 PROCEDURE — 1159F MED LIST DOCD IN RCRD: CPT | Mod: CPTII,,, | Performed by: INTERNAL MEDICINE

## 2023-11-07 PROCEDURE — 99214 OFFICE O/P EST MOD 30 MIN: CPT | Mod: S$PBB,,, | Performed by: INTERNAL MEDICINE

## 2023-11-07 PROCEDURE — 99213 OFFICE O/P EST LOW 20 MIN: CPT | Mod: PBBFAC | Performed by: INTERNAL MEDICINE

## 2023-11-07 PROCEDURE — 3008F PR BODY MASS INDEX (BMI) DOCUMENTED: ICD-10-PCS | Mod: CPTII,,, | Performed by: INTERNAL MEDICINE

## 2023-11-07 PROCEDURE — 1160F RVW MEDS BY RX/DR IN RCRD: CPT | Mod: CPTII,,, | Performed by: INTERNAL MEDICINE

## 2023-11-07 PROCEDURE — 3075F PR MOST RECENT SYSTOLIC BLOOD PRESS GE 130-139MM HG: ICD-10-PCS | Mod: CPTII,,, | Performed by: INTERNAL MEDICINE

## 2023-11-07 PROCEDURE — 3008F BODY MASS INDEX DOCD: CPT | Mod: CPTII,,, | Performed by: INTERNAL MEDICINE

## 2023-11-07 PROCEDURE — 3078F DIAST BP <80 MM HG: CPT | Mod: CPTII,,, | Performed by: INTERNAL MEDICINE

## 2023-11-07 RX ORDER — SACUBITRIL AND VALSARTAN 24; 26 MG/1; MG/1
1 TABLET, FILM COATED ORAL 2 TIMES DAILY
Qty: 180 TABLET | Refills: 3
Start: 2023-11-07 | End: 2023-11-08

## 2023-11-07 NOTE — PROGRESS NOTES
CARDIOVASCULAR PROGRESS NOTE    REASON FOR CONSULT:   Yo Pink is a 58 y.o. male who presents for follow up of CAD/CABG/ICM/PAD.    PCP: [facility]: [facility]  HISTORY OF PRESENT ILLNESS:   The patient returns for follow up.  In the interim since his last visit, his lab work noted significant hyperkalemia.  This was in the setting of Aldactone, Entresto, as well as eating bananas.  Was taken off of the Aldactone in his stopped eating bananas.  His Entresto dose was dropped back to 24/26 mg b.i.d..  Potassium is much improved but still somewhat elevated at 5.3 several weeks ago.  We will repeat labs today.  If his potassium remains elevated, we will have to stop the Entresto.  If has normalized, I will increase the Entresto and remain off of the Aldactone.  The patient otherwise denies angina, dyspnea, palpitations, or syncope.  There has been no PND, orthopnea, or lower extremity edema.  He denies melena, hematuria, or claudication symptoms.  He continues take his aspirin and Xarelto low-dose treatment without issues.    CARDIOVASCULAR HISTORY:   CAD ACS/NSTEMI 7/18/22: MV CAD->CABG 7/25/22 ([facility]): LIMA-LAD, SVG-R, SVG-PDA    ICM, EF 30%->40% (echo 1/30/23)    PAD, followed by Dr. Rich    Ao root dil, 3.8cm (echo 8/2023)    PAST MEDICAL HISTORY:     Past Medical History:   Diagnosis Date    Anticoagulant long-term use     Cardiomyopathy     Coronary artery disease     Myocardial infarction     PAD (peripheral artery disease)     Stroke        PAST SURGICAL HISTORY:     Past Surgical History:   Procedure Laterality Date    ANGIOGRAPHY OF LOWER EXTREMITY Left 7/5/2022    Procedure: Angiogram Extremity Unilateral;  Surgeon: Mehul Rich MD;  Location: [facility] OR;  Service: Vascular;  Laterality: Left;  RN PREOP ON 7/1/22. BINAX ON 7/5/22---NEED CONSENT    ANGIOGRAPHY OF LOWER EXTREMITY Right 10/4/2022    Procedure: Angiogram Extremity Unilateral;  Surgeon: Mheul Rich MD;  Location: [facility]  OR;  Service: Vascular;  Laterality: Right;  RN PHONE PREOP 9/27/2022    BINAX DAY OF PROCEDURE    CORONARY ARTERY BYPASS GRAFT (CABG) N/A 7/25/2022    Procedure: CORONARY ARTERY BYPASS GRAFT (CABG) X 3;  Surgeon: Kenton Wynn MD;  Location: [facility] OR [address] [place] [address];  Service: Cardiovascular;  Laterality: N/A;    DEBRIDEMENT OF FOOT Bilateral 9/19/2022    Procedure: DEBRIDEMENT, FOOT;  Surgeon: Mehul Rich MD;  Location: [facility] OR;  Service: Vascular;  Laterality: Bilateral;    ENDOSCOPIC HARVEST OF VEIN Left 7/25/2022    Procedure: SURGICAL PROCUREMENT, VEIN, ENDOSCOPIC;  Surgeon: Kenton Wynn MD;  [facility];  Service: Cardiovascular;  Laterality: Left;  Vein harvest start: 1337  Vein harvest stop: 1427  Vein prep start: 1428  Vein prep stop: 1454    LEFT HEART CATHETERIZATION Left 7/18/2022    Procedure: Left heart cath;  Surgeon: Noah Huffman MD;  Location: [facility] CATH LAB;  Service: Cardiology;  Laterality: Left;  not before 9am, R rad access    TOE AMPUTATION Bilateral 9/19/2022    Procedure: AMPUTATION, TOE THIRD TOE WITH BILATERAL FOOT DEBRIDEMENT;  Surgeon: Mehul Rich MD;  Location: [facility] OR;  Service: Vascular;  Laterality: Bilateral;  RN PREOP 9/15/2022   BINAX DAY OF SURGERY----NEED ORDERS--CLEARED BY CARDS    WOUND DEBRIDEMENT Right 10/17/2022    Procedure: DEBRIDEMENT, WOUND, RIGHT FOOT;  Surgeon: Mehul Rich MD;  Location: [facility] OR;  Service: Vascular;  Laterality: Right;  RN PHONE PREOP 10/11--BINAX ON ARRIVAL---NEED CONSENT, H/P , ORDERS       ALLERGIES AND MEDICATION:   Review of patient's allergies indicates:  No Known Allergies     Medication List            Accurate as of November 7, 2023  2:46 PM. If you have any questions, ask your nurse or doctor.                CONTINUE taking these medications      aspirin 81 MG EC tablet  Commonly known as: ECOTRIN  Take 1 tablet (81 mg total) by mouth once daily.     atorvastatin 80 MG tablet  Commonly  known as: LIPITOR  Take 1 tablet (80 mg total) by mouth every evening.     metoprolol succinate 25 MG 24 hr tablet  Commonly known as: TOPROL-XL  Take 1 tablet (25 mg total) by mouth once daily.     pantoprazole 20 MG tablet  Commonly known as: PROTONIX  Take 1 tablet (20 mg total) by mouth once daily.     XARELTO 2.5 mg Tab  Generic drug: rivaroxaban  Take 1 tablet (2.5 mg total) by mouth 2 (two) times daily.              SOCIAL HISTORY:     Social History     Socioeconomic History    Marital status: Single   Tobacco Use    Smoking status: Former     Current packs/day: 0.00     Types: Cigarettes     Quit date: 2022     Years since quittin.4    Smokeless tobacco: Never   Substance and Sexual Activity    Alcohol use: Never    Drug use: Not Currently     Social Determinants of Health     Financial Resource Strain: Low Risk  (2022)    Overall Financial Resource Strain (CARDIA)     Difficulty of Paying Living Expenses: Not hard at all   Food Insecurity: No Food Insecurity (2022)    Hunger Vital Sign     Worried About Running Out of Food in the Last Year: Never true     Ran Out of Food in the Last Year: Never true   Transportation Needs: No Transportation Needs (2022)    PRAPARE - Transportation     Lack of Transportation (Medical): No     Lack of Transportation (Non-Medical): No   Physical Activity: Inactive (2022)    Exercise Vital Sign     Days of Exercise per Week: 0 days     Minutes of Exercise per Session: 60 min   Stress: No Stress Concern Present (2022)    [employer] - Occupational Stress Questionnaire     Feeling of Stress : Only a little   Social Connections: Socially Isolated (2022)    Social Connection and Isolation Panel [NHANES]     Frequency of Communication with Friends and Family: More than three times a week     Frequency of Social Gatherings with Friends and Family: More than three times a week     Attends Synagogue Services: Never     " Active Member of Clubs or Organizations: No     Attends Club or Organization Meetings: Never     Marital Status: Never    Housing Stability: Low Risk  (9/28/2022)    Housing Stability Vital Sign     Unable to Pay for Housing in the Last Year: No     Number of Places Lived in the Last Year: 1     Unstable Housing in the Last Year: No       FAMILY HISTORY:     Family History   Problem Relation Age of Onset    Peripheral vascular disease Mother     Cancer Father        REVIEW OF SYSTEMS:   Review of Systems   Constitutional:  Negative for chills, diaphoresis and fever.   HENT:  Negative for nosebleeds.    Eyes:  Negative for blurred vision, double vision and photophobia.   Respiratory:  Negative for hemoptysis, shortness of breath and wheezing.    Cardiovascular:  Negative for chest pain, palpitations, orthopnea, claudication, leg swelling and PND.   Gastrointestinal:  Negative for abdominal pain, blood in stool, heartburn, melena, nausea and vomiting.   Genitourinary:  Negative for flank pain and hematuria.   Musculoskeletal:  Negative for falls, myalgias and neck pain.   Skin:  Negative for rash.   Neurological:  Negative for dizziness, seizures, loss of consciousness, weakness and headaches.   Endo/Heme/Allergies:  Negative for polydipsia. Does not bruise/bleed easily.   Psychiatric/Behavioral:  Negative for depression and memory loss. The patient is not nervous/anxious.        PHYSICAL EXAM:     Vitals:    11/07/23 1431   BP: 137/74   Pulse: (!) 56   Resp: 17        Body mass index is 26.29 kg/m².  Weight: 83.1 kg (183 lb 3.2 oz)   Height: 5' 10" (177.8 cm)     Physical Exam  Vitals reviewed.   Constitutional:       General: He is not in acute distress.     Appearance: Normal appearance. He is well-developed and normal weight. He is not ill-appearing, toxic-appearing or diaphoretic.   HENT:      Head: Normocephalic and atraumatic.   Eyes:      General: No scleral icterus.     Extraocular Movements: " Extraocular movements intact.      Conjunctiva/sclera: Conjunctivae normal.      Pupils: Pupils are equal, round, and reactive to light.   Neck:      Thyroid: No thyromegaly.      Vascular: Normal carotid pulses. No carotid bruit or JVD.      Trachea: Trachea normal.   Cardiovascular:      Rate and Rhythm: Normal rate and regular rhythm.      Pulses:           Carotid pulses are 2+ on the right side and 2+ on the left side.     Heart sounds: S1 normal and S2 normal. No murmur heard.     No friction rub. No gallop.      Comments: Sternotomy well healed  Pulmonary:      Effort: Pulmonary effort is normal. No respiratory distress.      Breath sounds: Normal breath sounds. No stridor. No wheezing, rhonchi or rales.   Chest:      Chest wall: No tenderness.   Abdominal:      General: There is no distension.      Palpations: Abdomen is soft.   Musculoskeletal:         General: No swelling or tenderness. Normal range of motion.      Cervical back: Normal range of motion and neck supple. No edema or rigidity.      Right lower leg: No edema.      Left lower leg: No edema.   Feet:      Right foot:      Skin integrity: No ulcer.      Left foot:      Skin integrity: No ulcer.   Skin:     General: Skin is warm and dry.      Coloration: Skin is not jaundiced.   Neurological:      General: No focal deficit present.      Mental Status: He is alert and oriented to person, place, and time.      Cranial Nerves: No cranial nerve deficit.   Psychiatric:         Mood and Affect: Mood normal.         Speech: Speech normal.         Behavior: Behavior normal. Behavior is cooperative.         DATA:   EKG: (personally reviewed tracing(s)):  9/26/23 SR 87, ASMI-age indet, lat TWI ?isch, similar to 2/17/23    Laboratory:  CBC:  Recent Labs   Lab 09/18/22  0313 09/19/22  1206 10/17/22  1201   WBC 10.49 9.28 6.77   Hemoglobin 12.8 L 12.0 L 12.5 L   Hematocrit 37.7 L 37.4 L 39.6 L   Platelets 328 314 298         CHEMISTRIES:  Recent Labs   Lab  07/28/22  0341 07/28/22  1118 07/29/22  0643 07/30/22  0412 09/15/22  1610 09/18/22  0313 09/29/22  0828 10/17/22  1201 10/10/23  1035 10/17/23  0822   Glucose 106  --  108 82  --  146 H   < > 101 134 H 127 H   Sodium 133 L  --  134 L 132 L  --  130 L   < > 133 L 136 135 L   Potassium 3.4 L   < > 3.9 3.9  --  3.4 L   < > 3.5 6.4 HH 5.3 H   BUN 11  --  12 17  --  19   < > 14 24 H 15   Creatinine 0.6  --  0.6 0.7  --  0.7   < > 0.7 1.1 0.9   eGFR if African American >60.0  --  >60.0 >60.0  --   --   --   --   --   --    Calcium 8.5 L  --  8.7 8.9  --  8.9   < > 9.5 9.4 9.0   Magnesium 1.9   < > 2.0 1.8 2.3 1.8  --   --   --   --     < > = values in this interval not displayed.         CARDIAC BIOMARKERS:  Recent Labs   Lab 07/16/22  0101 07/16/22  0644   Troponin I 0.073 H 0.114 H         COAGS:  Recent Labs   Lab 07/26/22  0306 09/15/22  1610 09/18/22  0313   INR 1.3 H 1.0 1.2         LIPIDS/LFTS:  Recent Labs   Lab 07/16/22  0644 07/24/22  0621 07/30/22  0412 09/18/22  0313 09/25/23  0910   Cholesterol 144  --   --   --  108 L   Triglycerides 122  --   --   --  94   HDL 35 L  --   --   --  35 L   LDL Cholesterol 84.6  --   --   --  54.2 L   Non-HDL Cholesterol 109  --   --   --  73   AST  --    < > 26 31 20   ALT  --    < > 13 23 22    < > = values in this interval not displayed.         Cardiovascular Testing:  Echo 8/22/23 (EF 40% on report 1/30/23; Ao root 3.8cm (images pers reviewed))    Left Ventricle: regional wall motion abnormalities present. There is moderately reduced systolic function with a visually estimated ejection fraction of 30 - 35%. mid-distal anterior and apical hypokinesis    Left Ventricle: Grade I diastolic dysfunction.    Left Atrium: Left atrium is mildly dilated.    Right Ventricle: Normal right ventricular cavity size. Systolic function is normal.    IVC/SVC: Intermediate venous pressure at 8 mmHg.    CAREY 11/21/22  Right lower extremity pressures and waveforms indicate no hemodynamically  significant arterial occlusive disease.  Left lower extremity pressures and waveforms indicate no hemodynamically significant arterial occlusive disease.    LE art US 10/17/22  -Right lower extremity arterial ultrasound shows an occluded AT with 50% TP trunk stenosis.  Pressures indicate no arterial occlusive disease.  -Left lower extremity arterial ultrasound shows a patent SFA/popliteal stent without hemodynamically significant stenosis.  Pressures indicate no arterial occlusive disease.    Carotid US 7/19/22  There is 0-19% right Internal Carotid Stenosis.  There is 0-19% left Internal Carotid Stenosis.     Cath: 7/18/22 (->CABG 7/25/22: LIMA-LAD, SVG-R, SVG-PDA)  LVEDP: 14mmHg  LVEF: 30% by echo  Wall Motion: LAD WMA  Dominance: Right  LM: distal 60%, iFR 0.86  LAD: prox  after S1, distal vessel fills via L-L and R-L george  Ramus: large, MLI  LCx: large, mid 20-30%, no step-up with iFR pullback  RCA: mid , dist vessel fills via L-L and R-L george  Hemostasis:  R Radial band  Impression:  NSTEMI  LM/2V CAD  Severe LV dysfxn  Severe PAD s/p recent L SFA PTAS, needing L 3rd toe amputation +/- RLE angio  R rad vasband for hemostasis  Plan:  Cont med rx.  Cont ASA/Plavix/Statin.  Start entresto/toprol, titrate as BP/HR will allow.  Case d/w Dr. Wynn, will transfer to [facility] for CABG vs MV PCI eval.  Lifevest ordered.  Repeat echo in 3 months (mid Oct 2022) for reassessment of LVEF.      ASSESSMENT:   # CAD/ACS s/p CABG x3V 7/2022, doing well.  # ICM, EF 30%->40%->30% (echo 8/2023), appears euvolemic  # hyperkalemia, now off aldactone and stopped eating bananas  # PAD, followed by Dr. Rich, no claudicant sxs.  # dyslipidemia on atorva 80mg  # Ao root dil, 3.8cm (echo 8/2023)    PLAN:   Cont med rx  Cont ASA 81mg qd  Start Xarelto 2.5mg bid  Check BMP today.  If K+ normalized off aldactone will inc entresto to 49/51mg bid.  RTC 1 month (Dec 2023)  Repeat echo 3 months (Feb 2024) for reassessment of  LVEF.      Noah Huffman MD, [facility]    Addendum 11/8/23:    BMP  Lab Results   Component Value Date     (L) 11/08/2023    K 4.4 11/08/2023     11/08/2023    CO2 24 11/08/2023    BUN 19 11/08/2023    CREATININE 0.9 11/08/2023    CALCIUM 9.2 11/08/2023    ANIONGAP 9 11/08/2023    EGFRNORACEVR >60 11/08/2023         Will inc entresto to 49/51mg bid  Check BMP 1 week  RTC 1 month (Dec 2023) as planned

## 2023-11-07 NOTE — LETTER
November 7, 2023        [facility]  [address] [place] [address]             [facility] - [facility]  [address]  [address]  [facility] [address]  Phone: [phone]   Patient: Yo Pink   MR Number: [id number]   YOB: 1965   Date of Visit: 11/7/2023           Thank you for referring Yo Pink to me for evaluation. Attached you will find relevant portions of my assessment and plan of care.    If you have questions, please do not hesitate to call me. I look forward to following Yo Pink along with you.    Sincerely,      Noah Huffman MD            CC  No Recipients    Enclosure

## 2023-11-08 ENCOUNTER — TELEPHONE (OUTPATIENT)
Dept: CARDIOLOGY | Facility: CLINIC | Age: 58
End: 2023-11-08
Payer: MEDICAID

## 2023-11-08 ENCOUNTER — LAB VISIT (OUTPATIENT)
Dept: LAB | Facility: HOSPITAL | Age: 58
End: 2023-11-08
Attending: INTERNAL MEDICINE
Payer: MEDICAID

## 2023-11-08 DIAGNOSIS — E87.5 HYPERKALEMIA: ICD-10-CM

## 2023-11-08 LAB
ANION GAP SERPL CALC-SCNC: 9 MMOL/L (ref 8–16)
BUN SERPL-MCNC: 19 MG/DL (ref 6–20)
CALCIUM SERPL-MCNC: 9.2 MG/DL (ref 8.7–10.5)
CHLORIDE SERPL-SCNC: 102 MMOL/L (ref 95–110)
CO2 SERPL-SCNC: 24 MMOL/L (ref 23–29)
CREAT SERPL-MCNC: 0.9 MG/DL (ref 0.5–1.4)
EST. GFR  (NO RACE VARIABLE): >60 ML/MIN/1.73 M^2
GLUCOSE SERPL-MCNC: 155 MG/DL (ref 70–110)
POTASSIUM SERPL-SCNC: 4.4 MMOL/L (ref 3.5–5.1)
SODIUM SERPL-SCNC: 135 MMOL/L (ref 136–145)

## 2023-11-08 PROCEDURE — 80048 BASIC METABOLIC PNL TOTAL CA: CPT | Performed by: INTERNAL MEDICINE

## 2023-11-08 PROCEDURE — 36415 COLL VENOUS BLD VENIPUNCTURE: CPT | Performed by: INTERNAL MEDICINE

## 2023-11-08 RX ORDER — SACUBITRIL AND VALSARTAN 49; 51 MG/1; MG/1
1 TABLET, FILM COATED ORAL 2 TIMES DAILY
Qty: 180 TABLET | Refills: 3 | Status: SHIPPED | OUTPATIENT
Start: 2023-11-08 | End: 2024-03-07 | Stop reason: SDUPTHER

## 2023-11-08 NOTE — TELEPHONE ENCOUNTER
----- Message from Noah Huffman MD sent at 11/8/2023  4:09 PM CST -----  Please schedule repeat labs in 1 week and call patient with the appointment.

## 2023-11-10 ENCOUNTER — TELEPHONE (OUTPATIENT)
Dept: CARDIOLOGY | Facility: CLINIC | Age: 58
End: 2023-11-10
Payer: MEDICAID

## 2023-11-10 NOTE — TELEPHONE ENCOUNTER
I spoke with the pharmacy in regards to the addendum placed by Dr. Huffman to inc entresto to 49/51mg bid.

## 2023-11-10 NOTE — TELEPHONE ENCOUNTER
----- Message from Kinga Albino sent at 11/10/2023 10:58 AM CST -----  Regarding: [phone]  Type: Patient Call Back    Who called: Sher Lakhani    What is the request in detail: she states she is calling for clarification on the pt medication, sacubitriL-valsartan (ENTRESTO) 49-51 mg per tablet. Will like a call back from the nurse with which strength the pt is taking     Can the clinic reply by [facility]?no     Would the patient rather a call back or a response via [facility]? Call back     Best call back number:[phone]    Additional Information:  [id number] - [place] - [address] AT [facility] [address]  [address]  [place] [address]  Phone: [phone] Fax: [phone]

## 2023-11-15 ENCOUNTER — LAB VISIT (OUTPATIENT)
Dept: LAB | Facility: HOSPITAL | Age: 58
End: 2023-11-15
Attending: INTERNAL MEDICINE
Payer: MEDICAID

## 2023-11-15 DIAGNOSIS — I25.5 ISCHEMIC CARDIOMYOPATHY: ICD-10-CM

## 2023-11-15 LAB
ANION GAP SERPL CALC-SCNC: 11 MMOL/L (ref 8–16)
BUN SERPL-MCNC: 14 MG/DL (ref 6–20)
CALCIUM SERPL-MCNC: 9.2 MG/DL (ref 8.7–10.5)
CHLORIDE SERPL-SCNC: 104 MMOL/L (ref 95–110)
CO2 SERPL-SCNC: 22 MMOL/L (ref 23–29)
CREAT SERPL-MCNC: 0.8 MG/DL (ref 0.5–1.4)
EST. GFR  (NO RACE VARIABLE): >60 ML/MIN/1.73 M^2
GLUCOSE SERPL-MCNC: 116 MG/DL (ref 70–110)
POTASSIUM SERPL-SCNC: 4.9 MMOL/L (ref 3.5–5.1)
SODIUM SERPL-SCNC: 137 MMOL/L (ref 136–145)

## 2023-11-15 PROCEDURE — 36415 COLL VENOUS BLD VENIPUNCTURE: CPT | Performed by: INTERNAL MEDICINE

## 2023-11-15 PROCEDURE — 80048 BASIC METABOLIC PNL TOTAL CA: CPT | Performed by: INTERNAL MEDICINE

## 2024-02-06 ENCOUNTER — HOSPITAL ENCOUNTER (OUTPATIENT)
Dept: CARDIOLOGY | Facility: HOSPITAL | Age: 59
Discharge: HOME | End: 2024-02-06
Attending: INTERNAL MEDICINE
Payer: MEDICAID

## 2024-02-06 DIAGNOSIS — Z95.1 S/P CABG (CORONARY ARTERY BYPASS GRAFT): ICD-10-CM

## 2024-02-06 DIAGNOSIS — I25.5 ISCHEMIC CARDIOMYOPATHY: ICD-10-CM

## 2024-02-06 DIAGNOSIS — I77.810 AORTIC ROOT DILATATION: ICD-10-CM

## 2024-02-06 DIAGNOSIS — I25.810 CORONARY ARTERY DISEASE INVOLVING CORONARY BYPASS GRAFT OF NATIVE HEART WITHOUT ANGINA PECTORIS: ICD-10-CM

## 2024-02-06 LAB
AORTIC ROOT ANNULUS: 3.6 CM
AORTIC VALVE CUSP SEPERATION: 1.95 CM
ASCENDING AORTA: 3.5 CM
AV INDEX (PROSTH): 0.55
AV MEAN GRADIENT: 7 MMHG
AV PEAK GRADIENT: 11 MMHG
AV VALVE AREA BY VELOCITY RATIO: 1.95 CM²
AV VALVE AREA: 1.85 CM²
AV VELOCITY RATIO: 0.57
CV ECHO LV RWT: 0.6 CM
DOP CALC AO PEAK VEL: 1.69 M/S
DOP CALC AO VTI: 45.8 CM
DOP CALC LVOT AREA: 3.4 CM2
DOP CALC LVOT DIAMETER: 2.08 CM
DOP CALC LVOT PEAK VEL: 0.97 M/S
DOP CALC LVOT STROKE VOLUME: 84.91 CM3
DOP CALCLVOT PEAK VEL VTI: 25 CM
E WAVE DECELERATION TIME: 350.95 MSEC
E/A RATIO: 0.75
E/E' RATIO: 13.56 M/S
ECHO LV POSTERIOR WALL: 1.31 CM (ref 0.6–1.1)
FRACTIONAL SHORTENING: 18 % (ref 28–44)
INTERVENTRICULAR SEPTUM: 1.37 CM (ref 0.6–1.1)
IVC DIAMETER: 2.03 CM
IVRT: 182.68 MSEC
LA MAJOR: 5.36 CM
LA MINOR: 5.26 CM
LA WIDTH: 4.5 CM
LEFT ATRIUM SIZE: 4.66 CM
LEFT ATRIUM VOLUME: 94.64 CM3
LEFT INTERNAL DIMENSION IN SYSTOLE: 3.57 CM (ref 2.1–4)
LEFT VENTRICLE DIASTOLIC VOLUME: 86.94 ML
LEFT VENTRICLE SYSTOLIC VOLUME: 53.5 ML
LEFT VENTRICULAR INTERNAL DIMENSION IN DIASTOLE: 4.38 CM (ref 3.5–6)
LEFT VENTRICULAR MASS: 223.47 G
LV LATERAL E/E' RATIO: 12.2 M/S
LV SEPTAL E/E' RATIO: 15.25 M/S
LVOT MG: 1.99 MMHG
LVOT MV: 0.66 CM/S
MV PEAK A VEL: 0.81 M/S
MV PEAK E VEL: 0.61 M/S
MV STENOSIS PRESSURE HALF TIME: 101.77 MS
MV VALVE AREA P 1/2 METHOD: 2.16 CM2
PISA TR MAX VEL: 2.43 M/S
PULM VEIN S/D RATIO: 1.09
PV PEAK D VEL: 0.45 M/S
PV PEAK GRADIENT: 5 MMHG
PV PEAK S VEL: 0.49 M/S
PV PEAK VELOCITY: 1.11 M/S
RA MAJOR: 5.43 CM
RA PRESSURE ESTIMATED: 8 MMHG
RA WIDTH: 4.5 CM
RIGHT VENTRICULAR END-DIASTOLIC DIMENSION: 3.49 CM
RV TB RVSP: 10 MMHG
RV TISSUE DOPPLER FREE WALL SYSTOLIC VELOCITY 1 (APICAL 4 CHAMBER VIEW): 10.19 CM/S
SINUS: 3.54 CM
STJ: 2.91 CM
TDI LATERAL: 0.05 M/S
TDI SEPTAL: 0.04 M/S
TDI: 0.05 M/S
TR MAX PG: 24 MMHG
TRICUSPID ANNULAR PLANE SYSTOLIC EXCURSION: 1.41 CM
TV REST PULMONARY ARTERY PRESSURE: 32 MMHG

## 2024-02-06 PROCEDURE — 93306 TTE W/DOPPLER COMPLETE: CPT

## 2024-02-06 PROCEDURE — 93306 TTE W/DOPPLER COMPLETE: CPT | Mod: 26,,, | Performed by: INTERNAL MEDICINE

## 2024-02-19 RX ORDER — ATORVASTATIN CALCIUM 80 MG/1
80 TABLET, FILM COATED ORAL NIGHTLY
Qty: 90 TABLET | Refills: 3 | Status: SHIPPED | OUTPATIENT
Start: 2024-02-19

## 2024-03-07 ENCOUNTER — OFFICE VISIT (OUTPATIENT)
Dept: CARDIOLOGY | Facility: CLINIC | Age: 59
End: 2024-03-07
Payer: MEDICAID

## 2024-03-07 VITALS
OXYGEN SATURATION: 98 % | DIASTOLIC BLOOD PRESSURE: 88 MMHG | BODY MASS INDEX: 27.54 KG/M2 | RESPIRATION RATE: 18 BRPM | WEIGHT: 192.38 LBS | HEIGHT: 70 IN | SYSTOLIC BLOOD PRESSURE: 120 MMHG | HEART RATE: 78 BPM

## 2024-03-07 DIAGNOSIS — I25.810 CORONARY ARTERY DISEASE INVOLVING CORONARY BYPASS GRAFT OF NATIVE HEART WITHOUT ANGINA PECTORIS: ICD-10-CM

## 2024-03-07 DIAGNOSIS — Z95.1 S/P CABG (CORONARY ARTERY BYPASS GRAFT): ICD-10-CM

## 2024-03-07 DIAGNOSIS — I25.5 ISCHEMIC CARDIOMYOPATHY: Primary | ICD-10-CM

## 2024-03-07 DIAGNOSIS — I73.9 PERIPHERAL ARTERIAL DISEASE: ICD-10-CM

## 2024-03-07 DIAGNOSIS — I50.22 HEART FAILURE WITH MID-RANGE EJECTION FRACTION (HFMEF): ICD-10-CM

## 2024-03-07 DIAGNOSIS — E78.5 DYSLIPIDEMIA: ICD-10-CM

## 2024-03-07 DIAGNOSIS — I73.9 PAD (PERIPHERAL ARTERY DISEASE): ICD-10-CM

## 2024-03-07 LAB
OHS QRS DURATION: 94 MS
OHS QTC CALCULATION: 420 MS

## 2024-03-07 PROCEDURE — 99214 OFFICE O/P EST MOD 30 MIN: CPT | Mod: PBBFAC | Performed by: INTERNAL MEDICINE

## 2024-03-07 PROCEDURE — 93005 ELECTROCARDIOGRAM TRACING: CPT | Mod: PBBFAC | Performed by: INTERNAL MEDICINE

## 2024-03-07 PROCEDURE — 3079F DIAST BP 80-89 MM HG: CPT | Mod: CPTII,,, | Performed by: INTERNAL MEDICINE

## 2024-03-07 PROCEDURE — 1159F MED LIST DOCD IN RCRD: CPT | Mod: CPTII,,, | Performed by: INTERNAL MEDICINE

## 2024-03-07 PROCEDURE — 99214 OFFICE O/P EST MOD 30 MIN: CPT | Mod: S$PBB,,, | Performed by: INTERNAL MEDICINE

## 2024-03-07 PROCEDURE — 3074F SYST BP LT 130 MM HG: CPT | Mod: CPTII,,, | Performed by: INTERNAL MEDICINE

## 2024-03-07 PROCEDURE — 99999 PR PBB SHADOW E&M-EST. PATIENT-LVL IV: CPT | Mod: PBBFAC,,, | Performed by: INTERNAL MEDICINE

## 2024-03-07 PROCEDURE — 3008F BODY MASS INDEX DOCD: CPT | Mod: CPTII,,, | Performed by: INTERNAL MEDICINE

## 2024-03-07 PROCEDURE — 1160F RVW MEDS BY RX/DR IN RCRD: CPT | Mod: CPTII,,, | Performed by: INTERNAL MEDICINE

## 2024-03-07 PROCEDURE — 93010 ELECTROCARDIOGRAM REPORT: CPT | Mod: S$PBB,,, | Performed by: INTERNAL MEDICINE

## 2024-03-07 RX ORDER — SACUBITRIL AND VALSARTAN 49; 51 MG/1; MG/1
1 TABLET, FILM COATED ORAL 2 TIMES DAILY
Qty: 180 TABLET | Refills: 3 | Status: SHIPPED | OUTPATIENT
Start: 2024-03-07

## 2024-03-07 RX ORDER — SACUBITRIL AND VALSARTAN 49; 51 MG/1; MG/1
1 TABLET, FILM COATED ORAL 2 TIMES DAILY
Qty: 180 TABLET | Refills: 3 | Status: SHIPPED | OUTPATIENT
Start: 2024-03-07 | End: 2024-03-07 | Stop reason: SDUPTHER

## 2024-03-07 RX ORDER — SACUBITRIL AND VALSARTAN 49; 51 MG/1; MG/1
1 TABLET, FILM COATED ORAL 2 TIMES DAILY
Qty: 180 TABLET | Refills: 3 | Status: CANCELLED | OUTPATIENT
Start: 2024-03-07

## 2024-03-07 RX ORDER — METOPROLOL SUCCINATE 25 MG/1
25 TABLET, EXTENDED RELEASE ORAL DAILY
Qty: 90 TABLET | Refills: 3 | Status: SHIPPED | OUTPATIENT
Start: 2024-03-07

## 2024-03-07 NOTE — TELEPHONE ENCOUNTER
----- Message from Jessica Ruiz sent at 3/7/2024  3:08 PM CST -----  Type: RX Refill Request    Who Called: bee [phone]    Have you contacted your pharmacy:    Refill or New Rx:sacubitriL-valsartan (ENTRESTO) 49-51 mg per tablet   - call pharmacy to give diagnosis code for insurance    RX Name and Strength:    How is the patient currently taking it? (ex. 1XDay):    Is this a 30 day or 90 day RX:    Preferred Pharmacy with phone number:    Local or Mail Order:    Ordering Provider:    Would the patient rather a call back or a response via [facility]?     Best Call Back Number:    Additional Information:          Blood noted in his stool.

## 2024-03-07 NOTE — PROGRESS NOTES
CARDIOVASCULAR PROGRESS NOTE    REASON FOR CONSULT:   Yo Pink is a 58 y.o. male who presents for follow up of CAD/CABG/ICM/PAD.    PCP: [facility]: Hilario  HISTORY OF PRESENT ILLNESS:   The patient returns for follow up.  He denies intercurrent angina, dyspnea, palpitations, or syncope.  There has been no PND, orthopnea, melena, hematuria, or claudication symptoms.  It appears that he is only taking 24/26 mg b.i.d. of Entresto.  He also has run out of his metoprolol.    I reviewed the results of his echocardiogram noting mild LV dysfunction.  Aortic root dimension was normal.    CARDIOVASCULAR HISTORY:   CAD ACS/NSTEMI 7/18/22: MV CAD->CABG 7/25/22 ([facility]ianna): LIMA-LAD, SVG-R, SVG-PDA    ICM, EF 30%->40% (echo 1/30/23)    PAD, followed by Dr. Rich    Ao root dil, 3.8cm (echo 8/2023)    PAST MEDICAL HISTORY:     Past Medical History:   Diagnosis Date    Anticoagulant long-term use     Cardiomyopathy     Coronary artery disease     Myocardial infarction     PAD (peripheral artery disease)     Stroke        PAST SURGICAL HISTORY:     Past Surgical History:   Procedure Laterality Date    ANGIOGRAPHY OF LOWER EXTREMITY Left 7/5/2022    Procedure: Angiogram Extremity Unilateral;  Surgeon: Mehul Rich MD;  Location: [facility];  Service: Vascular;  Laterality: Left;  RN PREOP ON 7/1/22. BINAX ON 7/5/22---NEED CONSENT    ANGIOGRAPHY OF LOWER EXTREMITY Right 10/4/2022    Procedure: Angiogram Extremity Unilateral;  Surgeon: Mehul Rich MD;  Location: [facility] OR;  Service: Vascular;  Laterality: Right;  RN PHONE PREOP 9/27/2022    BINAX DAY OF PROCEDURE    CORONARY ARTERY BYPASS GRAFT (CABG) N/A 7/25/2022    Procedure: CORONARY ARTERY BYPASS GRAFT (CABG) X 3;  Surgeon: Kenton Wynn MD;  Location: [facility] OR [address] [place] [address];  Service: Cardiovascular;  Laterality: N/A;    DEBRIDEMENT OF FOOT Bilateral 9/19/2022    Procedure: DEBRIDEMENT, FOOT;  Surgeon: Mehul Rich MD;  Location: [facility] OR;   Service: Vascular;  Laterality: Bilateral;    ENDOSCOPIC HARVEST OF VEIN Left 7/25/2022    Procedure: SURGICAL PROCUREMENT, VEIN, ENDOSCOPIC;  Surgeon: Kenton Wynn MD;  Location: [address];  Service: Cardiovascular;  Laterality: Left;  Vein harvest start: 1337  Vein harvest stop: 1427  Vein prep start: 1428  Vein prep stop: 1454    LEFT HEART CATHETERIZATION Left 7/18/2022    Procedure: Left heart cath;  Surgeon: Noah Huffman MD;  Location: [facility] CATH LAB;  Service: Cardiology;  Laterality: Left;  not before 9am, R rad access    TOE AMPUTATION Bilateral 9/19/2022    Procedure: AMPUTATION, TOE THIRD TOE WITH BILATERAL FOOT DEBRIDEMENT;  Surgeon: Mehul Rich MD;  Location: [facility];  Service: Vascular;  Laterality: Bilateral;  RN PREOP 9/15/2022   BINAX DAY OF SURGERY----NEED ORDERS--CLEARED BY CARDS    WOUND DEBRIDEMENT Right 10/17/2022    Procedure: DEBRIDEMENT, WOUND, RIGHT FOOT;  Surgeon: Mehul Rich MD;  Location: [facility];  Service: Vascular;  Laterality: Right;  RN PHONE PREOP 10/11--BINAX ON ARRIVAL---NEED CONSENT, H/P , ORDERS       ALLERGIES AND MEDICATION:   Review of patient's allergies indicates:  No Known Allergies     Medication List            Accurate as of March 7, 2024  2:05 PM. If you have any questions, ask your nurse or doctor.                CONTINUE taking these medications      aspirin 81 MG EC tablet  Commonly known as: ECOTRIN  Take 1 tablet (81 mg total) by mouth once daily.     atorvastatin 80 MG tablet  Commonly known as: LIPITOR  TAKE 1 TABLET(80 MG) BY MOUTH EVERY EVENING     ENTRESTO 49-51 mg per tablet  Generic drug: sacubitriL-valsartan  Take 1 tablet by mouth 2 (two) times daily.     metoprolol succinate 25 MG 24 hr tablet  Commonly known as: TOPROL-XL  Take 1 tablet (25 mg total) by mouth once daily.     pantoprazole 20 MG tablet  Commonly known as: PROTONIX  Take 1 tablet (20 mg total) by mouth once daily.     XARELTO 2.5 mg Tab  Generic drug:  rivaroxaban  Take 1 tablet (2.5 mg total) by mouth 2 (two) times daily.              SOCIAL HISTORY:     Social History     Socioeconomic History    Marital status: Single   Tobacco Use    Smoking status: Former     Current packs/day: 0.00     Types: Cigarettes     Quit date: 2022     Years since quittin.8    Smokeless tobacco: Never   Substance and Sexual Activity    Alcohol use: Never    Drug use: Not Currently     Social Determinants of Health     Financial Resource Strain: Low Risk  (2022)    Overall Financial Resource Strain (CARDIA)     Difficulty of Paying Living Expenses: Not hard at all   Food Insecurity: No Food Insecurity (2022)    Hunger Vital Sign     Worried About Running Out of Food in the Last Year: Never true     Ran Out of Food in the Last Year: Never true   Transportation Needs: No Transportation Needs (2022)    PRAPARE - Transportation     Lack of Transportation (Medical): No     Lack of Transportation (Non-Medical): No   Physical Activity: Inactive (2022)    Exercise Vital Sign     Days of Exercise per Week: 0 days     Minutes of Exercise per Session: 60 min   Stress: No Stress Concern Present (2022)    [employer] - Occupational Stress Questionnaire     Feeling of Stress : Only a little   Social Connections: Socially Isolated (2022)    Social Connection and Isolation Panel [NHANES]     Frequency of Communication with Friends and Family: More than three times a week     Frequency of Social Gatherings with Friends and Family: More than three times a week     Attends Yazidi Services: Never     Active Member of Clubs or Organizations: No     Attends Club or Organization Meetings: Never     Marital Status: Never    Housing Stability: Low Risk  (2022)    Housing Stability Vital Sign     Unable to Pay for Housing in the Last Year: No     Number of Places Lived in the Last Year: 1     Unstable Housing in the Last Year:  "No       FAMILY HISTORY:     Family History   Problem Relation Age of Onset    Peripheral vascular disease Mother     Cancer Father        REVIEW OF SYSTEMS:   Review of Systems   Constitutional:  Negative for chills, diaphoresis and fever.   HENT:  Negative for nosebleeds.    Eyes:  Negative for blurred vision, double vision and photophobia.   Respiratory:  Negative for hemoptysis, shortness of breath and wheezing.    Cardiovascular:  Negative for chest pain, palpitations, orthopnea, claudication, leg swelling and PND.   Gastrointestinal:  Negative for abdominal pain, blood in stool, heartburn, melena, nausea and vomiting.   Genitourinary:  Negative for flank pain and hematuria.   Musculoskeletal:  Negative for falls, myalgias and neck pain.   Skin:  Negative for rash.   Neurological:  Negative for dizziness, seizures, loss of consciousness, weakness and headaches.   Endo/Heme/Allergies:  Negative for polydipsia. Does not bruise/bleed easily.   Psychiatric/Behavioral:  Negative for depression and memory loss. The patient is not nervous/anxious.        PHYSICAL EXAM:     Vitals:    03/07/24 1347   BP: 120/88   Pulse: 78   Resp: 18        Body mass index is 27.6 kg/m².  Weight: 87.2 kg (192 lb 5.6 oz)   Height: 5' 10" (177.8 cm)     Physical Exam  Vitals reviewed.   Constitutional:       General: He is not in acute distress.     Appearance: Normal appearance. He is well-developed and normal weight. He is not ill-appearing, toxic-appearing or diaphoretic.   HENT:      Head: Normocephalic and atraumatic.   Eyes:      General: No scleral icterus.     Extraocular Movements: Extraocular movements intact.      Conjunctiva/sclera: Conjunctivae normal.      Pupils: Pupils are equal, round, and reactive to light.   Neck:      Thyroid: No thyromegaly.      Vascular: Normal carotid pulses. No carotid bruit or JVD.      Trachea: Trachea normal.   Cardiovascular:      Rate and Rhythm: Normal rate and regular rhythm.      Pulses:  "          Carotid pulses are 2+ on the right side and 2+ on the left side.     Heart sounds: S1 normal and S2 normal. No murmur heard.     No friction rub. No gallop.      Comments: Sternotomy well healed  Pulmonary:      Effort: Pulmonary effort is normal. No respiratory distress.      Breath sounds: Normal breath sounds. No stridor. No wheezing, rhonchi or rales.   Chest:      Chest wall: No tenderness.   Abdominal:      General: There is no distension.      Palpations: Abdomen is soft.   Musculoskeletal:         General: No swelling or tenderness. Normal range of motion.      Cervical back: Normal range of motion and neck supple. No edema or rigidity.      Right lower leg: No edema.      Left lower leg: No edema.   Feet:      Right foot:      Skin integrity: No ulcer.      Left foot:      Skin integrity: No ulcer.   Skin:     General: Skin is warm and dry.      Coloration: Skin is not jaundiced.   Neurological:      General: No focal deficit present.      Mental Status: He is alert and oriented to person, place, and time.      Cranial Nerves: No cranial nerve deficit.   Psychiatric:         Mood and Affect: Mood normal.         Speech: Speech normal.         Behavior: Behavior normal. Behavior is cooperative.       DATA:   EKG: (personally reviewed tracing(s)):  3/7/24 SR 64, IRBBB, PRWP, lat TWI, ?isch    Laboratory:  CBC:  Recent Labs   Lab 09/18/22  0313 09/19/22  1206 10/17/22  1201   WBC 10.49 9.28 6.77   Hemoglobin 12.8 L 12.0 L 12.5 L   Hematocrit 37.7 L 37.4 L 39.6 L   Platelets 328 314 298         CHEMISTRIES:  Recent Labs   Lab 07/28/22  0341 07/28/22  1118 07/29/22  0643 07/30/22  0412 09/15/22  1610 09/18/22  0313 09/29/22  0828 10/17/23  0822 11/08/23  1147 11/15/23  0929   Glucose 106  --  108 82  --  146 H   < > 127 H 155 H 116 H   Sodium 133 L  --  134 L 132 L  --  130 L   < > 135 L 135 L 137   Potassium 3.4 L   < > 3.9 3.9  --  3.4 L   < > 5.3 H 4.4 4.9   BUN 11  --  12 17  --  19   < > 15 19 14    Creatinine 0.6  --  0.6 0.7  --  0.7   < > 0.9 0.9 0.8   eGFR if African American >60.0  --  >60.0 >60.0  --   --   --   --   --   --    Calcium 8.5 L  --  8.7 8.9  --  8.9   < > 9.0 9.2 9.2   Magnesium 1.9   < > 2.0 1.8 2.3 1.8  --   --   --   --     < > = values in this interval not displayed.         CARDIAC BIOMARKERS:  Recent Labs   Lab 07/16/22  0101 07/16/22  0644   Troponin I 0.073 H 0.114 H         COAGS:  Recent Labs   Lab 07/26/22  0306 09/15/22  1610 09/18/22  0313   INR 1.3 H 1.0 1.2         LIPIDS/LFTS:  Recent Labs   Lab 07/16/22  0644 07/24/22  0621 07/30/22  0412 09/18/22  0313 09/25/23  0910   Cholesterol 144  --   --   --  108 L   Triglycerides 122  --   --   --  94   HDL 35 L  --   --   --  35 L   LDL Cholesterol 84.6  --   --   --  54.2 L   Non-HDL Cholesterol 109  --   --   --  73   AST  --    < > 26 31 20   ALT  --    < > 13 23 22    < > = values in this interval not displayed.         Cardiovascular Testing:  Echo 2/6/24    Left Ventricle: The left ventricle is normal in size. Mildly increased wall thickness. There is mild concentric hypertrophy. Mild global hypokinesis and regional wall motion abnormalities present (severe anteroapical hypokinesis c/w prior LAD territory MI). There is mildly reduced systolic function with a visually estimated ejection fraction of 40 - 45%. Grade I diastolic dysfunction.    Right Ventricle: Normal right ventricular cavity size. Systolic function is normal.    Pulmonary Artery: The estimated pulmonary artery systolic pressure is 32 mmHg.    CAREY 11/21/22  Right lower extremity pressures and waveforms indicate no hemodynamically significant arterial occlusive disease.  Left lower extremity pressures and waveforms indicate no hemodynamically significant arterial occlusive disease.    LE art US 10/17/22  -Right lower extremity arterial ultrasound shows an occluded AT with 50% TP trunk stenosis.  Pressures indicate no arterial occlusive disease.  -Left lower  extremity arterial ultrasound shows a patent SFA/popliteal stent without hemodynamically significant stenosis.  Pressures indicate no arterial occlusive disease.    Carotid US 7/19/22  There is 0-19% right Internal Carotid Stenosis.  There is 0-19% left Internal Carotid Stenosis.     Cath: 7/18/22 (->CABG 7/25/22: LIMA-LAD, SVG-R, SVG-PDA)  LVEDP: 14mmHg  LVEF: 30% by echo  Wall Motion: LAD WMA  Dominance: Right  LM: distal 60%, iFR 0.86  LAD: prox  after S1, distal vessel fills via L-L and R-L george  Ramus: large, MLI  LCx: large, mid 20-30%, no step-up with iFR pullback  RCA: mid , dist vessel fills via L-L and R-L george  Hemostasis:  R Radial band  Impression:  NSTEMI  LM/2V CAD  Severe LV dysfxn  Severe PAD s/p recent L SFA PTAS, needing L 3rd toe amputation +/- RLE angio  R rad vasband for hemostasis  Plan:  Cont med rx.  Cont ASA/Plavix/Statin.  Start entresto/toprol, titrate as BP/HR will allow.  Case d/w Dr. Wynn, will transfer to [facility] for CABG vs MV PCI eval.  Lifevest ordered.  Repeat echo in 3 months (mid Oct 2022) for reassessment of LVEF.      ASSESSMENT:   # CAD/ACS s/p CABG x3V 7/2022, doing well.  # ICM, EF 30%->40%->30%->40 (echo 2/2024), appears euvolemic  # hyperkalemia, now off aldactone and stopped eating bananas  # PAD, followed by Dr. Rich, no claudicant sxs.  # dyslipidemia on atorva 80mg  # Ao root dil, 3.8cm (echo 8/2023)    PLAN:   Cont med rx  Cont ASA 81mg qd  Cont Xarelto 2.5mg bid  Inc entresto 49/51mg bid  Check BMP 1 week  RTC 6 months (Sept 2024)      Noah Huffman MD, [facility]

## 2024-03-07 NOTE — LETTER
March 7, 2024        [facility]  [address] [place] [address]             [facility] - [facility]  [address]  [address]  [facility] [address]  Phone: [phone]   Patient: Yo Pink   MR Number: [id number]   YOB: 1965   Date of Visit: 3/7/2024           Thank you for referring Yo Pink to me for evaluation. Attached you will find relevant portions of my assessment and plan of care.    If you have questions, please do not hesitate to call me. I look forward to following Yo Pink along with you.    Sincerely,      Noah Huffman MD            CC  No Recipients    Enclosure

## 2024-03-07 NOTE — TELEPHONE ENCOUNTER
Spoke with pharmacist, medicaid is rejecting all diagnosis codes listed on last note. They will only accept code for heart failure, please advise

## 2024-03-11 NOTE — SUBJECTIVE & OBJECTIVE
Follow-up For: Procedure(s) (LRB):  Left heart cath (Left)  Instantaneous Wave-Free Ratio (IFR) (N/A)    Post-Operative Day: 6 Days Post-Op     Past Medical History:   Diagnosis Date    PAD (peripheral artery disease)        Past Surgical History:   Procedure Laterality Date    ANGIOGRAPHY OF LOWER EXTREMITY Left 2022    Procedure: Angiogram Extremity Unilateral;  Surgeon: Mehul Rich MD;  Location: [facility] OR;  Service: Vascular;  Laterality: Left;  RN PREOP ON 22. BINAX ON 22---NEED CONSENT    LEFT HEART CATHETERIZATION Left 2022    Procedure: Left heart cath;  Surgeon: Noah Huffman MD;  Location: [facility] CATH LAB;  Service: Cardiology;  Laterality: Left;  not before 9am, R rad access       Review of patient's allergies indicates:  No Known Allergies    Family History    None       Tobacco Use    Smoking status: Former Smoker     Quit date: 2022     Years since quittin.1    Smokeless tobacco: Never Used   Substance and Sexual Activity    Alcohol use: Never    Drug use: Not Currently    Sexual activity: Not on file      Review of Systems   Constitutional:  Negative for chills, diaphoresis, fatigue and fever.   HENT:  Negative for congestion.    Respiratory:  Negative for cough, chest tightness and shortness of breath.    Cardiovascular:  Negative for chest pain and palpitations.   Gastrointestinal:  Negative for abdominal pain, nausea and vomiting.   Genitourinary:  Negative for difficulty urinating and dysuria.   Musculoskeletal:  Positive for gait problem and myalgias (BLE).   Neurological:  Negative for dizziness, syncope, light-headedness and headaches.   Objective:     Vital Signs (Most Recent):  Temp: 97.5 °F (36.4 °C) (22)  Pulse: 69 (22)  Resp: 18 (22)  BP: (!) 146/96 (22)  SpO2: 98 % (22)   Vital Signs (24h Range):  Temp:  [97.5 °F (36.4 °C)-98.5 °F (36.9 °C)] 97.5 °F (36.4 °C)  Pulse:  [63-75] 69  Resp:  [18-21]  18  SpO2:  [97 %-100 %] 98 %  BP: (127-152)/(76-96) 146/96     Weight: 77.1 kg (170 lb)  Body mass index is 24.39 kg/m².      Intake/Output Summary (Last 24 hours) at 7/24/2022 0421  Last data filed at 7/23/2022 2122  Gross per 24 hour   Intake 480 ml   Output 1000 ml   Net -520 ml       Physical Exam  Vitals and nursing note reviewed.   Constitutional:       General: He is not in acute distress.     Appearance: Normal appearance. He is not ill-appearing.   HENT:      Head: Normocephalic and atraumatic.   Eyes:      General: No scleral icterus.     Extraocular Movements: Extraocular movements intact.      Conjunctiva/sclera: Conjunctivae normal.   Cardiovascular:      Rate and Rhythm: Normal rate and regular rhythm.      Comments: R DP 1+   L DP dopplerable   Pulmonary:      Effort: Pulmonary effort is normal. No respiratory distress.      Comments: On RA  Abdominal:      General: There is no distension.      Palpations: Abdomen is soft.      Tenderness: There is no abdominal tenderness.   Musculoskeletal:      Right lower leg: No edema.      Left lower leg: No edema.      Comments: LLE 3rd toe with dry gangrene    Skin:     General: Skin is warm and dry.   Neurological:      General: No focal deficit present.      Mental Status: He is alert and oriented to person, place, and time.     Lines/Drains/Airways       Peripheral Intravenous Line  Duration                  Peripheral IV - Single Lumen 07/24/22 0130 22 G Right Hand <1 day                    Significant Labs:    CBC/Anemia Profile:  No results for input(s): WBC, HGB, HCT, PLT, MCV, RDW, IRON, FERRITIN, RETIC, FOLATE, [id number], OCCULTBLOOD in the last 48 hours.     Chemistries:  Recent Labs   Lab 07/22/22  0503      K 3.7      CO2 24   BUN 12   CREATININE 0.7   CALCIUM 9.4       All pertinent labs within the past 24 hours have been reviewed.    Significant Imaging: I have reviewed all pertinent imaging results/findings within the past 24  hours.    TTE 7/16/22  The left ventricle is normal in size with moderate concentric hypertrophy and moderately decreased systolic function.  The estimated ejection fraction is 30%.  Mod-severe global hypokinesis with LAD territory WMA.  Grade II left ventricular diastolic dysfunction.  Normal right ventricular size with normal right ventricular systolic function.  Mild-to-moderate mitral regurgitation.  Mild tricuspid regurgitation.  The estimated PA systolic pressure is 62 mmHg.  There is pulmonary hypertension.    C 7/18/22  LVEDP: 14mmHg  LVEF: 30%  Wall Motion: LAD WMA     Dominance: Right  LM: distal 60%, iFR 0.86  LAD: prox  after S1, distal vessel fills via L-L and R-L george  Ramus: large, MLI  LCx: large, mid 20-30%, no step-up with iFR pullback  RCA: mid , dist vessel fills via L-L and R-L george    Carotid US 7/19/22  0-19% right and left carotid stenosis   No

## 2024-03-14 ENCOUNTER — LAB VISIT (OUTPATIENT)
Dept: LAB | Facility: HOSPITAL | Age: 59
End: 2024-03-14
Attending: INTERNAL MEDICINE
Payer: MEDICAID

## 2024-03-14 DIAGNOSIS — I25.5 ISCHEMIC CARDIOMYOPATHY: ICD-10-CM

## 2024-03-14 LAB
ANION GAP SERPL CALC-SCNC: 11 MMOL/L (ref 8–16)
BUN SERPL-MCNC: 14 MG/DL (ref 6–20)
CALCIUM SERPL-MCNC: 8.7 MG/DL (ref 8.7–10.5)
CHLORIDE SERPL-SCNC: 104 MMOL/L (ref 95–110)
CO2 SERPL-SCNC: 19 MMOL/L (ref 23–29)
CREAT SERPL-MCNC: 1 MG/DL (ref 0.5–1.4)
EST. GFR  (NO RACE VARIABLE): >60 ML/MIN/1.73 M^2
GLUCOSE SERPL-MCNC: 118 MG/DL (ref 70–110)
POTASSIUM SERPL-SCNC: 4.5 MMOL/L (ref 3.5–5.1)
SODIUM SERPL-SCNC: 134 MMOL/L (ref 136–145)

## 2024-03-14 PROCEDURE — 80048 BASIC METABOLIC PNL TOTAL CA: CPT | Performed by: INTERNAL MEDICINE

## 2024-03-14 PROCEDURE — 36415 COLL VENOUS BLD VENIPUNCTURE: CPT | Performed by: INTERNAL MEDICINE

## 2024-09-03 RX ORDER — SPIRONOLACTONE 25 MG/1
25 TABLET ORAL
Qty: 90 TABLET | Refills: 3 | OUTPATIENT
Start: 2024-09-03

## 2024-09-05 RX ORDER — RIVAROXABAN 2.5 MG/1
2.5 TABLET, FILM COATED ORAL 2 TIMES DAILY
Qty: 180 TABLET | Refills: 3 | Status: SHIPPED | OUTPATIENT
Start: 2024-09-05

## 2024-09-30 ENCOUNTER — OFFICE VISIT (OUTPATIENT)
Dept: CARDIOLOGY | Facility: CLINIC | Age: 59
End: 2024-09-30
Payer: MEDICAID

## 2024-09-30 VITALS
SYSTOLIC BLOOD PRESSURE: 122 MMHG | DIASTOLIC BLOOD PRESSURE: 86 MMHG | HEART RATE: 64 BPM | WEIGHT: 173.63 LBS | RESPIRATION RATE: 18 BRPM | OXYGEN SATURATION: 97 % | BODY MASS INDEX: 24.86 KG/M2 | HEIGHT: 70 IN

## 2024-09-30 DIAGNOSIS — I77.810 AORTIC ROOT DILATATION: ICD-10-CM

## 2024-09-30 DIAGNOSIS — I50.22 HEART FAILURE WITH MID-RANGE EJECTION FRACTION (HFMEF): ICD-10-CM

## 2024-09-30 DIAGNOSIS — I25.810 CORONARY ARTERY DISEASE INVOLVING CORONARY BYPASS GRAFT OF NATIVE HEART WITHOUT ANGINA PECTORIS: ICD-10-CM

## 2024-09-30 DIAGNOSIS — Z95.1 S/P CABG (CORONARY ARTERY BYPASS GRAFT): ICD-10-CM

## 2024-09-30 DIAGNOSIS — E78.5 DYSLIPIDEMIA: ICD-10-CM

## 2024-09-30 DIAGNOSIS — R94.31 ABNORMAL EKG: ICD-10-CM

## 2024-09-30 DIAGNOSIS — I25.5 ISCHEMIC CARDIOMYOPATHY: Primary | ICD-10-CM

## 2024-09-30 DIAGNOSIS — I73.9 PAD (PERIPHERAL ARTERY DISEASE): ICD-10-CM

## 2024-09-30 PROCEDURE — 93005 ELECTROCARDIOGRAM TRACING: CPT | Mod: PBBFAC | Performed by: INTERNAL MEDICINE

## 2024-09-30 PROCEDURE — 99214 OFFICE O/P EST MOD 30 MIN: CPT | Mod: PBBFAC | Performed by: INTERNAL MEDICINE

## 2024-09-30 PROCEDURE — 1160F RVW MEDS BY RX/DR IN RCRD: CPT | Mod: CPTII,,, | Performed by: INTERNAL MEDICINE

## 2024-09-30 PROCEDURE — 99214 OFFICE O/P EST MOD 30 MIN: CPT | Mod: S$PBB,,, | Performed by: INTERNAL MEDICINE

## 2024-09-30 PROCEDURE — 93010 ELECTROCARDIOGRAM REPORT: CPT | Mod: S$PBB,,, | Performed by: INTERNAL MEDICINE

## 2024-09-30 PROCEDURE — 4010F ACE/ARB THERAPY RXD/TAKEN: CPT | Mod: CPTII,,, | Performed by: INTERNAL MEDICINE

## 2024-09-30 PROCEDURE — G2211 COMPLEX E/M VISIT ADD ON: HCPCS | Mod: S$PBB,,, | Performed by: INTERNAL MEDICINE

## 2024-09-30 PROCEDURE — 99999 PR PBB SHADOW E&M-EST. PATIENT-LVL IV: CPT | Mod: PBBFAC,,, | Performed by: INTERNAL MEDICINE

## 2024-09-30 PROCEDURE — 3079F DIAST BP 80-89 MM HG: CPT | Mod: CPTII,,, | Performed by: INTERNAL MEDICINE

## 2024-09-30 PROCEDURE — 3074F SYST BP LT 130 MM HG: CPT | Mod: CPTII,,, | Performed by: INTERNAL MEDICINE

## 2024-09-30 PROCEDURE — 3008F BODY MASS INDEX DOCD: CPT | Mod: CPTII,,, | Performed by: INTERNAL MEDICINE

## 2024-09-30 PROCEDURE — 1159F MED LIST DOCD IN RCRD: CPT | Mod: CPTII,,, | Performed by: INTERNAL MEDICINE

## 2024-09-30 RX ORDER — SACUBITRIL AND VALSARTAN 49; 51 MG/1; MG/1
1 TABLET, FILM COATED ORAL 2 TIMES DAILY
Qty: 180 TABLET | Refills: 3 | Status: SHIPPED | OUTPATIENT
Start: 2024-09-30

## 2024-09-30 NOTE — PROGRESS NOTES
CARDIOVASCULAR PROGRESS NOTE    REASON FOR CONSULT:   Yo Pink is a 59 y.o. male who presents for follow up of CAD/CABG/ICM/PAD.    PCP: [facility]: [facility]  HISTORY OF PRESENT ILLNESS:   The patient returns for follow up.  He denies intercurrent angina, dyspnea, palpitations, or syncope.  There has been no PND, orthopnea, melena, hematuria, or claudication symptoms.  He continues to take his aspirin and low-dose Xarelto without any difficulty.    CARDIOVASCULAR HISTORY:   CAD ACS/NSTEMI 7/18/22: MV CAD->CABG 7/25/22 (Tianna): LIMA-LAD, SVG-R, SVG-PDA    ICM, EF 30%->40% (echo 1/30/23)    PAD, followed by Dr. Rich    Ao root dil, 3.8cm (echo 8/2023)    PAST MEDICAL HISTORY:     Past Medical History:   Diagnosis Date    Anticoagulant long-term use     Cardiomyopathy     Coronary artery disease     Myocardial infarction     PAD (peripheral artery disease)     Stroke        PAST SURGICAL HISTORY:     Past Surgical History:   Procedure Laterality Date    ANGIOGRAPHY OF LOWER EXTREMITY Left 7/5/2022    Procedure: Angiogram Extremity Unilateral;  Surgeon: Mehul Rich MD;  Location: [facility];  Service: Vascular;  Laterality: Left;  RN PREOP ON 7/1/22. BINAX ON 7/5/22---NEED CONSENT    ANGIOGRAPHY OF LOWER EXTREMITY Right 10/4/2022    Procedure: Angiogram Extremity Unilateral;  Surgeon: Mehul Rich MD;  Location: [facility] OR;  Service: Vascular;  Laterality: Right;  RN PHONE PREOP 9/27/2022    BINAX DAY OF PROCEDURE    CORONARY ARTERY BYPASS GRAFT (CABG) N/A 7/25/2022    Procedure: CORONARY ARTERY BYPASS GRAFT (CABG) X 3;  Surgeon: Kenton Wynn MD;  Location: [facility] OR [address] [place] [address];  Service: Cardiovascular;  Laterality: N/A;    DEBRIDEMENT OF FOOT Bilateral 9/19/2022    Procedure: DEBRIDEMENT, FOOT;  Surgeon: Mehul Rich MD;  Location: [facility] OR;  Service: Vascular;  Laterality: Bilateral;    ENDOSCOPIC HARVEST OF VEIN Left 7/25/2022    Procedure: SURGICAL PROCUREMENT, VEIN, ENDOSCOPIC;   Surgeon: Kenton Wynn MD;  Location: [address];  Service: Cardiovascular;  Laterality: Left;  Vein harvest start: 1337  Vein harvest stop: 1427  Vein prep start: 1428  Vein prep stop: 1454    LEFT HEART CATHETERIZATION Left 7/18/2022    Procedure: Left heart cath;  Surgeon: Noah Huffman MD;  Location: [facility] CATH LAB;  Service: Cardiology;  Laterality: Left;  not before 9am, R rad access    TOE AMPUTATION Bilateral 9/19/2022    Procedure: AMPUTATION, TOE THIRD TOE WITH BILATERAL FOOT DEBRIDEMENT;  Surgeon: Mehul Rich MD;  Location: [facility];  Service: Vascular;  Laterality: Bilateral;  RN PREOP 9/15/2022   BINAX DAY OF SURGERY----NEED ORDERS--CLEARED BY CARDS    WOUND DEBRIDEMENT Right 10/17/2022    Procedure: DEBRIDEMENT, WOUND, RIGHT FOOT;  Surgeon: Mehul Rich MD;  Location: [facility];  Service: Vascular;  Laterality: Right;  RN PHONE PREOP 10/11--BINAX ON ARRIVAL---NEED CONSENT, H/P , ORDERS       ALLERGIES AND MEDICATION:   Review of patient's allergies indicates:  No Known Allergies     Medication List            Accurate as of September 30, 2024 10:19 AM. If you have any questions, ask your nurse or doctor.                CONTINUE taking these medications      aspirin 81 MG EC tablet  Commonly known as: ECOTRIN  Take 1 tablet (81 mg total) by mouth once daily.     atorvastatin 80 MG tablet  Commonly known as: LIPITOR  TAKE 1 TABLET(80 MG) BY MOUTH EVERY EVENING     ENTRESTO 49-51 mg per tablet  Generic drug: sacubitriL-valsartan  Take 1 tablet by mouth 2 (two) times daily.     metoprolol succinate 25 MG 24 hr tablet  Commonly known as: TOPROL-XL  Take 1 tablet (25 mg total) by mouth once daily.     pantoprazole 20 MG tablet  Commonly known as: PROTONIX  Take 1 tablet (20 mg total) by mouth once daily.     XARELTO 2.5 mg Tab  Generic drug: rivaroxaban  Take 1 tablet (2.5 mg total) by mouth 2 (two) times daily.              SOCIAL HISTORY:     Social History     Socioeconomic  History    Marital status: Single   Tobacco Use    Smoking status: Former     Current packs/day: 0.00     Types: Cigarettes     Quit date: 2022     Years since quittin.3    Smokeless tobacco: Never   Substance and Sexual Activity    Alcohol use: Never    Drug use: Not Currently     Social Drivers of Health     Financial Resource Strain: Medium Risk (2024)    Overall Financial Resource Strain (CARDIA)     Difficulty of Paying Living Expenses: Somewhat hard   Food Insecurity: Food Insecurity Present (2024)    Hunger Vital Sign     Worried About Running Out of Food in the Last Year: Never true     Ran Out of Food in the Last Year: Sometimes true   Transportation Needs: No Transportation Needs (2022)    PRAPARE - Transportation     Lack of Transportation (Medical): No     Lack of Transportation (Non-Medical): No   Physical Activity: Sufficiently Active (2024)    Exercise Vital Sign     Days of Exercise per Week: 6 days     Minutes of Exercise per Session: 30 min   Stress: Stress Concern Present (2024)    [employer] [place] [employer] - Occupational Stress Questionnaire     Feeling of Stress : Rather much   Housing Stability: Low Risk  (2022)    Housing Stability Vital Sign     Unable to Pay for Housing in the Last Year: No     Number of Places Lived in the Last Year: 1     Unstable Housing in the Last Year: No       FAMILY HISTORY:     Family History   Problem Relation Name Age of Onset    Peripheral vascular disease Mother      Cancer Father         REVIEW OF SYSTEMS:   Review of Systems   Constitutional:  Negative for chills, diaphoresis and fever.   HENT:  Negative for nosebleeds.    Eyes:  Negative for blurred vision, double vision and photophobia.   Respiratory:  Negative for hemoptysis, shortness of breath and wheezing.    Cardiovascular:  Negative for chest pain, palpitations, orthopnea, claudication, leg swelling and PND.   Gastrointestinal:  Negative for  "abdominal pain, blood in stool, heartburn, melena, nausea and vomiting.   Genitourinary:  Negative for flank pain and hematuria.   Musculoskeletal:  Negative for falls, myalgias and neck pain.   Skin:  Negative for rash.   Neurological:  Negative for dizziness, seizures, loss of consciousness, weakness and headaches.   Endo/Heme/Allergies:  Negative for polydipsia. Does not bruise/bleed easily.   Psychiatric/Behavioral:  Negative for depression and memory loss. The patient is not nervous/anxious.        PHYSICAL EXAM:     Vitals:    09/30/24 1016   BP: 122/86   Pulse: 64   Resp: 18        Body mass index is 24.91 kg/m².  Weight: 78.8 kg (173 lb 9.8 oz)   Height: 5' 10" (177.8 cm)     Physical Exam  Vitals reviewed.   Constitutional:       General: He is not in acute distress.     Appearance: Normal appearance. He is well-developed and normal weight. He is not ill-appearing, toxic-appearing or diaphoretic.   HENT:      Head: Normocephalic and atraumatic.   Eyes:      General: No scleral icterus.     Extraocular Movements: Extraocular movements intact.      Conjunctiva/sclera: Conjunctivae normal.      Pupils: Pupils are equal, round, and reactive to light.   Neck:      Thyroid: No thyromegaly.      Vascular: Normal carotid pulses. No carotid bruit or JVD.      Trachea: Trachea normal.   Cardiovascular:      Rate and Rhythm: Normal rate and regular rhythm.      Pulses:           Carotid pulses are 2+ on the right side and 2+ on the left side.     Heart sounds: S1 normal and S2 normal. No murmur heard.     No friction rub. No gallop.      Comments: Sternotomy well healed  Pulmonary:      Effort: Pulmonary effort is normal. No respiratory distress.      Breath sounds: Normal breath sounds. No stridor. No wheezing, rhonchi or rales.   Chest:      Chest wall: No tenderness.   Abdominal:      General: There is no distension.      Palpations: Abdomen is soft.   Musculoskeletal:         General: No swelling or tenderness. " Normal range of motion.      Cervical back: Normal range of motion and neck supple. No edema or rigidity.      Right lower leg: No edema.      Left lower leg: No edema.   Feet:      Right foot:      Skin integrity: No ulcer.      Left foot:      Skin integrity: No ulcer.   Skin:     General: Skin is warm and dry.      Coloration: Skin is not jaundiced.   Neurological:      General: No focal deficit present.      Mental Status: He is alert and oriented to person, place, and time.      Cranial Nerves: No cranial nerve deficit.   Psychiatric:         Mood and Affect: Mood normal.         Speech: Speech normal.         Behavior: Behavior normal. Behavior is cooperative.         DATA:   EKG: (personally reviewed tracing(s)):  9/30/24 SR58, IRBBB, ASMI age indet, NSSTTW changes, similar to 3/7/24    Laboratory:  CBC:  Recent Labs   Lab 09/18/22  0313 09/19/22  1206 10/17/22  1201   WBC 10.49 9.28 6.77   Hemoglobin 12.8 L 12.0 L 12.5 L   Hematocrit 37.7 L 37.4 L 39.6 L   Platelets 328 314 298       CHEMISTRIES:  Recent Labs   Lab 07/28/22  0341 07/28/22  1118 07/29/22  0643 07/30/22  0412 09/15/22  1610 09/18/22  0313 09/29/22  0828 11/08/23  1147 11/15/23  0929 03/14/24  0834   Glucose 106  --  108 82  --  146 H   < > 155 H 116 H 118 H   Sodium 133 L  --  134 L 132 L  --  130 L   < > 135 L 137 134 L   Potassium 3.4 L   < > 3.9 3.9  --  3.4 L   < > 4.4 4.9 4.5   BUN 11  --  12 17  --  19   < > 19 14 14   Creatinine 0.6  --  0.6 0.7  --  0.7   < > 0.9 0.8 1.0   eGFR if African American >60.0  --  >60.0 >60.0  --   --   --   --   --   --    Calcium 8.5 L  --  8.7 8.9  --  8.9   < > 9.2 9.2 8.7   Magnesium 1.9   < > 2.0 1.8 2.3 1.8  --   --   --   --     < > = values in this interval not displayed.       CARDIAC BIOMARKERS:  Recent Labs   Lab 07/16/22  0101 07/16/22  0644   Troponin I 0.073 H 0.114 H       COAGS:  Recent Labs   Lab 07/26/22  0306 09/15/22  1610 09/18/22  0313   INR 1.3 H 1.0 1.2       LIPIDS/LFTS:  Recent  Labs   Lab 07/16/22  0644 07/24/22  0621 07/30/22  0412 09/18/22  0313 09/25/23  0910   Cholesterol 144  --   --   --  108 L   Triglycerides 122  --   --   --  94   HDL 35 L  --   --   --  35 L   LDL Cholesterol 84.6  --   --   --  54.2 L   Non-HDL Cholesterol 109  --   --   --  73   AST  --    < > 26 31 20   ALT  --    < > 13 23 22    < > = values in this interval not displayed.       Cardiovascular Testing:  Echo 2/6/24    Left Ventricle: The left ventricle is normal in size. Mildly increased wall thickness. There is mild concentric hypertrophy. Mild global hypokinesis and regional wall motion abnormalities present (severe anteroapical hypokinesis c/w prior LAD territory MI). There is mildly reduced systolic function with a visually estimated ejection fraction of 40 - 45%. Grade I diastolic dysfunction.    Right Ventricle: Normal right ventricular cavity size. Systolic function is normal.    Pulmonary Artery: The estimated pulmonary artery systolic pressure is 32 mmHg.    CAREY 11/21/22  Right lower extremity pressures and waveforms indicate no hemodynamically significant arterial occlusive disease.  Left lower extremity pressures and waveforms indicate no hemodynamically significant arterial occlusive disease.    LE art US 10/17/22  -Right lower extremity arterial ultrasound shows an occluded AT with 50% TP trunk stenosis.  Pressures indicate no arterial occlusive disease.  -Left lower extremity arterial ultrasound shows a patent SFA/popliteal stent without hemodynamically significant stenosis.  Pressures indicate no arterial occlusive disease.    Carotid US 7/19/22  There is 0-19% right Internal Carotid Stenosis.  There is 0-19% left Internal Carotid Stenosis.     Cath: 7/18/22 (->CABG 7/25/22: LIMA-LAD, SVG-R, SVG-PDA)  LVEDP: 14mmHg  LVEF: 30% by echo  Wall Motion: LAD WMA  Dominance: Right  LM: distal 60%, iFR 0.86  LAD: prox  after S1, distal vessel fills via L-L and R-L george  Ramus: large, MLI  LCx: large,  mid 20-30%, no step-up with iFR pullback  RCA: mid , dist vessel fills via L-L and R-L george  Hemostasis:  R Radial band  Impression:  NSTEMI  LM/2V CAD  Severe LV dysfxn  Severe PAD s/p recent L SFA PTAS, needing L 3rd toe amputation +/- RLE angio  R rad vasband for hemostasis  Plan:  Cont med rx.  Cont ASA/Plavix/Statin.  Start entresto/toprol, titrate as BP/HR will allow.  Case d/w Dr. Wynn, will transfer to [facility] for CABG vs MV PCI eval.  Lifevest ordered.  Repeat echo in 3 months (mid Oct 2022) for reassessment of LVEF.      ASSESSMENT:   # CAD/ACS s/p CABG x3V 7/2022, doing well.  # ICM, EF 30%->40%->30%->40 (echo 2/2024), appears euvolemic  # hx hyperkalemia, now off aldactone and stopped eating bananas, resolved.  # PAD, followed by Dr. Rich, no claudicant sxs.  # dyslipidemia on atorva 80mg  # Ao root dil, 3.8cm (echo 8/2023)    PLAN:   Cont med rx  Cont ASA 81mg qd  Cont Xarelto 2.5mg bid  RTC 1 year with echo (Sept 2025)    The above documents medical care services that are part of ongoing care related to this patient's serious/complex condition (Code )(CAD/ICM/PAD/HLP).        Noah Huffman MD, FACC

## 2024-10-01 LAB
OHS QRS DURATION: 94 MS
OHS QTC CALCULATION: 420 MS

## 2025-03-10 RX ORDER — METOPROLOL SUCCINATE 25 MG/1
TABLET, EXTENDED RELEASE ORAL
Qty: 90 TABLET | Refills: 3 | Status: SHIPPED | OUTPATIENT
Start: 2025-03-10

## 2025-04-20 RX ORDER — ATORVASTATIN CALCIUM 80 MG/1
80 TABLET, FILM COATED ORAL NIGHTLY
Qty: 90 TABLET | Refills: 3 | Status: SHIPPED | OUTPATIENT
Start: 2025-04-20

## 2025-08-13 ENCOUNTER — TELEPHONE (OUTPATIENT)
Dept: CARDIOLOGY | Facility: CLINIC | Age: 60
End: 2025-08-13
Payer: MEDICAID

## 2025-08-13 RX ORDER — RIVAROXABAN 2.5 MG/1
2.5 TABLET, FILM COATED ORAL 2 TIMES DAILY
Qty: 180 TABLET | Refills: 1 | Status: SHIPPED | OUTPATIENT
Start: 2025-08-13

## (undated) DEVICE — DRAIN CHEST DRY SUCTION

## (undated) DEVICE — SUT CTD VICRYL 0 UND CR/CTX

## (undated) DEVICE — ELECTRODE REM PLYHSV RETURN 9

## (undated) DEVICE — DRESSING TELFA STRL 4X3 LF

## (undated) DEVICE — KIT MANIFOLD LOW PRESS TUBING

## (undated) DEVICE — PACK ARTHROSCOPY W/ISO BAC

## (undated) DEVICE — BANDAGE ELAS SOFTWRAP ST 4X5YD

## (undated) DEVICE — ADHESIVE DERMABOND ADVANCED

## (undated) DEVICE — SPONGE DERMACEA GAUZE 4X4

## (undated) DEVICE — KIT SYR REUSABLE

## (undated) DEVICE — SOL 9P NACL IRR PIC IL

## (undated) DEVICE — TUBING HF INSUFFLATION W/ FLTR

## (undated) DEVICE — PAD DEFIB CADENCE ADULT R2

## (undated) DEVICE — HEMOSTAT SURGICEL NU-KNIT 6X9

## (undated) DEVICE — SUT SILK 2-0 SH 18IN BLACK

## (undated) DEVICE — CLIP SPRING 6MM

## (undated) DEVICE — Device

## (undated) DEVICE — TAPE SURG MEDIPORE 6X72IN

## (undated) DEVICE — BLADE 4IN EDGE INSULATED

## (undated) DEVICE — GLOVE SURG BIOGEL LATEX SZ 7.5

## (undated) DEVICE — PAD PREP 50/CA

## (undated) DEVICE — DRESSING TRANS 4X4 TEGADERM

## (undated) DEVICE — CATH JACKY RADIAL 3.5 110CM

## (undated) DEVICE — SUT 2/0 30IN ETHIBOND

## (undated) DEVICE — SUT SILK BLK BR. 2 2-60

## (undated) DEVICE — RETRACTOR OCTOBASE INSERT HOLD

## (undated) DEVICE — PAD CAST SPECIALIST STRL 4

## (undated) DEVICE — KIT HAND CONTROL HIGH PRESSUR

## (undated) DEVICE — KIT ESSENTIALS W/ Y ADAPTER

## (undated) DEVICE — SUT 6/0 4-24IN PROLENE

## (undated) DEVICE — CONTAINER SPECIMEN STRL 4OZ

## (undated) DEVICE — SEE MEDLINE ITEM 146271

## (undated) DEVICE — DRAPE CVMAX SPLIT ANES SCRN

## (undated) DEVICE — DRESSING N ADH OIL EMUL 3X8

## (undated) DEVICE — PAD RADI FEMORAL

## (undated) DEVICE — PUNCH AORTIC 4.8MM

## (undated) DEVICE — CANNULA VESSEL FREE FLOW

## (undated) DEVICE — NDL BOX COUNTER

## (undated) DEVICE — SEE MEDLINE ITEM 146292

## (undated) DEVICE — SUT 6 18IN STEEL MONO CCS

## (undated) DEVICE — TRAY HEART OMC

## (undated) DEVICE — DRESSING TRANS 2X2 TEGADERM

## (undated) DEVICE — PAD K-THERMIA 24IN X 60IN

## (undated) DEVICE — KIT PROBE COVER WITH GEL

## (undated) DEVICE — WIRE GUIDE SAFE-T-J .035 260CM

## (undated) DEVICE — KIT SAHARA DRAPE DRAW/LIFT

## (undated) DEVICE — TOURNIQUET TOURNIKWIK 2 TB 6IN

## (undated) DEVICE — GOWN POLY REINF BRTH SLV XL

## (undated) DEVICE — CONNECTOR Y 3/8X3/8X3/8

## (undated) DEVICE — DRAIN CHANNEL ROUND 19FR

## (undated) DEVICE — COVERS PROBE NR-48 STERILE

## (undated) DEVICE — COVER OVERHEAD SURG LT BLUE

## (undated) DEVICE — KIT GLIDESHEATH SLEND 6FR 10CM

## (undated) DEVICE — DRESSING AQUACEL SACRAL 9 X 9

## (undated) DEVICE — OMNIPAQUE 350MG 150ML VIAL

## (undated) DEVICE — SEE MEDLINE ITEM 154981

## (undated) DEVICE — GAUZE SPONGE 4X4 12PLY

## (undated) DEVICE — SUT 2/0 30IN SILK BLK BRAI

## (undated) DEVICE — APPLIER LIGACLIP SM 9.38IN

## (undated) DEVICE — SUT MONOCRYL 4-0 PS-1 UND

## (undated) DEVICE — VALVE CONTROL COPILOT

## (undated) DEVICE — CANISTER SUCTION 2 LTR

## (undated) DEVICE — BLADE SAW STERN .076MM 6.1MM L

## (undated) DEVICE — COLLECTOR SPECIMEN ANAEROBIC

## (undated) DEVICE — WIRE INTRAMYOCARDIAL TEMP

## (undated) DEVICE — SYR ONLY LUER LOCK 20CC

## (undated) DEVICE — APPLIER CLIP LIAGCLIP 9.375IN

## (undated) DEVICE — BLOWER MISTER

## (undated) DEVICE — NDL 18GA X1 1/2 REG BEVEL

## (undated) DEVICE — DRESSING ADH ISLAND 3.6 X 14

## (undated) DEVICE — BANDAGE ACE ELASTIC 6"

## (undated) DEVICE — SUT PROLENE 4-0 SH BLU 36IN

## (undated) DEVICE — SET DECANTER MEDICHOICE

## (undated) DEVICE — TOWEL OR DISP STRL BLUE 4/PK

## (undated) DEVICE — SYS VIRTUOSAPH PLUS EVM

## (undated) DEVICE — CAUTERY PUSHBUTTON PENCIL

## (undated) DEVICE — INSERTS STEALTH FIBRA SIZE 5

## (undated) DEVICE — TIP YANKAUERS BULB NO VENT

## (undated) DEVICE — SUT PROLENE 7-0 BV-1 30

## (undated) DEVICE — SUT PROLENE 4-0 RB-1 BL MO

## (undated) DEVICE — BLADE SAW STERNAL 5/BX

## (undated) DEVICE — PAD RADIOLUCENT STAT ADULT

## (undated) DEVICE — HEMOSTAT VASC BAND REG 24CM

## (undated) DEVICE — SUT 4-0 12-18IN SILK BLACK

## (undated) DEVICE — MARKER SKIN STND TIP BLUE BARR

## (undated) DEVICE — BANDAGE ELAS SOFTWRAP ST 6X5YD

## (undated) DEVICE — SWAB AEROBIC CULTURETTE

## (undated) DEVICE — KIT URINARY CATH URINE METER

## (undated) DEVICE — BLADE ELECTRO EDGE INSULATED

## (undated) DEVICE — GUIDE LAUNCHER 6FR EBU 3.5

## (undated) DEVICE — PLEDGET SUT SOFT 3/8X3/16X1/16

## (undated) DEVICE — GUIDEWIRE OMNI J TIP 185CM

## (undated) DEVICE — DRAPE SLUSH WARMER WITH DISC

## (undated) DEVICE — SHOE POST-OP MEN LARGE

## (undated) DEVICE — PACK CATH LAB

## (undated) DEVICE — PAD CAST SPECIALIST STRL 3

## (undated) DEVICE — SYR 50CC LL

## (undated) DEVICE — GLOVE BIOGEL PI MICRO SZ 7.5